# Patient Record
Sex: FEMALE | Race: BLACK OR AFRICAN AMERICAN | NOT HISPANIC OR LATINO | Employment: FULL TIME | RURAL
[De-identification: names, ages, dates, MRNs, and addresses within clinical notes are randomized per-mention and may not be internally consistent; named-entity substitution may affect disease eponyms.]

---

## 2022-11-01 ENCOUNTER — LAB VISIT (OUTPATIENT)
Dept: PRIMARY CARE CLINIC | Facility: CLINIC | Age: 52
End: 2022-11-01

## 2022-11-01 DIAGNOSIS — Z11.1 SCREENING-PULMONARY TB: Primary | ICD-10-CM

## 2022-11-01 PROCEDURE — 86580 TB INTRADERMAL TEST: CPT | Mod: ,,, | Performed by: NURSE PRACTITIONER

## 2022-11-01 PROCEDURE — 86580 POCT TB SKIN TEST: ICD-10-PCS | Mod: ,,, | Performed by: NURSE PRACTITIONER

## 2022-11-01 NOTE — PROGRESS NOTES
Subjective:       Patient ID: Stuart Cloud is a 52 y.o. female.    Chief Complaint: No chief complaint on file.    HPI  Review of Systems      Objective:      Physical Exam    Assessment:       Problem List Items Addressed This Visit    None  Visit Diagnoses       Screening-pulmonary TB    -  Primary              Plan:       TB skin test only

## 2022-11-03 ENCOUNTER — HOSPITAL ENCOUNTER (EMERGENCY)
Facility: HOSPITAL | Age: 52
Discharge: HOME | End: 2022-11-03
Payer: MEDICAID

## 2022-11-03 ENCOUNTER — LAB VISIT (OUTPATIENT)
Dept: PRIMARY CARE CLINIC | Facility: CLINIC | Age: 52
End: 2022-11-03
Payer: MEDICAID

## 2022-11-03 VITALS
DIASTOLIC BLOOD PRESSURE: 77 MMHG | OXYGEN SATURATION: 95 % | SYSTOLIC BLOOD PRESSURE: 135 MMHG | WEIGHT: 248 LBS | RESPIRATION RATE: 18 BRPM | TEMPERATURE: 99 F | HEART RATE: 67 BPM | BODY MASS INDEX: 39.86 KG/M2 | HEIGHT: 66 IN

## 2022-11-03 DIAGNOSIS — Z23 FLU VACCINE NEED: Primary | ICD-10-CM

## 2022-11-03 DIAGNOSIS — M17.11 PRIMARY OSTEOARTHRITIS OF RIGHT KNEE: Primary | ICD-10-CM

## 2022-11-03 DIAGNOSIS — R52 PAIN: ICD-10-CM

## 2022-11-03 LAB
TB INDURATION - 48 HR READ: NORMAL
TB INDURATION - 72 HR READ: NORMAL
TB SKIN TEST - 48 HR READ: NEGATIVE
TB SKIN TEST - 72 HR READ: NORMAL

## 2022-11-03 PROCEDURE — 90686 FLU VACCINE (QUAD) GREATER THAN OR EQUAL TO 3YO PRESERVATIVE FREE IM: ICD-10-PCS | Mod: ,,, | Performed by: NURSE PRACTITIONER

## 2022-11-03 PROCEDURE — 99283 PR EMERGENCY DEPT VISIT,LEVEL III: ICD-10-PCS | Mod: ,,, | Performed by: NURSE PRACTITIONER

## 2022-11-03 PROCEDURE — 99283 EMERGENCY DEPT VISIT LOW MDM: CPT | Mod: ,,, | Performed by: NURSE PRACTITIONER

## 2022-11-03 PROCEDURE — 96372 THER/PROPH/DIAG INJ SC/IM: CPT

## 2022-11-03 PROCEDURE — 90471 FLU VACCINE (QUAD) GREATER THAN OR EQUAL TO 3YO PRESERVATIVE FREE IM: ICD-10-PCS | Mod: ,,, | Performed by: NURSE PRACTITIONER

## 2022-11-03 PROCEDURE — 99284 EMERGENCY DEPT VISIT MOD MDM: CPT

## 2022-11-03 PROCEDURE — 90471 IMMUNIZATION ADMIN: CPT | Mod: ,,, | Performed by: NURSE PRACTITIONER

## 2022-11-03 PROCEDURE — 63600175 PHARM REV CODE 636 W HCPCS: Performed by: NURSE PRACTITIONER

## 2022-11-03 PROCEDURE — 90686 IIV4 VACC NO PRSV 0.5 ML IM: CPT | Mod: ,,, | Performed by: NURSE PRACTITIONER

## 2022-11-03 RX ORDER — IBUPROFEN 800 MG/1
800 TABLET ORAL EVERY 6 HOURS PRN
Qty: 20 TABLET | Refills: 0 | Status: SHIPPED | OUTPATIENT
Start: 2022-11-03 | End: 2023-03-29 | Stop reason: ALTCHOICE

## 2022-11-03 RX ORDER — METHOCARBAMOL 500 MG/1
1000 TABLET, FILM COATED ORAL 3 TIMES DAILY PRN
Qty: 30 TABLET | Refills: 0 | Status: SHIPPED | OUTPATIENT
Start: 2022-11-03 | End: 2022-11-08

## 2022-11-03 RX ORDER — KETOROLAC TROMETHAMINE 30 MG/ML
30 INJECTION, SOLUTION INTRAMUSCULAR; INTRAVENOUS
Status: COMPLETED | OUTPATIENT
Start: 2022-11-03 | End: 2022-11-03

## 2022-11-03 RX ADMIN — KETOROLAC TROMETHAMINE 30 MG: 30 INJECTION, SOLUTION INTRAMUSCULAR at 02:11

## 2022-11-03 NOTE — PROGRESS NOTES
Subjective:       Patient ID: Stuart Colud is a 52 y.o. female.    Chief Complaint: No chief complaint on file.    HPI  Review of Systems      Objective:      Physical Exam    Assessment:       Problem List Items Addressed This Visit    None  Visit Diagnoses       Flu vaccine need    -  Primary    Relevant Orders    Influenza - Quadrivalent *Preferred* (6 months+) (PF)            Plan:       Flu vaccine only

## 2022-11-03 NOTE — ED TRIAGE NOTES
PATIENT PRESENTS TO ER WITH COMPLAINT OF RIGHT KNEE PAIN. REPORTS THAT SHE HAS PAIN AND HAD IT EVALUATED BEFORE BUT JUST GOT MEDICAL INSURANCE AND NEEDS A MRI

## 2022-11-03 NOTE — ED PROVIDER NOTES
Encounter Date: 11/3/2022       History     Chief Complaint   Patient presents with    Knee Pain     52 year old female presents to ED with complaint of right knee pain. She states pain has been chronic to right knee since having repair of right hip approximately 6 months ago. 6 weeks ago she received an injection to her knee that lasted approximately 4 days. She endorses knee instability and locking/popping to knee. She states she is unable to sit with her knee bent due to pain and decreased ROM. Patient is from out of town and is requesting MRI of knee due to previous workup from other ortho and changes in insurance    The history is provided by the patient. No  was used.   Review of patient's allergies indicates:  No Known Allergies  No past medical history on file.  No past surgical history on file.  No family history on file.     Review of Systems   Musculoskeletal:  Positive for arthralgias and gait problem. Negative for joint swelling.   All other systems reviewed and are negative.    Physical Exam     Initial Vitals [11/03/22 1311]   BP Pulse Resp Temp SpO2   122/65 95 19 98.5 °F (36.9 °C) 97 %      MAP       --         Physical Exam    Nursing note and vitals reviewed.  Constitutional: She appears well-developed and well-nourished.   HENT:   Head: Normocephalic and atraumatic.   Eyes: EOM are normal. Pupils are equal, round, and reactive to light.   Neck: Neck supple.   Normal range of motion.  Cardiovascular:  Normal rate and regular rhythm.           Pulmonary/Chest: She has no wheezes. She has no rhonchi.   Abdominal: Abdomen is soft. She exhibits no distension. There is no abdominal tenderness.   Musculoskeletal:         General: Tenderness present. No edema.      Cervical back: Normal range of motion and neck supple.      Right lower leg: Swelling and tenderness present.     Neurological: She is alert and oriented to person, place, and time.   Skin: Skin is warm and dry. Capillary  refill takes less than 2 seconds.   Psychiatric: She has a normal mood and affect. Thought content normal.       Medical Screening Exam   See Full Note    ED Course   Procedures  Labs Reviewed - No data to display       Imaging Results              X-Ray Knee AP LAT with [place] Right (Final result)  Result time 11/03/22 13:38:18   Procedure changed from X-Ray Knee 3 View Right     Final result by Александр Cheng DO (11/03/22 13:38:18)                   Impression:      No acute findings    Mild degenerative change      Electronically signed by: Александр Cheng  Date:    11/03/2022  Time:    13:38               Narrative:    EXAMINATION:  XR KNEE AP LAT WITH [place] RIGHT    CLINICAL HISTORY:  knee pain;Pain, unspecified    TECHNIQUE:  XR KNEE AP LAT WITH SUNRISE RIGHT    COMPARISON:  None    FINDINGS:  No acute fracture or dislocation.    Mild degenerative change    No radiopaque foreign bodies.                                       Medications   ketorolac injection 30 mg (30 mg Intramuscular Given 11/3/22 1407)                       Clinical Impression:   Final diagnoses:  [R52] Pain  [M17.11] Primary osteoarthritis of right knee (Primary)        ED Disposition Condition    Discharge Stable          ED Prescriptions       Medication Sig Dispense Start Date End Date Auth. Provider    methocarbamoL (ROBAXIN) 500 MG Tab Take 2 tablets (1,000 mg total) by mouth 3 (three) times daily as needed (pain). 30 tablet 11/3/2022 11/8/2022 SHANICE Herring    ibuprofen (ADVIL,MOTRIN) 800 MG tablet Take 1 tablet (800 mg total) by mouth every 6 (six) hours as needed for Pain. 20 tablet 11/3/2022 -- SHANICE Herring          Follow-up Information    None          SHANICE Herring  11/03/22 1413       SHANICE Herring  11/03/22 1416

## 2022-11-03 NOTE — Clinical Note
"Bethany"BETHANY" Pedro Luis was seen and treated in our emergency department on 11/3/2022.  She may return to work on 11/07/2022.       If you have any questions or concerns, please don't hesitate to call.      SHANICE Herring"

## 2023-01-22 ENCOUNTER — HOSPITAL ENCOUNTER (EMERGENCY)
Facility: HOSPITAL | Age: 53
Discharge: HOME | End: 2023-01-22
Payer: MEDICAID

## 2023-01-22 VITALS
BODY MASS INDEX: 39.37 KG/M2 | DIASTOLIC BLOOD PRESSURE: 79 MMHG | RESPIRATION RATE: 18 BRPM | SYSTOLIC BLOOD PRESSURE: 147 MMHG | TEMPERATURE: 98 F | HEART RATE: 79 BPM | OXYGEN SATURATION: 98 % | WEIGHT: 245 LBS | HEIGHT: 66 IN

## 2023-01-22 DIAGNOSIS — R05.9 COUGH, UNSPECIFIED TYPE: ICD-10-CM

## 2023-01-22 DIAGNOSIS — U07.1 COVID-19 VIRUS INFECTION: Primary | ICD-10-CM

## 2023-01-22 PROCEDURE — 99283 PR EMERGENCY DEPT VISIT,LEVEL III: ICD-10-PCS | Mod: ,,, | Performed by: NURSE PRACTITIONER

## 2023-01-22 PROCEDURE — 99283 EMERGENCY DEPT VISIT LOW MDM: CPT | Mod: 25

## 2023-01-22 PROCEDURE — 99283 EMERGENCY DEPT VISIT LOW MDM: CPT | Mod: ,,, | Performed by: NURSE PRACTITIONER

## 2023-01-22 RX ORDER — HYDROCHLOROTHIAZIDE 12.5 MG/1
12.5 TABLET ORAL DAILY
COMMUNITY
End: 2023-03-16 | Stop reason: SDUPTHER

## 2023-01-22 RX ORDER — LOSARTAN POTASSIUM 25 MG/1
25 TABLET ORAL DAILY
COMMUNITY
End: 2023-03-16 | Stop reason: SDUPTHER

## 2023-01-22 NOTE — DISCHARGE INSTRUCTIONS
Take Mucinex 600-1200 mg twice a day. Use Flonase nasal spray daily. Take Vitamin C 1000mg daily, Vitamin D3 1000units daily, Zinc 25-50 mg daily, aspirin 325 mg daily. Drink plenty of liquids. Take Tylenol or ibuprofen as needed for pain. Return to the ED for trouble breathing or chest pain.

## 2023-01-22 NOTE — Clinical Note
"Stuart ARDONVENUS" Pedro Luis was seen and treated in our emergency department on 1/22/2023.  She may return to work on 01/25/2023.       If you have any questions or concerns, please don't hesitate to call.      Padmini Faye NP"

## 2023-01-22 NOTE — ED PROVIDER NOTES
Encounter Date: 1/22/2023       History     Chief Complaint   Patient presents with    Cough    COVID-19 Concerns     Presented with c/o persistent cough, burning nose, chills, bodyaches and general malaise. Was diagnosed with COVID 19 on 1/16. Denies dyspnea, abd pain or n/v/d.  was only given 5 days of the paxlovid for COVID 19. (Explained that is normal dosing).  has not been taking Mucinex or vitamin regimen or using flonase. States COVID 19 vaccine previously. Reports that she is concerned about having pneumonia.    Review of patient's allergies indicates:   Allergen Reactions    Tylox [oxycodone-acetaminophen] Itching    Sulfa (sulfonamide antibiotics) Rash    Zithromax z-casimiro [azithromycin] Rash     No past medical history on file.  No past surgical history on file.  No family history on file.     Review of Systems   Constitutional:  Positive for activity change, appetite change, chills and fatigue. Negative for fever.   HENT:  Positive for congestion and ear pain.    Respiratory:  Positive for cough. Negative for shortness of breath and wheezing.    Cardiovascular:  Negative for chest pain.   Gastrointestinal:  Negative for abdominal pain, diarrhea, nausea and vomiting.   Genitourinary: Negative.    Musculoskeletal: Negative.    Skin: Negative.    Neurological: Negative.    Psychiatric/Behavioral: Negative.       Physical Exam     Initial Vitals [01/22/23 1642]   BP Pulse Resp Temp SpO2   (!) 148/77 83 16 98.2 °F (36.8 °C) 100 %      MAP       --         Physical Exam    Nursing note and vitals reviewed.  Constitutional: She appears well-developed and well-nourished.   HENT:   Head: Normocephalic.   Right Ear: External ear normal.   Left Ear: External ear normal.   Nose: Nose normal.   Mouth/Throat: Oropharynx is clear and moist.   Eyes: Conjunctivae and EOM are normal. Pupils are equal, round, and reactive to light.   Neck: Neck supple.   Normal range of motion.  Cardiovascular:  Normal rate,  regular rhythm, normal heart sounds and intact distal pulses.           No murmur heard.  Pulmonary/Chest: Breath sounds normal. No respiratory distress. She has no wheezes. She has no rhonchi. She has no rales. She exhibits no tenderness.   Abdominal: Abdomen is soft. Bowel sounds are normal. There is no abdominal tenderness.   Musculoskeletal:         General: Normal range of motion.      Cervical back: Normal range of motion and neck supple.     Neurological: She is alert and oriented to person, place, and time. She has normal strength. GCS score is 15. GCS eye subscore is 4. GCS verbal subscore is 5. GCS motor subscore is 6.   Skin: Skin is warm and dry. Capillary refill takes less than 2 seconds.   Psychiatric: She has a normal mood and affect.       Medical Screening Exam   See Full Note    ED Course   Procedures  Labs Reviewed - No data to display       Imaging Results              X-Ray Chest PA And Lateral (Final result)  Result time 01/22/23 17:27:03      Final result by Александр Cheng DO (01/22/23 17:27:03)                   Impression:      No acute pulmonary disease      Electronically signed by: Александр Cheng  Date:    01/22/2023  Time:    17:27               Narrative:    EXAMINATION:  XR CHEST PA AND LATERAL    CLINICAL HISTORY:  COVID-19    TECHNIQUE:  XR CHEST PA AND LATERAL    COMPARISON:  None    FINDINGS:  No lines or tubes.    Lungs are clear.    Normal pleura.    Cardiac silhouette is normal    No obvious acute bone findings.                                    X-Rays:   Independently Interpreted Readings:   Chest X-Ray: No acute findings.   Medications - No data to display  Medical Decision Making:   ED Management:  Pt is afebrile. O2 sat is 100% on RA. HR 80s. Lungs are clear bilat. With no wheeze, rhonchi or rales. CXR clear of pneumonia. Instructed to take Mucinex and use Flonase and take the Vit C, Vit D and zinc. Instructed to return to the ED if she develops dyspnea.                  Clinical Impression:   Final diagnoses:  [U07.1] COVID-19 virus infection (Primary)  [R05.9] Cough, unspecified type        ED Disposition Condition    Discharge Stable          ED Prescriptions    None       Follow-up Information       Follow up With Specialties Details Why Contact Info    Gonzalo Aburto MD Family Medicine In 1 day  [address] [place] [address]  [facility] [place] [address]  [phone]               Padmini Faye NP  01/22/23 4488

## 2023-01-25 ENCOUNTER — OFFICE VISIT (OUTPATIENT)
Dept: ORTHOPEDICS | Facility: CLINIC | Age: 53
End: 2023-01-25
Payer: MEDICAID

## 2023-01-25 DIAGNOSIS — M23.91 INTERNAL DERANGEMENT OF KNEE JOINT, RIGHT: ICD-10-CM

## 2023-01-25 DIAGNOSIS — M25.561 ACUTE PAIN OF RIGHT KNEE: Primary | ICD-10-CM

## 2023-01-25 PROCEDURE — 1159F PR MEDICATION LIST DOCUMENTED IN MEDICAL RECORD: ICD-10-PCS | Mod: CPTII,,, | Performed by: ORTHOPAEDIC SURGERY

## 2023-01-25 PROCEDURE — 99203 OFFICE O/P NEW LOW 30 MIN: CPT | Mod: S$PBB,,, | Performed by: ORTHOPAEDIC SURGERY

## 2023-01-25 PROCEDURE — 99203 PR OFFICE/OUTPT VISIT, NEW, LEVL III, 30-44 MIN: ICD-10-PCS | Mod: S$PBB,,, | Performed by: ORTHOPAEDIC SURGERY

## 2023-01-25 PROCEDURE — 1159F MED LIST DOCD IN RCRD: CPT | Mod: CPTII,,, | Performed by: ORTHOPAEDIC SURGERY

## 2023-01-25 PROCEDURE — 99212 OFFICE O/P EST SF 10 MIN: CPT | Mod: PBBFAC | Performed by: ORTHOPAEDIC SURGERY

## 2023-01-25 NOTE — PROGRESS NOTES
Clinic Note        CC:   Chief Complaint   Patient presents with    Right Knee - Pain        Principal problem: Acute pain of right knee [M25.561]     REASON FOR VISIT:       HISTORY:  53-year-old female who has had right knee pain for over 6 months her pain is worse when she stands hurts when she squats she has mechanical symptoms she is done physical therapy prior to moving here from [place] she is having pain over the posterior medial joint line of the knee as at previous injections without relief the symptoms.      PAST MEDICAL HISTORY: History reviewed. No pertinent past medical history.       PAST SURGICAL HISTORY: History reviewed. No pertinent surgical history.       ALLERGIES:   Review of patient's allergies indicates:   Allergen Reactions    Tylox [oxycodone-acetaminophen] Itching    Sulfa (sulfonamide antibiotics) Rash    Zithromax z-casimiro [azithromycin] Rash        MEDICATIONS :    Current Outpatient Medications:     hydroCHLOROthiazide (HYDRODIURIL) 12.5 MG Tab, Take 12.5 mg by mouth once daily., Disp: , Rfl:     ibuprofen (ADVIL,MOTRIN) 800 MG tablet, Take 1 tablet (800 mg total) by mouth every 6 (six) hours as needed for Pain., Disp: 20 tablet, Rfl: 0    losartan (COZAAR) 25 MG tablet, Take 25 mg by mouth once daily., Disp: , Rfl:      SOCIAL HISTORY:   Social History     Socioeconomic History    Marital status: Single          FAMILY HISTORY: History reviewed. No pertinent family history.       PHYSICAL EXAM:  In general, this is a well-developed, well-nourished female . The patient is alert, oriented and cooperative.      HEENT:  Normocephalic, atraumatic.  Extraocular movements are intact bilaterally.  The oropharynx is benign.       NECK:  Nontender with good range of motion.      LUNGS:  Clear to auscultation bilaterally.      HEART:  Demonstrates a regular rate and rhythm.  No murmurs appreciated.      ABDOMEN:  Soft, non-tender, non-distended.        EXTREMITIES:  Right lower extremity  she has motor function 5/5 sensation to touch has palpable pulses tender to palpation of the posterior medial joint line pain on Jacques's testing no instability noted      RADIOGRAPHIC FINDINGS:  X-rays show some degenerative changes more involved medial compartment      IMPRESSION: Acute pain of right knee    Internal derangement of knee joint, right         PLAN:  This time we discussed treatment options and will get an MRI of her right knee she is having mechanical symptoms I suspect a medial meniscus tear I will check her back after the MRI  There are no Patient Instructions on file for this visit.      Follow up in about 6 weeks (around 3/8/2023).         Tacos Aburto      (Subject to voice recognition error, transcription service not allowed)

## 2023-01-26 ENCOUNTER — TELEPHONE (OUTPATIENT)
Dept: ORTHOPEDICS | Facility: CLINIC | Age: 53
End: 2023-01-26
Payer: MEDICAID

## 2023-02-23 ENCOUNTER — TELEPHONE (OUTPATIENT)
Dept: ORTHOPEDICS | Facility: CLINIC | Age: 53
End: 2023-02-23
Payer: MEDICAID

## 2023-02-23 NOTE — TELEPHONE ENCOUNTER
Attempted to call patient with MRI results of right knee.  No answer, voice mail was full and unable to leave a message.

## 2023-02-23 NOTE — TELEPHONE ENCOUNTER
Patient called back and I gave her MRI results of her right knee. MRI shows lateral meniscus tear and Dr. Aburto recommends right knee scope.  Patient agrees and states she needs to check her calendar for when spring break is and she will call back to set up surgery.

## 2023-03-13 ENCOUNTER — HOSPITAL ENCOUNTER (EMERGENCY)
Facility: HOSPITAL | Age: 53
Discharge: HOME | End: 2023-03-13
Payer: MEDICAID

## 2023-03-13 VITALS
RESPIRATION RATE: 17 BRPM | HEIGHT: 66 IN | TEMPERATURE: 98 F | OXYGEN SATURATION: 97 % | DIASTOLIC BLOOD PRESSURE: 84 MMHG | HEART RATE: 62 BPM | BODY MASS INDEX: 41.62 KG/M2 | SYSTOLIC BLOOD PRESSURE: 133 MMHG | WEIGHT: 259 LBS

## 2023-03-13 DIAGNOSIS — K59.00 CONSTIPATION, UNSPECIFIED CONSTIPATION TYPE: Primary | ICD-10-CM

## 2023-03-13 LAB
ALBUMIN SERPL BCP-MCNC: 3.6 G/DL (ref 3.5–5)
ALBUMIN/GLOB SERPL: 1.1 {RATIO}
ALP SERPL-CCNC: 82 U/L (ref 41–108)
ALT SERPL W P-5'-P-CCNC: 29 U/L (ref 13–56)
ANION GAP SERPL CALCULATED.3IONS-SCNC: 10 MMOL/L (ref 7–16)
AST SERPL W P-5'-P-CCNC: 14 U/L (ref 15–37)
BASOPHILS # BLD AUTO: 0.03 K/UL (ref 0–0.2)
BASOPHILS NFR BLD AUTO: 0.6 % (ref 0–1)
BILIRUB SERPL-MCNC: 0.6 MG/DL (ref ?–1.2)
BUN SERPL-MCNC: 14 MG/DL (ref 7–18)
BUN/CREAT SERPL: 20 (ref 6–20)
CALCIUM SERPL-MCNC: 9.1 MG/DL (ref 8.5–10.1)
CHLORIDE SERPL-SCNC: 107 MMOL/L (ref 98–107)
CO2 SERPL-SCNC: 29 MMOL/L (ref 21–32)
CREAT SERPL-MCNC: 0.71 MG/DL (ref 0.55–1.02)
DIFFERENTIAL METHOD BLD: ABNORMAL
EGFR (NO RACE VARIABLE) (RUSH/TITUS): 103 ML/MIN/1.73M²
EOSINOPHIL # BLD AUTO: 0.12 K/UL (ref 0–0.5)
EOSINOPHIL NFR BLD AUTO: 2.5 % (ref 1–4)
ERYTHROCYTE [DISTWIDTH] IN BLOOD BY AUTOMATED COUNT: 12.6 % (ref 11.5–14.5)
GLOBULIN SER-MCNC: 3.2 G/DL (ref 2–4)
GLUCOSE SERPL-MCNC: 113 MG/DL (ref 74–106)
HCT VFR BLD AUTO: 39.2 % (ref 38–47)
HGB BLD-MCNC: 12.9 G/DL (ref 12–16)
IMM GRANULOCYTES # BLD AUTO: 0.01 K/UL (ref 0–0.04)
IMM GRANULOCYTES NFR BLD: 0.2 % (ref 0–0.4)
LYMPHOCYTES # BLD AUTO: 1.64 K/UL (ref 1–4.8)
LYMPHOCYTES NFR BLD AUTO: 34.8 % (ref 27–41)
MCH RBC QN AUTO: 29.3 PG (ref 27–31)
MCHC RBC AUTO-ENTMCNC: 32.9 G/DL (ref 32–36)
MCV RBC AUTO: 88.9 FL (ref 80–96)
MONOCYTES # BLD AUTO: 0.31 K/UL (ref 0–0.8)
MONOCYTES NFR BLD AUTO: 6.6 % (ref 2–6)
MPC BLD CALC-MCNC: 11.3 FL (ref 9.4–12.4)
NEUTROPHILS # BLD AUTO: 2.6 K/UL (ref 1.8–7.7)
NEUTROPHILS NFR BLD AUTO: 55.3 % (ref 53–65)
NRBC # BLD AUTO: 0 X10E3/UL
NRBC, AUTO (.00): 0 %
PLATELET # BLD AUTO: 226 K/UL (ref 150–400)
POTASSIUM SERPL-SCNC: 3.4 MMOL/L (ref 3.5–5.1)
PROT SERPL-MCNC: 6.8 G/DL (ref 6.4–8.2)
RBC # BLD AUTO: 4.41 M/UL (ref 4.2–5.4)
SODIUM SERPL-SCNC: 143 MMOL/L (ref 136–145)
WBC # BLD AUTO: 4.71 K/UL (ref 4.5–11)

## 2023-03-13 PROCEDURE — 99284 PR EMERGENCY DEPT VISIT,LEVEL IV: ICD-10-PCS | Mod: ,,, | Performed by: NURSE PRACTITIONER

## 2023-03-13 PROCEDURE — 99284 EMERGENCY DEPT VISIT MOD MDM: CPT | Mod: ,,, | Performed by: NURSE PRACTITIONER

## 2023-03-13 PROCEDURE — 85025 COMPLETE CBC W/AUTO DIFF WBC: CPT | Performed by: NURSE PRACTITIONER

## 2023-03-13 PROCEDURE — 99285 EMERGENCY DEPT VISIT HI MDM: CPT | Mod: 25

## 2023-03-13 PROCEDURE — 80053 COMPREHEN METABOLIC PANEL: CPT | Performed by: NURSE PRACTITIONER

## 2023-03-13 RX ORDER — LORATADINE 10 MG/1
10 TABLET ORAL DAILY
Qty: 60 TABLET | Refills: 0 | Status: SHIPPED | OUTPATIENT
Start: 2023-03-13 | End: 2023-03-29 | Stop reason: ALTCHOICE

## 2023-03-13 RX ORDER — POLYETHYLENE GLYCOL 3350 17 G/17G
17 POWDER, FOR SOLUTION ORAL DAILY
Qty: 10 PACKET | Refills: 0 | Status: SHIPPED | OUTPATIENT
Start: 2023-03-13 | End: 2023-03-13 | Stop reason: SDUPTHER

## 2023-03-13 RX ORDER — FLUTICASONE PROPIONATE 50 MCG
1 SPRAY, SUSPENSION (ML) NASAL 2 TIMES DAILY PRN
Qty: 15 G | Refills: 0 | Status: SHIPPED | OUTPATIENT
Start: 2023-03-13 | End: 2023-05-01

## 2023-03-13 RX ORDER — POLYETHYLENE GLYCOL 3350 17 G/17G
17 POWDER, FOR SOLUTION ORAL DAILY
Qty: 10 PACKET | Refills: 0 | Status: SHIPPED | OUTPATIENT
Start: 2023-03-13 | End: 2023-06-22 | Stop reason: CLARIF

## 2023-03-13 RX ORDER — FLUTICASONE PROPIONATE 50 MCG
1 SPRAY, SUSPENSION (ML) NASAL 2 TIMES DAILY PRN
Qty: 15 G | Refills: 0 | Status: SHIPPED | OUTPATIENT
Start: 2023-03-13 | End: 2023-03-13 | Stop reason: SDUPTHER

## 2023-03-13 RX ORDER — DOCUSATE SODIUM 100 MG/1
100 CAPSULE, LIQUID FILLED ORAL 2 TIMES DAILY PRN
Qty: 60 CAPSULE | Refills: 0 | Status: SHIPPED | OUTPATIENT
Start: 2023-03-13 | End: 2023-03-13 | Stop reason: SDUPTHER

## 2023-03-13 RX ORDER — DOCUSATE SODIUM 100 MG/1
100 CAPSULE, LIQUID FILLED ORAL 2 TIMES DAILY PRN
Qty: 60 CAPSULE | Refills: 0 | Status: SHIPPED | OUTPATIENT
Start: 2023-03-13 | End: 2023-03-29 | Stop reason: ALTCHOICE

## 2023-03-13 RX ORDER — LORATADINE 10 MG/1
10 TABLET ORAL DAILY
Qty: 60 TABLET | Refills: 0 | Status: SHIPPED | OUTPATIENT
Start: 2023-03-13 | End: 2023-03-13 | Stop reason: SDUPTHER

## 2023-03-13 NOTE — Clinical Note
"Stuart CALDERÓN" Pedro Luis was seen and treated in our emergency department on 3/13/2023.  She may return to work on 03/14/2023.       If you have any questions or concerns, please don't hesitate to call.      Missy WILKINSON    "

## 2023-03-13 NOTE — DISCHARGE INSTRUCTIONS
Drink plenty of fluids.  Eat fresh fruits and vegetables.  Follow up with your primary care provider in 2 days. Return to the emergency department for any increase in symptoms or for any other new or worrisome symptoms.

## 2023-03-16 ENCOUNTER — OFFICE VISIT (OUTPATIENT)
Dept: FAMILY MEDICINE | Facility: CLINIC | Age: 53
End: 2023-03-16
Payer: MEDICAID

## 2023-03-16 VITALS
HEART RATE: 76 BPM | WEIGHT: 293 LBS | BODY MASS INDEX: 47.09 KG/M2 | HEIGHT: 66 IN | DIASTOLIC BLOOD PRESSURE: 82 MMHG | SYSTOLIC BLOOD PRESSURE: 145 MMHG

## 2023-03-16 DIAGNOSIS — E66.01 CLASS 3 SEVERE OBESITY DUE TO EXCESS CALORIES WITH BODY MASS INDEX (BMI) OF 45.0 TO 49.9 IN ADULT, UNSPECIFIED WHETHER SERIOUS COMORBIDITY PRESENT: ICD-10-CM

## 2023-03-16 DIAGNOSIS — I10 ESSENTIAL HYPERTENSION: Primary | ICD-10-CM

## 2023-03-16 DIAGNOSIS — E03.9 ACQUIRED HYPOTHYROIDISM: ICD-10-CM

## 2023-03-16 DIAGNOSIS — R73.9 HYPERGLYCEMIA: ICD-10-CM

## 2023-03-16 PROCEDURE — 3008F PR BODY MASS INDEX (BMI) DOCUMENTED: ICD-10-PCS | Mod: CPTII,,, | Performed by: FAMILY MEDICINE

## 2023-03-16 PROCEDURE — 4010F PR ACE/ARB THEARPY RXD/TAKEN: ICD-10-PCS | Mod: CPTII,,, | Performed by: FAMILY MEDICINE

## 2023-03-16 PROCEDURE — 3008F BODY MASS INDEX DOCD: CPT | Mod: CPTII,,, | Performed by: FAMILY MEDICINE

## 2023-03-16 PROCEDURE — 99204 PR OFFICE/OUTPT VISIT, NEW, LEVL IV, 45-59 MIN: ICD-10-PCS | Mod: ,,, | Performed by: FAMILY MEDICINE

## 2023-03-16 PROCEDURE — 3079F DIAST BP 80-89 MM HG: CPT | Mod: CPTII,,, | Performed by: FAMILY MEDICINE

## 2023-03-16 PROCEDURE — 3077F PR MOST RECENT SYSTOLIC BLOOD PRESSURE >= 140 MM HG: ICD-10-PCS | Mod: CPTII,,, | Performed by: FAMILY MEDICINE

## 2023-03-16 PROCEDURE — 99204 OFFICE O/P NEW MOD 45 MIN: CPT | Mod: ,,, | Performed by: FAMILY MEDICINE

## 2023-03-16 PROCEDURE — 3079F PR MOST RECENT DIASTOLIC BLOOD PRESSURE 80-89 MM HG: ICD-10-PCS | Mod: CPTII,,, | Performed by: FAMILY MEDICINE

## 2023-03-16 PROCEDURE — 1159F PR MEDICATION LIST DOCUMENTED IN MEDICAL RECORD: ICD-10-PCS | Mod: CPTII,,, | Performed by: FAMILY MEDICINE

## 2023-03-16 PROCEDURE — 4010F ACE/ARB THERAPY RXD/TAKEN: CPT | Mod: CPTII,,, | Performed by: FAMILY MEDICINE

## 2023-03-16 PROCEDURE — 3077F SYST BP >= 140 MM HG: CPT | Mod: CPTII,,, | Performed by: FAMILY MEDICINE

## 2023-03-16 PROCEDURE — 1159F MED LIST DOCD IN RCRD: CPT | Mod: CPTII,,, | Performed by: FAMILY MEDICINE

## 2023-03-16 RX ORDER — PHENTERMINE HYDROCHLORIDE 37.5 MG/1
37.5 CAPSULE ORAL EVERY MORNING
Qty: 30 CAPSULE | Refills: 2 | Status: SHIPPED | OUTPATIENT
Start: 2023-03-16 | End: 2023-03-29 | Stop reason: ALTCHOICE

## 2023-03-16 RX ORDER — HYDROCHLOROTHIAZIDE 12.5 MG/1
12.5 TABLET ORAL DAILY
Qty: 30 TABLET | Refills: 2 | Status: SHIPPED | OUTPATIENT
Start: 2023-03-16 | End: 2023-03-29 | Stop reason: ALTCHOICE

## 2023-03-16 RX ORDER — LEVOTHYROXINE SODIUM 25 UG/1
25 TABLET ORAL
Qty: 30 TABLET | Refills: 2 | Status: SHIPPED | OUTPATIENT
Start: 2023-03-16 | End: 2023-05-01 | Stop reason: SDUPTHER

## 2023-03-16 RX ORDER — LEVOTHYROXINE SODIUM 25 UG/1
25 TABLET ORAL
COMMUNITY
Start: 2022-09-22 | End: 2023-03-16 | Stop reason: SDUPTHER

## 2023-03-16 RX ORDER — LOSARTAN POTASSIUM 25 MG/1
25 TABLET ORAL DAILY
Qty: 30 TABLET | Refills: 2 | Status: SHIPPED | OUTPATIENT
Start: 2023-03-16 | End: 2023-05-01 | Stop reason: SDUPTHER

## 2023-03-20 PROBLEM — I10 ESSENTIAL HYPERTENSION: Status: ACTIVE | Noted: 2023-03-20

## 2023-03-20 PROBLEM — E03.9 ACQUIRED HYPOTHYROIDISM: Status: ACTIVE | Noted: 2023-03-20

## 2023-03-20 PROBLEM — R73.9 HYPERGLYCEMIA: Status: ACTIVE | Noted: 2023-03-20

## 2023-03-20 PROBLEM — E66.01 CLASS 3 SEVERE OBESITY DUE TO EXCESS CALORIES WITH BODY MASS INDEX (BMI) OF 45.0 TO 49.9 IN ADULT: Status: ACTIVE | Noted: 2023-03-20

## 2023-03-20 PROBLEM — E66.813 CLASS 3 SEVERE OBESITY DUE TO EXCESS CALORIES WITH BODY MASS INDEX (BMI) OF 45.0 TO 49.9 IN ADULT: Status: ACTIVE | Noted: 2023-03-20

## 2023-03-20 NOTE — ASSESSMENT & PLAN NOTE
Patient requests help with weight loss.  Repeat currently does not qualify for Ozempic.  Will try Adipex 37.5 mg p.o. q.d. for no longer than 6 months.  Can consider adding Topamax to this.

## 2023-03-20 NOTE — PROGRESS NOTES
"              Sarbjit Akins IV, DO  [facility]  [address] [place] [address]  Phone: [phone]      Subjective     Name: Stuart Cloud   Sex: female  YOB: 1970 (53 y.o.)  MRN: [id number]  Visit Date: 03/20/2023   Chief Complaint: Establish Care and Medication Refill (Wants to lose weight/Wants referral to ob/gyn/Gallbladder trouble)        HISTORY OF PRESENT ILLNESS:    Chief Complaint   Patient presents with    Establish Care    Medication Refill     Wants to lose weight  Wants referral to ob/gyn  Gallbladder trouble       HPI  See tests that keep you healthy and the problem oriented documentation below.    Tests to Keep You Healthy    Mammogram: DUE  Colon Cancer Screening: DUE  Cervical Cancer Screening: DUE  Last Blood Pressure <= 139/89 (3/16/2023): NO        Portions of this note may have been created with voice recognition software. Occasional "wrong-word" or "sound-a-like" substitutions may have occurred due to the inherent limitations of voice recognition software. Please, read the note carefully and recognize, using context, where substitutions have occurred.     PAST MEDICAL HISTORY:  Significant Diagnoses - Patient  has a past medical history of Hypertension and Hypothyroidism.  Medications - Patient has a current medication list which includes the following long-term medication(s): hydrochlorothiazide, levothyroxine, loratadine, and losartan.   Allergies - Patient is allergic to tylox [oxycodone-acetaminophen], latex, sulfa (sulfonamide antibiotics), and zithromax z-casimiro [azithromycin].  Surgeries - Patient  has no past surgical history on file.  Family History - Patient family history is not on file.      SOCIAL HISTORY:  Tobacco - Patient  reports that she has never smoked. She has never used smokeless tobacco.   Alcohol - Patient  reports no history of alcohol use.   Recreational Drugs - Patient  reports no history of drug use.       Review of Systems " "  All other systems reviewed and are negative.       Past Medical History:   Diagnosis Date    Hypertension     Hypothyroidism         Review of patient's allergies indicates:   Allergen Reactions    Tylox [oxycodone-acetaminophen] Itching    Latex     Sulfa (sulfonamide antibiotics) Rash    Zithromax z-casimiro [azithromycin] Rash        History reviewed. No pertinent surgical history.     History reviewed. No pertinent family history.    Current Outpatient Medications   Medication Instructions    docusate sodium (COLACE) 100 mg, Oral, 2 times daily PRN    fluticasone propionate (FLONASE) 50 mcg, Each Nostril, 2 times daily PRN    hydroCHLOROthiazide (HYDRODIURIL) 12.5 mg, Oral, Daily    ibuprofen (ADVIL,MOTRIN) 800 mg, Oral, Every 6 hours PRN    levothyroxine (SYNTHROID) 25 mcg, Oral, Before breakfast    loratadine (CLARITIN) 10 mg, Oral, Daily    losartan (COZAAR) 25 mg, Oral, Daily    phentermine 37.5 mg, Oral, Every morning    polyethylene glycol (GLYCOLAX) 17 g, Oral, Daily        Objective     BP (!) 145/82   Pulse 76   Ht 5' 6" (1.676 m)   Wt 134.3 kg (296 lb)   BMI 47.78 kg/m²     Physical Exam  Constitutional:       General: She is not in acute distress.     Appearance: Normal appearance. She is not ill-appearing.   HENT:      Head: Normocephalic and atraumatic.      Right Ear: External ear normal.      Left Ear: External ear normal.   Eyes:      Extraocular Movements: Extraocular movements intact.      Conjunctiva/sclera: Conjunctivae normal.   Cardiovascular:      Rate and Rhythm: Normal rate.      Heart sounds: No murmur heard.  Pulmonary:      Effort: Pulmonary effort is normal.   Musculoskeletal:      Cervical back: Normal range of motion.   Skin:     General: Skin is warm and dry.      Coloration: Skin is not jaundiced.      Findings: No rash.   Neurological:      Mental Status: She is alert.   Psychiatric:         Mood and Affect: Mood normal.        All recently obtained labs have " been reviewed and discussed in detail with the patient.   Assessment     1. Essential hypertension    2. Acquired hypothyroidism    3. Hyperglycemia    4. Class 3 severe obesity due to excess calories with body mass index (BMI) of 45.0 to 49.9 in adult, unspecified whether serious comorbidity present         Plan        Problem List Items Addressed This Visit          Cardiac/Vascular    Essential hypertension - Primary    Relevant Medications    hydroCHLOROthiazide (HYDRODIURIL) 12.5 MG Tab    losartan (COZAAR) 25 MG tablet    Other Relevant Orders    Lipid Panel       Endocrine    Acquired hypothyroidism     Synthroid 25 mcg p.o. q.d.          Relevant Medications    levothyroxine (SYNTHROID) 25 MCG tablet    Hyperglycemia     Hemoglobin A1c for stratification         Relevant Orders    Hemoglobin A1C    Class 3 severe obesity due to excess calories with body mass index (BMI) of 45.0 to 49.9 in adult     Patient requests help with weight loss.  Repeat currently does not qualify for Ozempic.  Will try Adipex 37.5 mg p.o. q.d. for no longer than 6 months.  Can consider adding Topamax to this.         Relevant Medications    phentermine 37.5 MG capsule       No follow-ups on file.    Patient advised that is symptoms worsen, they should call or report directly to local emergency department.    Sarbjit Akins,   [facility]

## 2023-03-29 ENCOUNTER — OFFICE VISIT (OUTPATIENT)
Dept: OBSTETRICS AND GYNECOLOGY | Facility: CLINIC | Age: 53
End: 2023-03-29
Payer: MEDICAID

## 2023-03-29 VITALS
BODY MASS INDEX: 41.27 KG/M2 | HEIGHT: 66 IN | WEIGHT: 256.81 LBS | RESPIRATION RATE: 17 BRPM | HEART RATE: 86 BPM | OXYGEN SATURATION: 99 % | SYSTOLIC BLOOD PRESSURE: 142 MMHG | TEMPERATURE: 99 F | DIASTOLIC BLOOD PRESSURE: 79 MMHG

## 2023-03-29 DIAGNOSIS — E03.9 HYPOTHYROIDISM, UNSPECIFIED TYPE: ICD-10-CM

## 2023-03-29 DIAGNOSIS — R73.9 HYPERGLYCEMIA: ICD-10-CM

## 2023-03-29 DIAGNOSIS — N89.8 VAGINAL DISCHARGE: ICD-10-CM

## 2023-03-29 DIAGNOSIS — Z12.4 SCREENING FOR CERVICAL CANCER: ICD-10-CM

## 2023-03-29 DIAGNOSIS — Z01.411 ENCOUNTER FOR GYNECOLOGICAL EXAMINATION WITH ABNORMAL FINDING: Primary | ICD-10-CM

## 2023-03-29 LAB
CANDIDA SPECIES: NEGATIVE
CHOLEST SERPL-MCNC: 168 MG/DL (ref 0–200)
CHOLEST/HDLC SERPL: 3.2 {RATIO}
GARDNERELLA: NEGATIVE
HDLC SERPL-MCNC: 53 MG/DL (ref 40–60)
LDLC SERPL CALC-MCNC: 100 MG/DL
LDLC/HDLC SERPL: 1.9 {RATIO}
NONHDLC SERPL-MCNC: 115 MG/DL
T3FREE SERPL-MCNC: 2.58 PG/ML (ref 2.18–3.98)
T4 FREE SERPL-MCNC: 0.93 NG/DL (ref 0.76–1.46)
TRICHOMONAS: NEGATIVE
TRIGL SERPL-MCNC: 75 MG/DL (ref 35–150)
TSH SERPL DL<=0.005 MIU/L-ACNC: 1.69 UIU/ML (ref 0.36–3.74)
VLDLC SERPL-MCNC: 15 MG/DL

## 2023-03-29 PROCEDURE — 87624 HPV HI-RISK TYP POOLED RSLT: CPT | Mod: ,,, | Performed by: CLINICAL MEDICAL LABORATORY

## 2023-03-29 PROCEDURE — 1159F MED LIST DOCD IN RCRD: CPT | Mod: CPTII,,, | Performed by: ADVANCED PRACTICE MIDWIFE

## 2023-03-29 PROCEDURE — 84481 FREE ASSAY (FT-3): CPT | Mod: ,,, | Performed by: CLINICAL MEDICAL LABORATORY

## 2023-03-29 PROCEDURE — 87624 HUMAN PAPILLOMAVIRUS (HPV): ICD-10-PCS | Mod: ,,, | Performed by: CLINICAL MEDICAL LABORATORY

## 2023-03-29 PROCEDURE — 84481 T3, FREE: ICD-10-PCS | Mod: ,,, | Performed by: CLINICAL MEDICAL LABORATORY

## 2023-03-29 PROCEDURE — 87510 BACTERIAL VAGINOSIS: ICD-10-PCS | Mod: ,,, | Performed by: CLINICAL MEDICAL LABORATORY

## 2023-03-29 PROCEDURE — 99386 PR PREVENTIVE VISIT,NEW,40-64: ICD-10-PCS | Mod: ,,, | Performed by: ADVANCED PRACTICE MIDWIFE

## 2023-03-29 PROCEDURE — 3044F HG A1C LEVEL LT 7.0%: CPT | Mod: CPTII,,, | Performed by: ADVANCED PRACTICE MIDWIFE

## 2023-03-29 PROCEDURE — 84439 ASSAY OF FREE THYROXINE: CPT | Mod: ,,, | Performed by: CLINICAL MEDICAL LABORATORY

## 2023-03-29 PROCEDURE — 3008F BODY MASS INDEX DOCD: CPT | Mod: CPTII,,, | Performed by: ADVANCED PRACTICE MIDWIFE

## 2023-03-29 PROCEDURE — 87660 BACTERIAL VAGINOSIS: ICD-10-PCS | Mod: ,,, | Performed by: CLINICAL MEDICAL LABORATORY

## 2023-03-29 PROCEDURE — 1159F PR MEDICATION LIST DOCUMENTED IN MEDICAL RECORD: ICD-10-PCS | Mod: CPTII,,, | Performed by: ADVANCED PRACTICE MIDWIFE

## 2023-03-29 PROCEDURE — 83036 HEMOGLOBIN A1C: ICD-10-PCS | Mod: ,,, | Performed by: CLINICAL MEDICAL LABORATORY

## 2023-03-29 PROCEDURE — 87591 CHLAMYDIA/GC, PCR (THINPREP): ICD-10-PCS | Mod: ,,, | Performed by: CLINICAL MEDICAL LABORATORY

## 2023-03-29 PROCEDURE — 4010F ACE/ARB THERAPY RXD/TAKEN: CPT | Mod: CPTII,,, | Performed by: ADVANCED PRACTICE MIDWIFE

## 2023-03-29 PROCEDURE — 3077F PR MOST RECENT SYSTOLIC BLOOD PRESSURE >= 140 MM HG: ICD-10-PCS | Mod: CPTII,,, | Performed by: ADVANCED PRACTICE MIDWIFE

## 2023-03-29 PROCEDURE — 99386 PREV VISIT NEW AGE 40-64: CPT | Mod: ,,, | Performed by: ADVANCED PRACTICE MIDWIFE

## 2023-03-29 PROCEDURE — 3078F PR MOST RECENT DIASTOLIC BLOOD PRESSURE < 80 MM HG: ICD-10-PCS | Mod: CPTII,,, | Performed by: ADVANCED PRACTICE MIDWIFE

## 2023-03-29 PROCEDURE — 87591 N.GONORRHOEAE DNA AMP PROB: CPT | Mod: ,,, | Performed by: CLINICAL MEDICAL LABORATORY

## 2023-03-29 PROCEDURE — 87660 TRICHOMONAS VAGIN DIR PROBE: CPT | Mod: ,,, | Performed by: CLINICAL MEDICAL LABORATORY

## 2023-03-29 PROCEDURE — 84443 TSH: ICD-10-PCS | Mod: ,,, | Performed by: CLINICAL MEDICAL LABORATORY

## 2023-03-29 PROCEDURE — 4010F PR ACE/ARB THEARPY RXD/TAKEN: ICD-10-PCS | Mod: CPTII,,, | Performed by: ADVANCED PRACTICE MIDWIFE

## 2023-03-29 PROCEDURE — 87480 CANDIDA DNA DIR PROBE: CPT | Mod: ,,, | Performed by: CLINICAL MEDICAL LABORATORY

## 2023-03-29 PROCEDURE — 84439 T4, FREE: ICD-10-PCS | Mod: ,,, | Performed by: CLINICAL MEDICAL LABORATORY

## 2023-03-29 PROCEDURE — 84443 ASSAY THYROID STIM HORMONE: CPT | Mod: ,,, | Performed by: CLINICAL MEDICAL LABORATORY

## 2023-03-29 PROCEDURE — 80061 LIPID PANEL: CPT | Mod: ,,, | Performed by: CLINICAL MEDICAL LABORATORY

## 2023-03-29 PROCEDURE — 87491 CHLMYD TRACH DNA AMP PROBE: CPT | Mod: ,,, | Performed by: CLINICAL MEDICAL LABORATORY

## 2023-03-29 PROCEDURE — 83036 HEMOGLOBIN GLYCOSYLATED A1C: CPT | Mod: ,,, | Performed by: CLINICAL MEDICAL LABORATORY

## 2023-03-29 PROCEDURE — 88142 CYTOPATH C/V THIN LAYER: CPT | Mod: TC,GCY | Performed by: ADVANCED PRACTICE MIDWIFE

## 2023-03-29 PROCEDURE — 80061 LIPID PANEL: ICD-10-PCS | Mod: ,,, | Performed by: CLINICAL MEDICAL LABORATORY

## 2023-03-29 PROCEDURE — 3078F DIAST BP <80 MM HG: CPT | Mod: CPTII,,, | Performed by: ADVANCED PRACTICE MIDWIFE

## 2023-03-29 PROCEDURE — 3077F SYST BP >= 140 MM HG: CPT | Mod: CPTII,,, | Performed by: ADVANCED PRACTICE MIDWIFE

## 2023-03-29 PROCEDURE — 87480 BACTERIAL VAGINOSIS: ICD-10-PCS | Mod: ,,, | Performed by: CLINICAL MEDICAL LABORATORY

## 2023-03-29 PROCEDURE — 3008F PR BODY MASS INDEX (BMI) DOCUMENTED: ICD-10-PCS | Mod: CPTII,,, | Performed by: ADVANCED PRACTICE MIDWIFE

## 2023-03-29 PROCEDURE — 87510 GARDNER VAG DNA DIR PROBE: CPT | Mod: ,,, | Performed by: CLINICAL MEDICAL LABORATORY

## 2023-03-29 PROCEDURE — 87491 CHLAMYDIA/GC, PCR (THINPREP): ICD-10-PCS | Mod: ,,, | Performed by: CLINICAL MEDICAL LABORATORY

## 2023-03-29 PROCEDURE — 3044F PR MOST RECENT HEMOGLOBIN A1C LEVEL <7.0%: ICD-10-PCS | Mod: CPTII,,, | Performed by: ADVANCED PRACTICE MIDWIFE

## 2023-03-29 NOTE — LETTER
March 29, 2023      [facility] - Obstetrics and Gynecology  [address] [place] [id number]  Phone: [phone]  Fax: [phone]       Patient: Stuart Cloud   YOB: 1970  Date of Visit: 03/29/2023    To Whom It May Concern:    YOLANDA Cloud  was at [facility] on 03/29/2023. The patient may return to work/school on 3/30/2023 with no restrictions. If you have any questions or concerns, or if I can be of further assistance, please do not hesitate to contact me.    Sincerely,    Binta De La Cruz LPN

## 2023-03-29 NOTE — PROGRESS NOTES
Subjective:      Patient ID: Stuart Cloud is a 53 y.o. female.    Chief Complaint:  Vaginal Bleeding (Spotting every now and then) and Annual Exam      History of Present Illness  Ms Cloud is here for an gyn exam.  She has had a hysterectomy but has her ovaries.  She had a mammogram last August and it was normal.  She notices blood on the tissue when she wipes on and ff for about 8 months.  She had the hysterectomy because of fibroids and endometriosis.  She recently had an endoscopy and colonoscopy and they were normal.  She has started having hot flashes.  She has pain down her legs and difficulty walking.   She has had a knee replacement.  BMI is 41.  2  Annual Exam-Postmenopausal  Patient presents for annual exam. The patient has  c/o spotting every not and then today. The patient is not currently sexually active. GYN screening history: last pap: patient does not recall results of last pap. The patient is not taking hormone replacement therapy. Patient denies post-menopausal vaginal bleeding. Vaginal bleeding has been going on for 8 months. The patient wears seatbelts: yes. The patient participates in regular exercise: no. Has the patient ever been transfused or tattooed?: yes. The patient reports that there is not domestic violence in her life.    GYN & OB History  No LMP recorded. Patient has had a hysterectomy.   Date of Last Pap: No result found    OB History    Para Term  AB Living   6 4 4   2 4   SAB IAB Ectopic Multiple Live Births   1       4      # Outcome Date GA Lbr Andrea/2nd Weight Sex Delivery Anes PTL Lv   6 AB            5 SAB            4 Term            3 Term            2 Term            1 Term                Review of Systems  Review of Systems   Constitutional:  Positive for appetite change and fatigue.   HENT: Negative.     Respiratory: Negative.     Cardiovascular: Negative.    Gastrointestinal: Negative.  Negative for anal bleeding, constipation and diarrhea.    Genitourinary:  Positive for vaginal bleeding.   Musculoskeletal:  Positive for arthralgias, gait problem and joint swelling.   Skin:  Positive for color change.      Objective:    Physical Exam   Constitutional: She is oriented to person, place, and time. She appears well-developed. No distress.   HENT:   Head: Normocephalic.   Nose: Nose normal.   Neck: No thyromegaly present.   Cardiovascular: Normal rate, regular rhythm and normal heart sounds.   Pulmonary/Chest: Effort normal and breath sounds normal. She exhibits no mass, no tenderness, no laceration and no retraction. Right breast exhibits no inverted nipple, no mass, no nipple discharge, no skin change and no tenderness. Left breast exhibits no inverted nipple, no mass, no nipple discharge, no skin change and no tenderness.   Abdominal: Soft. She exhibits no distension and no mass. There is no abdominal tenderness. There is no guarding.   Genitourinary: Pelvic exam was performed with patient supine. Rectum normal and skenes normal. Right labia normal and left labia normal. Right bartholin is not tender. Left bartholin is not tender. No bleeding or atrophy in the vagina. Right adnexum displays no mass. Left adnexum displays no mass. Cervix exhibits no friability. Also,  pap smear completed  Uterus is absent.    Genitourinary Comments: She has a cervix and the uterus is absent.  Pap collected.     Musculoskeletal:      Cervical back: Neck supple.   Neurological: She is alert and oriented to person, place, and time.   Skin: Skin is warm.   Psychiatric: She has a normal mood and affect. Her behavior is normal.      Assessment:     1. Encounter for gynecological examination with abnormal finding    2. Body mass index (BMI) 45.0-49.9, adult    3. Hypothyroidism, unspecified type    4. Hyperglycemia    5. Screening for cervical cancer    6. Vaginal discharge            Plan:   Call results of testing  Healthy diet discussed  U/S of the pelvis  Discussed that she  must have had a supracervical hysterectomy  Consider HRT if hot flashes are severe.

## 2023-03-30 ENCOUNTER — TELEPHONE (OUTPATIENT)
Dept: OBSTETRICS AND GYNECOLOGY | Facility: CLINIC | Age: 53
End: 2023-03-30
Payer: MEDICAID

## 2023-03-30 LAB
EST. AVERAGE GLUCOSE BLD GHB EST-MCNC: 100 MG/DL
HBA1C MFR BLD HPLC: 5.6 % (ref 4.5–6.6)

## 2023-03-30 NOTE — TELEPHONE ENCOUNTER
----- Message from Michelle Hollins CNM sent at 3/30/2023 11:09 AM CDT -----  Review with pt.as neg. Affirm, thyroid panel and lipids are within normal limits.  AIC and pap not resulted.

## 2023-03-31 ENCOUNTER — TELEPHONE (OUTPATIENT)
Dept: OBSTETRICS AND GYNECOLOGY | Facility: CLINIC | Age: 53
End: 2023-03-31
Payer: MEDICAID

## 2023-03-31 DIAGNOSIS — R23.2 HOT FLASHES: Primary | ICD-10-CM

## 2023-04-01 LAB
GH SERPL-MCNC: NORMAL NG/ML
INSULIN SERPL-ACNC: NORMAL U[IU]/ML
LAB AP CLINICAL INFORMATION: NORMAL
LAB AP GYN INTERPRETATION: NEGATIVE
LAB AP PAP DISCLAIMER COMMENTS: NORMAL
RENIN PLAS-CCNC: NORMAL NG/ML/H

## 2023-04-04 ENCOUNTER — TELEPHONE (OUTPATIENT)
Dept: OBSTETRICS AND GYNECOLOGY | Facility: CLINIC | Age: 53
End: 2023-04-04
Payer: MEDICAID

## 2023-04-04 DIAGNOSIS — Z91.89 AT RISK FOR ACUTE ISCHEMIC CARDIAC EVENT: Primary | ICD-10-CM

## 2023-04-04 LAB
CHLAMYDIA BY PCR: NEGATIVE
HPV 16: NEGATIVE
HPV 18: NEGATIVE
HPV OTHER: NEGATIVE
N. GONORRHOEAE (GC) BY PCR: NEGATIVE

## 2023-04-04 RX ORDER — ATORVASTATIN CALCIUM 20 MG/1
20 TABLET, FILM COATED ORAL DAILY
Qty: 90 TABLET | Refills: 3 | Status: SHIPPED | OUTPATIENT
Start: 2023-04-04 | End: 2023-05-01

## 2023-04-04 NOTE — TELEPHONE ENCOUNTER
----- Message from Michelle Hollins CNM sent at 4/4/2023  8:35 AM CDT -----  Review with pt.as normal pap, neg HPV.  Needs annual exam and repeat pap in 5 years.  Negative GC/CT.  She is suppose to come back in for an FSH.

## 2023-04-04 NOTE — ASSESSMENT & PLAN NOTE
Nondiabetic with a ASCVD risk score indicating benefit from moderate intensity statin like atorvastatin 20 mg p.o. q.h.s.    The 10-year ASCVD risk score (Homero ROBISON, et al., 2019) is: 5%    Values used to calculate the score:      Age: 53 years      Sex: Female      Is Non- : Yes      Diabetic: No      Tobacco smoker: No      Systolic Blood Pressure: 142 mmHg      Is BP treated: Yes      HDL Cholesterol: 53 mg/dL      Total Cholesterol: 168 mg/dL

## 2023-04-05 ENCOUNTER — OFFICE VISIT (OUTPATIENT)
Dept: FAMILY MEDICINE | Facility: CLINIC | Age: 53
End: 2023-04-05
Payer: MEDICAID

## 2023-04-05 ENCOUNTER — HOSPITAL ENCOUNTER (OUTPATIENT)
Dept: RADIOLOGY | Facility: HOSPITAL | Age: 53
Discharge: HOME | End: 2023-04-05
Attending: ADVANCED PRACTICE MIDWIFE
Payer: MEDICAID

## 2023-04-05 VITALS
HEIGHT: 66 IN | HEART RATE: 83 BPM | SYSTOLIC BLOOD PRESSURE: 108 MMHG | DIASTOLIC BLOOD PRESSURE: 66 MMHG | BODY MASS INDEX: 41.46 KG/M2 | WEIGHT: 258 LBS

## 2023-04-05 DIAGNOSIS — I10 ESSENTIAL HYPERTENSION: ICD-10-CM

## 2023-04-05 DIAGNOSIS — E66.01 CLASS 3 SEVERE OBESITY DUE TO EXCESS CALORIES WITH SERIOUS COMORBIDITY AND BODY MASS INDEX (BMI) OF 45.0 TO 49.9 IN ADULT: ICD-10-CM

## 2023-04-05 DIAGNOSIS — E03.9 HYPOTHYROIDISM, UNSPECIFIED TYPE: ICD-10-CM

## 2023-04-05 DIAGNOSIS — M54.50 ACUTE BILATERAL LOW BACK PAIN WITHOUT SCIATICA: Primary | ICD-10-CM

## 2023-04-05 PROCEDURE — 3078F PR MOST RECENT DIASTOLIC BLOOD PRESSURE < 80 MM HG: ICD-10-PCS | Mod: CPTII,,, | Performed by: FAMILY MEDICINE

## 2023-04-05 PROCEDURE — 3008F BODY MASS INDEX DOCD: CPT | Mod: CPTII,,, | Performed by: FAMILY MEDICINE

## 2023-04-05 PROCEDURE — 76830 TRANSVAGINAL US NON-OB: CPT | Mod: 26,,, | Performed by: RADIOLOGY

## 2023-04-05 PROCEDURE — 1159F PR MEDICATION LIST DOCUMENTED IN MEDICAL RECORD: ICD-10-PCS | Mod: CPTII,,, | Performed by: FAMILY MEDICINE

## 2023-04-05 PROCEDURE — 99214 PR OFFICE/OUTPT VISIT, EST, LEVL IV, 30-39 MIN: ICD-10-PCS | Mod: 25,,, | Performed by: FAMILY MEDICINE

## 2023-04-05 PROCEDURE — 96372 PR INJECTION,THERAP/PROPH/DIAG2ST, IM OR SUBCUT: ICD-10-PCS | Mod: ,,, | Performed by: FAMILY MEDICINE

## 2023-04-05 PROCEDURE — 96372 THER/PROPH/DIAG INJ SC/IM: CPT | Mod: ,,, | Performed by: FAMILY MEDICINE

## 2023-04-05 PROCEDURE — 76830 US PELVIS COMP WITH TRANSVAG NON-OB (XPD): ICD-10-PCS | Mod: 26,,, | Performed by: RADIOLOGY

## 2023-04-05 PROCEDURE — 76856 US PELVIS COMP WITH TRANSVAG NON-OB (XPD): ICD-10-PCS | Mod: 26,,, | Performed by: RADIOLOGY

## 2023-04-05 PROCEDURE — 3044F PR MOST RECENT HEMOGLOBIN A1C LEVEL <7.0%: ICD-10-PCS | Mod: CPTII,,, | Performed by: FAMILY MEDICINE

## 2023-04-05 PROCEDURE — 3074F SYST BP LT 130 MM HG: CPT | Mod: CPTII,,, | Performed by: FAMILY MEDICINE

## 2023-04-05 PROCEDURE — 4010F PR ACE/ARB THEARPY RXD/TAKEN: ICD-10-PCS | Mod: CPTII,,, | Performed by: FAMILY MEDICINE

## 2023-04-05 PROCEDURE — 3074F PR MOST RECENT SYSTOLIC BLOOD PRESSURE < 130 MM HG: ICD-10-PCS | Mod: CPTII,,, | Performed by: FAMILY MEDICINE

## 2023-04-05 PROCEDURE — 3044F HG A1C LEVEL LT 7.0%: CPT | Mod: CPTII,,, | Performed by: FAMILY MEDICINE

## 2023-04-05 PROCEDURE — 76856 US EXAM PELVIC COMPLETE: CPT | Mod: TC

## 2023-04-05 PROCEDURE — 4010F ACE/ARB THERAPY RXD/TAKEN: CPT | Mod: CPTII,,, | Performed by: FAMILY MEDICINE

## 2023-04-05 PROCEDURE — 3078F DIAST BP <80 MM HG: CPT | Mod: CPTII,,, | Performed by: FAMILY MEDICINE

## 2023-04-05 PROCEDURE — 1159F MED LIST DOCD IN RCRD: CPT | Mod: CPTII,,, | Performed by: FAMILY MEDICINE

## 2023-04-05 PROCEDURE — 3008F PR BODY MASS INDEX (BMI) DOCUMENTED: ICD-10-PCS | Mod: CPTII,,, | Performed by: FAMILY MEDICINE

## 2023-04-05 PROCEDURE — 99214 OFFICE O/P EST MOD 30 MIN: CPT | Mod: 25,,, | Performed by: FAMILY MEDICINE

## 2023-04-05 PROCEDURE — 76856 US EXAM PELVIC COMPLETE: CPT | Mod: 26,,, | Performed by: RADIOLOGY

## 2023-04-05 RX ORDER — KETOROLAC TROMETHAMINE 10 MG/1
10 TABLET, FILM COATED ORAL EVERY 6 HOURS
Qty: 20 TABLET | Refills: 0 | Status: SHIPPED | OUTPATIENT
Start: 2023-04-05 | End: 2023-04-10

## 2023-04-05 RX ORDER — KETOROLAC TROMETHAMINE 30 MG/ML
60 INJECTION, SOLUTION INTRAMUSCULAR; INTRAVENOUS
Status: COMPLETED | OUTPATIENT
Start: 2023-04-05 | End: 2023-04-05

## 2023-04-05 RX ORDER — IBUPROFEN 800 MG/1
800 TABLET ORAL 3 TIMES DAILY
Qty: 30 TABLET | Refills: 0 | Status: SHIPPED | OUTPATIENT
Start: 2023-04-05 | End: 2023-04-15

## 2023-04-05 RX ORDER — CYCLOBENZAPRINE HCL 5 MG
5 TABLET ORAL 3 TIMES DAILY PRN
Qty: 30 TABLET | Refills: 0 | Status: SHIPPED | OUTPATIENT
Start: 2023-04-05 | End: 2023-04-15

## 2023-04-05 RX ADMIN — KETOROLAC TROMETHAMINE 60 MG: 30 INJECTION, SOLUTION INTRAMUSCULAR; INTRAVENOUS at 01:04

## 2023-04-05 NOTE — ASSESSMENT & PLAN NOTE
Patient complains of acute bilateral low back pain.  She states that it does go down her leg but it is not sciatica.  We will be supporting her with a intramuscular injection of Toradol 60 mg in the office today I will also send her out with a 5 day supply of Toradol the be taking q.6 hours p.r.n. for pain.  Patient also requests a small supply of ibuprofen 800 mg as well as Flexeril which has helped in the past.  Patient was advised not to take the ibuprofen with the Toradol.  She was also advised not to take for Toradol for more than 5 days.  Kidney function reviewed and is more than adequate.

## 2023-04-05 NOTE — PROGRESS NOTES
"              Sarbjit Akins IV, DO  [facility]  [address] [place] [address]  Phone: [phone]      Subjective     Name: Stuart Cloud   Sex: female  YOB: 1970 (53 y.o.)  MRN: [id number]  Visit Date: 04/05/2023   Chief Complaint: Back Pain and Hip Pain (Right hip/X 2 days)        HISTORY OF PRESENT ILLNESS:    Chief Complaint   Patient presents with    Back Pain    Hip Pain     Right hip  X 2 days       HPI  See tests that keep you healthy and the problem oriented documentation below.    Tests to Keep You Healthy    Mammogram: DUE  Colon Cancer Screening: DUE  Last Blood Pressure <= 139/89 (4/5/2023): NO        Portions of this note may have been created with voice recognition software. Occasional "wrong-word" or "sound-a-like" substitutions may have occurred due to the inherent limitations of voice recognition software. Please, read the note carefully and recognize, using context, where substitutions have occurred.     PAST MEDICAL HISTORY:  Significant Diagnoses - Patient  has a past medical history of Hypertension and Hypothyroidism.  Medications - Patient has a current medication list which includes the following long-term medication(s): atorvastatin, levothyroxine, and losartan.   Allergies - Patient is allergic to percocet [oxycodone-acetaminophen], latex, sulfa (sulfonamide antibiotics), and zithromax z-casimiro [azithromycin].  Surgeries - Patient  has a past surgical history that includes Hysterectomy; lumpectomy, breast; right hip replacement (Right); and left knee replacement (Left).  Family History - Patient family history includes Diabetes in her mother; Hypertension in her maternal grandmother and mother; Stroke in her maternal grandfather, maternal grandmother, and mother.      SOCIAL HISTORY:  Tobacco - Patient  reports that she has never smoked. She has never used smokeless tobacco.   Alcohol - Patient  reports no history of alcohol use.   Recreational " "Drugs - Patient  reports no history of drug use.       Review of Systems   All other systems reviewed and are negative.       Past Medical History:   Diagnosis Date    Hypertension     Hypothyroidism         Review of patient's allergies indicates:   Allergen Reactions    Percocet [oxycodone-acetaminophen] Itching    Latex     Sulfa (sulfonamide antibiotics) Rash    Zithromax z-casimiro [azithromycin] Rash        Past Surgical History:   Procedure Laterality Date    HYSTERECTOMY      left knee replacement Left     LUMPECTOMY, BREAST      right hip replacement Right         Family History   Problem Relation Age of Onset    Hypertension Maternal Grandmother     Stroke Maternal Grandmother     Stroke Maternal Grandfather     Hypertension Mother     Stroke Mother     Diabetes Mother        Current Outpatient Medications   Medication Instructions    atorvastatin (LIPITOR) 20 mg, Oral, Daily    cyclobenzaprine (FLEXERIL) 5 mg, Oral, 3 times daily PRN    fluticasone propionate (FLONASE) 50 mcg, Each Nostril, 2 times daily PRN    ibuprofen (ADVIL,MOTRIN) 800 mg, Oral, 3 times daily    ketorolac (TORADOL) 10 mg, Oral, Every 6 hours    levothyroxine (SYNTHROID) 25 mcg, Oral, Before breakfast    losartan (COZAAR) 25 mg, Oral, Daily    polyethylene glycol (GLYCOLAX) 17 g, Oral, Daily        Objective     /66 (BP Location: Left arm, Patient Position: Sitting)   Pulse 83   Ht 5' 6" (1.676 m)   Wt 117 kg (258 lb)   BMI 41.64 kg/m²     Physical Exam  Constitutional:       General: She is not in acute distress.     Appearance: Normal appearance. She is not ill-appearing.   HENT:      Head: Normocephalic and atraumatic.      Right Ear: External ear normal.      Left Ear: External ear normal.   Eyes:      Extraocular Movements: Extraocular movements intact.      Conjunctiva/sclera: Conjunctivae normal.   Cardiovascular:      Rate and Rhythm: Normal rate.      Heart sounds: No murmur heard.  Pulmonary:      " Effort: Pulmonary effort is normal.   Musculoskeletal:      Cervical back: Normal range of motion.   Skin:     General: Skin is warm and dry.      Coloration: Skin is not jaundiced.      Findings: No rash.   Neurological:      Mental Status: She is alert.   Psychiatric:         Mood and Affect: Mood normal.        All recently obtained labs have been reviewed and discussed in detail with the patient.   Assessment     1. Acute bilateral low back pain without sciatica         Plan        Problem List Items Addressed This Visit          Orthopedic    Acute bilateral low back pain without sciatica - Primary     Patient complains of acute bilateral low back pain.  She states that it does go down her leg but it is not sciatica.  We will be supporting her with a intramuscular injection of Toradol 60 mg in the office today I will also send her out with a 5 day supply of Toradol the be taking q.6 hours p.r.n. for pain.  Patient also requests a small supply of ibuprofen 800 mg as well as Flexeril which has helped in the past.  Patient was advised not to take the ibuprofen with the Toradol.  She was also advised not to take for Toradol for more than 5 days.  Kidney function reviewed and is more than adequate.           Relevant Medications    ketorolac injection 60 mg (Start on 4/5/2023  2:00 PM)    ketorolac (TORADOL) 10 mg tablet    cyclobenzaprine (FLEXERIL) 5 MG tablet    ibuprofen (ADVIL,MOTRIN) 800 MG tablet       No follow-ups on file.    Patient advised that is symptoms worsen, they should call or report directly to local emergency department.    Sarbjit Akins DO  [facility]

## 2023-04-10 ENCOUNTER — TELEPHONE (OUTPATIENT)
Dept: OBSTETRICS AND GYNECOLOGY | Facility: CLINIC | Age: 53
End: 2023-04-10
Payer: MEDICAID

## 2023-04-10 NOTE — TELEPHONE ENCOUNTER
----- Message from Michelle Hollins CNM sent at 4/10/2023  3:38 PM CDT -----  Review with pt.as normal.  They think she has a left ovary.They did see her cervix but uterus is absent.

## 2023-04-17 ENCOUNTER — TELEPHONE (OUTPATIENT)
Dept: OBSTETRICS AND GYNECOLOGY | Facility: CLINIC | Age: 53
End: 2023-04-17
Payer: MEDICAID

## 2023-04-17 NOTE — TELEPHONE ENCOUNTER
----- Message from Michelle Hollins CNM sent at 4/16/2023  3:02 PM CDT -----  Review with pt.as FSH elevated.  Doesn't she want estrogen to treat the hot flashes

## 2023-04-28 ENCOUNTER — TELEPHONE (OUTPATIENT)
Dept: ORTHOPEDICS | Facility: CLINIC | Age: 53
End: 2023-04-28
Payer: MEDICAID

## 2023-04-28 NOTE — TELEPHONE ENCOUNTER
----- Message from Tammy Gonzalez sent at 4/21/2023 11:18 AM CDT -----  Regarding: PROCEDURE  PATIENT SAYS SHE IS READY TO SCHEDULE PROCEDURE. CALL BACK NUMBER IS [phone].

## 2023-05-01 ENCOUNTER — OFFICE VISIT (OUTPATIENT)
Dept: FAMILY MEDICINE | Facility: CLINIC | Age: 53
End: 2023-05-01
Payer: MEDICAID

## 2023-05-01 VITALS
SYSTOLIC BLOOD PRESSURE: 115 MMHG | DIASTOLIC BLOOD PRESSURE: 70 MMHG | BODY MASS INDEX: 41.14 KG/M2 | HEIGHT: 66 IN | HEART RATE: 85 BPM | WEIGHT: 256 LBS

## 2023-05-01 DIAGNOSIS — Z12.11 SCREENING FOR MALIGNANT NEOPLASM OF COLON: ICD-10-CM

## 2023-05-01 DIAGNOSIS — I10 ESSENTIAL HYPERTENSION: ICD-10-CM

## 2023-05-01 DIAGNOSIS — Z87.11 HISTORY OF GASTRIC ULCER: Primary | ICD-10-CM

## 2023-05-01 DIAGNOSIS — E03.9 ACQUIRED HYPOTHYROIDISM: ICD-10-CM

## 2023-05-01 DIAGNOSIS — Z12.31 ENCOUNTER FOR SCREENING MAMMOGRAM FOR MALIGNANT NEOPLASM OF BREAST: ICD-10-CM

## 2023-05-01 PROCEDURE — 99213 OFFICE O/P EST LOW 20 MIN: CPT | Mod: ,,, | Performed by: FAMILY MEDICINE

## 2023-05-01 PROCEDURE — 1159F MED LIST DOCD IN RCRD: CPT | Mod: CPTII,,, | Performed by: FAMILY MEDICINE

## 2023-05-01 PROCEDURE — 1159F PR MEDICATION LIST DOCUMENTED IN MEDICAL RECORD: ICD-10-PCS | Mod: CPTII,,, | Performed by: FAMILY MEDICINE

## 2023-05-01 PROCEDURE — 4010F PR ACE/ARB THEARPY RXD/TAKEN: ICD-10-PCS | Mod: CPTII,,, | Performed by: FAMILY MEDICINE

## 2023-05-01 PROCEDURE — 3074F PR MOST RECENT SYSTOLIC BLOOD PRESSURE < 130 MM HG: ICD-10-PCS | Mod: CPTII,,, | Performed by: FAMILY MEDICINE

## 2023-05-01 PROCEDURE — 3008F PR BODY MASS INDEX (BMI) DOCUMENTED: ICD-10-PCS | Mod: CPTII,,, | Performed by: FAMILY MEDICINE

## 2023-05-01 PROCEDURE — 3074F SYST BP LT 130 MM HG: CPT | Mod: CPTII,,, | Performed by: FAMILY MEDICINE

## 2023-05-01 PROCEDURE — 3044F PR MOST RECENT HEMOGLOBIN A1C LEVEL <7.0%: ICD-10-PCS | Mod: CPTII,,, | Performed by: FAMILY MEDICINE

## 2023-05-01 PROCEDURE — 3044F HG A1C LEVEL LT 7.0%: CPT | Mod: CPTII,,, | Performed by: FAMILY MEDICINE

## 2023-05-01 PROCEDURE — 4010F ACE/ARB THERAPY RXD/TAKEN: CPT | Mod: CPTII,,, | Performed by: FAMILY MEDICINE

## 2023-05-01 PROCEDURE — 3008F BODY MASS INDEX DOCD: CPT | Mod: CPTII,,, | Performed by: FAMILY MEDICINE

## 2023-05-01 PROCEDURE — 3078F PR MOST RECENT DIASTOLIC BLOOD PRESSURE < 80 MM HG: ICD-10-PCS | Mod: CPTII,,, | Performed by: FAMILY MEDICINE

## 2023-05-01 PROCEDURE — 3078F DIAST BP <80 MM HG: CPT | Mod: CPTII,,, | Performed by: FAMILY MEDICINE

## 2023-05-01 PROCEDURE — 99213 PR OFFICE/OUTPT VISIT, EST, LEVL III, 20-29 MIN: ICD-10-PCS | Mod: ,,, | Performed by: FAMILY MEDICINE

## 2023-05-01 RX ORDER — HYDROCHLOROTHIAZIDE 12.5 MG/1
12.5 TABLET ORAL DAILY
Qty: 30 TABLET | Refills: 2 | Status: SHIPPED | OUTPATIENT
Start: 2023-05-01 | End: 2023-08-17

## 2023-05-01 RX ORDER — LEVOTHYROXINE SODIUM 25 UG/1
25 TABLET ORAL
Qty: 30 TABLET | Refills: 2 | Status: SHIPPED | OUTPATIENT
Start: 2023-05-01 | End: 2023-08-17 | Stop reason: SDUPTHER

## 2023-05-01 RX ORDER — HYDROCHLOROTHIAZIDE 12.5 MG/1
12.5 TABLET ORAL DAILY
COMMUNITY
Start: 2023-04-23 | End: 2023-05-01 | Stop reason: SDUPTHER

## 2023-05-01 RX ORDER — LOSARTAN POTASSIUM 25 MG/1
25 TABLET ORAL DAILY
Qty: 30 TABLET | Refills: 2 | Status: SHIPPED | OUTPATIENT
Start: 2023-05-01 | End: 2023-08-17

## 2023-05-01 NOTE — ASSESSMENT & PLAN NOTE
[facility]  Patient request consult to Gastroenterology for screening colonoscopy as well as EGD for history of gastric/duodenal ulcer.  Patient is not sure which.  States that she was never treated in told that the the ulcer would be just fine.

## 2023-05-01 NOTE — LETTER
May 1, 2023      [facility]  [address] [place] [address]  Phone: [phone]  Fax: [phone]       Patient: Stuart Cloud   YOB: 1970  Date of Visit: 05/01/2023    To Whom It May Concern:    YOLANDA Cloud  was at [facility] on 05/01/2023. The patient may return to work/school on 05/03/2023 with no restrictions. If you have any questions or concerns, or if I can be of further assistance, please do not hesitate to contact me.    Sincerely,    Gosia Gonzales RN

## 2023-05-01 NOTE — PROGRESS NOTES
"              Sarbjit Akins IV, DO  [facility]  [address] [place] [address]  Phone: [phone]      Subjective     Name: Stuart Cloud   Sex: female  YOB: 1970 (53 y.o.)  MRN: [id number]  Visit Date: 05/01/2023   Chief Complaint: left eye red (Itches, burns/Started this am)        HISTORY OF PRESENT ILLNESS:    Chief Complaint   Patient presents with    left eye red     Itches, burns  Started this am       HPI  See tests that keep you healthy and the problem oriented documentation below.    Tests to Keep You Healthy    Mammogram: ORDERED BUT NOT SCHEDULED  Colon Cancer Screening: DUE  Last Blood Pressure <= 139/89 (5/1/2023): Yes        Portions of this note may have been created with voice recognition software. Occasional "wrong-word" or "sound-a-like" substitutions may have occurred due to the inherent limitations of voice recognition software. Please, read the note carefully and recognize, using context, where substitutions have occurred.     PAST MEDICAL HISTORY:  Significant Diagnoses - Patient  has a past medical history of Hypertension and Hypothyroidism.  Medications - Patient has a current medication list which includes the following long-term medication(s): hydrochlorothiazide, levothyroxine, and losartan.   Allergies - Patient is allergic to percocet [oxycodone-acetaminophen], latex, sulfa (sulfonamide antibiotics), and zithromax z-casimiro [azithromycin].  Surgeries - Patient  has a past surgical history that includes Hysterectomy; lumpectomy, breast; right hip replacement (Right); and left knee replacement (Left).  Family History - Patient family history includes Diabetes in her mother; Hypertension in her maternal grandmother and mother; Stroke in her maternal grandfather, maternal grandmother, and mother.      SOCIAL HISTORY:  Tobacco - Patient  reports that she has never smoked. She has never used smokeless tobacco.   Alcohol - Patient  reports no history " "of alcohol use.   Recreational Drugs - Patient  reports no history of drug use.       Review of Systems   All other systems reviewed and are negative.       Past Medical History:   Diagnosis Date    Hypertension     Hypothyroidism         Review of patient's allergies indicates:   Allergen Reactions    Percocet [oxycodone-acetaminophen] Itching    Latex     Sulfa (sulfonamide antibiotics) Rash    Zithromax z-casimiro [azithromycin] Rash        Past Surgical History:   Procedure Laterality Date    HYSTERECTOMY      left knee replacement Left     LUMPECTOMY, BREAST      right hip replacement Right         Family History   Problem Relation Age of Onset    Hypertension Maternal Grandmother     Stroke Maternal Grandmother     Stroke Maternal Grandfather     Hypertension Mother     Stroke Mother     Diabetes Mother        Current Outpatient Medications   Medication Instructions    hydroCHLOROthiazide (HYDRODIURIL) 12.5 mg, Oral, Daily    levothyroxine (SYNTHROID) 25 mcg, Oral, Before breakfast    losartan (COZAAR) 25 mg, Oral, Daily    polyethylene glycol (GLYCOLAX) 17 g, Oral, Daily        Objective     /70   Pulse 85   Ht 5' 6" (1.676 m)   Wt 116.1 kg (256 lb)   BMI 41.32 kg/m²     Physical Exam  Constitutional:       General: She is not in acute distress.     Appearance: Normal appearance. She is not ill-appearing.   HENT:      Head: Normocephalic and atraumatic.      Right Ear: External ear normal.      Left Ear: External ear normal.   Eyes:      Extraocular Movements: Extraocular movements intact.      Conjunctiva/sclera: Conjunctivae normal.   Cardiovascular:      Rate and Rhythm: Normal rate.      Heart sounds: No murmur heard.  Pulmonary:      Effort: Pulmonary effort is normal.   Musculoskeletal:      Cervical back: Normal range of motion.   Skin:     General: Skin is warm and dry.      Coloration: Skin is not jaundiced.      Findings: No rash.   Neurological:      Mental Status: She is " alert.   Psychiatric:         Mood and Affect: Mood normal.        All recently obtained labs have been reviewed and discussed in detail with the patient.   Assessment     1. History of gastric ulcer    2. Acquired hypothyroidism    3. Essential hypertension    4. Encounter for screening mammogram for malignant neoplasm of breast    5. Screening for malignant neoplasm of colon         Plan        Problem List Items Addressed This Visit          Cardiac/Vascular    Essential hypertension     Controlled in the office today with a blood pressure 115/70  Continue current care.           Relevant Medications    hydroCHLOROthiazide (HYDRODIURIL) 12.5 MG Tab    losartan (COZAAR) 25 MG tablet       Endocrine    Hypothyroidism     Currently controlled on Synthroid.           Relevant Medications    levothyroxine (SYNTHROID) 25 MCG tablet       GI    History of gastric ulcer - Primary     Patient request consult to Gastroenterology for screening colonoscopy as well as EGD for history of gastric/duodenal ulcer.  Patient is not sure which.  States that she was never treated in told that the the ulcer would be just fine.            Other Visit Diagnoses       Encounter for screening mammogram for malignant neoplasm of breast        Relevant Orders    Mammo Digital Screening Bilat    Screening for malignant neoplasm of colon        Relevant Orders    Ambulatory referral/consult to Gastroenterology            No follow-ups on file.    Patient advised that is symptoms worsen, they should call or report directly to local emergency department.    Sarbjit Akins,   [facility]

## 2023-05-02 DIAGNOSIS — Z12.11 COLON CANCER SCREENING: Primary | ICD-10-CM

## 2023-05-02 DIAGNOSIS — Z12.11 SCREENING FOR COLON CANCER: Primary | ICD-10-CM

## 2023-05-02 RX ORDER — POLYETHYLENE GLYCOL 3350, SODIUM SULFATE ANHYDROUS, SODIUM BICARBONATE, SODIUM CHLORIDE, POTASSIUM CHLORIDE 236; 22.74; 6.74; 5.86; 2.97 G/4L; G/4L; G/4L; G/4L; G/4L
4 POWDER, FOR SOLUTION ORAL ONCE
Qty: 4000 ML | Refills: 0 | Status: SHIPPED | OUTPATIENT
Start: 2023-05-02 | End: 2023-05-02

## 2023-05-18 ENCOUNTER — TELEPHONE (OUTPATIENT)
Dept: ORTHOPEDICS | Facility: CLINIC | Age: 53
End: 2023-05-18
Payer: MEDICAID

## 2023-05-18 DIAGNOSIS — Z01.812 PRE-OPERATIVE LABORATORY EXAMINATION: ICD-10-CM

## 2023-05-18 DIAGNOSIS — S83.281A ACUTE LATERAL MENISCUS TEAR OF RIGHT KNEE, INITIAL ENCOUNTER: Primary | ICD-10-CM

## 2023-05-18 DIAGNOSIS — Z01.810 PRE-OPERATIVE CARDIOVASCULAR EXAMINATION: ICD-10-CM

## 2023-05-18 NOTE — TELEPHONE ENCOUNTER
Called patient and she was ready to set up her right knee scope.  Scheduled her for 05/25/23 and explained to her to come in on 05/24/23 for labs and EKG. Voiced understanding.

## 2023-05-18 NOTE — TELEPHONE ENCOUNTER
----- Message from Tammy Gonzalez sent at 5/17/2023  8:32 AM CDT -----  Regarding: SURGERY  PATIENT SAYS SHE IS READY TO SCHEDULE SURGERY. CALL BACK NUMBER IS [phone].

## 2023-05-23 ENCOUNTER — TELEPHONE (OUTPATIENT)
Dept: ORTHOPEDICS | Facility: CLINIC | Age: 53
End: 2023-05-23
Payer: MEDICAID

## 2023-05-23 NOTE — TELEPHONE ENCOUNTER
----- Message from Dorothy Carpenter sent at 5/22/2023 11:24 AM CDT -----  Regarding: Knee Scope  Patient want to know if she will be able to go back to work Friday after surgery Thursday knee scope. Please call her @ [phone]

## 2023-05-23 NOTE — TELEPHONE ENCOUNTER
Called patient and explained to her that she may want to take off Friday following her surgery due to having to walk a lot.  Patient agrees and stated she would get a work excuse on Thursday when she is here for surgery.

## 2023-05-25 ENCOUNTER — ANESTHESIA EVENT (OUTPATIENT)
Dept: SURGERY | Facility: HOSPITAL | Age: 53
End: 2023-05-25
Payer: MEDICAID

## 2023-05-25 ENCOUNTER — HOSPITAL ENCOUNTER (OUTPATIENT)
Facility: HOSPITAL | Age: 53
Discharge: HOME | End: 2023-05-25
Attending: ORTHOPAEDIC SURGERY | Admitting: ORTHOPAEDIC SURGERY
Payer: MEDICAID

## 2023-05-25 ENCOUNTER — ANESTHESIA (OUTPATIENT)
Dept: SURGERY | Facility: HOSPITAL | Age: 53
End: 2023-05-25
Payer: MEDICAID

## 2023-05-25 VITALS
RESPIRATION RATE: 18 BRPM | WEIGHT: 255 LBS | HEART RATE: 63 BPM | SYSTOLIC BLOOD PRESSURE: 145 MMHG | HEIGHT: 66 IN | OXYGEN SATURATION: 94 % | TEMPERATURE: 98 F | BODY MASS INDEX: 40.98 KG/M2 | DIASTOLIC BLOOD PRESSURE: 80 MMHG

## 2023-05-25 DIAGNOSIS — S83.281A ACUTE LATERAL MENISCUS TEAR OF RIGHT KNEE: ICD-10-CM

## 2023-05-25 PROCEDURE — 27000716 HC OXISENSOR PROBE, ANY SIZE: Performed by: ANESTHESIOLOGY

## 2023-05-25 PROCEDURE — 36000710: Performed by: ORTHOPAEDIC SURGERY

## 2023-05-25 PROCEDURE — D9220A PRA ANESTHESIA: Mod: QK,ANES,, | Performed by: ANESTHESIOLOGY

## 2023-05-25 PROCEDURE — 25000003 PHARM REV CODE 250: Performed by: NURSE ANESTHETIST, CERTIFIED REGISTERED

## 2023-05-25 PROCEDURE — 01400 ANES OPN/ARTHRS KNEE JT NOS: CPT | Performed by: ORTHOPAEDIC SURGERY

## 2023-05-25 PROCEDURE — 29881 ARTHRS KNE SRG MNISECTMY M/L: CPT | Mod: RT,,, | Performed by: ORTHOPAEDIC SURGERY

## 2023-05-25 PROCEDURE — 27000655: Performed by: ANESTHESIOLOGY

## 2023-05-25 PROCEDURE — 71000015 HC POSTOP RECOV 1ST HR: Performed by: ORTHOPAEDIC SURGERY

## 2023-05-25 PROCEDURE — D9220A PRA ANESTHESIA: ICD-10-PCS | Mod: CRNA,,, | Performed by: NURSE ANESTHETIST, CERTIFIED REGISTERED

## 2023-05-25 PROCEDURE — D9220A PRA ANESTHESIA: ICD-10-PCS | Mod: QK,ANES,, | Performed by: ANESTHESIOLOGY

## 2023-05-25 PROCEDURE — 37000008 HC ANESTHESIA 1ST 15 MINUTES: Performed by: ORTHOPAEDIC SURGERY

## 2023-05-25 PROCEDURE — 27000177 HC AIRWAY, LARYNGEAL MASK: Performed by: ANESTHESIOLOGY

## 2023-05-25 PROCEDURE — 71000033 HC RECOVERY, INTIAL HOUR: Performed by: ORTHOPAEDIC SURGERY

## 2023-05-25 PROCEDURE — D9220A PRA ANESTHESIA: Mod: CRNA,,, | Performed by: NURSE ANESTHETIST, CERTIFIED REGISTERED

## 2023-05-25 PROCEDURE — 63600175 PHARM REV CODE 636 W HCPCS: Performed by: ANESTHESIOLOGY

## 2023-05-25 PROCEDURE — 27201423 OPTIME MED/SURG SUP & DEVICES STERILE SUPPLY: Performed by: ORTHOPAEDIC SURGERY

## 2023-05-25 PROCEDURE — 63600175 PHARM REV CODE 636 W HCPCS: Performed by: NURSE ANESTHETIST, CERTIFIED REGISTERED

## 2023-05-25 PROCEDURE — 36000711: Performed by: ORTHOPAEDIC SURGERY

## 2023-05-25 PROCEDURE — 25000003 PHARM REV CODE 250: Performed by: ORTHOPAEDIC SURGERY

## 2023-05-25 PROCEDURE — 37000009 HC ANESTHESIA EA ADD 15 MINS: Performed by: ORTHOPAEDIC SURGERY

## 2023-05-25 PROCEDURE — 29881 PR KNEE SCOPE SINGLE MENISECECTOMY: ICD-10-PCS | Mod: RT,,, | Performed by: ORTHOPAEDIC SURGERY

## 2023-05-25 PROCEDURE — 71000039 HC RECOVERY, EACH ADD'L HOUR: Performed by: ORTHOPAEDIC SURGERY

## 2023-05-25 PROCEDURE — 97161 PT EVAL LOW COMPLEX 20 MIN: CPT

## 2023-05-25 RX ORDER — ACETAMINOPHEN 500 MG
1000 TABLET ORAL EVERY 6 HOURS PRN
Status: DISCONTINUED | OUTPATIENT
Start: 2023-05-25 | End: 2023-05-25 | Stop reason: HOSPADM

## 2023-05-25 RX ORDER — ONDANSETRON 2 MG/ML
4 INJECTION INTRAMUSCULAR; INTRAVENOUS DAILY PRN
Status: DISCONTINUED | OUTPATIENT
Start: 2023-05-25 | End: 2023-05-25 | Stop reason: HOSPADM

## 2023-05-25 RX ORDER — MIDAZOLAM HYDROCHLORIDE 1 MG/ML
INJECTION INTRAMUSCULAR; INTRAVENOUS
Status: DISCONTINUED | OUTPATIENT
Start: 2023-05-25 | End: 2023-05-25

## 2023-05-25 RX ORDER — ONDANSETRON 4 MG/1
8 TABLET, ORALLY DISINTEGRATING ORAL EVERY 8 HOURS PRN
Status: DISCONTINUED | OUTPATIENT
Start: 2023-05-25 | End: 2023-05-25 | Stop reason: HOSPADM

## 2023-05-25 RX ORDER — LIDOCAINE HYDROCHLORIDE 10 MG/ML
1 INJECTION, SOLUTION EPIDURAL; INFILTRATION; INTRACAUDAL; PERINEURAL ONCE
Status: CANCELLED | OUTPATIENT
Start: 2023-05-25 | End: 2023-05-25

## 2023-05-25 RX ORDER — MEPERIDINE HYDROCHLORIDE 25 MG/ML
25 INJECTION INTRAMUSCULAR; INTRAVENOUS; SUBCUTANEOUS EVERY 10 MIN PRN
Status: DISCONTINUED | OUTPATIENT
Start: 2023-05-25 | End: 2023-05-25 | Stop reason: HOSPADM

## 2023-05-25 RX ORDER — CLINDAMYCIN PHOSPHATE 900 MG/50ML
INJECTION, SOLUTION INTRAVENOUS
Status: DISCONTINUED | OUTPATIENT
Start: 2023-05-25 | End: 2023-05-25

## 2023-05-25 RX ORDER — HYDROMORPHONE HYDROCHLORIDE 4 MG/1
4 TABLET ORAL EVERY 6 HOURS PRN
Qty: 28 TABLET | Refills: 0 | Status: SHIPPED | OUTPATIENT
Start: 2023-05-25 | End: 2023-06-01

## 2023-05-25 RX ORDER — HYDROMORPHONE HYDROCHLORIDE 2 MG/ML
1 INJECTION, SOLUTION INTRAMUSCULAR; INTRAVENOUS; SUBCUTANEOUS EVERY 4 HOURS PRN
Status: DISCONTINUED | OUTPATIENT
Start: 2023-05-25 | End: 2023-05-25 | Stop reason: HOSPADM

## 2023-05-25 RX ORDER — FENTANYL CITRATE 50 UG/ML
INJECTION, SOLUTION INTRAMUSCULAR; INTRAVENOUS
Status: DISCONTINUED | OUTPATIENT
Start: 2023-05-25 | End: 2023-05-25

## 2023-05-25 RX ORDER — LIDOCAINE HYDROCHLORIDE 20 MG/ML
INJECTION, SOLUTION EPIDURAL; INFILTRATION; INTRACAUDAL; PERINEURAL
Status: DISCONTINUED | OUTPATIENT
Start: 2023-05-25 | End: 2023-05-25

## 2023-05-25 RX ORDER — SODIUM CHLORIDE, SODIUM LACTATE, POTASSIUM CHLORIDE, CALCIUM CHLORIDE 600; 310; 30; 20 MG/100ML; MG/100ML; MG/100ML; MG/100ML
125 INJECTION, SOLUTION INTRAVENOUS CONTINUOUS
Status: DISCONTINUED | OUTPATIENT
Start: 2023-05-25 | End: 2023-05-25 | Stop reason: HOSPADM

## 2023-05-25 RX ORDER — MORPHINE SULFATE 10 MG/ML
4 INJECTION INTRAMUSCULAR; INTRAVENOUS; SUBCUTANEOUS EVERY 5 MIN PRN
Status: DISCONTINUED | OUTPATIENT
Start: 2023-05-25 | End: 2023-05-25

## 2023-05-25 RX ORDER — HYDROMORPHONE HYDROCHLORIDE 2 MG/ML
0.5 INJECTION, SOLUTION INTRAMUSCULAR; INTRAVENOUS; SUBCUTANEOUS EVERY 5 MIN PRN
Status: COMPLETED | OUTPATIENT
Start: 2023-05-25 | End: 2023-05-25

## 2023-05-25 RX ORDER — ONDANSETRON 2 MG/ML
INJECTION INTRAMUSCULAR; INTRAVENOUS
Status: DISCONTINUED | OUTPATIENT
Start: 2023-05-25 | End: 2023-05-25

## 2023-05-25 RX ORDER — DEXAMETHASONE SODIUM PHOSPHATE 4 MG/ML
INJECTION, SOLUTION INTRA-ARTICULAR; INTRALESIONAL; INTRAMUSCULAR; INTRAVENOUS; SOFT TISSUE
Status: DISCONTINUED | OUTPATIENT
Start: 2023-05-25 | End: 2023-05-25

## 2023-05-25 RX ORDER — DIPHENHYDRAMINE HYDROCHLORIDE 50 MG/ML
25 INJECTION INTRAMUSCULAR; INTRAVENOUS EVERY 6 HOURS PRN
Status: DISCONTINUED | OUTPATIENT
Start: 2023-05-25 | End: 2023-05-25 | Stop reason: HOSPADM

## 2023-05-25 RX ORDER — SODIUM CHLORIDE 9 MG/ML
INJECTION, SOLUTION INTRAVENOUS CONTINUOUS
Status: DISPENSED | OUTPATIENT
Start: 2023-05-25

## 2023-05-25 RX ORDER — PROMETHAZINE HYDROCHLORIDE 25 MG/1
25 TABLET ORAL EVERY 6 HOURS PRN
Status: DISCONTINUED | OUTPATIENT
Start: 2023-05-25 | End: 2023-05-25 | Stop reason: HOSPADM

## 2023-05-25 RX ORDER — PROPOFOL 10 MG/ML
VIAL (ML) INTRAVENOUS
Status: DISCONTINUED | OUTPATIENT
Start: 2023-05-25 | End: 2023-05-25

## 2023-05-25 RX ORDER — SODIUM CHLORIDE, SODIUM LACTATE, POTASSIUM CHLORIDE, CALCIUM CHLORIDE 600; 310; 30; 20 MG/100ML; MG/100ML; MG/100ML; MG/100ML
INJECTION, SOLUTION INTRAVENOUS CONTINUOUS
Status: CANCELLED | OUTPATIENT
Start: 2023-05-25

## 2023-05-25 RX ADMIN — HYDROMORPHONE HYDROCHLORIDE 0.5 MG: 2 INJECTION, SOLUTION INTRAMUSCULAR; INTRAVENOUS; SUBCUTANEOUS at 04:05

## 2023-05-25 RX ADMIN — ONDANSETRON 4 MG: 2 INJECTION INTRAMUSCULAR; INTRAVENOUS at 03:05

## 2023-05-25 RX ADMIN — SODIUM CHLORIDE: 9 INJECTION, SOLUTION INTRAVENOUS at 03:05

## 2023-05-25 RX ADMIN — SODIUM CHLORIDE: 9 INJECTION, SOLUTION INTRAVENOUS at 05:05

## 2023-05-25 RX ADMIN — DEXAMETHASONE SODIUM PHOSPHATE 6 MG: 4 INJECTION, SOLUTION INTRA-ARTICULAR; INTRALESIONAL; INTRAMUSCULAR; INTRAVENOUS; SOFT TISSUE at 03:05

## 2023-05-25 RX ADMIN — LIDOCAINE HYDROCHLORIDE 40 MG: 20 INJECTION, SOLUTION EPIDURAL; INFILTRATION; INTRACAUDAL; PERINEURAL at 03:05

## 2023-05-25 RX ADMIN — FENTANYL CITRATE 100 MCG: 50 INJECTION INTRAMUSCULAR; INTRAVENOUS at 03:05

## 2023-05-25 RX ADMIN — MIDAZOLAM HYDROCHLORIDE 2 MG: 1 INJECTION, SOLUTION INTRAMUSCULAR; INTRAVENOUS at 03:05

## 2023-05-25 RX ADMIN — DIPHENHYDRAMINE HYDROCHLORIDE 25 MG: 50 INJECTION INTRAMUSCULAR; INTRAVENOUS at 04:05

## 2023-05-25 RX ADMIN — CLINDAMYCIN IN 5 PERCENT DEXTROSE 900 MG: 18 INJECTION, SOLUTION INTRAVENOUS at 03:05

## 2023-05-25 RX ADMIN — PROPOFOL 175 MG: 10 INJECTION, EMULSION INTRAVENOUS at 03:05

## 2023-05-25 NOTE — H&P
[facility]  Orthopedics  H&P    Patient Name: Stuart Cloud  MRN: [id number]  Admission Date: (Not on file)  Primary Care Provider: Sarbjit Akins IV, DO    Patient information was obtained from patient and past medical records.     Subjective:     Principal Problem:<principal problem not specified>    Chief Complaint: No chief complaint on file.       HPI: 53-year-old female with lateral meniscus tear of her right knee needing arthroscopic evaluation debridement possible tear of the right knee  Right lower extremity she moves her toes has sensation to touch has palpable pulses tender palpation over her lateral joint line pain on flexion past 90° has 1+ effusion no instability in the knee noted  X-rays show some mild degenerative changes MRI shows lateral meniscus tear right knee  Impression lateral meniscus tear right knee  Plan arthroscopic evaluation debridement possible repair right knee      Past Medical History:   Diagnosis Date    Hypertension     Hypothyroidism        Past Surgical History:   Procedure Laterality Date    HYSTERECTOMY      left knee replacement Left     LUMPECTOMY, BREAST      right hip replacement Right        Review of patient's allergies indicates:   Allergen Reactions    Percocet [oxycodone-acetaminophen] Itching    Latex     Sulfa (sulfonamide antibiotics) Rash    Zithromax z-casimiro [azithromycin] Rash       No current facility-administered medications for this encounter.     Current Outpatient Medications   Medication Sig    hydroCHLOROthiazide (HYDRODIURIL) 12.5 MG Tab Take 1 tablet (12.5 mg total) by mouth once daily.    levothyroxine (SYNTHROID) 25 MCG tablet Take 1 tablet (25 mcg total) by mouth before breakfast.    losartan (COZAAR) 25 MG tablet Take 1 tablet (25 mg total) by mouth once daily.    polyethylene glycol (GLYCOLAX) 17 gram PwPk Take 17 g by mouth once daily.     Family History       Problem Relation (Age of Onset)     Diabetes Mother    Hypertension Maternal Grandmother, Mother    Stroke Maternal Grandmother, Maternal Grandfather, Mother          Tobacco Use    Smoking status: Never    Smokeless tobacco: Never   Substance and Sexual Activity    Alcohol use: Never    Drug use: Never    Sexual activity: Not Currently     Review of Systems   Musculoskeletal:  Positive for joint pain.   Objective:     Vital Signs (Most Recent):    Vital Signs (24h Range):  BP: ()/()   Arterial Line BP: ()/()            There is no height or weight on file to calculate BMI.    No intake or output data in the 24 hours ending 05/25/23 0813               Right Knee Exam     Inspection   Effusion: present    Tenderness   The patient is tender to palpation of the lateral joint line.    Other   Sensation: normal    Vascular Exam     Right Pulses  Dorsalis Pedis:      2+           Significant Labs: All pertinent labs within the past 24 hours have been reviewed.    Significant Imaging: I have reviewed all pertinent imaging results/findings.    Assessment/Plan:     No notes have been filed under this hospital service.  Service: Orthopedic Surgery      Tacos Aburto MD  Orthopedics  [facility]

## 2023-05-25 NOTE — PT/OT/SLP EVAL
Physical Therapy Evaluation    Patient Name:  Stuart Cloud   MRN:  [id number]    Recommendations:     Discharge Recommendations: outpatient PT, home   Discharge Equipment Recommendations: crutches   Barriers to discharge: None    Assessment:     Stuart Cloud is a 53 y.o. female admitted with a medical diagnosis of Acute lateral meniscus tear of right knee.  She presents with the following impairments/functional limitations: decreased ROM, gait instability Patient with good understanding of post op education.    Rehab Prognosis: Good; patient would benefit from acute skilled PT services to address these deficits and reach maximum level of function.    Recent Surgery: Procedure(s) (LRB):  ARTHROSCOPY, KNEE (Right)  ARTHROSCOPY, KNEE, WITH MENISCECTOMY (Right) Day of Surgery    Plan:     During this hospitalization, patient to be seen 1 x/week to address the identified rehab impairments via gait training, therapeutic activities and progress toward the following goals:    Plan of Care Expires:  05/25/23    Subjective     Chief Complaint: post op pain  Patient/Family Comments/goals: plans on dc home today  Pain/Comfort:  Pain Rating 1: 4/10  Location - Side 1: Right  Location 1: knee  Pain Addressed 1: Cessation of Activity    Patients cultural, spiritual, [employer] conflicts given the current situation: no    Living Environment:  Lives with family  Prior to admission, patients level of function was independent.  Equipment used at home: none.  DME owned (not currently used): none.  Upon discharge, patient will have assistance from family.    Objective:     Communicated with nurse prior to session.  Patient found supine with    upon PT entry to room.    General Precautions: Standard, fall  Orthopedic Precautions:RLE weight bearing as tolerated   Braces:    Respiratory Status: Room air    Exams:  na    Functional Mobility:  Gait: ambulated 10 feet with crutches wbat      AM-PAC 6 CLICK MOBILITY  Total  Score:24       Treatment & Education:  HEP: ascope protocol  Crutches fit and train wbat    Patient left supine with all lines intact.    GOALS:   Multidisciplinary Problems       Physical Therapy Goals       Not on file                    History:     Past Medical History:   Diagnosis Date    Hypertension     Hypothyroidism        Past Surgical History:   Procedure Laterality Date    HYSTERECTOMY      left knee replacement Left     LUMPECTOMY, BREAST      right hip replacement Right        Time Tracking:     PT Received On: 05/25/23  PT Start Time: 1740     PT Stop Time: 1800  PT Total Time (min): 20 min     Billable Minutes: Evaluation 20      05/25/2023

## 2023-05-25 NOTE — ANESTHESIA PROCEDURE NOTES
Intubation    Date/Time: 5/25/2023 3:08 PM  Performed by: Rhiannon Fuentes CRNA  Authorized by: Carson Haile MD     Intubation:     Induction:  Intravenous    Intubated:  Postinduction    Mask Ventilation:  N/a    Attempts:  1    Attempted By:  CRNA    Difficult Airway Encountered?: No      Complications:  None    Airway Device:  Supraglottic airway/LMA    Airway Device Size:  4.0    Style/Cuff Inflation:  Cuffed    Inflation Amount (mL):  10    Secured at:  The lips    Placement Verified By:  Capnometry    Complicating Factors:  None    Findings Post-Intubation:  BS equal bilateral and atraumatic/condition of teeth unchanged

## 2023-05-25 NOTE — HPI
53-year-old female with lateral meniscus tear of her right knee needing arthroscopic evaluation debridement possible tear of the right knee  Right lower extremity she moves her toes has sensation to touch has palpable pulses tender palpation over her lateral joint line pain on flexion past 90° has 1+ effusion no instability in the knee noted  X-rays show some mild degenerative changes MRI shows lateral meniscus tear right knee  Impression lateral meniscus tear right knee  Plan arthroscopic evaluation debridement possible repair right knee

## 2023-05-25 NOTE — SUBJECTIVE & OBJECTIVE
Past Medical History:   Diagnosis Date    Hypertension     Hypothyroidism        Past Surgical History:   Procedure Laterality Date    HYSTERECTOMY      left knee replacement Left     LUMPECTOMY, BREAST      right hip replacement Right        Review of patient's allergies indicates:   Allergen Reactions    Percocet [oxycodone-acetaminophen] Itching    Latex     Sulfa (sulfonamide antibiotics) Rash    Zithromax z-casimiro [azithromycin] Rash       No current facility-administered medications for this encounter.     Current Outpatient Medications   Medication Sig    hydroCHLOROthiazide (HYDRODIURIL) 12.5 MG Tab Take 1 tablet (12.5 mg total) by mouth once daily.    levothyroxine (SYNTHROID) 25 MCG tablet Take 1 tablet (25 mcg total) by mouth before breakfast.    losartan (COZAAR) 25 MG tablet Take 1 tablet (25 mg total) by mouth once daily.    polyethylene glycol (GLYCOLAX) 17 gram PwPk Take 17 g by mouth once daily.     Family History       Problem Relation (Age of Onset)    Diabetes Mother    Hypertension Maternal Grandmother, Mother    Stroke Maternal Grandmother, Maternal Grandfather, Mother          Tobacco Use    Smoking status: Never    Smokeless tobacco: Never   Substance and Sexual Activity    Alcohol use: Never    Drug use: Never    Sexual activity: Not Currently     Review of Systems   Musculoskeletal:  Positive for joint pain.   Objective:     Vital Signs (Most Recent):    Vital Signs (24h Range):  BP: ()/()   Arterial Line BP: ()/()            There is no height or weight on file to calculate BMI.    No intake or output data in the 24 hours ending 05/25/23 0813               Right Knee Exam     Inspection   Effusion: present    Tenderness   The patient is tender to palpation of the lateral joint line.    Other   Sensation: normal    Vascular Exam     Right Pulses  Dorsalis Pedis:      2+           Significant Labs: All pertinent labs within the past 24 hours have been reviewed.    Significant Imaging: I have  reviewed all pertinent imaging results/findings.

## 2023-05-25 NOTE — BRIEF OP NOTE
"[facility] - Orthopedic Periop Services  Brief Operative Note    Surgery Date: 5/25/2023     Surgeon(s) and Role:     * Tacos Aburto MD - Primary    Assisting Surgeon: None    Pre-op Diagnosis:  Acute lateral meniscus tear of right knee, initial encounter [S83.281A]    Post-op Diagnosis:  Post-Op Diagnosis Codes:     * Acute lateral meniscus tear of right knee, initial encounter [S83.281A]    Procedure(s) (LRB):  ARTHROSCOPY, KNEE (Right)  ARTHROSCOPY, KNEE, WITH MENISCECTOMY (Right)    Anesthesia: General    Operative Findings:  Patient underwent right knee arthroscopy without complication.    Estimated Blood Loss: 15 mL         Specimens:   Specimen (24h ago, onward)      None              Discharge Note    OUTCOME: Patient tolerated treatment/procedure well without complication and is now ready for discharge.    DISPOSITION: Home or Self Care    FINAL DIAGNOSIS:  Acute lateral meniscus tear of right knee    FOLLOWUP: In clinic    DISCHARGE INSTRUCTIONS:    Discharge Procedure Orders   CRUTCHES FOR HOME USE     Order Specific Question Answer Comments   Type: Axillary    Height: 5' 6" (1.676 m)    Weight: 115.7 kg (255 lb)    Length of need (1-99 months): 2      Diet general     Keep surgical extremity elevated     Ice to affected area   Order Comments: using barrier between ice and skin (specify duration&frequency)     Remove dressing in 72 hours   Order Comments: Keep dressing in place for 72 hours     Change dressing (specify)   Order Comments: Dressing change: one time per day beginning 72 hours post op.     Call MD for:  temperature >100.4     Call MD for:  persistent nausea and vomiting     Call MD for:  severe uncontrolled pain     Call MD for:  difficulty breathing, headache or visual disturbances     Call MD for:  redness, tenderness, or signs of infection (pain, swelling, redness, odor or green/yellow discharge around incision site)     Call MD for:  hives     Call MD for:  persistent dizziness " or light-headedness     Call MD for:  extreme fatigue     Activity as tolerated     Shower on day dressing removed (No bath)     Weight bearing as tolerated

## 2023-05-25 NOTE — ANESTHESIA PREPROCEDURE EVALUATION
05/25/2023  Stuart Cloud is a 53 y.o., female.      Pre-op Assessment    I have reviewed the Patient Summary Reports.     I have reviewed the Nursing Notes. I have reviewed the NPO Status.   I have reviewed the Medications.     Review of Systems  Anesthesia Hx:  No problems with previous Anesthesia    Social:  Non-Smoker, No Alcohol Use    Hematology/Oncology:  Hematology Normal   Oncology Normal     EENT/Dental:EENT/Dental Normal   Cardiovascular:   Hypertension    Pulmonary:  Pulmonary Normal    Renal/:  Renal/ Normal     Hepatic/GI:  Hepatic/GI Normal    Musculoskeletal:  Musculoskeletal Normal    Neurological:  Neurology Normal    Endocrine:   Hypothyroidism  Obesity / BMI > 30  Dermatological:  Skin Normal    Psych:  Psychiatric Normal           Physical Exam  General: Well nourished    Airway:  Mallampati: III / III  Mouth Opening: Normal  TM Distance: > 6 cm  Tongue: Normal  Neck ROM: Normal ROM    Chest/Lungs:  Clear to auscultation, Normal Respiratory Rate    Heart:  Rate: Normal  Rhythm: Regular Rhythm        Anesthesia Plan  Type of Anesthesia, risks & benefits discussed:    Anesthesia Type: Gen Supraglottic Airway  Intra-op Monitoring Plan: Standard ASA Monitors  Post Op Pain Control Plan: multimodal analgesia  Induction:  IV  Informed Consent: Informed consent signed with the Patient and all parties understand the risks and agree with anesthesia plan.  All questions answered.   ASA Score: 2  Day of Surgery Review of History & Physical: H&P Update referred to the surgeon/provider.I have interviewed and examined the patient. I have reviewed the patient's H&P dated: There are no significant changes. H&P completed by Anesthesiologist.    Ready For Surgery From Anesthesia Perspective.     .

## 2023-05-25 NOTE — INTERVAL H&P NOTE
The patient has been examined and the H&P has been reviewed:    I concur with the findings and no changes have occurred since H&P was written.    Surgery risks, benefits and alternative options discussed and understood by patient/family.          Active Hospital Problems    Diagnosis  POA    *Acute lateral meniscus tear of right knee [S83.324A]  Yes      Resolved Hospital Problems   No resolved problems to display.

## 2023-05-25 NOTE — ANESTHESIA POSTPROCEDURE EVALUATION
Anesthesia Post Evaluation    Patient: Stuart Cloud    Procedure(s) Performed: Procedure(s) (LRB):  ARTHROSCOPY, KNEE (Right)  ARTHROSCOPY, KNEE, WITH MENISCECTOMY (Right)    Final Anesthesia Type: general      Patient location during evaluation: PACU  Post-procedure vital signs: reviewed and stable  Pain management: adequate  Airway patency: patent    PONV status at discharge: No PONV  Anesthetic complications: no      Cardiovascular status: hemodynamically stable  Respiratory status: unassisted  Hydration status: euvolemic  Follow-up not needed.          Vitals Value Taken Time   /84 05/25/23 1717   Temp 36.6 °C (97.8 °F) 05/25/23 1608   Pulse 63 05/25/23 1727   Resp 18 05/25/23 1717   SpO2 94 % 05/25/23 1727   Vitals shown include unvalidated device data.      Event Time   Out of Recovery 17:12:31         Pain/Fernando Score: Pain Rating Prior to Med Admin: 7 (5/25/2023  4:40 PM)  Fernando Score: 10 (5/25/2023  5:25 PM)

## 2023-05-25 NOTE — OP NOTE
[facility] - Orthopedic Periop Services  General Surgery  Operative Note    SUMMARY     Date of Procedure: 5/25/2023     Procedure: Procedure(s) (LRB):  ARTHROSCOPY, KNEE (Right)  ARTHROSCOPY, KNEE, WITH MENISCECTOMY (Right)       Surgeon(s) and Role:     * Tacos Aburto MD - Primary    Assisting Surgeon: None    Pre-Operative Diagnosis: Acute lateral meniscus tear of right knee, initial encounter [S83.281A]    Post-Operative Diagnosis: Post-Op Diagnosis Codes:     * Acute lateral meniscus tear of right knee, initial encounter [S83.281A]    Anesthesia: General    Operative Findings (including complications, if any):  After risks and benefits of procedure explained at length the patient stating she understands risks benefits wished to proceed with the procedure written informed consent was obtained.  Patient was taken operating room placed in supine position on operative table anesthesia induced per Anesthesia.  This time tourniquet was placed over cast padding on proximal right thigh this was then placed in arthroscopic leg alves.  Pillow placed on left knee in the foot of the table let down.  This time right lower extremities prepped and draped sterile fashion.  Inferior medial and inferior lateral portals were marked at the inferior pole of patella mediolateral borders of patellar tendon.  At this time stab incision made at the inferior lateral portal blunt trocar used to introduce cannula in the knee.  Camera introduced in knee inflated with saline.  Knee was inspected there was noted be some chondromalacia grade 2 of the patellofemoral joint.  Medial gutter no pathology medial joint line there was tear of the posterior horn the medial meniscus on the articular surface needing debridement.  ACL PCL popliteus tendons intact.  Lateral meniscus was noted to have a macerated tear of the posterior horn extending to the anterior horn of the lateral meniscus.  There was some grade 1-2 chondromalacia lateral  joint line.  Medial portal made probe placed in medial and lateral meniscus tears confirmed ACL PCL popliteus tendons intact.  At this time biter placed in medial compartment medial meniscus tear debrided shaver used to remove any debrided material smooth edge.  Probe placed in the further tears medially.  Biter placed in lateral compartment lateral meniscus tear debrided shaver used to remove any debrided material smooth edge.  Probe placed in the further tears laterally.  This time knee inspected no further pathology.  Instruments fluid removed.  Portals closed with 4-0 nylon horizontal mattress suture.  Sterile occlusive dressing placed.  Patient was awakened taken recovery room in good condition.  All counts correct.  No complications.    Description of Technical Procedures:     Significant Surgical Tasks Conducted by the Assistant(s), if Applicable:     Estimated Blood Loss (EBL): 15 mL           Implants: * No implants in log *    Specimens:   Specimen (24h ago, onward)      None                    Condition: Good    Disposition: PACU - hemodynamically stable.    Attestation: I was present and scrubbed for the entire procedure.

## 2023-05-25 NOTE — TRANSFER OF CARE
"Anesthesia Transfer of Care Note    Patient: Stuart Cloud    Procedure(s) Performed: Procedure(s) (LRB):  ARTHROSCOPY, KNEE (Right)  ARTHROSCOPY, KNEE, WITH MENISCECTOMY (Right)    Patient location: PACU    Anesthesia Type: general    Transport from OR: Transported from OR on 6-10 L/min O2 by face mask with adequate spontaneous ventilation    Post pain: adequate analgesia    Post assessment: no apparent anesthetic complications    Post vital signs: stable    Level of consciousness: awake and alert    Nausea/Vomiting: no nausea/vomiting    Complications: none    Transfer of care protocol was followed      Last vitals:   Visit Vitals  BP (!) 159/88   Pulse 68   Temp 36.8 °C (98.3 °F) (Oral)   Resp 18   Ht 5' 6" (1.676 m)   Wt 115.7 kg (255 lb)   SpO2 96%   Breastfeeding No   BMI 41.16 kg/m²     "

## 2023-05-31 ENCOUNTER — TELEPHONE (OUTPATIENT)
Dept: HEPATOLOGY | Facility: HOSPITAL | Age: 53
End: 2023-05-31
Payer: MEDICAID

## 2023-05-31 DIAGNOSIS — M25.569 KNEE PAIN, UNSPECIFIED CHRONICITY, UNSPECIFIED LATERALITY: Primary | ICD-10-CM

## 2023-05-31 NOTE — TELEPHONE ENCOUNTER
Spoke with patient regarding pain in the knee she has had replaced in the past.  She would like ortho to evaluate.  Will order referral today.

## 2023-06-01 ENCOUNTER — OFFICE VISIT (OUTPATIENT)
Dept: FAMILY MEDICINE | Facility: CLINIC | Age: 53
End: 2023-06-01
Payer: MEDICAID

## 2023-06-01 VITALS
HEART RATE: 85 BPM | HEIGHT: 66 IN | BODY MASS INDEX: 41.78 KG/M2 | SYSTOLIC BLOOD PRESSURE: 120 MMHG | DIASTOLIC BLOOD PRESSURE: 75 MMHG | WEIGHT: 260 LBS

## 2023-06-01 DIAGNOSIS — I10 ESSENTIAL HYPERTENSION: ICD-10-CM

## 2023-06-01 DIAGNOSIS — F32.9 REACTIVE DEPRESSION: ICD-10-CM

## 2023-06-01 DIAGNOSIS — M25.561 ACUTE PAIN OF RIGHT KNEE: Primary | ICD-10-CM

## 2023-06-01 DIAGNOSIS — F41.9 ANXIETY: ICD-10-CM

## 2023-06-01 PROCEDURE — 3078F PR MOST RECENT DIASTOLIC BLOOD PRESSURE < 80 MM HG: ICD-10-PCS | Mod: CPTII,,, | Performed by: FAMILY MEDICINE

## 2023-06-01 PROCEDURE — 3008F BODY MASS INDEX DOCD: CPT | Mod: CPTII,,, | Performed by: FAMILY MEDICINE

## 2023-06-01 PROCEDURE — 3044F PR MOST RECENT HEMOGLOBIN A1C LEVEL <7.0%: ICD-10-PCS | Mod: CPTII,,, | Performed by: FAMILY MEDICINE

## 2023-06-01 PROCEDURE — 1159F MED LIST DOCD IN RCRD: CPT | Mod: CPTII,,, | Performed by: FAMILY MEDICINE

## 2023-06-01 PROCEDURE — 96372 PR INJECTION,THERAP/PROPH/DIAG2ST, IM OR SUBCUT: ICD-10-PCS | Mod: ,,, | Performed by: FAMILY MEDICINE

## 2023-06-01 PROCEDURE — 4010F ACE/ARB THERAPY RXD/TAKEN: CPT | Mod: CPTII,,, | Performed by: FAMILY MEDICINE

## 2023-06-01 PROCEDURE — 3078F DIAST BP <80 MM HG: CPT | Mod: CPTII,,, | Performed by: FAMILY MEDICINE

## 2023-06-01 PROCEDURE — 99214 OFFICE O/P EST MOD 30 MIN: CPT | Mod: 25,,, | Performed by: FAMILY MEDICINE

## 2023-06-01 PROCEDURE — 3074F SYST BP LT 130 MM HG: CPT | Mod: CPTII,,, | Performed by: FAMILY MEDICINE

## 2023-06-01 PROCEDURE — 4010F PR ACE/ARB THEARPY RXD/TAKEN: ICD-10-PCS | Mod: CPTII,,, | Performed by: FAMILY MEDICINE

## 2023-06-01 PROCEDURE — 1159F PR MEDICATION LIST DOCUMENTED IN MEDICAL RECORD: ICD-10-PCS | Mod: CPTII,,, | Performed by: FAMILY MEDICINE

## 2023-06-01 PROCEDURE — 3008F PR BODY MASS INDEX (BMI) DOCUMENTED: ICD-10-PCS | Mod: CPTII,,, | Performed by: FAMILY MEDICINE

## 2023-06-01 PROCEDURE — 99214 PR OFFICE/OUTPT VISIT, EST, LEVL IV, 30-39 MIN: ICD-10-PCS | Mod: 25,,, | Performed by: FAMILY MEDICINE

## 2023-06-01 PROCEDURE — 96372 THER/PROPH/DIAG INJ SC/IM: CPT | Mod: ,,, | Performed by: FAMILY MEDICINE

## 2023-06-01 PROCEDURE — 3044F HG A1C LEVEL LT 7.0%: CPT | Mod: CPTII,,, | Performed by: FAMILY MEDICINE

## 2023-06-01 PROCEDURE — 3074F PR MOST RECENT SYSTOLIC BLOOD PRESSURE < 130 MM HG: ICD-10-PCS | Mod: CPTII,,, | Performed by: FAMILY MEDICINE

## 2023-06-01 RX ORDER — CLONAZEPAM 0.5 MG/1
0.5 TABLET ORAL 2 TIMES DAILY PRN
Qty: 28 TABLET | Refills: 0 | Status: SHIPPED | OUTPATIENT
Start: 2023-06-01 | End: 2023-08-17 | Stop reason: SDUPTHER

## 2023-06-01 RX ORDER — KETOROLAC TROMETHAMINE 30 MG/ML
60 INJECTION, SOLUTION INTRAMUSCULAR; INTRAVENOUS
Status: COMPLETED | OUTPATIENT
Start: 2023-06-01 | End: 2023-06-01

## 2023-06-01 RX ORDER — FLUOXETINE HYDROCHLORIDE 20 MG/1
20 CAPSULE ORAL DAILY
Qty: 30 CAPSULE | Refills: 11 | Status: SHIPPED | OUTPATIENT
Start: 2023-06-01 | End: 2023-06-22 | Stop reason: CLARIF

## 2023-06-01 RX ORDER — KETOROLAC TROMETHAMINE 10 MG/1
10 TABLET, FILM COATED ORAL EVERY 6 HOURS
Qty: 20 TABLET | Refills: 0 | Status: SHIPPED | OUTPATIENT
Start: 2023-06-01 | End: 2023-06-06

## 2023-06-01 RX ADMIN — KETOROLAC TROMETHAMINE 60 MG: 30 INJECTION, SOLUTION INTRAMUSCULAR; INTRAVENOUS at 11:06

## 2023-06-01 NOTE — LETTER
June 1, 2023      [facility]  [address]  [place] [address]  Phone: [phone]  Fax: [phone]       Patient: Stuart Cloud   YOB: 1970  Date of Visit: 06/01/2023    To Whom It May Concern:    YOLANDA Cloud  was at [facility] on 06/01/2023. The patient may return to work/school on 06/08/2023 with no restrictions. If you have any questions or concerns, or if I can be of further assistance, please do not hesitate to contact me.    Sincerely,    Gosia Gonzales RN

## 2023-06-01 NOTE — LETTER
June 1, 2023      [facility]  [address] [place] [address]  Phone: [phone]  Fax: [phone]       Patient: Stuart Cloud   YOB: 1970  Date of Visit: 06/01/2023    To Whom It May Concern:    YOLANDA Cloud  was at [facility] on 06/01/2023. The patient may return to work/school on 06/07/2023 with restrictions. Light duty.  If you have any questions or concerns, or if I can be of further assistance, please do not hesitate to contact me.    Sincerely,        Sarbjit Akins IV, DO

## 2023-06-01 NOTE — PROGRESS NOTES
"              Sarbjit Akins IV, DO  [facility] [place]  [address], [place] [address]  Phone: [phone]      Subjective     Name: Stuart Cloud   Sex: female  YOB: 1970 (53 y.o.)  MRN: [id number]  Visit Date: 06/01/2023   Chief Complaint: Knee Pain (Left knee pain)        HISTORY OF PRESENT ILLNESS:    Chief Complaint   Patient presents with    Knee Pain     Left knee pain       HPI  See tests that keep you healthy and the problem oriented documentation below.    Tests to Keep You Healthy    Mammogram: SCHEDULED  Colon Cancer Screening: SCHEDULED  Last Blood Pressure <= 139/89 (6/1/2023): Yes      Portions of this note may have been created with voice recognition software. Occasional "wrong-word" or "sound-a-like" substitutions may have occurred due to the inherent limitations of voice recognition software. Please, read the note carefully and recognize, using context, where substitutions have occurred.     PAST MEDICAL HISTORY:  Significant Diagnoses - Patient  has a past medical history of Hypertension and Hypothyroidism.  Medications - Patient has a current medication list which includes the following long-term medication(s): hydrochlorothiazide, levothyroxine, losartan, clonazepam, and fluoxetine.   Allergies - Patient is allergic to percocet [oxycodone-acetaminophen], latex, opioids - morphine analogues, sulfa (sulfonamide antibiotics), and zithromax z-casimiro [azithromycin].  Surgeries - Patient  has a past surgical history that includes Hysterectomy; lumpectomy, breast; right hip replacement (Right); left knee replacement (Left); Arthroscopy of knee (Right, 5/25/2023); and Knee arthroscopy w/ meniscectomy (Right, 5/25/2023).  Family History - Patient family history includes Diabetes in her mother; Hypertension in her maternal grandmother and mother; Stroke in her maternal grandfather, maternal grandmother, and mother.      SOCIAL HISTORY:  Tobacco - Patient  reports that " "she has never smoked. She has never used smokeless tobacco.   Alcohol - Patient  reports no history of alcohol use.   Recreational Drugs - Patient  reports no history of drug use.       Review of Systems   All other systems reviewed and are negative.       Past Medical History:   Diagnosis Date    Hypertension     Hypothyroidism         Review of patient's allergies indicates:   Allergen Reactions    Percocet [oxycodone-acetaminophen] Itching    Latex     Opioids - morphine analogues Hives    Sulfa (sulfonamide antibiotics) Rash    Zithromax z-casimiro [azithromycin] Rash        Past Surgical History:   Procedure Laterality Date    ARTHROSCOPY OF KNEE Right 5/25/2023    Procedure: ARTHROSCOPY, KNEE;  Surgeon: Tacos Aburto MD;  Location: [facility];  Service: Orthopedics;  Laterality: Right;    HYSTERECTOMY      KNEE ARTHROSCOPY W/ MENISCECTOMY Right 5/25/2023    Procedure: ARTHROSCOPY, KNEE, WITH MENISCECTOMY;  Surgeon: Tacos Aburto MD;  Location: [facility];  Service: Orthopedics;  Laterality: Right;    left knee replacement Left     LUMPECTOMY, BREAST      right hip replacement Right         Family History   Problem Relation Age of Onset    Hypertension Maternal Grandmother     Stroke Maternal Grandmother     Stroke Maternal Grandfather     Hypertension Mother     Stroke Mother     Diabetes Mother        Current Outpatient Medications   Medication Instructions    clonazePAM (KLONOPIN) 0.5 mg, Oral, 2 times daily PRN    FLUoxetine 20 mg, Oral, Daily    hydroCHLOROthiazide (HYDRODIURIL) 12.5 mg, Oral, Daily    ketorolac (TORADOL) 10 mg, Oral, Every 6 hours    levothyroxine (SYNTHROID) 25 mcg, Oral, Before breakfast    losartan (COZAAR) 25 mg, Oral, Daily    polyethylene glycol (GLYCOLAX) 17 g, Oral, Daily        Objective     /75   Pulse 85   Ht 5' 6" (1.676 m)   Wt 117.9 kg (260 lb)   BMI 41.97 kg/m²     Physical Exam  Constitutional:       General: She is not in " acute distress.     Appearance: Normal appearance. She is not ill-appearing.   HENT:      Head: Normocephalic and atraumatic.      Right Ear: External ear normal.      Left Ear: External ear normal.   Eyes:      Extraocular Movements: Extraocular movements intact.      Conjunctiva/sclera: Conjunctivae normal.   Cardiovascular:      Rate and Rhythm: Normal rate.      Heart sounds: No murmur heard.  Pulmonary:      Effort: Pulmonary effort is normal.   Musculoskeletal:      Cervical back: Normal range of motion.   Skin:     General: Skin is warm and dry.      Coloration: Skin is not jaundiced.      Findings: No rash.   Neurological:      Mental Status: She is alert.   Psychiatric:         Mood and Affect: Mood normal.        All recently obtained labs have been reviewed and discussed in detail with the patient.   Assessment     1. Acute pain of right knee    2. Reactive depression    3. Essential hypertension    4. Anxiety         Plan        Problem List Items Addressed This Visit          Psychiatric    Reactive depression    Relevant Medications    FLUoxetine 20 MG capsule    Anxiety    Relevant Medications    clonazePAM (KLONOPIN) 0.5 MG tablet       Cardiac/Vascular    Essential hypertension       Orthopedic    Acute pain of right knee - Primary    Relevant Medications    ketorolac injection 60 mg    ketorolac (TORADOL) 10 mg tablet       No follow-ups on file.    Patient advised that is symptoms worsen, they should call or report directly to local emergency department.    Sarbjit Akins,   [facility]

## 2023-06-06 ENCOUNTER — TELEPHONE (OUTPATIENT)
Dept: ORTHOPEDICS | Facility: CLINIC | Age: 53
End: 2023-06-06
Payer: MEDICAID

## 2023-06-06 NOTE — TELEPHONE ENCOUNTER
Called patient and explained to her that Dr. Aburto would check her at her appt in the morning. Voiced understanding.

## 2023-06-06 NOTE — TELEPHONE ENCOUNTER
----- Message from Dorothy Carpenter sent at 6/6/2023 11:53 AM CDT -----  Regarding: infection  Patient had surgery on the 25 or 26 and she have infection have yellowish puss coming out of  incision and its red her knee also is stiff. Please call her @ [phone]

## 2023-06-07 ENCOUNTER — OFFICE VISIT (OUTPATIENT)
Dept: ORTHOPEDICS | Facility: CLINIC | Age: 53
End: 2023-06-07
Payer: MEDICAID

## 2023-06-07 VITALS
HEART RATE: 68 BPM | WEIGHT: 260 LBS | SYSTOLIC BLOOD PRESSURE: 150 MMHG | TEMPERATURE: 98 F | BODY MASS INDEX: 41.78 KG/M2 | OXYGEN SATURATION: 96 % | HEIGHT: 66 IN | DIASTOLIC BLOOD PRESSURE: 90 MMHG

## 2023-06-07 DIAGNOSIS — Z47.89 ENCOUNTER FOR ORTHOPEDIC FOLLOW-UP CARE: Primary | ICD-10-CM

## 2023-06-07 DIAGNOSIS — Z98.890 STATUS POST ARTHROSCOPY OF RIGHT KNEE: ICD-10-CM

## 2023-06-07 PROCEDURE — 1159F MED LIST DOCD IN RCRD: CPT | Mod: CPTII,,, | Performed by: ORTHOPAEDIC SURGERY

## 2023-06-07 PROCEDURE — 3044F PR MOST RECENT HEMOGLOBIN A1C LEVEL <7.0%: ICD-10-PCS | Mod: CPTII,,, | Performed by: ORTHOPAEDIC SURGERY

## 2023-06-07 PROCEDURE — 3077F SYST BP >= 140 MM HG: CPT | Mod: CPTII,,, | Performed by: ORTHOPAEDIC SURGERY

## 2023-06-07 PROCEDURE — 3077F PR MOST RECENT SYSTOLIC BLOOD PRESSURE >= 140 MM HG: ICD-10-PCS | Mod: CPTII,,, | Performed by: ORTHOPAEDIC SURGERY

## 2023-06-07 PROCEDURE — 3008F BODY MASS INDEX DOCD: CPT | Mod: CPTII,,, | Performed by: ORTHOPAEDIC SURGERY

## 2023-06-07 PROCEDURE — 3080F DIAST BP >= 90 MM HG: CPT | Mod: CPTII,,, | Performed by: ORTHOPAEDIC SURGERY

## 2023-06-07 PROCEDURE — 4010F PR ACE/ARB THEARPY RXD/TAKEN: ICD-10-PCS | Mod: CPTII,,, | Performed by: ORTHOPAEDIC SURGERY

## 2023-06-07 PROCEDURE — 3080F PR MOST RECENT DIASTOLIC BLOOD PRESSURE >= 90 MM HG: ICD-10-PCS | Mod: CPTII,,, | Performed by: ORTHOPAEDIC SURGERY

## 2023-06-07 PROCEDURE — 3044F HG A1C LEVEL LT 7.0%: CPT | Mod: CPTII,,, | Performed by: ORTHOPAEDIC SURGERY

## 2023-06-07 PROCEDURE — 3008F PR BODY MASS INDEX (BMI) DOCUMENTED: ICD-10-PCS | Mod: CPTII,,, | Performed by: ORTHOPAEDIC SURGERY

## 2023-06-07 PROCEDURE — 99024 PR POST-OP FOLLOW-UP VISIT: ICD-10-PCS | Mod: ,,, | Performed by: ORTHOPAEDIC SURGERY

## 2023-06-07 PROCEDURE — 99024 POSTOP FOLLOW-UP VISIT: CPT | Mod: ,,, | Performed by: ORTHOPAEDIC SURGERY

## 2023-06-07 PROCEDURE — 4010F ACE/ARB THERAPY RXD/TAKEN: CPT | Mod: CPTII,,, | Performed by: ORTHOPAEDIC SURGERY

## 2023-06-07 PROCEDURE — 99215 OFFICE O/P EST HI 40 MIN: CPT | Mod: PBBFAC | Performed by: ORTHOPAEDIC SURGERY

## 2023-06-07 PROCEDURE — 1159F PR MEDICATION LIST DOCUMENTED IN MEDICAL RECORD: ICD-10-PCS | Mod: CPTII,,, | Performed by: ORTHOPAEDIC SURGERY

## 2023-06-07 NOTE — PROGRESS NOTES
Patient is here for follow-up of her right knee arthroscopy.  She has little irritation around the stitches.  Sutures removed.  No signs of any infection inside of the knee.  I do not see any purulence today.  She says she has some slight drainage yesterday.  This time sutures removed.  She is good motion of the knee.  I will send her to therapy for strengthening.  I will follow up 6 weeks.

## 2023-06-13 ENCOUNTER — CLINICAL SUPPORT (OUTPATIENT)
Dept: REHABILITATION | Facility: HOSPITAL | Age: 53
End: 2023-06-13
Payer: MEDICAID

## 2023-06-13 DIAGNOSIS — Z98.890 STATUS POST ARTHROSCOPY OF RIGHT KNEE: Primary | ICD-10-CM

## 2023-06-13 DIAGNOSIS — M25.661 DECREASED RANGE OF MOTION (ROM) OF RIGHT KNEE: ICD-10-CM

## 2023-06-13 DIAGNOSIS — R26.9 ABNORMAL GAIT: ICD-10-CM

## 2023-06-13 DIAGNOSIS — R29.898 WEAKNESS OF RIGHT LOWER EXTREMITY: ICD-10-CM

## 2023-06-13 PROCEDURE — 97161 PT EVAL LOW COMPLEX 20 MIN: CPT

## 2023-06-13 NOTE — PLAN OF CARE
[facility]  Physical Therapy Initial Evaluation    Name: Stuart Cloud  Clinic Number: [id number]    Therapy Diagnosis:   Encounter Diagnoses   Name Primary?    Status post arthroscopy of right knee Yes    Decreased range of motion (ROM) of right knee     Weakness of right lower extremity     Abnormal gait      Physician: Tacos Aburto MD    Physician Orders: PT Eval and Treat  Medical Diagnosis from Referral: Z47.89 (ICD-10-CM) - Encounter for orthopedic follow-up care and Z98.890 (ICD-10-CM) - Status post arthroscopy of right knee  Evaluation Date: 6/13/2023  Authorization Period Expiration: 6/6/2024  Plan of Care Expiration: 7/28/2023  Visit # / Visits authorized: 1/13 (including initial evaluation)    Time In: 1105  Time Out: 1150  Total Billable Time: 45 minutes    Precautions: Standard    Subjective     Date of onset: 5/25/2023    History of current condition - Stuart reports she is status post a right knee arthroscopy on 5/25/2023. 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 Patient reports Dr. Aburto wanted her to be off work for 3-4 weeks; however, patient reports she returned to work on 5/29/2023 due to personal reasons. Patient has a past surgical history of a left total knee replacement and a right total knee replacement.     Medical History:   Past Medical History:   Diagnosis Date    Hypertension     Hypothyroidism      Surgical History:   Stuart Cloud  has a past surgical history that includes Hysterectomy; lumpectomy, breast; right hip replacement (Right); left knee replacement (Left); Arthroscopy of knee (Right, 5/25/2023); and Knee arthroscopy w/ meniscectomy (Right, 5/25/2023).    Medications:   Stuart has a current medication list which includes the following prescription(s): clonazepam, fluoxetine, hydrochlorothiazide, levothyroxine, losartan, and polyethylene glycol, and the following Facility-Administered  Medications: sodium chloride 0.9%.    Allergies:   Review of patient's allergies indicates:   Allergen Reactions    Percocet [oxycodone-acetaminophen] Itching    Latex     Opioids - morphine analogues Hives    Sulfa (sulfonamide antibiotics) Rash    Zithromax z-casimiro [azithromycin] Rash      Imaging: no post-op imaging of the right knee available to view in Epic    Prior Therapy: none since surgery aside from receiving a home exercise program in outpatient surgery   Prior Level of Function: independent with all functional mobility without assistive device  Current Level of Function: independent with all functional mobility without assistive device but with a notable limp  History of Falls: none  Social History: lives with their son (12-year-old)  Steps/Stairs: 2 steps to enter home without handrails; 15 steps inside home with a handrail on the right while ascending  Occupation: manager/supervisor of [employer] at [employer]  Driving: Yes  Durable Medical Equipment: rollator  Dominant Extremity: right  Affected Extremity: right    Pain:  Current 7/10, worst 10/10, best 6/10   Location: right knee   Description: Aching, pressure, and stiffness  Aggravating Factors: Prolonged standing/walking  Easing Factors: pain medication, ice, rest, and elevation    Pts goals: Patient wishes to decrease the pain in her right knee and improve her right lower extremity function to improve her quality of life both at work and at home.     Objective     Range of Motion/Strength:     Hip Right Left Pain/Dysfunction with Movement   AROM/PROM      flexion  WFL  WFL    extension  WFL  WFL    abduction  WFL  WFL    adduction  WFL  WFL    Internal rotation  WFL  WFL    External rotation  WFL  WFL      Knee Right Left Pain/Dysfunction with Movement   AROM/PROM      flexion  102*  110*    extension  0*  0*      Ankle Right Left Pain/Dysfunction with Movement   AROM/PROM      dorsiflexion  WFL  WFL    plantarflexion  WFL   WFL      L/E MMT Right Left Pain/Dysfunction with Movement   Hip Flexion 3-/5 4/5    Hip Abduction 4/5 4/5    Hip Adduction 4/5 4/5    Knee Flexion 4/5 4/5    Knee Extension 4/5 4+/5    Ankle DF 4/5 4+/5    Ankle PF 4+/5 4+/5      Quad la degree on right; 0 degrees on left    Posture: rounded shoulders    Palpation: grossly tenderness on palpation of right knee    Sensation: WFL for light touch, pain, and temperature    Flexibility: generalized stiffness in bilateral lower extremities    Gait Analysis: independent without assistive device; right antalgic gait with decreased right stance time; foot flat contact bilaterally    Transfers: modified independent with use of upper extremities    CMS Impairment/Limitation/Restriction for FOTO Intake Survey    Therapist reviewed FOTO scores for Stuart Cloud on 2023.   FOTO documents entered into EPIC - see Media section.    Functional Score: 34%  Category: Mobility     TREATMENT     Home Exercises and Patient Education Provided    Education provided: Patient educated on the importance of completing skilled physical therapy treatment and home exercise program as prescribed to maximize functional gains.     Written Home Exercises Provided: yes. Exercises were reviewed and Stuart was able to demonstrate them prior to the end of the session. Stuart demonstrated fair  understanding of the education provided.     See EMR under Patient Instructions for exercises provided 2023.    Assessment     Stuart is a 53 y.o. female referred to outpatient physical therapy with a medical diagnosis of Z47.89 (ICD-10-CM) - Encounter for orthopedic follow-up care and Z98.890 (ICD-10-CM) - Status post arthroscopy of right knee. Pt presents to PT decreased right knee flexion range of motion, generalized stiffness in bilateral lower extremities, inefficient use of right quad, right greater than left lower extremity weakness, increased assist required with transfers compared to  baseline, and abnormal gait mechanics.    Pt prognosis is Good.   Pt will benefit from skilled outpatient Physical Therapy to address the deficits stated above and in the chart below, provide pt/family education, and to maximize pt's level of independence.     Plan of care discussed with patient: Yes  Pt's spiritual, cultural and educational needs considered and pt agreeable to plan of care and goals as stated below:     Anticipated Barriers for therapy: compliance with home exercise program    Decision Making/ Complexity Score: low    Goals:    Short Term Goals:  Patient will demonstrate independence with home exercise program to ensure carryover of treatment.  Patient will perform sit to/from stand with contact guard assist without upper extremity support to improve independence and safety with transfers.  Patient will demonstrate 0-115 degrees of bilateral knee active range of motion to improve functional use of bilateral lower extremities.   Patient will perform a right straight leg raise with a 0 degree quad lag to improve functional stability of the right knee.   Patient will improve right lower extremity strength to 4+/5 to improve independence and safety with bed mobility, transfers, and gait.  Patient will ambulate 150 feet without assistive device with independence without evident antalgic gait to improve independence and safety with gait.   Patient will ascend/descend 5 steps reciprocally with bilateral upper extremity support to improve independence and safety with stair negotiation.     Long Term Goals:  Patient will perform sit to/from stand with standby assist without upper extremity support to improve independence and safety with transfers.  Patient will improve bilateral lower extremity strength to 5/5 to improve independence and safety with bed mobility, transfers, and gait.  Patient will ambulate 300 feet without assistive device with independence including up/down curbs and ramps to improve  independence and safety with community ambulation.  Patient will ascend/descend 5 steps reciprocally with unilateral upper extremity support to improve independence and safety with stair negotiation.     Plan     Plan of care Certification: 6/13/2023 to 7/28/2023.    Outpatient Physical Therapy 2 times weekly for 6 weeks to include the following interventions: Electrical Stimulation (Russian; neuromuscular electrical stimulation; premodulated IFC), Gait Training, Manual Therapy, Moist Heat/ Ice, Neuromuscular Re-ed, Patient Education, Therapeutic Activities, and Therapeutic Exercise.     Tyler Pate, PT, DPT  6/13/2023    I CERTIFY THE NEED FOR THESE SERVICES FURNISHED UNDER THIS PLAN OF TREATMENT AND WHILE UNDER MY CARE.    Physician's comments:

## 2023-06-22 ENCOUNTER — HOSPITAL ENCOUNTER (EMERGENCY)
Facility: HOSPITAL | Age: 53
Discharge: HOME | End: 2023-06-22
Payer: MEDICAID

## 2023-06-22 VITALS
SYSTOLIC BLOOD PRESSURE: 154 MMHG | WEIGHT: 250 LBS | DIASTOLIC BLOOD PRESSURE: 89 MMHG | TEMPERATURE: 98 F | RESPIRATION RATE: 18 BRPM | HEART RATE: 81 BPM | HEIGHT: 66 IN | OXYGEN SATURATION: 99 % | BODY MASS INDEX: 40.18 KG/M2

## 2023-06-22 DIAGNOSIS — T14.90XA INJURY: ICD-10-CM

## 2023-06-22 DIAGNOSIS — S63.501A SPRAIN OF RIGHT WRIST, INITIAL ENCOUNTER: Primary | ICD-10-CM

## 2023-06-22 PROCEDURE — 99283 EMERGENCY DEPT VISIT LOW MDM: CPT

## 2023-06-22 PROCEDURE — 99283 PR EMERGENCY DEPT VISIT,LEVEL III: ICD-10-PCS | Mod: ,,, | Performed by: NURSE PRACTITIONER

## 2023-06-22 PROCEDURE — 99283 EMERGENCY DEPT VISIT LOW MDM: CPT | Mod: ,,, | Performed by: NURSE PRACTITIONER

## 2023-06-22 NOTE — ED PROVIDER NOTES
Encounter Date: 6/22/2023       History     Chief Complaint   Patient presents with    Wrist Injury     Right wrist pain after a fall 06/21/23     Patient presents to the ED with complaints of right wrist pain after she fell at work yesterday. Reports the pain is primarily in her wrist but it radiates to her fingers and elbow.     The history is provided by the patient.   Review of patient's allergies indicates:   Allergen Reactions    Percocet [oxycodone-acetaminophen] Itching    Latex     Opioids - morphine analogues Hives    Sulfa (sulfonamide antibiotics) Rash    Zithromax z-casimiro [azithromycin] Rash     Past Medical History:   Diagnosis Date    Hypertension     Hypothyroidism      Past Surgical History:   Procedure Laterality Date    ARTHROSCOPY OF KNEE Right 5/25/2023    Procedure: ARTHROSCOPY, KNEE;  Surgeon: Tacos Aburto MD;  Location: [facility];  Service: Orthopedics;  Laterality: Right;    HYSTERECTOMY      KNEE ARTHROSCOPY W/ MENISCECTOMY Right 5/25/2023    Procedure: ARTHROSCOPY, KNEE, WITH MENISCECTOMY;  Surgeon: Tacos Aburto MD;  Location: [facility];  Service: Orthopedics;  Laterality: Right;    left knee replacement Left     LUMPECTOMY, BREAST      right hip replacement Right      Family History   Problem Relation Age of Onset    Hypertension Maternal Grandmother     Stroke Maternal Grandmother     Stroke Maternal Grandfather     Hypertension Mother     Stroke Mother     Diabetes Mother      Social History     Tobacco Use    Smoking status: Never    Smokeless tobacco: Never   Substance Use Topics    Alcohol use: Never    Drug use: Never     Review of Systems   Constitutional: Negative.    Respiratory: Negative.     Cardiovascular: Negative.    Musculoskeletal: Negative.         Right wrist pain   Skin: Negative.    Neurological: Negative.    Psychiatric/Behavioral: Negative.     All other systems reviewed and are negative.    Physical Exam     Initial Vitals [06/22/23 0855]   BP  Pulse Resp Temp SpO2   (!) 154/89 81 18 98.1 °F (36.7 °C) 99 %      MAP       --         Physical Exam    Vitals reviewed.  Constitutional: She appears well-developed and well-nourished.   Cardiovascular:  Normal rate, regular rhythm, normal heart sounds and intact distal pulses.           Pulmonary/Chest: Breath sounds normal.   Musculoskeletal:         General: Tenderness (right wrist) present. No edema. Normal range of motion.     Neurological: She is alert and oriented to person, place, and time. She has normal strength. GCS score is 15. GCS eye subscore is 4. GCS verbal subscore is 5. GCS motor subscore is 6.   Skin: Skin is warm and dry. Capillary refill takes less than 2 seconds.   Psychiatric: She has a normal mood and affect. Her behavior is normal. Judgment and thought content normal.       Medical Screening Exam   See Full Note    ED Course   Procedures  Labs Reviewed - No data to display       Imaging Results              X-Ray Forearm Right (Final result)  Result time 06/22/23 09:24:54      Final result by Tutu Carcamo II, MD (06/22/23 09:24:54)                   Impression:      No evidence of abnormality demonstrated      Electronically signed by: Tutu Carcamo  Date:    06/22/2023  Time:    09:24               Narrative:    EXAMINATION:  XR FOREARM RIGHT    CLINICAL HISTORY:  Injury, unspecified, initial encounter    COMPARISON:  None available    TECHNIQUE:  XR FOREARM RIGHT    FINDINGS:  No evidence of fracture seen.  The alignment of the joints appears normal.  No degenerative change is present.  No soft tissue abnormality is seen.                                       X-Ray Wrist Complete Right (Final result)  Result time 06/22/23 09:23:58      Final result by Tutu Carcamo II, MD (06/22/23 09:23:58)                   Impression:      No evidence of acute injury demonstrated      Electronically signed by: Tutu Carcamo  Date:    06/22/2023  Time:    09:23               Narrative:     EXAMINATION:  XR WRIST COMPLETE 3 VIEWS RIGHT    CLINICAL HISTORY:  Injury, unspecified, initial encounter    COMPARISON:  None available    TECHNIQUE:  XR WRIST 3 VIEWS RIGHT    FINDINGS:  No evidence of fracture seen.  The alignment of the joints appears normal.  Mild wrist degenerative change is present.  No soft tissue abnormality is seen.                                       Medications - No data to display  Medical Decision Making:   Clinical Tests:   Radiological Study: Ordered and Reviewed  ED Management:  [facility]    Patient presents for emergent evaluation of acute right wrist pain after a fall that poses a threat to life and/or bodily function.    In the ED patient found to have acute right wrist pain.    I ordered X-rays and personally reviewed them and reviewed the radiologist interpretation.  Right wrist Xray insignificant for acute findings.      Discharge MDM  I discussed the treatment and discharge plan with the patient including wrist splint as needed, Ibuprofen and ice as needed also.    Patient was discharged in stable condition.  Detailed return precautions discussed.                        Clinical Impression:   Final diagnoses:  [T14.90XA] Injury  [S63.501A] Sprain of right wrist, initial encounter (Primary)        ED Disposition Condition    Discharge Stable          ED Prescriptions    None       Follow-up Information       Follow up With Specialties Details Why Contact Info    Sarbjit Akins IV, DO Family Medicine In 1 week  [address] [facility] [place] [address]  [phone]               Kaela Carmen, MATI  06/22/23 1411

## 2023-06-22 NOTE — ED TRIAGE NOTES
Patient reports she fell while at work 06/21/23, reports her foot stuck in place and she fell to the floor and sustained pain to her right wrist

## 2023-06-22 NOTE — Clinical Note
"Stuart Moonadonay" Pedro Luis was seen and treated in our emergency department on 6/22/2023.  She may return to work on 06/23/2023.       If you have any questions or concerns, please don't hesitate to call.      SHANICE Murillo"

## 2023-06-26 ENCOUNTER — TELEPHONE (OUTPATIENT)
Dept: FAMILY MEDICINE | Facility: CLINIC | Age: 53
End: 2023-06-26
Payer: MEDICAID

## 2023-06-26 DIAGNOSIS — G62.9 NEUROPATHY: Primary | ICD-10-CM

## 2023-06-26 NOTE — TELEPHONE ENCOUNTER
Patient has numbness and tingling in her hand now.  She does request referral for Neurology and I will be placing was ordered today.

## 2023-07-11 ENCOUNTER — CLINICAL SUPPORT (OUTPATIENT)
Dept: REHABILITATION | Facility: HOSPITAL | Age: 53
End: 2023-07-11
Payer: MEDICAID

## 2023-07-11 DIAGNOSIS — Z98.890 STATUS POST ARTHROSCOPY OF RIGHT KNEE: Primary | ICD-10-CM

## 2023-07-11 DIAGNOSIS — R29.898 WEAKNESS OF RIGHT LOWER EXTREMITY: ICD-10-CM

## 2023-07-11 DIAGNOSIS — R26.9 ABNORMAL GAIT: ICD-10-CM

## 2023-07-11 DIAGNOSIS — M25.661 DECREASED RANGE OF MOTION (ROM) OF RIGHT KNEE: ICD-10-CM

## 2023-07-11 PROCEDURE — 97112 NEUROMUSCULAR REEDUCATION: CPT | Mod: CQ

## 2023-07-11 PROCEDURE — 97530 THERAPEUTIC ACTIVITIES: CPT | Mod: CQ

## 2023-07-11 PROCEDURE — 97110 THERAPEUTIC EXERCISES: CPT | Mod: CQ

## 2023-07-11 NOTE — PROGRESS NOTES
"[facility]  Physical Therapy Treatment Note    Name: Stuart Cloud  Clinic Number: [id number]    Therapy Diagnosis: No diagnosis found.  Physician: Tacos Aburto MD    Visit Date: 7/11/2023    Physician Orders: PT Eval and Treat  Medical Diagnosis from Referral: Z47.89 (ICD-10-CM) - Encounter for orthopedic follow-up care and Z98.890 (ICD-10-CM) - Status post arthroscopy of right knee  Evaluation Date: 6/13/2023  Authorization Period Expiration: 6/6/2024  Plan of Care Expiration: 7/28/2023  Visit # / Visits authorized: 2/13   PTA Visit #: 1    Time In: 1535  Time Out: 1616  Total Billable Time: 41 minutes    Precautions: Standard    Subjective     Pt reports: "I am sore from this job."    She was compliant with home exercise program.    Pain: 5/10  Location: right knee      Objective     Stuart received therapeutic exercises to develop strength, endurance, ROM, and flexibility for 16 minutes including:  Nu step - 6 minutes   Calf stretch on slant board - 2 minutes  Long arc quads (RLE) - 20 reps  Seated hamstring stretch - 3 x 20 sec hold    Stuart participated in neuromuscular re-education activities to improve: Balance for 10 minutes. The following activities were included:  Quad sets (BLE) - 20 reps x 3 sec hold  Short arc quads (RLE) - 20 reps  Straight leg raises (RLE) - 20 reps    Stuart participated in dynamic functional therapeutic activities to improve functional performance for 10 minutes, including:  Chair squats - 20 reps   4 in step up - 20 reps     Stuart received cold pack for 5 minutes to right knee. (Patient reported only needing 5 minutes)     Right Knee AROM Right Knee PROM   Extension 0 degrees 0 degrees   Flexion (Supine) 102 degrees 102 degrees     Home Exercises Provided and Patient Education Provided     Education provided:               Written Home Exercises Provided: yes. Exercises were reviewed and Stuart was able to demonstrate them prior to " the end of the session.  Stuart demonstrated good  understanding of the education provided.     See EMR under Patient Instructions for exercises provided  07/11/2023 by MIKAEL Worley .    Assessment     Patient with no new complaints following therapy today.     Stuart isprogressing well towards her goals.   Pt prognosis is Good.     Pt will continue to benefit from skilled outpatient physical therapy to address the deficits listed in the problem list box on initial evaluation, provide pt/family education, and to maximize pt's level of independence in the home and community environment.     Pt's spiritual, cultural, and educational needs considered and pt agreeable to plan of care and goals.     Anticipated barriers to physical therapy: compliance with home exercise program    Goals:    Short Term Goals:  Patient will demonstrate independence with home exercise program to ensure carryover of treatment.  Patient will perform sit to/from stand with contact guard assist without upper extremity support to improve independence and safety with transfers.  Patient will demonstrate 0-115 degrees of bilateral knee active range of motion to improve functional use of bilateral lower extremities.   Patient will perform a right straight leg raise with a 0 degree quad lag to improve functional stability of the right knee.   Patient will improve right lower extremity strength to 4+/5 to improve independence and safety with bed mobility, transfers, and gait.  Patient will ambulate 150 feet without assistive device with independence without evident antalgic gait to improve independence and safety with gait.   Patient will ascend/descend 5 steps reciprocally with bilateral upper extremity support to improve independence and safety with stair negotiation.      Long Term Goals:  Patient will perform sit to/from stand with standby assist without upper extremity support to improve independence and safety with transfers.  Patient  will improve bilateral lower extremity strength to 5/5 to improve independence and safety with bed mobility, transfers, and gait.  Patient will ambulate 300 feet without assistive device with independence including up/down curbs and ramps to improve independence and safety with community ambulation.  Patient will ascend/descend 5 steps reciprocally with unilateral upper extremity support to improve independence and safety with stair negotiation.     Plan     Continue with appropriate POC      Halle Hernandez, PTA  7/11/2023

## 2023-08-17 DIAGNOSIS — E66.01 CLASS 3 SEVERE OBESITY DUE TO EXCESS CALORIES WITH SERIOUS COMORBIDITY AND BODY MASS INDEX (BMI) OF 45.0 TO 49.9 IN ADULT: ICD-10-CM

## 2023-08-17 DIAGNOSIS — E03.9 ACQUIRED HYPOTHYROIDISM: ICD-10-CM

## 2023-08-17 DIAGNOSIS — M54.50 ACUTE BILATERAL LOW BACK PAIN WITHOUT SCIATICA: Primary | ICD-10-CM

## 2023-08-17 DIAGNOSIS — F41.9 ANXIETY: ICD-10-CM

## 2023-08-17 DIAGNOSIS — I10 ESSENTIAL HYPERTENSION: ICD-10-CM

## 2023-08-17 RX ORDER — PHENTERMINE HYDROCHLORIDE 37.5 MG/1
37.5 CAPSULE ORAL EVERY MORNING
COMMUNITY
Start: 2023-07-08 | End: 2023-08-17 | Stop reason: SDUPTHER

## 2023-08-17 RX ORDER — CLONAZEPAM 0.5 MG/1
0.5 TABLET ORAL 2 TIMES DAILY PRN
Qty: 28 TABLET | Refills: 0 | Status: SHIPPED | OUTPATIENT
Start: 2023-08-17 | End: 2023-11-14 | Stop reason: SDUPTHER

## 2023-08-17 RX ORDER — LOSARTAN POTASSIUM AND HYDROCHLOROTHIAZIDE 12.5; 5 MG/1; MG/1
1 TABLET ORAL DAILY
Qty: 90 TABLET | Refills: 3 | Status: SHIPPED | OUTPATIENT
Start: 2023-08-17 | End: 2024-04-01 | Stop reason: SDUPTHER

## 2023-08-17 RX ORDER — PHENTERMINE HYDROCHLORIDE 37.5 MG/1
37.5 CAPSULE ORAL EVERY MORNING
Qty: 84 CAPSULE | Refills: 0 | Status: SHIPPED | OUTPATIENT
Start: 2023-08-17 | End: 2023-11-09

## 2023-08-17 RX ORDER — HYDROCHLOROTHIAZIDE 12.5 MG/1
12.5 TABLET ORAL DAILY
Qty: 30 TABLET | Refills: 2 | Status: CANCELLED | OUTPATIENT
Start: 2023-08-17

## 2023-08-17 RX ORDER — LOSARTAN POTASSIUM 25 MG/1
25 TABLET ORAL DAILY
Qty: 30 TABLET | Refills: 2 | Status: CANCELLED | OUTPATIENT
Start: 2023-08-17

## 2023-08-17 RX ORDER — NAPROXEN 500 MG/1
500 TABLET ORAL 2 TIMES DAILY
Qty: 60 TABLET | Refills: 0 | Status: SHIPPED | OUTPATIENT
Start: 2023-08-17 | End: 2023-09-16

## 2023-08-17 RX ORDER — CYCLOBENZAPRINE HCL 5 MG
5 TABLET ORAL 3 TIMES DAILY PRN
Qty: 30 TABLET | Refills: 0 | Status: SHIPPED | OUTPATIENT
Start: 2023-08-17 | End: 2023-08-27

## 2023-08-17 RX ORDER — NAPROXEN 500 MG/1
500 TABLET ORAL 2 TIMES DAILY
COMMUNITY
Start: 2023-03-18 | End: 2023-08-17 | Stop reason: SDUPTHER

## 2023-08-17 RX ORDER — LEVOTHYROXINE SODIUM 25 UG/1
25 TABLET ORAL
Qty: 30 TABLET | Refills: 2 | Status: SHIPPED | OUTPATIENT
Start: 2023-08-17 | End: 2023-11-14 | Stop reason: SDUPTHER

## 2023-08-17 NOTE — TELEPHONE ENCOUNTER
for patient convenience and with blood pressure continuing to be elevated, both systolic and diastolic, recommend increasing dosage of losartan and combining with hydrochlorothiazide into 1 pill.  Prescription monitoring program was reviewed refills are appropriate at this time.  Patient will need another visit for further prescriptions of clonazepam.    Records show that patient did lose weight between last 2 visits.  Will continue phentermine at this time.  Twelve week prescription has been sent in and patient will need follow-up afterwards to continue this medication.

## 2023-08-21 ENCOUNTER — OFFICE VISIT (OUTPATIENT)
Dept: FAMILY MEDICINE | Facility: CLINIC | Age: 53
End: 2023-08-21
Payer: MEDICAID

## 2023-08-21 VITALS
TEMPERATURE: 98 F | DIASTOLIC BLOOD PRESSURE: 72 MMHG | HEART RATE: 66 BPM | SYSTOLIC BLOOD PRESSURE: 110 MMHG | BODY MASS INDEX: 42.27 KG/M2 | WEIGHT: 261.88 LBS

## 2023-08-21 DIAGNOSIS — R11.0 NAUSEA: Primary | ICD-10-CM

## 2023-08-21 DIAGNOSIS — Z20.828 VIRAL DISEASE EXPOSURE: ICD-10-CM

## 2023-08-21 LAB
CTP QC/QA: YES
FLUAV AG NPH QL: NEGATIVE
FLUBV AG NPH QL: NEGATIVE
SARS-COV-2 AG RESP QL IA.RAPID: NEGATIVE

## 2023-08-21 PROCEDURE — 87428 SARSCOV & INF VIR A&B AG IA: CPT | Mod: RHCUB | Performed by: NURSE PRACTITIONER

## 2023-08-21 PROCEDURE — 99213 OFFICE O/P EST LOW 20 MIN: CPT | Mod: ,,, | Performed by: NURSE PRACTITIONER

## 2023-08-21 PROCEDURE — 99213 PR OFFICE/OUTPT VISIT, EST, LEVL III, 20-29 MIN: ICD-10-PCS | Mod: ,,, | Performed by: NURSE PRACTITIONER

## 2023-08-21 PROCEDURE — 3044F PR MOST RECENT HEMOGLOBIN A1C LEVEL <7.0%: ICD-10-PCS | Mod: CPTII,,, | Performed by: NURSE PRACTITIONER

## 2023-08-21 PROCEDURE — 3074F PR MOST RECENT SYSTOLIC BLOOD PRESSURE < 130 MM HG: ICD-10-PCS | Mod: CPTII,,, | Performed by: NURSE PRACTITIONER

## 2023-08-21 PROCEDURE — 1160F RVW MEDS BY RX/DR IN RCRD: CPT | Mod: CPTII,,, | Performed by: NURSE PRACTITIONER

## 2023-08-21 PROCEDURE — 1160F PR REVIEW ALL MEDS BY PRESCRIBER/CLIN PHARMACIST DOCUMENTED: ICD-10-PCS | Mod: CPTII,,, | Performed by: NURSE PRACTITIONER

## 2023-08-21 PROCEDURE — 3008F PR BODY MASS INDEX (BMI) DOCUMENTED: ICD-10-PCS | Mod: CPTII,,, | Performed by: NURSE PRACTITIONER

## 2023-08-21 PROCEDURE — 3078F DIAST BP <80 MM HG: CPT | Mod: CPTII,,, | Performed by: NURSE PRACTITIONER

## 2023-08-21 PROCEDURE — 1159F PR MEDICATION LIST DOCUMENTED IN MEDICAL RECORD: ICD-10-PCS | Mod: CPTII,,, | Performed by: NURSE PRACTITIONER

## 2023-08-21 PROCEDURE — 3044F HG A1C LEVEL LT 7.0%: CPT | Mod: CPTII,,, | Performed by: NURSE PRACTITIONER

## 2023-08-21 PROCEDURE — 3074F SYST BP LT 130 MM HG: CPT | Mod: CPTII,,, | Performed by: NURSE PRACTITIONER

## 2023-08-21 PROCEDURE — 1159F MED LIST DOCD IN RCRD: CPT | Mod: CPTII,,, | Performed by: NURSE PRACTITIONER

## 2023-08-21 PROCEDURE — 4010F ACE/ARB THERAPY RXD/TAKEN: CPT | Mod: CPTII,,, | Performed by: NURSE PRACTITIONER

## 2023-08-21 PROCEDURE — 3078F PR MOST RECENT DIASTOLIC BLOOD PRESSURE < 80 MM HG: ICD-10-PCS | Mod: CPTII,,, | Performed by: NURSE PRACTITIONER

## 2023-08-21 PROCEDURE — 4010F PR ACE/ARB THEARPY RXD/TAKEN: ICD-10-PCS | Mod: CPTII,,, | Performed by: NURSE PRACTITIONER

## 2023-08-21 PROCEDURE — 3008F BODY MASS INDEX DOCD: CPT | Mod: CPTII,,, | Performed by: NURSE PRACTITIONER

## 2023-08-21 RX ORDER — DOCUSATE SODIUM 100 MG/1
CAPSULE, LIQUID FILLED ORAL
COMMUNITY
Start: 2023-03-13 | End: 2023-11-14

## 2023-08-21 RX ORDER — ONDANSETRON 8 MG/1
8 TABLET, ORALLY DISINTEGRATING ORAL EVERY 6 HOURS PRN
Qty: 20 TABLET | Refills: 0 | Status: SHIPPED | OUTPATIENT
Start: 2023-08-21

## 2023-08-21 NOTE — PATIENT INSTRUCTIONS
COVID-19 negative  Zofran as prescribed PRN along with supportive care measures    Will ask Dr. Akins's nurse to check status of referral for endoscopy

## 2023-08-21 NOTE — PROGRESS NOTES
Subjective:       Patient ID: Stuart Cloud is a 53 y.o. female.    Chief Complaint: Abdominal Pain, Nausea, and Headache    HPI    Patient presents with son c/o abdominal pain, nausea, and headache    Says that she is expecting call with appointment details for colonoscopy appointment    Denies vomiting or diarrhea  Describes abdominal cramping and nausea for the past 1-2 days    Given that son has multiple URI complaints, patient says that she is hurting in her back which is similar to pain she experienced when she had COVID-19; Wants screening today    /72   Pulse 66   Temp 97.9 °F (36.6 °C)   Wt 118.8 kg (261 lb 14.4 oz)   BMI 42.27 kg/m²     Medication List with Changes/Refills   New Medications    ONDANSETRON (ZOFRAN-ODT) 8 MG TBDL    Take 1 tablet (8 mg total) by mouth every 6 (six) hours as needed (nausea).   Current Medications    CLONAZEPAM (KLONOPIN) 0.5 MG TABLET    Take 1 tablet (0.5 mg total) by mouth 2 (two) times daily as needed for Anxiety.    CYCLOBENZAPRINE (FLEXERIL) 5 MG TABLET    Take 1 tablet (5 mg total) by mouth 3 (three) times daily as needed for Muscle spasms.    DOCUSATE SODIUM (COLACE) 100 MG CAPSULE    TAKE ONE CAPSULE BY MOUTH TWICE DAILY AS NEEDED FOR CONSTIPATION    LEVOTHYROXINE (SYNTHROID) 25 MCG TABLET    Take 1 tablet (25 mcg total) by mouth before breakfast.    LOSARTAN-HYDROCHLOROTHIAZIDE 50-12.5 MG (HYZAAR) 50-12.5 MG PER TABLET    Take 1 tablet by mouth once daily.    NAPROXEN (NAPROSYN) 500 MG TABLET    Take 1 tablet (500 mg total) by mouth 2 (two) times daily.    PHENTERMINE 37.5 MG CAPSULE    Take 1 capsule (37.5 mg total) by mouth every morning.       Review of Systems   Constitutional:  Negative for chills and fever.   Respiratory:  Negative for cough.    Cardiovascular:  Negative for chest pain.   Gastrointestinal:  Positive for abdominal pain and nausea. Negative for blood in stool, diarrhea and vomiting.   Neurological:  Positive for headaches.          Objective:      Physical Exam  Vitals and nursing note reviewed.   Constitutional:       General: She is not in acute distress.     Appearance: Normal appearance.   HENT:      Head: Normocephalic.   Eyes:      Conjunctiva/sclera: Conjunctivae normal.   Neck:      Thyroid: No thyromegaly.      Trachea: Trachea normal.   Cardiovascular:      Rate and Rhythm: Normal rate and regular rhythm.      Pulses: Normal pulses.      Heart sounds: Normal heart sounds.   Pulmonary:      Effort: Pulmonary effort is normal. No respiratory distress.      Breath sounds: Normal breath sounds.   Abdominal:      General: Bowel sounds are normal.      Palpations: Abdomen is soft.   Musculoskeletal:      Cervical back: Neck supple.   Skin:     General: Skin is warm and dry.   Neurological:      General: No focal deficit present.      Mental Status: She is alert and oriented to person, place, and time.   Psychiatric:         Mood and Affect: Mood normal.         Behavior: Behavior normal.         Assessment:       1. Nausea    2. Viral disease exposure        Plan:       Patient Instructions   COVID-19 negative  Zofran as prescribed PRN along with supportive care measures    Will ask Dr. Akins's nurse to check status of referral for endoscopy   Nausea  -     ondansetron (ZOFRAN-ODT) 8 MG TbDL; Take 1 tablet (8 mg total) by mouth every 6 (six) hours as needed (nausea).  Dispense: 20 tablet; Refill: 0    Viral disease exposure  -     POCT SARS-COV2 (COVID) with Flu Antigen

## 2023-08-21 NOTE — LETTER
August 21, 2023    Stuart Cloud  [address]  [place] [address]             [facility]  Family Medicine  [address] [place] [address]  Phone: [phone]  Fax: [phone]   August 21, 2023     Patient: Stuart Cloud   YOB: 1970   Date of Visit: 8/21/2023       To Whom it May Concern:    Stuart Cloud was seen in my clinic on 8/21/2023. She may return to work on 08/22/23 .    Please excuse her from any classes or work missed.    If you have any questions or concerns, please don't hesitate to call.    Sincerely,         Mahsa Carson, ISIDROP

## 2023-08-22 ENCOUNTER — OFFICE VISIT (OUTPATIENT)
Dept: NEUROLOGY | Facility: CLINIC | Age: 53
End: 2023-08-22
Payer: MEDICAID

## 2023-08-22 VITALS
RESPIRATION RATE: 18 BRPM | BODY MASS INDEX: 42.01 KG/M2 | OXYGEN SATURATION: 98 % | DIASTOLIC BLOOD PRESSURE: 70 MMHG | WEIGHT: 261.38 LBS | HEART RATE: 78 BPM | SYSTOLIC BLOOD PRESSURE: 124 MMHG | HEIGHT: 66 IN

## 2023-08-22 DIAGNOSIS — G62.9 NEUROPATHY: ICD-10-CM

## 2023-08-22 DIAGNOSIS — G43.109 MIGRAINE WITH AURA AND WITHOUT STATUS MIGRAINOSUS, NOT INTRACTABLE: ICD-10-CM

## 2023-08-22 DIAGNOSIS — G44.85 PRIMARY STABBING HEADACHE: ICD-10-CM

## 2023-08-22 DIAGNOSIS — G56.20 CUBITAL TUNNEL SYNDROME, UNSPECIFIED LATERALITY: Primary | ICD-10-CM

## 2023-08-22 PROCEDURE — 3008F BODY MASS INDEX DOCD: CPT | Mod: CPTII,,, | Performed by: PSYCHIATRY & NEUROLOGY

## 2023-08-22 PROCEDURE — 99215 OFFICE O/P EST HI 40 MIN: CPT | Mod: PBBFAC | Performed by: PSYCHIATRY & NEUROLOGY

## 2023-08-22 PROCEDURE — 4010F PR ACE/ARB THEARPY RXD/TAKEN: ICD-10-PCS | Mod: CPTII,,, | Performed by: PSYCHIATRY & NEUROLOGY

## 2023-08-22 PROCEDURE — 1160F RVW MEDS BY RX/DR IN RCRD: CPT | Mod: CPTII,,, | Performed by: PSYCHIATRY & NEUROLOGY

## 2023-08-22 PROCEDURE — 1159F MED LIST DOCD IN RCRD: CPT | Mod: CPTII,,, | Performed by: PSYCHIATRY & NEUROLOGY

## 2023-08-22 PROCEDURE — 4010F ACE/ARB THERAPY RXD/TAKEN: CPT | Mod: CPTII,,, | Performed by: PSYCHIATRY & NEUROLOGY

## 2023-08-22 PROCEDURE — 3074F PR MOST RECENT SYSTOLIC BLOOD PRESSURE < 130 MM HG: ICD-10-PCS | Mod: CPTII,,, | Performed by: PSYCHIATRY & NEUROLOGY

## 2023-08-22 PROCEDURE — 1159F PR MEDICATION LIST DOCUMENTED IN MEDICAL RECORD: ICD-10-PCS | Mod: CPTII,,, | Performed by: PSYCHIATRY & NEUROLOGY

## 2023-08-22 PROCEDURE — 99204 PR OFFICE/OUTPT VISIT, NEW, LEVL IV, 45-59 MIN: ICD-10-PCS | Mod: S$PBB,,, | Performed by: PSYCHIATRY & NEUROLOGY

## 2023-08-22 PROCEDURE — 3008F PR BODY MASS INDEX (BMI) DOCUMENTED: ICD-10-PCS | Mod: CPTII,,, | Performed by: PSYCHIATRY & NEUROLOGY

## 2023-08-22 PROCEDURE — 99204 OFFICE O/P NEW MOD 45 MIN: CPT | Mod: S$PBB,,, | Performed by: PSYCHIATRY & NEUROLOGY

## 2023-08-22 PROCEDURE — 3078F PR MOST RECENT DIASTOLIC BLOOD PRESSURE < 80 MM HG: ICD-10-PCS | Mod: CPTII,,, | Performed by: PSYCHIATRY & NEUROLOGY

## 2023-08-22 PROCEDURE — 3074F SYST BP LT 130 MM HG: CPT | Mod: CPTII,,, | Performed by: PSYCHIATRY & NEUROLOGY

## 2023-08-22 PROCEDURE — 3044F HG A1C LEVEL LT 7.0%: CPT | Mod: CPTII,,, | Performed by: PSYCHIATRY & NEUROLOGY

## 2023-08-22 PROCEDURE — 3044F PR MOST RECENT HEMOGLOBIN A1C LEVEL <7.0%: ICD-10-PCS | Mod: CPTII,,, | Performed by: PSYCHIATRY & NEUROLOGY

## 2023-08-22 PROCEDURE — 3078F DIAST BP <80 MM HG: CPT | Mod: CPTII,,, | Performed by: PSYCHIATRY & NEUROLOGY

## 2023-08-22 PROCEDURE — 1160F PR REVIEW ALL MEDS BY PRESCRIBER/CLIN PHARMACIST DOCUMENTED: ICD-10-PCS | Mod: CPTII,,, | Performed by: PSYCHIATRY & NEUROLOGY

## 2023-08-22 RX ORDER — PREGABALIN 75 MG/1
75 CAPSULE ORAL 2 TIMES DAILY
Qty: 180 CAPSULE | Refills: 3 | Status: SHIPPED | OUTPATIENT
Start: 2023-08-22 | End: 2023-11-14

## 2023-08-22 NOTE — PATIENT INSTRUCTIONS
Work on healthy lifestyle habits   NCV both Ue's r/o cubital tunnel syndrome  Trial Lyrica 75 mg twice daily  MRI brain w contrast    F/u 3 months
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
## 2023-08-30 ENCOUNTER — TELEPHONE (OUTPATIENT)
Dept: FAMILY MEDICINE | Facility: CLINIC | Age: 53
End: 2023-08-30
Payer: MEDICAID

## 2023-09-15 DIAGNOSIS — Z12.11 SCREENING FOR COLON CANCER: Primary | ICD-10-CM

## 2023-09-18 RX ORDER — POLYETHYLENE GLYCOL 3350, SODIUM SULFATE ANHYDROUS, SODIUM BICARBONATE, SODIUM CHLORIDE, POTASSIUM CHLORIDE 236; 22.74; 6.74; 5.86; 2.97 G/4L; G/4L; G/4L; G/4L; G/4L
4 POWDER, FOR SOLUTION ORAL ONCE
Qty: 4000 ML | Refills: 0 | Status: SHIPPED | OUTPATIENT
Start: 2023-09-18 | End: 2023-09-18

## 2023-10-23 ENCOUNTER — OFFICE VISIT (OUTPATIENT)
Dept: FAMILY MEDICINE | Facility: CLINIC | Age: 53
End: 2023-10-23

## 2023-10-23 VITALS
WEIGHT: 250.38 LBS | HEIGHT: 66 IN | OXYGEN SATURATION: 100 % | HEART RATE: 76 BPM | TEMPERATURE: 98 F | BODY MASS INDEX: 40.24 KG/M2 | DIASTOLIC BLOOD PRESSURE: 82 MMHG | SYSTOLIC BLOOD PRESSURE: 131 MMHG

## 2023-10-23 DIAGNOSIS — H81.90 VESTIBULAR DIZZINESS: Primary | ICD-10-CM

## 2023-10-23 PROCEDURE — 99212 OFFICE O/P EST SF 10 MIN: CPT | Mod: ,,,

## 2023-10-23 PROCEDURE — 99212 PR OFFICE/OUTPT VISIT, EST, LEVL II, 10-19 MIN: ICD-10-PCS | Mod: ,,,

## 2023-10-23 RX ORDER — MECLIZINE HYDROCHLORIDE 25 MG/1
25 TABLET ORAL 3 TIMES DAILY PRN
Qty: 30 TABLET | Refills: 1 | Status: SHIPPED | OUTPATIENT
Start: 2023-10-23 | End: 2024-04-01

## 2023-10-23 RX ORDER — FLUTICASONE PROPIONATE 50 MCG
1 SPRAY, SUSPENSION (ML) NASAL DAILY
Qty: 15.8 ML | Refills: 2 | Status: SHIPPED | OUTPATIENT
Start: 2023-10-23 | End: 2023-11-14

## 2023-10-23 NOTE — ASSESSMENT & PLAN NOTE
Continue taking amoxicillin and decongestant mediation prescribed yesterday.   Start using flonase    Use saline nose sprays  Cool mist humidifier with distilled water  Warm salt water gargles  Warm tea with honey and lemon  Chloraseptic spray OTC.   Tylenol/ibuprofen for pain/fever greater than 100.4  Increase water intake.   Return to clinic if there is no improvement.

## 2023-10-23 NOTE — LETTER
October 23, 2023      [facility]  [address]  [place] [place] [address]  Phone: [phone]  Fax: [phone]       Patient: Stuart Cloud   YOB: 1970  Date of Visit: 10/23/2023    To Whom It May Concern:    Bismark Cloud  was at [facility] on 10/23/2023. The patient may return to work/school on 10/24/2023 with no restrictions. If you have any questions or concerns, or if I can be of further assistance, please do not hesitate to contact me.    Sincerely,    DAKOTA Lee

## 2023-10-23 NOTE — PATIENT INSTRUCTIONS
Start taking flonase daily  Continue medications previously prescribed    Use saline nose sprays  Cool mist humidifier with distilled water  Warm salt water gargles  Warm tea with honey and lemon  Chloraseptic spray OTC.   Tylenol/ibuprofen for pain/fever greater than 100.4  Increase water intake.   Return to clinic if there is no improvement.

## 2023-10-23 NOTE — PROGRESS NOTES
PATIENT NAME: Stuart Cloud  : 1970  DATE: 10/23/23  MRN: [id number]          Reason for Visit / Chief Complaint: URI (Exam Room 8)       Update PCP  Update Chief Complaint         History of Present Illness / Problem Focused Workflow     Stuart Cloud presents to the clinic with URI (Exam Room 8)     Started on amoxicillin yesterday by another provider for otitis.     URI   There has been no fever. Associated symptoms include coughing, ear pain, nausea, rhinorrhea, sneezing and a sore throat. Pertinent negatives include no diarrhea or vomiting.       Review of Systems     @Review of Systems   Constitutional:  Positive for appetite change. Negative for chills and fever.   HENT:  Positive for ear pain, postnasal drip, rhinorrhea, sinus pressure/congestion, sneezing and sore throat. Negative for tinnitus.    Respiratory:  Positive for cough.    Gastrointestinal:  Positive for nausea. Negative for diarrhea and vomiting.   Neurological:  Positive for dizziness.       Medical / Social / Family History     Past Medical History:   Diagnosis Date    Hypertension     Hypothyroidism        Past Surgical History:   Procedure Laterality Date    ARTHROSCOPY OF KNEE Right 2023    Procedure: ARTHROSCOPY, KNEE;  Surgeon: Tacos Aburto MD;  Location: [facility];  Service: Orthopedics;  Laterality: Right;    HYSTERECTOMY      KNEE ARTHROSCOPY W/ MENISCECTOMY Right 2023    Procedure: ARTHROSCOPY, KNEE, WITH MENISCECTOMY;  Surgeon: Tacos Aburto MD;  Location: [facility];  Service: Orthopedics;  Laterality: Right;    left knee replacement Left     LUMPECTOMY, BREAST      right hip replacement Right        Social History  Ms.  reports that she has never smoked. She has never used smokeless tobacco. She reports that she does not drink alcohol and does not use drugs.    Family History  Ms.'s family history includes Diabetes in her mother; Hypertension in her maternal grandmother and mother; Stroke  in her maternal grandfather, maternal grandmother, and mother.    Medications and Allergies     Medications  Outpatient Medications Marked as Taking for the 10/23/23 encounter (Office Visit) with Maria L Phillips FNP-C   Medication Sig Dispense Refill    levothyroxine (SYNTHROID) 25 MCG tablet Take 1 tablet (25 mcg total) by mouth before breakfast. 30 tablet 2    losartan-hydrochlorothiazide 50-12.5 mg (HYZAAR) 50-12.5 mg per tablet Take 1 tablet by mouth once daily. 90 tablet 3    ondansetron (ZOFRAN-ODT) 8 MG TbDL Take 1 tablet (8 mg total) by mouth every 6 (six) hours as needed (nausea). 20 tablet 0       Allergies  Review of patient's allergies indicates:   Allergen Reactions    Percocet [oxycodone-acetaminophen] Itching    Latex     Opioids - morphine analogues Hives    Sulfa (sulfonamide antibiotics) Rash    Zithromax z-casimiro [azithromycin] Rash       Physical Examination     Vitals:    10/23/23 1332   BP: 131/82   Pulse: 76   Temp: 98.4 °F (36.9 °C)     Physical Exam  Vitals and nursing note reviewed.   Constitutional:       Appearance: Normal appearance. She is normal weight.   HENT:      Head: Normocephalic.      Right Ear: Ear canal and external ear normal. A middle ear effusion is present. Tympanic membrane is bulging.      Left Ear: Ear canal and external ear normal. A middle ear effusion is present. Tympanic membrane is bulging.      Nose: Congestion present.      Mouth/Throat:      Mouth: Mucous membranes are moist.      Pharynx: Oropharynx is clear.   Eyes:      Extraocular Movements: Extraocular movements intact.      Conjunctiva/sclera: Conjunctivae normal.      Pupils: Pupils are equal, round, and reactive to light.   Cardiovascular:      Rate and Rhythm: Normal rate and regular rhythm.      Pulses: Normal pulses.      Heart sounds: Normal heart sounds.   Pulmonary:      Effort: Pulmonary effort is normal.      Breath sounds: Normal breath sounds.   Abdominal:      General: Abdomen is flat. Bowel  sounds are normal.      Palpations: Abdomen is soft.   Musculoskeletal:         General: Normal range of motion.      Cervical back: Normal range of motion and neck supple.   Skin:     General: Skin is warm and dry.      Capillary Refill: Capillary refill takes less than 2 seconds.   Neurological:      General: No focal deficit present.      Mental Status: She is alert and oriented to person, place, and time.   Psychiatric:         Mood and Affect: Mood normal.         Behavior: Behavior normal.         Thought Content: Thought content normal.         Judgment: Judgment normal.                           Procedures   Assessment and Plan (including Health Maintenance)      Problem List  Smart Sets  Document Outside HM   :    Plan:   Problem List Items Addressed This Visit          ENT    Vestibular dizziness - Primary    Current Assessment & Plan     Continue taking amoxicillin and decongestant mediation prescribed yesterday.   Start using flonase    Use saline nose sprays  Cool mist humidifier with distilled water  Warm salt water gargles  Warm tea with honey and lemon  Chloraseptic spray OTC.   Tylenol/ibuprofen for pain/fever greater than 100.4  Increase water intake.   Return to clinic if there is no improvement.               Health Maintenance Topics with due status: Not Due       Topic Last Completion Date    Hemoglobin A1c (Diabetic Prevention Screening) 03/29/2023    Lipid Panel 03/29/2023       Future Appointments   Date Time Provider Department Center   11/15/2023  3:00 PM Sage Ordonez MD [facility] NEURO [place] [facility]   [facility]31[facility]  [facility] 01 [facility]             Follow up if symptoms worsen or fail to improve.     Signature:  DAKOTA MARTINEZ        Date of encounter: 10/23/23

## 2023-10-24 ENCOUNTER — HOSPITAL ENCOUNTER (EMERGENCY)
Facility: HOSPITAL | Age: 53
Discharge: HOME | End: 2023-10-24

## 2023-10-24 VITALS
HEIGHT: 66 IN | HEART RATE: 62 BPM | OXYGEN SATURATION: 100 % | BODY MASS INDEX: 36 KG/M2 | TEMPERATURE: 98 F | RESPIRATION RATE: 17 BRPM | DIASTOLIC BLOOD PRESSURE: 82 MMHG | SYSTOLIC BLOOD PRESSURE: 123 MMHG | WEIGHT: 224 LBS

## 2023-10-24 DIAGNOSIS — J06.9 VIRAL UPPER RESPIRATORY ILLNESS: Primary | ICD-10-CM

## 2023-10-24 LAB — SARS-COV-2 RDRP RESP QL NAA+PROBE: NEGATIVE

## 2023-10-24 PROCEDURE — 99283 EMERGENCY DEPT VISIT LOW MDM: CPT | Mod: ,,, | Performed by: NURSE PRACTITIONER

## 2023-10-24 PROCEDURE — 87635 SARS-COV-2 COVID-19 AMP PRB: CPT | Performed by: NURSE PRACTITIONER

## 2023-10-24 PROCEDURE — 99282 EMERGENCY DEPT VISIT SF MDM: CPT

## 2023-10-24 PROCEDURE — 99283 PR EMERGENCY DEPT VISIT,LEVEL III: ICD-10-PCS | Mod: ,,, | Performed by: NURSE PRACTITIONER

## 2023-10-24 NOTE — ED PROVIDER NOTES
Encounter Date: 10/24/2023       History     Chief Complaint   Patient presents with    Diarrhea    Abdominal Pain    Shoulder Pain     Patient comes in with complaints of 3 soft stools today, stomach pain and bilateral shoulder pain, States she was started on atb therapy last week, was seen in clinic yesterday . She is requesting covid test     Patient presents to the ED with multiple complaints, states she was seen by a PCP yesterday and states she was told if she did not feel any better today to get a COVID swab. Patient states she went to her PCP but due to financial reasons she came to the ED for testing.     The history is provided by the patient.     Review of patient's allergies indicates:   Allergen Reactions    Percocet [oxycodone-acetaminophen] Itching    Latex     Opioids - morphine analogues Hives    Sulfa (sulfonamide antibiotics) Rash    Zithromax z-casimiro [azithromycin] Rash     Past Medical History:   Diagnosis Date    Hypertension     Hypothyroidism      Past Surgical History:   Procedure Laterality Date    ARTHROSCOPY OF KNEE Right 5/25/2023    Procedure: ARTHROSCOPY, KNEE;  Surgeon: Tacos Aburto MD;  Location: [facility];  Service: Orthopedics;  Laterality: Right;    HYSTERECTOMY      KNEE ARTHROSCOPY W/ MENISCECTOMY Right 5/25/2023    Procedure: ARTHROSCOPY, KNEE, WITH MENISCECTOMY;  Surgeon: Tacos Aburto MD;  Location: [facility];  Service: Orthopedics;  Laterality: Right;    left knee replacement Left     LUMPECTOMY, BREAST      right hip replacement Right      Family History   Problem Relation Age of Onset    Hypertension Maternal Grandmother     Stroke Maternal Grandmother     Stroke Maternal Grandfather     Hypertension Mother     Stroke Mother     Diabetes Mother      Social History     Tobacco Use    Smoking status: Never    Smokeless tobacco: Never   Substance Use Topics    Alcohol use: Never    Drug use: Never     Review of Systems   Constitutional: Negative.    HENT:   Positive for congestion, sinus pain, sneezing and sore throat.    Respiratory: Negative.     Cardiovascular: Negative.    Musculoskeletal: Negative.    Skin: Negative.    Neurological: Negative.    Psychiatric/Behavioral: Negative.     All other systems reviewed and are negative.      Physical Exam     Initial Vitals [10/24/23 1033]   BP Pulse Resp Temp SpO2   (!) 125/57 79 20 98.1 °F (36.7 °C) 99 %      MAP       --         Physical Exam    Vitals reviewed.  Constitutional: She appears well-developed and well-nourished.   HENT:   Right Ear: External ear normal.   Left Ear: External ear normal.   Mouth/Throat: Oropharynx is clear and moist.   Cardiovascular:  Normal rate, regular rhythm, normal heart sounds and intact distal pulses.           Pulmonary/Chest: Breath sounds normal.   Abdominal: Abdomen is soft. Bowel sounds are normal.   Musculoskeletal:         General: Normal range of motion.     Neurological: She is alert and oriented to person, place, and time. She has normal strength. GCS score is 15. GCS eye subscore is 4. GCS verbal subscore is 5. GCS motor subscore is 6.   Skin: Skin is warm and dry. Capillary refill takes less than 2 seconds.   Psychiatric: She has a normal mood and affect. Her behavior is normal. Judgment and thought content normal.         Medical Screening Exam   See Full Note    ED Course   Procedures  Labs Reviewed   SARS-COV-2 RNA AMPLIFICATION, QUAL - Normal    Narrative:     Negative SARS-CoV results should not be used as the sole basis for treatment or patient management decisions; negative results should be considered in the context of a patient's recent exposures, history and the presene of clinical signs and symptoms consistent with COVID-19.  Negative results should be treated as presumptive and confirmed by molecular assay, if necessary for patient management.          Imaging Results    None          Medications - No data to display  Medical Decision Making  MDM    Patient  presents for emergent evaluation of acute multiple URI complaints that poses a threat to life and/or bodily function.    In the ED patient found to have acute viral URI.    I ordered labs and personally reviewed them.  Labs negative for COVID.     Discharge MDM  I discussed the treatment and discharge plan with the patient.   Patient was discharged in stable condition.  Detailed return precautions discussed.                                Clinical Impression:   Final diagnoses:  [J06.9] Viral upper respiratory illness (Primary)        ED Disposition Condition    Discharge Stable          ED Prescriptions    None       Follow-up Information    None          Kaela Carmen, [facility]  10/24/23 1210

## 2023-10-24 NOTE — Clinical Note
"Stuart Cortesloni" Pedro Luis was seen and treated in our emergency department on 10/24/2023.  She may return to work on 10/25/2023.       If you have any questions or concerns, please don't hesitate to call.      Kaela Carmen, SHANICE"

## 2023-11-08 ENCOUNTER — HOSPITAL ENCOUNTER (EMERGENCY)
Facility: HOSPITAL | Age: 53
Discharge: HOME | End: 2023-11-08
Attending: EMERGENCY MEDICINE
Payer: MEDICAID

## 2023-11-08 VITALS
TEMPERATURE: 98 F | HEIGHT: 66 IN | RESPIRATION RATE: 18 BRPM | OXYGEN SATURATION: 100 % | BODY MASS INDEX: 36 KG/M2 | DIASTOLIC BLOOD PRESSURE: 78 MMHG | WEIGHT: 224 LBS | SYSTOLIC BLOOD PRESSURE: 135 MMHG | HEART RATE: 80 BPM

## 2023-11-08 DIAGNOSIS — A08.4 VIRAL GASTROENTERITIS: Primary | ICD-10-CM

## 2023-11-08 DIAGNOSIS — R10.9 ABDOMINAL PAIN: ICD-10-CM

## 2023-11-08 LAB
ALBUMIN SERPL BCP-MCNC: 3.1 G/DL (ref 3.5–5)
ALBUMIN/GLOB SERPL: 1.1 {RATIO}
ALP SERPL-CCNC: 79 U/L (ref 41–108)
ALT SERPL W P-5'-P-CCNC: 32 U/L (ref 13–56)
ANION GAP SERPL CALCULATED.3IONS-SCNC: 8 MMOL/L (ref 7–16)
AST SERPL W P-5'-P-CCNC: 26 U/L (ref 15–37)
BACTERIA #/AREA URNS HPF: ABNORMAL /HPF
BASOPHILS # BLD AUTO: 0.02 K/UL (ref 0–0.2)
BASOPHILS NFR BLD AUTO: 0.4 % (ref 0–1)
BILIRUB SERPL-MCNC: 0.5 MG/DL (ref ?–1.2)
BILIRUB UR QL STRIP: NEGATIVE
BUN SERPL-MCNC: 14 MG/DL (ref 7–18)
BUN/CREAT SERPL: 17 (ref 6–20)
CALCIUM SERPL-MCNC: 8.8 MG/DL (ref 8.5–10.1)
CAOX CRY URNS QL MICRO: ABNORMAL /HPF
CHLORIDE SERPL-SCNC: 111 MMOL/L (ref 98–107)
CLARITY UR: ABNORMAL
CO2 SERPL-SCNC: 27 MMOL/L (ref 21–32)
COLOR UR: YELLOW
CREAT SERPL-MCNC: 0.81 MG/DL (ref 0.55–1.02)
DIFFERENTIAL METHOD BLD: ABNORMAL
EGFR (NO RACE VARIABLE) (RUSH/TITUS): 88 ML/MIN/1.73M2
EOSINOPHIL # BLD AUTO: 0.27 K/UL (ref 0–0.5)
EOSINOPHIL NFR BLD AUTO: 4.8 % (ref 1–4)
ERYTHROCYTE [DISTWIDTH] IN BLOOD BY AUTOMATED COUNT: 12.4 % (ref 11.5–14.5)
FLUAV AG UPPER RESP QL IA.RAPID: NEGATIVE
FLUBV AG UPPER RESP QL IA.RAPID: NEGATIVE
GLOBULIN SER-MCNC: 2.9 G/DL (ref 2–4)
GLUCOSE SERPL-MCNC: 126 MG/DL (ref 74–106)
GLUCOSE UR STRIP-MCNC: NORMAL MG/DL
HCT VFR BLD AUTO: 37.6 % (ref 38–47)
HGB BLD-MCNC: 12.9 G/DL (ref 12–16)
IMM GRANULOCYTES # BLD AUTO: 0.02 K/UL (ref 0–0.04)
IMM GRANULOCYTES NFR BLD: 0.4 % (ref 0–0.4)
KETONES UR STRIP-SCNC: NEGATIVE MG/DL
LEUKOCYTE ESTERASE UR QL STRIP: ABNORMAL
LIPASE SERPL-CCNC: <19 U/L (ref 16–77)
LYMPHOCYTES # BLD AUTO: 1.08 K/UL (ref 1–4.8)
LYMPHOCYTES NFR BLD AUTO: 19.3 % (ref 27–41)
MCH RBC QN AUTO: 29.5 PG (ref 27–31)
MCHC RBC AUTO-ENTMCNC: 34.3 G/DL (ref 32–36)
MCV RBC AUTO: 85.8 FL (ref 80–96)
MONOCYTES # BLD AUTO: 0.41 K/UL (ref 0–0.8)
MONOCYTES NFR BLD AUTO: 7.3 % (ref 2–6)
MPC BLD CALC-MCNC: 10.3 FL (ref 9.4–12.4)
MUCOUS, UA: ABNORMAL /LPF
NEUTROPHILS # BLD AUTO: 3.79 K/UL (ref 1.8–7.7)
NEUTROPHILS NFR BLD AUTO: 67.8 % (ref 53–65)
NITRITE UR QL STRIP: NEGATIVE
NRBC # BLD AUTO: 0 X10E3/UL
NRBC, AUTO (.00): 0 %
PH UR STRIP: 5.5 PH UNITS
PLATELET # BLD AUTO: 247 K/UL (ref 150–400)
POTASSIUM SERPL-SCNC: 3.2 MMOL/L (ref 3.5–5.1)
PROT SERPL-MCNC: 6 G/DL (ref 6.4–8.2)
PROT UR QL STRIP: 20
RAPID GROUP A STREP: NEGATIVE
RBC # BLD AUTO: 4.38 M/UL (ref 4.2–5.4)
RBC # UR STRIP: ABNORMAL /UL
RBC #/AREA URNS HPF: 22 /HPF
SARS-COV-2 RDRP RESP QL NAA+PROBE: NEGATIVE
SODIUM SERPL-SCNC: 143 MMOL/L (ref 136–145)
SP GR UR STRIP: 1.03
SQUAMOUS #/AREA URNS LPF: ABNORMAL /HPF
UROBILINOGEN UR STRIP-ACNC: 3 MG/DL
WBC # BLD AUTO: 5.59 K/UL (ref 4.5–11)
WBC #/AREA URNS HPF: 5 /HPF

## 2023-11-08 PROCEDURE — 87635 SARS-COV-2 COVID-19 AMP PRB: CPT | Performed by: EMERGENCY MEDICINE

## 2023-11-08 PROCEDURE — 93005 ELECTROCARDIOGRAM TRACING: CPT

## 2023-11-08 PROCEDURE — 83690 ASSAY OF LIPASE: CPT | Performed by: EMERGENCY MEDICINE

## 2023-11-08 PROCEDURE — 80053 COMPREHEN METABOLIC PANEL: CPT | Performed by: EMERGENCY MEDICINE

## 2023-11-08 PROCEDURE — 93010 EKG 12-LEAD: ICD-10-PCS | Mod: ,,, | Performed by: INTERNAL MEDICINE

## 2023-11-08 PROCEDURE — 99284 PR EMERGENCY DEPT VISIT,LEVEL IV: ICD-10-PCS | Mod: ,,, | Performed by: EMERGENCY MEDICINE

## 2023-11-08 PROCEDURE — 99284 EMERGENCY DEPT VISIT MOD MDM: CPT | Mod: ,,, | Performed by: EMERGENCY MEDICINE

## 2023-11-08 PROCEDURE — 99284 EMERGENCY DEPT VISIT MOD MDM: CPT | Mod: 25

## 2023-11-08 PROCEDURE — 93010 ELECTROCARDIOGRAM REPORT: CPT | Mod: ,,, | Performed by: INTERNAL MEDICINE

## 2023-11-08 PROCEDURE — 87804 INFLUENZA ASSAY W/OPTIC: CPT | Performed by: EMERGENCY MEDICINE

## 2023-11-08 PROCEDURE — 96365 THER/PROPH/DIAG IV INF INIT: CPT

## 2023-11-08 PROCEDURE — 25000003 PHARM REV CODE 250: Performed by: EMERGENCY MEDICINE

## 2023-11-08 PROCEDURE — 81001 URINALYSIS AUTO W/SCOPE: CPT | Performed by: EMERGENCY MEDICINE

## 2023-11-08 PROCEDURE — 85025 COMPLETE CBC W/AUTO DIFF WBC: CPT | Performed by: EMERGENCY MEDICINE

## 2023-11-08 PROCEDURE — 87880 STREP A ASSAY W/OPTIC: CPT | Performed by: EMERGENCY MEDICINE

## 2023-11-08 PROCEDURE — 63600175 PHARM REV CODE 636 W HCPCS: Performed by: EMERGENCY MEDICINE

## 2023-11-08 PROCEDURE — 96375 TX/PRO/DX INJ NEW DRUG ADDON: CPT

## 2023-11-08 PROCEDURE — 96361 HYDRATE IV INFUSION ADD-ON: CPT

## 2023-11-08 RX ORDER — KETOROLAC TROMETHAMINE 15 MG/ML
15 INJECTION, SOLUTION INTRAMUSCULAR; INTRAVENOUS
Status: COMPLETED | OUTPATIENT
Start: 2023-11-08 | End: 2023-11-08

## 2023-11-08 RX ORDER — PROMETHAZINE HYDROCHLORIDE 25 MG/1
25 TABLET ORAL EVERY 6 HOURS PRN
Qty: 15 TABLET | Refills: 0 | Status: SHIPPED | OUTPATIENT
Start: 2023-11-08

## 2023-11-08 RX ORDER — PROMETHAZINE HYDROCHLORIDE 25 MG/1
25 SUPPOSITORY RECTAL EVERY 6 HOURS PRN
Qty: 12 SUPPOSITORY | Refills: 0 | Status: SHIPPED | OUTPATIENT
Start: 2023-11-08 | End: 2023-11-14

## 2023-11-08 RX ORDER — ACETAMINOPHEN 325 MG/1
650 TABLET ORAL
Status: COMPLETED | OUTPATIENT
Start: 2023-11-08 | End: 2023-11-08

## 2023-11-08 RX ORDER — PROMETHAZINE HYDROCHLORIDE 25 MG/1
25 TABLET ORAL EVERY 6 HOURS PRN
Qty: 16 TABLET | Refills: 0 | Status: SHIPPED | OUTPATIENT
Start: 2023-11-08 | End: 2023-11-14

## 2023-11-08 RX ADMIN — KETOROLAC TROMETHAMINE 15 MG: 15 INJECTION, SOLUTION INTRAMUSCULAR; INTRAVENOUS at 06:11

## 2023-11-08 RX ADMIN — ACETAMINOPHEN 650 MG: 325 TABLET ORAL at 05:11

## 2023-11-08 RX ADMIN — SODIUM CHLORIDE 1000 ML: 9 INJECTION, SOLUTION INTRAVENOUS at 05:11

## 2023-11-08 RX ADMIN — PROMETHAZINE HYDROCHLORIDE 12.5 MG: 25 INJECTION INTRAMUSCULAR; INTRAVENOUS at 05:11

## 2023-11-08 NOTE — Clinical Note
"Stuart"Stuart" Pedro Luis was seen and treated in our emergency department on 11/8/2023.  She may return to work on 11/13/2023.       If you have any questions or concerns, please don't hesitate to call.      Contreras Rosario MD"

## 2023-11-08 NOTE — ED TRIAGE NOTES
Patient reports lower abdominal pain since Sunday and upper abdominal pain that started last night.  She reports that she has had diarrhea since Sunday every time she coughs or sneezes, and vomited at least 3 times since then.

## 2023-11-08 NOTE — DISCHARGE INSTRUCTIONS
Use prescription Phenergan as needed for nausea    Keep hydrated with plenty of fluids.  Gradually increase your diet as tolerated    Use ibuprofen and Tylenol as needed

## 2023-11-08 NOTE — ED PROVIDER NOTES
.mdmEncounter Date: 11/8/2023       History     Chief Complaint   Patient presents with    Vomiting    Abdominal Pain    Diarrhea     Patient complains of vomiting and diarrhea and abdominal cramps body aches and fever.  Her symptoms 1st began 3 days ago.  She works at a hospital.  She did test negative for COVID a few days ago.  Also runny nose occasional cough.  No localized abdominal pain.      Review of patient's allergies indicates:   Allergen Reactions    Percocet [oxycodone-acetaminophen] Itching    Latex     Opioids - morphine analogues Hives    Sulfa (sulfonamide antibiotics) Rash    Zithromax z-casimiro [azithromycin] Rash     Past Medical History:   Diagnosis Date    Hypertension     Hypothyroidism      Past Surgical History:   Procedure Laterality Date    ARTHROSCOPY OF KNEE Right 5/25/2023    Procedure: ARTHROSCOPY, KNEE;  Surgeon: Tacos Aburto MD;  Location: [facility];  Service: Orthopedics;  Laterality: Right;    HYSTERECTOMY      KNEE ARTHROSCOPY W/ MENISCECTOMY Right 5/25/2023    Procedure: ARTHROSCOPY, KNEE, WITH MENISCECTOMY;  Surgeon: Tacos Aburto MD;  Location: [facility];  Service: Orthopedics;  Laterality: Right;    left knee replacement Left     LUMPECTOMY, BREAST      right hip replacement Right      Family History   Problem Relation Age of Onset    Hypertension Maternal Grandmother     Stroke Maternal Grandmother     Stroke Maternal Grandfather     Hypertension Mother     Stroke Mother     Diabetes Mother      Social History     Tobacco Use    Smoking status: Never    Smokeless tobacco: Never   Substance Use Topics    Alcohol use: Never    Drug use: Never     Review of Systems   Constitutional:  Negative for fever.   HENT:  Negative for sore throat.    Respiratory:  Negative for shortness of breath.    Cardiovascular:  Negative for chest pain.   Gastrointestinal:  Positive for abdominal pain (Generalized cramping), diarrhea, nausea and vomiting.   Genitourinary:  Negative  for dysuria.   Musculoskeletal:  Negative for back pain.   Skin:  Negative for rash.   Neurological:  Negative for weakness.   Hematological:  Does not bruise/bleed easily.       Physical Exam     Initial Vitals [11/08/23 0426]   BP Pulse Resp Temp SpO2   (!) 145/95 83 16 98.3 °F (36.8 °C) 100 %      MAP       --         Physical Exam    Nursing note and vitals reviewed.  Constitutional: She appears well-developed and well-nourished.   HENT:   Head: Normocephalic and atraumatic.   Eyes: EOM are normal. Pupils are equal, round, and reactive to light.   Neck: Neck supple. No thyromegaly present.   Normal range of motion.  Cardiovascular:  Normal rate, regular rhythm, normal heart sounds and intact distal pulses.           No murmur heard.  Pulmonary/Chest: Breath sounds normal. No respiratory distress. She has no wheezes.   Abdominal: Abdomen is soft. Bowel sounds are normal. She exhibits no distension. There is no abdominal tenderness.   Musculoskeletal:         General: No tenderness or edema. Normal range of motion.      Cervical back: Normal range of motion and neck supple.     Lymphadenopathy:     She has no cervical adenopathy.   Neurological: She is alert and oriented to person, place, and time. She has normal strength. No cranial nerve deficit or sensory deficit.   Skin: Skin is warm and dry. No rash noted.   Psychiatric: She has a normal mood and affect.         Medical Screening Exam   See Full Note    ED Course   Procedures  Labs Reviewed   COMPREHENSIVE METABOLIC PANEL - Abnormal; Notable for the following components:       Result Value    Potassium 3.2 (*)     Chloride 111 (*)     Glucose 126 (*)     Total Protein 6.0 (*)     Albumin 3.1 (*)     All other components within normal limits   URINALYSIS, REFLEX TO URINE CULTURE - Abnormal; Notable for the following components:    Leukocytes, UA Trace (*)     Protein, UA 20 (*)     Urobilinogen, UA 3 (*)     Blood, UA Moderate (*)     Specific Gravity, UA  1.034 (*)     All other components within normal limits   CBC WITH DIFFERENTIAL - Abnormal; Notable for the following components:    Hematocrit 37.6 (*)     Neutrophils % 67.8 (*)     Lymphocytes % 19.3 (*)     Monocytes % 7.3 (*)     Eosinophils % 4.8 (*)     All other components within normal limits   URINALYSIS, MICROSCOPIC - Abnormal; Notable for the following components:    RBC, UA 22 (*)     Bacteria, UA Few (*)     Squamous Epithelial Cells, UA Occasional (*)     Calcium Oxalate Crystals, UA Many (*)     Mucous Few (*)     All other components within normal limits   RAPID INFLUENZA A/B - Normal   THROAT SCREEN, RAPID STREP - Normal   LIPASE - Normal   SARS-COV-2 RNA AMPLIFICATION, QUAL - Normal    Narrative:     Negative SARS-CoV results should not be used as the sole basis for treatment or patient management decisions; negative results should be considered in the context of a patient's recent exposures, history and the presene of clinical signs and symptoms consistent with COVID-19.  Negative results should be treated as presumptive and confirmed by molecular assay, if necessary for patient management.   CBC W/ AUTO DIFFERENTIAL    Narrative:     The following orders were created for panel order CBC auto differential.  Procedure                               Abnormality         Status                     ---------                               -----------         ------                     CBC with Differential[[id number]]       Abnormal            Final result                 Please view results for these tests on the individual orders.          Imaging Results    None          Medications   sodium chloride 0.9% bolus 1,000 mL 1,000 mL (0 mLs Intravenous Stopped 11/8/23 0612)   promethazine (PHENERGAN) 12.5 mg in dextrose 5 % (D5W) 50 mL IVPB (0 mg Intravenous Stopped 11/8/23 0531)   acetaminophen tablet 650 mg (650 mg Oral Given 11/8/23 0511)   ketorolac injection 15 mg (15 mg Intravenous Given 11/8/23  0610)     Medical Decision Making  MDM    Patient presents for emergent evaluation of acute diarrhea vomiting around [place] runny nose cough that poses a threat to life and/or bodily function.    In the ED patient found to have acute viral illness.    I ordered labs and personally reviewed them.  Labs significant for negative flu strep and COVID.  White count normal    Discharge MDM  Patient was managed in the ED with IV normal saline Phenergan Toradol  The response to treatment was improved.    Patient was discharged in stable condition.  Detailed return precautions discussed.     Amount and/or Complexity of Data Reviewed  Labs: ordered.    Risk  OTC drugs.  Prescription drug management.               ED Course as of 11/10/23 0602   Wed Nov 08, 2023   0551 Care transferred to Dr. Nelson [PK]   0602 No dysuria or urinary frequency.  Discussed with the hematuria patient says she is been worked up previously for that with cystoscopy.  Advised to follow-up with primary care [PK]   0777 Strep test is negative. [BB]      ED Course User Index  [BB] Chepe Nelson MD  [PK] Contreras Rosario MD                    Clinical Impression:   Final diagnoses:  [R10.9] Abdominal pain  [A08.4] Viral gastroenteritis (Primary)        ED Disposition Condition    Discharge Stable          ED Prescriptions       Medication Sig Dispense Start Date End Date Auth. Provider    promethazine (PHENERGAN) 25 MG tablet Take 1 tablet (25 mg total) by mouth every 6 (six) hours as needed for Nausea. 15 tablet 11/8/2023 -- Contreras Rosario MD    promethazine (PHENERGAN) 25 MG tablet Take 1 tablet (25 mg total) by mouth every 6 (six) hours as needed. 16 tablet 11/8/2023 -- Chepe Nelson MD    promethazine (PHENERGAN) 25 MG suppository Place 1 suppository (25 mg total) rectally every 6 (six) hours as needed. 12 suppository 11/8/2023 -- Chepe Nelson MD          Follow-up Information       Follow up With Specialties Details Why Contact Info     Sarbjit Akins IV, DO Family Medicine Schedule an appointment as soon as possible for a visit   [address]  [place] [address]  [phone]      [facility] Emergency Medicine Go to  As needed, If symptoms worsen [address] [place] [address] [phone]             Contreras Rosario MD  11/10/23 0602

## 2023-11-09 ENCOUNTER — TELEPHONE (OUTPATIENT)
Dept: EMERGENCY MEDICINE | Facility: HOSPITAL | Age: 53
End: 2023-11-09
Payer: MEDICAID

## 2023-11-14 ENCOUNTER — OFFICE VISIT (OUTPATIENT)
Dept: FAMILY MEDICINE | Facility: CLINIC | Age: 53
End: 2023-11-14
Payer: MEDICAID

## 2023-11-14 VITALS
BODY MASS INDEX: 40.74 KG/M2 | DIASTOLIC BLOOD PRESSURE: 85 MMHG | SYSTOLIC BLOOD PRESSURE: 131 MMHG | HEIGHT: 66 IN | WEIGHT: 253.5 LBS | HEART RATE: 84 BPM

## 2023-11-14 DIAGNOSIS — H92.03 OTALGIA OF BOTH EARS: ICD-10-CM

## 2023-11-14 DIAGNOSIS — F41.9 ANXIETY: ICD-10-CM

## 2023-11-14 DIAGNOSIS — H61.21 IMPACTED CERUMEN OF RIGHT EAR: ICD-10-CM

## 2023-11-14 DIAGNOSIS — Z12.39 ENCOUNTER FOR SCREENING FOR MALIGNANT NEOPLASM OF BREAST, UNSPECIFIED SCREENING MODALITY: Primary | ICD-10-CM

## 2023-11-14 DIAGNOSIS — I10 ESSENTIAL HYPERTENSION: ICD-10-CM

## 2023-11-14 DIAGNOSIS — E66.01 CLASS 3 SEVERE OBESITY DUE TO EXCESS CALORIES WITHOUT SERIOUS COMORBIDITY WITH BODY MASS INDEX (BMI) OF 40.0 TO 44.9 IN ADULT: ICD-10-CM

## 2023-11-14 DIAGNOSIS — E03.9 ACQUIRED HYPOTHYROIDISM: ICD-10-CM

## 2023-11-14 PROCEDURE — 69209 REMOVE IMPACTED EAR WAX UNI: CPT | Mod: RT,,, | Performed by: FAMILY MEDICINE

## 2023-11-14 PROCEDURE — 69209 EAR CERUMEN REMOVAL: ICD-10-PCS | Mod: RT,,, | Performed by: FAMILY MEDICINE

## 2023-11-14 PROCEDURE — 99214 PR OFFICE/OUTPT VISIT, EST, LEVL IV, 30-39 MIN: ICD-10-PCS | Mod: 25,,, | Performed by: FAMILY MEDICINE

## 2023-11-14 PROCEDURE — 99214 OFFICE O/P EST MOD 30 MIN: CPT | Mod: 25,,, | Performed by: FAMILY MEDICINE

## 2023-11-14 RX ORDER — LEVOTHYROXINE SODIUM 25 UG/1
25 TABLET ORAL
Qty: 30 TABLET | Refills: 2 | Status: SHIPPED | OUTPATIENT
Start: 2023-11-14 | End: 2024-04-01 | Stop reason: SDUPTHER

## 2023-11-14 RX ORDER — PHENTERMINE HYDROCHLORIDE 37.5 MG/1
37.5 TABLET ORAL
Qty: 30 TABLET | Refills: 2 | Status: SHIPPED | OUTPATIENT
Start: 2023-11-14 | End: 2024-02-12

## 2023-11-14 RX ORDER — CLONAZEPAM 0.5 MG/1
0.5 TABLET ORAL 2 TIMES DAILY PRN
Qty: 28 TABLET | Refills: 0 | Status: SHIPPED | OUTPATIENT
Start: 2023-11-14 | End: 2024-04-01 | Stop reason: SDUPTHER

## 2023-11-14 NOTE — ASSESSMENT & PLAN NOTE
Well-controlled with clonazepam 0.5 mg p.o. b.i.d. p.r.n. anxiety.  Will prescribe number 28 today.

## 2023-11-14 NOTE — ASSESSMENT & PLAN NOTE
Patient lost approximately 1.5 lb since August.  Re-evaluation of BMI April 5th 20 till today shows a decrease of about 5 points patient's current BMI is 40.92.    Recommend continuing phentermine for 90 days.

## 2023-11-14 NOTE — PROGRESS NOTES
"              Sarbjit Akins IV, DO  [facility]  [address] [place] [address]  Phone: [phone]      Subjective     Name: Stuart Cloud   Sex: female  YOB: 1970 (53 y.o.)  MRN: [id number]  Visit Date: 11/14/2023   Chief Complaint: Otalgia and Conjunctivitis        HISTORY OF PRESENT ILLNESS:    Chief Complaint   Patient presents with    Otalgia    Conjunctivitis       HPI  See tests that keep you healthy and the problem oriented documentation below.    Tests to Keep You Healthy    Mammogram: SCHEDULED  Colon Cancer Screening: SCHEDULED  Last Blood Pressure <= 139/89 (11/14/2023): Yes      Portions of this note may have been created with voice recognition software. Occasional "wrong-word" or "sound-a-like" substitutions may have occurred due to the inherent limitations of voice recognition software. Please, read the note carefully and recognize, using context, where substitutions have occurred.     PAST MEDICAL HISTORY:  Significant Diagnoses - Patient  has a past medical history of Hypertension and Hypothyroidism.  Medications - Patient has a current medication list which includes the following long-term medication(s): clonazepam, levothyroxine, losartan-hydrochlorothiazide 50-12.5 mg, and meclizine.   Allergies - Patient is allergic to percocet [oxycodone-acetaminophen], latex, opioids - morphine analogues, sulfa (sulfonamide antibiotics), and zithromax z-casimiro [azithromycin].  Surgeries - Patient  has a past surgical history that includes Hysterectomy; lumpectomy, breast; right hip replacement (Right); left knee replacement (Left); Arthroscopy of knee (Right, 5/25/2023); and Knee arthroscopy w/ meniscectomy (Right, 5/25/2023).  Family History - Patient family history includes Diabetes in her mother; Hypertension in her maternal grandmother and mother; Stroke in her maternal grandfather, maternal grandmother, and mother.      SOCIAL HISTORY:  Tobacco - Patient  reports " that she has never smoked. She has never been exposed to tobacco smoke. She has never used smokeless tobacco.   Alcohol - Patient  reports no history of alcohol use.   Recreational Drugs - Patient  reports no history of drug use.       Review of Systems   All other systems reviewed and are negative.         Past Medical History:   Diagnosis Date    Hypertension     Hypothyroidism         Review of patient's allergies indicates:   Allergen Reactions    Percocet [oxycodone-acetaminophen] Itching    Latex     Opioids - morphine analogues Hives    Sulfa (sulfonamide antibiotics) Rash    Zithromax z-casimiro [azithromycin] Rash        Past Surgical History:   Procedure Laterality Date    ARTHROSCOPY OF KNEE Right 5/25/2023    Procedure: ARTHROSCOPY, KNEE;  Surgeon: Tacos Aburto MD;  Location: [facility];  Service: Orthopedics;  Laterality: Right;    HYSTERECTOMY      KNEE ARTHROSCOPY W/ MENISCECTOMY Right 5/25/2023    Procedure: ARTHROSCOPY, KNEE, WITH MENISCECTOMY;  Surgeon: Tacos Aburto MD;  Location: [facility];  Service: Orthopedics;  Laterality: Right;    left knee replacement Left     LUMPECTOMY, BREAST      right hip replacement Right         Family History   Problem Relation Age of Onset    Hypertension Maternal Grandmother     Stroke Maternal Grandmother     Stroke Maternal Grandfather     Hypertension Mother     Stroke Mother     Diabetes Mother        Current Outpatient Medications   Medication Instructions    clonazePAM (KLONOPIN) 0.5 mg, Oral, 2 times daily PRN    cyclobenzaprine (FLEXERIL) 5 MG tablet Take 1 tablet 3 times a day by oral route as needed for 9 days.    levothyroxine (SYNTHROID) 25 mcg, Oral, Before breakfast    losartan-hydrochlorothiazide 50-12.5 mg (HYZAAR) 50-12.5 mg per tablet 1 tablet, Oral, Daily    meclizine (ANTIVERT) 25 mg, Oral, 3 times daily PRN    NAPROXEN ORAL 500 mg, Oral, Daily PRN    ondansetron (ZOFRAN-ODT) 8 mg, Oral, Every 6 hours PRN    phentermine  "(ADIPEX-P) 37.5 mg, Oral, Before breakfast    promethazine (PHENERGAN) 25 mg, Oral, Every 6 hours PRN        Objective     /85   Pulse 84   Ht 5' 6" (1.676 m)   Wt 115 kg (253 lb 8 oz)   BMI 40.92 kg/m²     Physical Exam  Constitutional:       General: She is not in acute distress.     Appearance: Normal appearance. She is not ill-appearing.   HENT:      Head: Normocephalic and atraumatic.      Right Ear: External ear normal.      Left Ear: External ear normal.   Eyes:      Extraocular Movements: Extraocular movements intact.      Conjunctiva/sclera: Conjunctivae normal.   Cardiovascular:      Rate and Rhythm: Normal rate.      Heart sounds: No murmur heard.  Pulmonary:      Effort: Pulmonary effort is normal.   Musculoskeletal:      Cervical back: Normal range of motion.   Skin:     General: Skin is warm and dry.      Coloration: Skin is not jaundiced.      Findings: No rash.   Neurological:      Mental Status: She is alert.   Psychiatric:         Mood and Affect: Mood normal.          All recently obtained labs have been reviewed and discussed in detail with the patient.   Assessment     1. Encounter for screening for malignant neoplasm of breast, unspecified screening modality    2. Class 3 severe obesity due to excess calories without serious comorbidity with body mass index (BMI) of 40.0 to 44.9 in adult    3. Anxiety    4. Acquired hypothyroidism    5. Otalgia of both ears    6. Impacted cerumen of right ear    7. Essential hypertension         Plan        Problem List Items Addressed This Visit          Psychiatric    Anxiety     Well-controlled with clonazepam 0.5 mg p.o. b.i.d. p.r.n. anxiety.  Will prescribe number 28 today.         Relevant Medications    clonazePAM (KLONOPIN) 0.5 MG tablet       ENT    Otalgia of both ears     Patient describes pain in both ears.  She states that she was given some Augmentin and penicillin by a nurse practitioner last week.  Left ear is visualized with clean " canal and tympanic membrane is visualized with a good cone of light.  Right ear is unable to be visualized secondary to impacted cerumen.  See quick procedure note for further information.            Cardiac/Vascular    Essential hypertension     Reasonably controlled in office today 131/85.  Recommend keeping under 120/80.  Patient has not taken her medications today but states that she will start him back and that at home it runs approximately 117/76.            Endocrine    Hypothyroidism     Most recent TSH and free T4 within lab reference ranges.  Recommend continuing Synthroid 25 mcg p.o. q.d..         Relevant Medications    levothyroxine (SYNTHROID) 25 MCG tablet    Class 3 severe obesity due to excess calories without serious comorbidity with body mass index (BMI) of 40.0 to 44.9 in adult     Patient lost approximately 1.5 lb since August.  Re-evaluation of BMI April 5th 20 till today shows a decrease of about 5 points patient's current BMI is 40.92.    Recommend continuing phentermine for 90 days.         Relevant Medications    phentermine (ADIPEX-P) 37.5 mg tablet     Other Visit Diagnoses       Encounter for screening for malignant neoplasm of breast, unspecified screening modality    -  Primary    Relevant Orders    Mammo Digital Screening Bilat w/ Demetrio    Impacted cerumen of right ear        Relevant Orders    Ear Cerumen Removal (Completed)            No follow-ups on file.    Patient advised that is symptoms worsen, they should call or report directly to local emergency department.    Sarbjit Akins,   [facility]

## 2023-11-14 NOTE — ASSESSMENT & PLAN NOTE
Reasonably controlled in office today 131/85.  Recommend keeping under 120/80.  Patient has not taken her medications today but states that she will start him back and that at home it runs approximately 117/76.

## 2023-11-14 NOTE — ASSESSMENT & PLAN NOTE
Most recent TSH and free T4 within lab reference ranges.  Recommend continuing Synthroid 25 mcg p.o. q.d..

## 2023-11-14 NOTE — PROCEDURES
"Ear Cerumen Removal    Date/Time: 11/14/2023 10:30 AM    Performed by: Sarbjit Akins IV, DO  Authorized by: Sarbjit Akins IV, DO    Time out: Immediately prior to procedure a "time out" was called to verify the correct patient, procedure, equipment, support staff and site/side marked as required.      Local anesthetic:  None  Location details:  Right ear  Procedure type: irrigation    Cerumen  Removal Results:  Cerumen completely removed  Patient tolerance:  Patient tolerated the procedure well with no immediate complications     After cerumen removal, ear canal was visualized and is mildly erythematous from irritation.  Tympanic membrane is visualized with good cone of light.    "

## 2023-11-14 NOTE — ASSESSMENT & PLAN NOTE
Patient describes pain in both ears.  She states that she was given some Augmentin and penicillin by a nurse practitioner last week.  Left ear is visualized with clean canal and tympanic membrane is visualized with a good cone of light.  Right ear is unable to be visualized secondary to impacted cerumen.  See quick procedure note for further information.

## 2023-11-14 NOTE — LETTER
November 14, 2023      [facility]  [address] [place] [address]  Phone: [phone]  Fax: [phone]       Patient: Stuart Cloud   YOB: 1970  Date of Visit: 11/14/2023    To Whom It May Concern:    Bismark Cloud  was at [facility] on 11/14/2023. The patient may return to work/school on 11/15/2023 with no restrictions. If you have any questions or concerns, or if I can be of further assistance, please do not hesitate to contact me.    Sincerely,    Sarbjit Akins IV, D.O.

## 2023-12-29 ENCOUNTER — OFFICE VISIT (OUTPATIENT)
Dept: FAMILY MEDICINE | Facility: CLINIC | Age: 53
End: 2023-12-29
Payer: MEDICAID

## 2023-12-29 VITALS
HEART RATE: 83 BPM | WEIGHT: 252 LBS | OXYGEN SATURATION: 98 % | BODY MASS INDEX: 40.5 KG/M2 | SYSTOLIC BLOOD PRESSURE: 125 MMHG | DIASTOLIC BLOOD PRESSURE: 82 MMHG | TEMPERATURE: 98 F | HEIGHT: 66 IN

## 2023-12-29 DIAGNOSIS — R49.0 HOARSENESS: ICD-10-CM

## 2023-12-29 DIAGNOSIS — E03.9 HYPOTHYROIDISM, UNSPECIFIED TYPE: Primary | ICD-10-CM

## 2023-12-29 DIAGNOSIS — H92.03 OTALGIA OF BOTH EARS: ICD-10-CM

## 2023-12-29 PROCEDURE — 99213 OFFICE O/P EST LOW 20 MIN: CPT | Mod: ,,,

## 2023-12-29 PROCEDURE — 3079F DIAST BP 80-89 MM HG: CPT | Mod: CPTII,,,

## 2023-12-29 PROCEDURE — 3008F BODY MASS INDEX DOCD: CPT | Mod: CPTII,,,

## 2023-12-29 PROCEDURE — 3074F SYST BP LT 130 MM HG: CPT | Mod: CPTII,,,

## 2023-12-29 RX ORDER — CIPROFLOXACIN AND DEXAMETHASONE 3; 1 MG/ML; MG/ML
4 SUSPENSION/ DROPS AURICULAR (OTIC) 2 TIMES DAILY
Qty: 7.5 ML | Refills: 0 | Status: SHIPPED | OUTPATIENT
Start: 2023-12-29

## 2023-12-29 NOTE — LETTER
December 29, 2023      [facility]  [address] [facility] [place] [address]  Phone: [phone]  Fax: [phone]       Patient: Stuart Cloud   YOB: 1970  Date of Visit: 12/29/2023    To Whom It May Concern:    Bismark Cloud  was at [facility] on 12/29/2023. The patient may return to work on 12/31/2023 with no restrictions. If you have any questions or concerns, or if I can be of further assistance, please do not hesitate to contact me.    Sincerely,    Maria L DUNCAN

## 2024-01-02 ENCOUNTER — OFFICE VISIT (OUTPATIENT)
Dept: FAMILY MEDICINE | Facility: CLINIC | Age: 54
End: 2024-01-02
Payer: MEDICAID

## 2024-01-02 VITALS
DIASTOLIC BLOOD PRESSURE: 85 MMHG | BODY MASS INDEX: 39.87 KG/M2 | HEART RATE: 54 BPM | OXYGEN SATURATION: 98 % | SYSTOLIC BLOOD PRESSURE: 122 MMHG | TEMPERATURE: 98 F | WEIGHT: 247 LBS

## 2024-01-02 DIAGNOSIS — J01.00 ACUTE NON-RECURRENT MAXILLARY SINUSITIS: Primary | ICD-10-CM

## 2024-01-02 PROCEDURE — 3079F DIAST BP 80-89 MM HG: CPT | Mod: CPTII,,,

## 2024-01-02 PROCEDURE — 3074F SYST BP LT 130 MM HG: CPT | Mod: CPTII,,,

## 2024-01-02 PROCEDURE — 3008F BODY MASS INDEX DOCD: CPT | Mod: CPTII,,,

## 2024-01-02 PROCEDURE — 1160F RVW MEDS BY RX/DR IN RCRD: CPT | Mod: CPTII,,,

## 2024-01-02 PROCEDURE — 99213 OFFICE O/P EST LOW 20 MIN: CPT | Mod: ,,,

## 2024-01-02 PROCEDURE — 1159F MED LIST DOCD IN RCRD: CPT | Mod: CPTII,,,

## 2024-01-02 RX ORDER — METHYLPREDNISOLONE 4 MG/1
TABLET ORAL
Qty: 21 EACH | Refills: 0 | Status: SHIPPED | OUTPATIENT
Start: 2024-01-02 | End: 2024-01-23

## 2024-01-02 RX ORDER — AMOXICILLIN AND CLAVULANATE POTASSIUM 875; 125 MG/1; MG/1
1 TABLET, FILM COATED ORAL EVERY 12 HOURS
Qty: 20 TABLET | Refills: 0 | Status: SHIPPED | OUTPATIENT
Start: 2024-01-02 | End: 2024-01-17 | Stop reason: CLARIF

## 2024-01-02 NOTE — ASSESSMENT & PLAN NOTE
Augmentin BID x 10 days  Medrol Dosepack     Use saline nose sprays  Cool mist humidifier with distilled water  Warm salt water gargles  Warm tea with honey and lemon  Chloraseptic spray OTC.   Tylenol/ibuprofen for pain/fever greater than 100.4  Increase water intake.   Return to clinic if there is no improvement.

## 2024-01-02 NOTE — PROGRESS NOTES
PATIENT NAME: Stuart Cloud  : 1970  DATE: 23  MRN: [id number]      Reason for Visit / Chief Complaint: fluid in ear (Exam A )       Update PCP  Update Chief Complaint         History of Present Illness / Problem Focused Workflow     Stuart Cloud presents to the clinic with fluid in ear (Exam A )     Presents to clinic with dizziness, ear pain, and hoarseness for a few days. She also reports trouble swallowing and dry cough . She has been off thyroid medication x 1 month.         Review of Systems     @Review of Systems   Constitutional:  Negative for chills and fever.   HENT:  Positive for nasal congestion, ear pain, sore throat, trouble swallowing and voice change. Negative for ear discharge.    Respiratory:  Positive for cough. Negative for shortness of breath and wheezing.    Cardiovascular:  Negative for palpitations and leg swelling.   Genitourinary:  Negative for decreased urine volume.   Neurological:  Negative for headaches.   Hematological:  Negative for adenopathy.       Medical / Social / Family History     Past Medical History:   Diagnosis Date    Hypertension     Hypothyroidism        Past Surgical History:   Procedure Laterality Date    ARTHROSCOPY OF KNEE Right 2023    Procedure: ARTHROSCOPY, KNEE;  Surgeon: Tacos Aburto MD;  Location: [facility];  Service: Orthopedics;  Laterality: Right;    HYSTERECTOMY      KNEE ARTHROSCOPY W/ MENISCECTOMY Right 2023    Procedure: ARTHROSCOPY, KNEE, WITH MENISCECTOMY;  Surgeon: Tacos Aburto MD;  Location: [facility];  Service: Orthopedics;  Laterality: Right;    left knee replacement Left     LUMPECTOMY, BREAST      right hip replacement Right        Social History  Ms.  reports that she has never smoked. She has never been exposed to tobacco smoke. She has never used smokeless tobacco. She reports that she does not drink alcohol and does not use drugs.    Family History  Ms.'s family history includes Diabetes in  her mother; Hypertension in her maternal grandmother and mother; Stroke in her maternal grandfather, maternal grandmother, and mother.    Medications and Allergies     Medications  No outpatient medications have been marked as taking for the 12/29/23 encounter (Office Visit) with Maria L Phillips FNP-C.       Allergies  Review of patient's allergies indicates:   Allergen Reactions    Percocet [oxycodone-acetaminophen] Itching    Latex     Opioids - morphine analogues Hives    Sulfa (sulfonamide antibiotics) Rash    Zithromax z-casimiro [azithromycin] Rash       Physical Examination     Vitals:    12/29/23 1337   BP: 125/82   Pulse: 83   Temp: 97.7 °F (36.5 °C)     Physical Exam  Vitals and nursing note reviewed.   Constitutional:       Appearance: Normal appearance. She is normal weight.   HENT:      Head: Normocephalic.      Right Ear: Ear canal and external ear normal. Tenderness present. A middle ear effusion is present. Tympanic membrane is injected.      Left Ear: Ear canal and external ear normal. Tenderness present. A middle ear effusion is present. Tympanic membrane is injected.      Nose: Nose normal.      Right Turbinates: Enlarged.      Left Turbinates: Enlarged.      Mouth/Throat:      Mouth: Mucous membranes are moist.      Pharynx: Oropharynx is clear. Uvula midline. Posterior oropharyngeal erythema present.   Eyes:      Extraocular Movements: Extraocular movements intact.      Conjunctiva/sclera: Conjunctivae normal.      Pupils: Pupils are equal, round, and reactive to light.   Cardiovascular:      Rate and Rhythm: Normal rate and regular rhythm.      Pulses: Normal pulses.      Heart sounds: Normal heart sounds.   Pulmonary:      Effort: Pulmonary effort is normal.      Breath sounds: Normal breath sounds.   Abdominal:      General: Abdomen is flat. Bowel sounds are normal.      Palpations: Abdomen is soft.   Musculoskeletal:         General: Normal range of motion.      Cervical back: Normal range of  motion and neck supple.   Skin:     General: Skin is warm and dry.      Capillary Refill: Capillary refill takes less than 2 seconds.   Neurological:      General: No focal deficit present.      Mental Status: She is alert and oriented to person, place, and time.   Psychiatric:         Mood and Affect: Mood normal.         Behavior: Behavior normal.         Thought Content: Thought content normal.         Judgment: Judgment normal.               Lab Results   Component Value Date    WBC 5.59 11/08/2023    HGB 12.9 11/08/2023    HCT 37.6 (L) 11/08/2023    MCV 85.8 11/08/2023     11/08/2023          Sodium   Date Value Ref Range Status   11/08/2023 143 136 - 145 mmol/L Final     Potassium   Date Value Ref Range Status   11/08/2023 3.2 (L) 3.5 - 5.1 mmol/L Final     Chloride   Date Value Ref Range Status   11/08/2023 111 (H) 98 - 107 mmol/L Final     CO2   Date Value Ref Range Status   11/08/2023 27 21 - 32 mmol/L Final     Glucose   Date Value Ref Range Status   11/08/2023 126 (H) 74 - 106 mg/dL Final     BUN   Date Value Ref Range Status   11/08/2023 14 7 - 18 mg/dL Final     Creatinine   Date Value Ref Range Status   11/08/2023 0.81 0.55 - 1.02 mg/dL Final     Calcium   Date Value Ref Range Status   11/08/2023 8.8 8.5 - 10.1 mg/dL Final     Total Protein   Date Value Ref Range Status   11/08/2023 6.0 (L) 6.4 - 8.2 g/dL Final     Albumin   Date Value Ref Range Status   11/08/2023 3.1 (L) 3.5 - 5.0 g/dL Final     Bilirubin, Total   Date Value Ref Range Status   11/08/2023 0.5 >0.0 - 1.2 mg/dL Final     Alk Phos   Date Value Ref Range Status   11/08/2023 79 41 - 108 U/L Final     AST   Date Value Ref Range Status   11/08/2023 26 15 - 37 U/L Final     ALT   Date Value Ref Range Status   11/08/2023 32 13 - 56 U/L Final     Anion Gap   Date Value Ref Range Status   11/08/2023 8 7 - 16 mmol/L Final      Procedures   Assessment and Plan (including Health Maintenance)      Problem List  Smart Sets  Document Outside     :    Plan:  Problem List Items Addressed This Visit          ENT    Otalgia of both ears    Current Assessment & Plan     Ciprodex drops            Endocrine    Hypothyroidism - Primary    Current Assessment & Plan     Has been off thyroid medication x 1 month   -restart medication   -recheck labs after taking medication x 1 month  Referred to endocrinology per patient request         Relevant Orders    Ambulatory referral/consult to Endocrinology     Other Visit Diagnoses       Hoarseness        Relevant Orders    Ambulatory referral/consult to Endocrinology            Health Maintenance Topics with due status: Not Due       Topic Last Completion Date    Hemoglobin A1c (Diabetic Prevention Screening) 03/29/2023    Lipid Panel 03/29/2023       Future Appointments   Date Time Provider Department Center   1/10/2024  4:00 PM [facility]   1/31/2024  7:00 AM [facility] 01 [facility]         No follow-ups on file.     Signature:  DAKOTA MARTINEZ         Date of encounter: 12/29/23

## 2024-01-02 NOTE — PROGRESS NOTES
PATIENT NAME: Stuart Cloud  : 1970  DATE: 24  MRN: [id number]      Reason for Visit / Chief Complaint: Ear Drainage (Exam C )       Update PCP  Update Chief Complaint         History of Present Illness / Problem Focused Workflow     Stuart Cloud presents to the clinic with Ear Drainage (Exam C )     Presents to clinic with persistent ear symptoms with new onset runny nose and sinus pressure. She reports feeling a tickle in sinuses. Sputum is yellow.     Review of Systems     @Review of Systems   Constitutional:  Negative for chills and fever.   HENT:  Positive for nasal congestion, ear pain, rhinorrhea, sinus pressure/congestion and sore throat. Negative for ear discharge.    Eyes:  Negative for visual disturbance.   Respiratory:  Positive for cough. Negative for shortness of breath.    Gastrointestinal:  Negative for abdominal pain, diarrhea, nausea and vomiting.   Genitourinary:  Negative for decreased urine volume and dysuria.   Neurological:  Negative for headaches.     Medical / Social / Family History     Past Medical History:   Diagnosis Date    Hypertension     Hypothyroidism        Past Surgical History:   Procedure Laterality Date    ARTHROSCOPY OF KNEE Right 2023    Procedure: ARTHROSCOPY, KNEE;  Surgeon: Tacos Aburto MD;  Location: [facility];  Service: Orthopedics;  Laterality: Right;    HYSTERECTOMY      KNEE ARTHROSCOPY W/ MENISCECTOMY Right 2023    Procedure: ARTHROSCOPY, KNEE, WITH MENISCECTOMY;  Surgeon: Tacos Aburto MD;  Location: [facility];  Service: Orthopedics;  Laterality: Right;    left knee replacement Left     LUMPECTOMY, BREAST      right hip replacement Right        Social History  Ms.  reports that she has never smoked. She has never been exposed to tobacco smoke. She has never used smokeless tobacco. She reports that she does not drink alcohol and does not use drugs.    Family History  Ms.'s family history includes Diabetes in her  mother; Hypertension in her maternal grandmother and mother; Stroke in her maternal grandfather, maternal grandmother, and mother.    Medications and Allergies     Medications  No outpatient medications have been marked as taking for the 1/2/24 encounter (Office Visit) with Maria L Phillips FNP-C.       Allergies  Review of patient's allergies indicates:   Allergen Reactions    Percocet [oxycodone-acetaminophen] Itching    Latex     Opioids - morphine analogues Hives    Sulfa (sulfonamide antibiotics) Rash    Zithromax z-casimiro [azithromycin] Rash       Physical Examination     Vitals:    01/02/24 1451   BP: 122/85   Pulse: (!) 54   Temp: 98 °F (36.7 °C)     Physical Exam  Vitals and nursing note reviewed.   Constitutional:       Appearance: Normal appearance. She is normal weight.   HENT:      Head: Normocephalic.      Right Ear: Tympanic membrane, ear canal and external ear normal.      Left Ear: Tympanic membrane, ear canal and external ear normal.      Nose: Congestion and rhinorrhea present. Rhinorrhea is purulent.      Right Turbinates: Enlarged.      Left Turbinates: Enlarged.      Right Sinus: Maxillary sinus tenderness present.      Left Sinus: Maxillary sinus tenderness present.      Mouth/Throat:      Mouth: Mucous membranes are moist.      Pharynx: Oropharynx is clear. Uvula midline. Posterior oropharyngeal erythema present. No oropharyngeal exudate.   Eyes:      Extraocular Movements: Extraocular movements intact.      Conjunctiva/sclera: Conjunctivae normal.      Pupils: Pupils are equal, round, and reactive to light.   Cardiovascular:      Rate and Rhythm: Normal rate and regular rhythm.      Pulses: Normal pulses.      Heart sounds: Normal heart sounds.   Pulmonary:      Effort: Pulmonary effort is normal.      Breath sounds: Normal breath sounds.   Abdominal:      General: Abdomen is flat. Bowel sounds are normal.      Palpations: Abdomen is soft.   Musculoskeletal:         General: Normal range  of motion.      Cervical back: Normal range of motion and neck supple.   Skin:     General: Skin is warm and dry.      Capillary Refill: Capillary refill takes less than 2 seconds.   Neurological:      General: No focal deficit present.      Mental Status: She is alert and oriented to person, place, and time.   Psychiatric:         Mood and Affect: Mood normal.         Behavior: Behavior normal.         Thought Content: Thought content normal.         Judgment: Judgment normal.                    Procedures   Assessment and Plan (including Health Maintenance)      Problem List  Smart Sets  Document Outside HM   :    Plan:           Problem List Items Addressed This Visit          ENT    Acute non-recurrent maxillary sinusitis - Primary    Current Assessment & Plan     Augmentin BID x 10 days  Medrol Dosepack     Use saline nose sprays  Cool mist humidifier with distilled water  Warm salt water gargles  Warm tea with honey and lemon  Chloraseptic spray OTC.   Tylenol/ibuprofen for pain/fever greater than 100.4  Increase water intake.   Return to clinic if there is no improvement.               Health Maintenance Topics with due status: Not Due       Topic Last Completion Date    Hemoglobin A1c (Diabetic Prevention Screening) 03/29/2023    Lipid Panel 03/29/2023       Future Appointments   Date Time Provider Department Center   1/10/2024  4:00 PM [facility]   1/31/2024  7:00 AM [facility] GI ROOM 01 [facility]         Follow up if symptoms worsen or fail to improve.     Signature:  DAKOTA MARTINEZ        Date of encounter: 1/2/24

## 2024-01-02 NOTE — LETTER
January 2, 2024      [facility]  [address] [place] [address]  Phone: [phone]  Fax: [phone]       Patient: Stuart Cloud   YOB: 1970  Date of Visit: 01/02/2024    To Whom It May Concern:    Bismark Cloud  was at [facility] on 01/02/2024. The patient may return to work on 01/03/2024 with no restrictions. If you have any questions or concerns, or if I can be of further assistance, please do not hesitate to contact me.    Sincerely,    Maria L DUNCAN

## 2024-01-16 ENCOUNTER — HOSPITAL ENCOUNTER (EMERGENCY)
Facility: HOSPITAL | Age: 54
Discharge: HOME | End: 2024-01-16
Attending: EMERGENCY MEDICINE
Payer: MEDICAID

## 2024-01-16 DIAGNOSIS — M79.672 PAIN IN BOTH FEET: Primary | ICD-10-CM

## 2024-01-16 DIAGNOSIS — M79.671 PAIN IN BOTH FEET: Primary | ICD-10-CM

## 2024-01-16 PROCEDURE — 99283 EMERGENCY DEPT VISIT LOW MDM: CPT | Mod: ,,, | Performed by: EMERGENCY MEDICINE

## 2024-01-16 PROCEDURE — 99281 EMR DPT VST MAYX REQ PHY/QHP: CPT

## 2024-01-16 NOTE — ED PROVIDER NOTES
Encounter Date: 1/16/2024       History     Chief Complaint   Patient presents with    Foot Pain     A 54-year-old female patient came to the emergency department complaining of pain in both feet.  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  The pain is mainly on the top of both feet.  There is no fever or chills.  There is no swelling of the feet.  The patient has previous history of arthritis and she had procedures on her knee.    The history is provided by the patient. No  was used.     Review of patient's allergies indicates:   Allergen Reactions    Percocet [oxycodone-acetaminophen] Itching    Latex     Opioids - morphine analogues Hives    Sulfa (sulfonamide antibiotics) Rash    Zithromax z-casimiro [azithromycin] Rash     Past Medical History:   Diagnosis Date    Anxiety with depression     Essential hypertension 03/20/2023    History of gastric ulcer 05/01/2023    Hypothyroidism     Vestibular dizziness 10/23/2023     Past Surgical History:   Procedure Laterality Date    ARTHROSCOPY OF KNEE Right 5/25/2023    Procedure: ARTHROSCOPY, KNEE;  Surgeon: Tacos Aburto MD;  Location: [facility];  Service: Orthopedics;  Laterality: Right;    HYSTERECTOMY      KNEE ARTHROSCOPY W/ MENISCECTOMY Right 5/25/2023    Procedure: ARTHROSCOPY, KNEE, WITH MENISCECTOMY;  Surgeon: Tacos Aburto MD;  Location: [facility];  Service: Orthopedics;  Laterality: Right;    left knee replacement Left     LUMPECTOMY, BREAST      right hip replacement Right      Family History   Problem Relation Age of Onset    Hypertension Maternal Grandmother     Stroke Maternal Grandmother     Stroke Maternal Grandfather     Hypertension Mother     Stroke Mother     Diabetes Mother      Social History     Tobacco Use    Smoking status: Never     Passive exposure: Never    Smokeless tobacco: Never   Substance Use Topics    Alcohol use: Never    Drug use: Never     Review of  Systems   Constitutional:  Negative for fever.   HENT:  Negative for sore throat.    Respiratory:  Negative for shortness of breath.    Cardiovascular:  Negative for chest pain.   Gastrointestinal:  Negative for nausea.   Genitourinary:  Negative for dysuria.   Musculoskeletal:  Negative for back pain.   Skin:  Negative for rash.   Neurological:  Negative for weakness.   Hematological:  Does not bruise/bleed easily.       Physical Exam     Initial Vitals   BP Pulse Resp Temp SpO2   -- -- -- -- --      MAP       --         Physical Exam    Constitutional: She appears well-developed and well-nourished.   HENT:   Head: Normocephalic and atraumatic.   Eyes: Pupils are equal, round, and reactive to light.   Neck: Neck supple.   Normal range of motion.  Pulmonary/Chest: No respiratory distress.   Abdominal: Abdomen is soft.   Musculoskeletal:         General: Normal range of motion.      Cervical back: Normal range of motion and neck supple.      Comments: Tenderness on the dorsum of the feet     Neurological: She is alert. GCS score is 15. GCS eye subscore is 4. GCS verbal subscore is 5. GCS motor subscore is 6.   Skin: Skin is warm. Capillary refill takes less than 2 seconds.   Psychiatric: She has a normal mood and affect.         Medical Screening Exam   See Full Note    ED Course   Procedures  Labs Reviewed - No data to display       Imaging Results    None          Medications - No data to display  Medical Decision Making  54-year-old female patient works a chicken plant has pain in the feet because she stands for a long time                                      Clinical Impression:   Final diagnoses:  [M79.671, M79.672] Pain in both feet (Primary)        ED Disposition Condition    Discharge Stable          ED Prescriptions    None       Follow-up Information    None          Edy Ariza MD  01/16/24 0459

## 2024-01-16 NOTE — Clinical Note
"Stuart Recio" Pedro Luis was seen and treated in our emergency department on 1/16/2024.  She may return to work on 01/16/2024.       If you have any questions or concerns, please don't hesitate to call.       RN    "

## 2024-01-16 NOTE — ED TRIAGE NOTES
"Pt in with cc of bilateral feet pain on the tops. Says it started after wearing heels to [employer]. Pt has taken no OTC meds because " she aint got none"  "

## 2024-01-16 NOTE — Clinical Note
"Stuart Recio" Pedro Luis was seen and treated in our emergency department on 1/16/2024.  She may return to work on 01/17/2024.       If you have any questions or concerns, please don't hesitate to call.       RN    "

## 2024-01-17 ENCOUNTER — HOSPITAL ENCOUNTER (EMERGENCY)
Facility: HOSPITAL | Age: 54
Discharge: HOME | End: 2024-01-17
Payer: MEDICAID

## 2024-01-17 VITALS
SYSTOLIC BLOOD PRESSURE: 119 MMHG | BODY MASS INDEX: 39.21 KG/M2 | HEART RATE: 85 BPM | TEMPERATURE: 98 F | WEIGHT: 244 LBS | DIASTOLIC BLOOD PRESSURE: 73 MMHG | HEIGHT: 66 IN | RESPIRATION RATE: 18 BRPM | OXYGEN SATURATION: 100 %

## 2024-01-17 DIAGNOSIS — B34.9 VIRAL SYNDROME: Primary | ICD-10-CM

## 2024-01-17 LAB
FLUAV AG UPPER RESP QL IA.RAPID: NEGATIVE
FLUBV AG UPPER RESP QL IA.RAPID: NEGATIVE
SARS-COV-2 RDRP RESP QL NAA+PROBE: NEGATIVE

## 2024-01-17 PROCEDURE — 99284 EMERGENCY DEPT VISIT MOD MDM: CPT

## 2024-01-17 PROCEDURE — 63600175 PHARM REV CODE 636 W HCPCS: Performed by: NURSE PRACTITIONER

## 2024-01-17 PROCEDURE — 96372 THER/PROPH/DIAG INJ SC/IM: CPT | Performed by: NURSE PRACTITIONER

## 2024-01-17 PROCEDURE — 87804 INFLUENZA ASSAY W/OPTIC: CPT | Performed by: NURSE PRACTITIONER

## 2024-01-17 PROCEDURE — 87635 SARS-COV-2 COVID-19 AMP PRB: CPT | Performed by: NURSE PRACTITIONER

## 2024-01-17 PROCEDURE — 99284 EMERGENCY DEPT VISIT MOD MDM: CPT | Mod: ,,, | Performed by: NURSE PRACTITIONER

## 2024-01-17 RX ORDER — ONDANSETRON 4 MG/1
4 TABLET, ORALLY DISINTEGRATING ORAL EVERY 6 HOURS PRN
Qty: 10 TABLET | Refills: 0 | Status: SHIPPED | OUTPATIENT
Start: 2024-01-17 | End: 2024-04-01

## 2024-01-17 RX ORDER — ONDANSETRON HYDROCHLORIDE 2 MG/ML
4 INJECTION, SOLUTION INTRAVENOUS
Status: COMPLETED | OUTPATIENT
Start: 2024-01-17 | End: 2024-01-17

## 2024-01-17 RX ADMIN — ONDANSETRON 4 MG: 2 INJECTION INTRAMUSCULAR; INTRAVENOUS at 09:01

## 2024-01-17 NOTE — ED TRIAGE NOTES
PATIENT PRESENTS TO ER WITH COMPLAINT OF NAUSEA, CHILLS. REPORTS NAUSEA STARTED THIS AM AND CHILLS X 2 DAYS. BILATERAL EAR PAIN AS WELL. RECENTLY GIVEN ABX FOR FLUID BEHIND EARS

## 2024-01-17 NOTE — Clinical Note
"Stuart Recio" Pedro Luis was seen and treated in our emergency department on 1/17/2024.  She may return to work on 01/18/2024.       If you have any questions or concerns, please don't hesitate to call.      FEI Carolina RN    "

## 2024-01-17 NOTE — ED PROVIDER NOTES
Encounter Date: 1/17/2024       History     Chief Complaint   Patient presents with    Nausea     54-year-old female presents to the emergency department to be evaluated for nausea, diarrhea, bilateral ear pain and chills that began this morning.  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  Denies any abdominal pain, dysuria, vomiting.    The history is provided by the patient.   Diarrhea   This is a new problem. The current episode started today. The problem occurs 2 to 4 times per day. Associated symptoms include chills. Pertinent negatives include no abdominal pain, arthralgias, bloating, coughing, fever, headaches, increased  flatus, myalgias, sweats, URI, vomiting or weight loss.     Review of patient's allergies indicates:   Allergen Reactions    Percocet [oxycodone-acetaminophen] Itching    Latex     Opioids - morphine analogues Hives    Sulfa (sulfonamide antibiotics) Rash    Zithromax z-casimiro [azithromycin] Rash     Past Medical History:   Diagnosis Date    Anxiety with depression     Essential hypertension 03/20/2023    History of gastric ulcer 05/01/2023    Hypothyroidism     Vestibular dizziness 10/23/2023     Past Surgical History:   Procedure Laterality Date    ARTHROSCOPY OF KNEE Right 5/25/2023    Procedure: ARTHROSCOPY, KNEE;  Surgeon: Tacos Aburto MD;  Location: [facility];  Service: Orthopedics;  Laterality: Right;    HYSTERECTOMY      KNEE ARTHROSCOPY W/ MENISCECTOMY Right 5/25/2023    Procedure: ARTHROSCOPY, KNEE, WITH MENISCECTOMY;  Surgeon: Tacos Aburto MD;  Location: [facility];  Service: Orthopedics;  Laterality: Right;    left knee replacement Left     LUMPECTOMY, BREAST      right hip replacement Right      Family History   Problem Relation Age of Onset    Hypertension Maternal Grandmother     Stroke Maternal Grandmother     Stroke Maternal Grandfather     Hypertension Mother      Stroke Mother     Diabetes Mother      Social History     Tobacco Use    Smoking status: Never     Passive exposure: Never    Smokeless tobacco: Never   Substance Use Topics    Alcohol use: Never    Drug use: Never     Review of Systems   Constitutional:  Positive for chills. Negative for fever and weight loss.   Respiratory:  Negative for cough.    Gastrointestinal:  Positive for diarrhea and nausea. Negative for abdominal distention, abdominal pain, bloating, constipation, flatus and vomiting.   Musculoskeletal:  Negative for arthralgias and myalgias.   Neurological:  Negative for headaches.   All other systems reviewed and are negative.      Physical Exam     Initial Vitals [01/17/24 0921]   BP Pulse Resp Temp SpO2   119/73 85 18 97.9 °F (36.6 °C) 100 %      MAP       --         Physical Exam    Vitals reviewed.  Constitutional: She appears well-developed and well-nourished.   Neck: Neck supple.   Cardiovascular:  Normal rate and regular rhythm.           Pulmonary/Chest: Breath sounds normal.   Abdominal: Abdomen is soft. Bowel sounds are normal. She exhibits no distension and no mass. There is no abdominal tenderness. There is no rebound and no guarding.   Musculoskeletal:         General: Normal range of motion.      Cervical back: Neck supple.     Neurological: She is alert and oriented to person, place, and time. She has normal strength. GCS score is 15. GCS eye subscore is 4. GCS verbal subscore is 5. GCS motor subscore is 6.   Skin: Skin is warm and dry. Capillary refill takes less than 2 seconds.   Psychiatric: She has a normal mood and affect.         Medical Screening Exam   See Full Note    ED Course   Procedures  Labs Reviewed   RAPID INFLUENZA A/B - Normal   SARS-COV-2 RNA AMPLIFICATION, QUAL - Normal    Narrative:     Negative SARS-CoV results should not be used as the sole basis for treatment or patient management decisions; negative results should be considered in the context of a patient's recent  exposures, history and the presene of clinical signs and symptoms consistent with COVID-19.  Negative results should be treated as presumptive and confirmed by molecular assay, if necessary for patient management.          Imaging Results    None          Medications   ondansetron injection 4 mg (4 mg Intramuscular Given 1/17/24 0940)     Medical Decision Making  54-year-old female presents to the emergency department to be evaluated for nausea, diarrhea, bilateral ear pain and chills that began this morning.  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  Denies any abdominal pain, dysuria, vomiting.  Flu and COVID swabs are both negative  Treated in the emergency department was Zofran   Diagnosis: Viral syndrome  Prescribed Zofran    Risk  Prescription drug management.                                      Clinical Impression:   Final diagnoses:  [B34.9] Viral syndrome (Primary)        ED Disposition Condition    Discharge Stable          ED Prescriptions       Medication Sig Dispense Start Date End Date Auth. Provider    ondansetron (ZOFRAN-ODT) 4 MG TbDL Take 1 tablet (4 mg total) by mouth every 6 (six) hours as needed. 10 tablet 1/17/2024 -- Maria L Palafox FNP          Follow-up Information    None          Maria L Palafox FNP  01/17/24 1031

## 2024-01-17 NOTE — DISCHARGE INSTRUCTIONS
Clear liquid diet.  Take Zofran as needed as directed. Follow up with your primary care provider in 2 days. Return to the emergency department for any increase in symptoms or for any other new or worrisome symptoms.

## 2024-01-22 ENCOUNTER — HOSPITAL ENCOUNTER (EMERGENCY)
Facility: HOSPITAL | Age: 54
Discharge: HOME | End: 2024-01-22
Payer: MEDICAID

## 2024-01-22 PROCEDURE — 99283 EMERGENCY DEPT VISIT LOW MDM: CPT | Mod: ,,, | Performed by: EMERGENCY MEDICINE

## 2024-01-22 PROCEDURE — 99283 EMERGENCY DEPT VISIT LOW MDM: CPT

## 2024-01-23 ENCOUNTER — TELEPHONE (OUTPATIENT)
Dept: EMERGENCY MEDICINE | Facility: HOSPITAL | Age: 54
End: 2024-01-23
Payer: MEDICAID

## 2024-01-23 DIAGNOSIS — E66.01 CLASS 3 SEVERE OBESITY DUE TO EXCESS CALORIES WITHOUT SERIOUS COMORBIDITY WITH BODY MASS INDEX (BMI) OF 40.0 TO 44.9 IN ADULT: ICD-10-CM

## 2024-01-23 RX ORDER — PHENTERMINE HYDROCHLORIDE 37.5 MG/1
37.5 TABLET ORAL
Qty: 30 TABLET | Refills: 2 | OUTPATIENT
Start: 2024-01-23 | End: 2024-04-22

## 2024-01-23 NOTE — TELEPHONE ENCOUNTER
Patient was previously made aware this is controlled substance and will not be called in remotely.  If patient continues to have reasonable weight loss can considering continuing medication after evaluation and discussion.

## 2024-01-23 NOTE — TELEPHONE ENCOUNTER
----- Message from Harvey Echavarria sent at 1/23/2024 10:43 AM CST -----  Please return call at [phone] concerning meds being called into pharmacy

## 2024-02-05 ENCOUNTER — HOSPITAL ENCOUNTER (EMERGENCY)
Facility: HOSPITAL | Age: 54
Discharge: HOME | End: 2024-02-05
Attending: FAMILY MEDICINE
Payer: MEDICAID

## 2024-02-05 VITALS
DIASTOLIC BLOOD PRESSURE: 84 MMHG | RESPIRATION RATE: 18 BRPM | HEIGHT: 66 IN | HEART RATE: 82 BPM | BODY MASS INDEX: 40.5 KG/M2 | OXYGEN SATURATION: 100 % | SYSTOLIC BLOOD PRESSURE: 137 MMHG | TEMPERATURE: 98 F | WEIGHT: 252 LBS

## 2024-02-05 DIAGNOSIS — J06.9 UPPER RESPIRATORY TRACT INFECTION, UNSPECIFIED TYPE: Primary | ICD-10-CM

## 2024-02-05 PROCEDURE — 99282 EMERGENCY DEPT VISIT SF MDM: CPT

## 2024-02-05 PROCEDURE — 87635 SARS-COV-2 COVID-19 AMP PRB: CPT | Performed by: FAMILY MEDICINE

## 2024-02-05 PROCEDURE — 87804 INFLUENZA ASSAY W/OPTIC: CPT | Performed by: FAMILY MEDICINE

## 2024-02-05 PROCEDURE — 99283 EMERGENCY DEPT VISIT LOW MDM: CPT | Mod: ,,, | Performed by: FAMILY MEDICINE

## 2024-02-05 NOTE — Clinical Note
"Stuart "Anandaadonay" Pedro Luis was seen and treated in our emergency department on 2/5/2024.  She may return to work on 02/06/2024.       If you have any questions or concerns, please don't hesitate to call.      Sarbjit Townsend, DO"

## 2024-02-05 NOTE — ED PROVIDER NOTES
Encounter Date: 2/5/2024       History     Chief Complaint   Patient presents with    COVID-19 Concerns     Patient comes in with sore throat coughing runny nose        Review of patient's allergies indicates:   Allergen Reactions    Percocet [oxycodone-acetaminophen] Itching    Latex     Opioids - morphine analogues Hives    Sulfa (sulfonamide antibiotics) Rash    Zithromax z-casimiro [azithromycin] Rash     Past Medical History:   Diagnosis Date    Anxiety with depression     Essential hypertension 03/20/2023    History of gastric ulcer 05/01/2023    Hypothyroidism     Vestibular dizziness 10/23/2023     Past Surgical History:   Procedure Laterality Date    ARTHROSCOPY OF KNEE Right 5/25/2023    Procedure: ARTHROSCOPY, KNEE;  Surgeon: Tacos Aburto MD;  Location: [facility] OR;  Service: Orthopedics;  Laterality: Right;    HYSTERECTOMY      KNEE ARTHROSCOPY W/ MENISCECTOMY Right 5/25/2023    Procedure: ARTHROSCOPY, KNEE, WITH MENISCECTOMY;  Surgeon: Tacos Aburto MD;  Location: [facility] OR;  Service: Orthopedics;  Laterality: Right;    left knee replacement Left     LUMPECTOMY, BREAST      right hip replacement Right      Family History   Problem Relation Age of Onset    Hypertension Maternal Grandmother     Stroke Maternal Grandmother     Stroke Maternal Grandfather     Hypertension Mother     Stroke Mother     Diabetes Mother      Social History     Tobacco Use    Smoking status: Never     Passive exposure: Never    Smokeless tobacco: Never   Substance Use Topics    Alcohol use: Never    Drug use: Never     Review of Systems   Constitutional: Negative.  Negative for fever.   HENT: Negative.  Negative for sore throat.    Eyes: Negative.    Respiratory: Negative.  Negative for shortness of breath.    Cardiovascular: Negative.  Negative for chest pain.   Gastrointestinal: Negative.  Negative for nausea.   Endocrine: Negative.    Genitourinary: Negative.  Negative for dysuria.   Musculoskeletal: Negative.   Negative for back pain.   Skin: Negative.  Negative for rash.   Allergic/Immunologic: Negative.    Neurological: Negative.  Negative for weakness.   Hematological: Negative.  Does not bruise/bleed easily.   Psychiatric/Behavioral: Negative.     All other systems reviewed and are negative.      Physical Exam     Initial Vitals [02/05/24 0808]   BP Pulse Resp Temp SpO2   137/84 82 18 98.2 °F (36.8 °C) 100 %      MAP       --         Physical Exam    Constitutional: She appears well-developed and well-nourished.   HENT:   Head: Normocephalic and atraumatic.   Right Ear: External ear normal.   Left Ear: External ear normal.   Nose: Nose normal.   Mouth/Throat: Oropharynx is clear and moist.   Eyes: Conjunctivae and EOM are normal. Pupils are equal, round, and reactive to light.   Neck: Neck supple.   Normal range of motion.  Cardiovascular:  Normal rate, regular rhythm, normal heart sounds and intact distal pulses.           Pulmonary/Chest: Breath sounds normal.   Abdominal: Abdomen is soft. Bowel sounds are normal.   Genitourinary:    Vagina and uterus normal.     Musculoskeletal:         General: Normal range of motion.      Cervical back: Normal range of motion and neck supple.     Neurological: She is alert and oriented to person, place, and time. She has normal strength and normal reflexes.   Skin: Skin is warm. Capillary refill takes less than 2 seconds.   Psychiatric: She has a normal mood and affect. Her behavior is normal. Judgment and thought content normal.         Medical Screening Exam   See Full Note    ED Course   Procedures  Labs Reviewed   RAPID INFLUENZA A/B - Normal   SARS-COV-2 RNA AMPLIFICATION, QUAL - Normal    Narrative:     Negative SARS-CoV results should not be used as the sole basis for treatment or patient management decisions; negative results should be considered in the context of a patient's recent exposures, history and the presene of clinical signs and symptoms consistent with  COVID-19.  Negative results should be treated as presumptive and confirmed by molecular assay, if necessary for patient management.          Imaging Results    None          Medications - No data to display  Medical Decision Making                        Medical Decision Making:   Initial Assessment:   Patient with nasal congestion cough  Differential Diagnosis:   Viral upper respiratory tract infection  ED Management:  Continue over-the-counter decongestants and ibuprofen  Follow-up primary care provider             Clinical Impression:   Final diagnoses:  [J06.9] Upper respiratory tract infection, unspecified type (Primary)        ED Disposition Condition    Discharge Stable          ED Prescriptions    None       Follow-up Information    None          Sarbjit Townsend, DO  02/08/24 2027

## 2024-02-05 NOTE — ED TRIAGE NOTES
Pt presents to ed via pov, c/o uri, runny nose, and exposure to flu and covid yesterday @ [facility].

## 2024-02-20 DIAGNOSIS — E03.9 HYPOTHYROIDISM, UNSPECIFIED TYPE: ICD-10-CM

## 2024-02-20 DIAGNOSIS — R49.0 HOARSENESS: Primary | ICD-10-CM

## 2024-02-20 NOTE — PROGRESS NOTES
Patient came in for labs for thyroid. Discussed getting thyroid ultrasound and doing e consult with endocrinology. Patient is agreeable with plan. Mentions pain in right anterior thigh that is different from sciatic pain. She has been wearing back brace at work and had put right foot up while standing. Advised her to avoid propping feet up while standing and  putting pressure on the  LFCN, loosen back brace slightly and use ice over anterior hip area. Advised to return to clinic if it did not improve or it worsens. Patient verbalized understanding.

## 2024-03-01 ENCOUNTER — TELEPHONE (OUTPATIENT)
Dept: FAMILY MEDICINE | Facility: CLINIC | Age: 54
End: 2024-03-01
Payer: COMMERCIAL

## 2024-03-01 NOTE — TELEPHONE ENCOUNTER
----- Message from DAKOTA Lee sent at 3/1/2024  9:43 AM CST -----  Thyroid labs were okay. When her ultrasound comes back we will contact her again.

## 2024-03-07 ENCOUNTER — HOSPITAL ENCOUNTER (EMERGENCY)
Facility: HOSPITAL | Age: 54
Discharge: HOME | End: 2024-03-07
Attending: FAMILY MEDICINE
Payer: COMMERCIAL

## 2024-03-07 VITALS
OXYGEN SATURATION: 97 % | DIASTOLIC BLOOD PRESSURE: 82 MMHG | HEIGHT: 66 IN | BODY MASS INDEX: 40.18 KG/M2 | RESPIRATION RATE: 18 BRPM | WEIGHT: 250 LBS | TEMPERATURE: 98 F | SYSTOLIC BLOOD PRESSURE: 139 MMHG | HEART RATE: 73 BPM

## 2024-03-07 DIAGNOSIS — R19.7 DIARRHEA, UNSPECIFIED TYPE: Primary | ICD-10-CM

## 2024-03-07 PROCEDURE — 99284 EMERGENCY DEPT VISIT MOD MDM: CPT

## 2024-03-07 PROCEDURE — 99284 EMERGENCY DEPT VISIT MOD MDM: CPT | Mod: ,,, | Performed by: FAMILY MEDICINE

## 2024-03-07 RX ORDER — FUROSEMIDE 20 MG/1
20 TABLET ORAL EVERY OTHER DAY
Qty: 20 TABLET | Refills: 0 | Status: SHIPPED | OUTPATIENT
Start: 2024-03-07 | End: 2024-04-01

## 2024-03-07 RX ORDER — DIPHENOXYLATE HYDROCHLORIDE AND ATROPINE SULFATE 2.5; .025 MG/1; MG/1
1 TABLET ORAL 4 TIMES DAILY PRN
Qty: 30 TABLET | Refills: 1 | Status: SHIPPED | OUTPATIENT
Start: 2024-03-07 | End: 2024-04-01

## 2024-03-07 NOTE — ED PROVIDER NOTES
Encounter Date: 3/7/2024    SCRIBE #1 NOTE: I, am scribing for, and in the presence of,  . I have scribed the following portions of the note -       History     Chief Complaint   Patient presents with    Diarrhea     Pt c/o nausea, diarrhea and stomach pain x1 wk. Pt also c/o left leg burning and bilateral swelling to hands.      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[employer]        Review of patient's allergies indicates:   Allergen Reactions    Percocet [oxycodone-acetaminophen] Itching    Latex     Opioids - morphine analogues Hives    Sulfa (sulfonamide antibiotics) Rash    Zithromax z-casimiro [azithromycin] Rash     Past Medical History:   Diagnosis Date    Anxiety with depression     Arthritis     Class 3 severe obesity due to excess calories without serious comorbidity with body mass index (BMI) of 40.0 to 44.9 in adult 03/20/2023    Essential hypertension 03/20/2023    History of gastric ulcer 05/01/2023    Hypothyroidism     Vestibular dizziness 10/23/2023     Past Surgical History:   Procedure Laterality Date    ARTHROSCOPY OF KNEE Right 05/25/2023    Procedure: ARTHROSCOPY, KNEE;  Surgeon: Tacos Aburto MD;  Location: [facility] OR;  Service: Orthopedics;  Laterality: Right;    HYSTERECTOMY      JOINT REPLACEMENT Right     hip replacement    KNEE ARTHROPLASTY Left     KNEE ARTHROSCOPY W/ MENISCECTOMY Right 05/25/2023    Procedure: ARTHROSCOPY, KNEE, WITH MENISCECTOMY;  Surgeon: Tacos Aburto MD;  Location: [facility] OR;  Service: Orthopedics;  Laterality: Right;    LUMPECTOMY, BREAST       Family History   Problem Relation Age of Onset    Hypertension Maternal Grandmother     Stroke Maternal Grandmother     Stroke Maternal Grandfather     Hypertension Mother     Stroke Mother     Diabetes Mother      Social History     Tobacco Use    Smoking status:  Never     Passive exposure: Never    Smokeless tobacco: Never   Substance Use Topics    Alcohol use: Never    Drug use: Never     Review of Systems   Constitutional: Negative.  Negative for fever.   HENT: Negative.  Negative for sore throat.    Eyes: Negative.    Respiratory: Negative.  Negative for shortness of breath.    Cardiovascular: Negative.  Negative for chest pain.   Gastrointestinal: Negative.  Negative for nausea.   Endocrine: Negative.    Genitourinary: Negative.  Negative for dysuria.   Musculoskeletal: Negative.  Negative for back pain.   Skin: Negative.  Negative for rash.   Allergic/Immunologic: Negative.    Neurological: Negative.  Negative for weakness.   Hematological: Negative.  Does not bruise/bleed easily.   Psychiatric/Behavioral: Negative.     All other systems reviewed and are negative.      Physical Exam     Initial Vitals [03/07/24 0528]   BP Pulse Resp Temp SpO2   139/82 73 18 97.9 °F (36.6 °C) 97 %      MAP       --         Physical Exam    Constitutional: She appears well-developed and well-nourished.   HENT:   Head: Normocephalic and atraumatic.   Right Ear: External ear normal.   Left Ear: External ear normal.   Nose: Nose normal.   Mouth/Throat: Oropharynx is clear and moist.   Eyes: Conjunctivae and EOM are normal. Pupils are equal, round, and reactive to light.   Neck: Neck supple.   Normal range of motion.  Cardiovascular:  Normal rate, regular rhythm, normal heart sounds and intact distal pulses.           Pulmonary/Chest: Breath sounds normal.   Abdominal: Abdomen is soft. Bowel sounds are normal.   Genitourinary:    Vagina and uterus normal.     Musculoskeletal:         General: Normal range of motion.      Cervical back: Normal range of motion and neck supple.     Neurological: She is alert and oriented to person, place, and time. She has normal strength and normal reflexes.   Skin: Skin is warm. Capillary refill takes less than 2 seconds.   Psychiatric: She has a normal mood  and affect. Her behavior is normal. Judgment and thought content normal.         Medical Screening Exam   See Full Note    ED Course   Procedures  Labs Reviewed - No data to display       Imaging Results    None          Medications - No data to display  Medical Decision Making  Risk  Prescription drug management.                          Medical Decision Making:   Initial Assessment:   Patient with wrist arm and leg pain secondary to work she is doing  Differential Diagnosis:   Pains in wrist arms and legs secondary to her job with diarrhea  ED Management:  Lomotil             Clinical Impression:   Final diagnoses:  [R19.7] Diarrhea, unspecified type (Primary)        ED Disposition Condition    Discharge Stable          ED Prescriptions       Medication Sig Dispense Start Date End Date Auth. Provider    diphenoxylate-atropine 2.5-0.025 mg (LOMOTIL) 2.5-0.025 mg per tablet Take 1 tablet by mouth 4 (four) times daily as needed for Diarrhea. 30 tablet 3/7/2024 -- Sarbjit Townsend,     furosemide (LASIX) 20 MG tablet Take 1 tablet (20 mg total) by mouth every other day. for 20 doses 20 tablet 3/7/2024 4/15/2024 Sarbjit Townsend DO          Follow-up Information    None          Sarbjit Townsend DO  03/20/24 6714

## 2024-03-07 NOTE — Clinical Note
"Stuart Recio" Pedro Luis was seen and treated in our emergency department on 3/7/2024.  She may return to work on 03/08/2024.       If you have any questions or concerns, please don't hesitate to call.      Satinder WILKINSON    "

## 2024-03-11 ENCOUNTER — HOSPITAL ENCOUNTER (EMERGENCY)
Facility: HOSPITAL | Age: 54
Discharge: HOME | End: 2024-03-12
Payer: COMMERCIAL

## 2024-03-11 ENCOUNTER — HOSPITAL ENCOUNTER (OUTPATIENT)
Dept: RADIOLOGY | Facility: HOSPITAL | Age: 54
Discharge: HOME | End: 2024-03-11
Payer: COMMERCIAL

## 2024-03-11 DIAGNOSIS — M54.17 LUMBOSACRAL RADICULOPATHY: Primary | ICD-10-CM

## 2024-03-11 DIAGNOSIS — R49.0 HOARSENESS: ICD-10-CM

## 2024-03-11 PROCEDURE — 99284 EMERGENCY DEPT VISIT MOD MDM: CPT | Mod: ,,, | Performed by: NURSE PRACTITIONER

## 2024-03-11 PROCEDURE — 76536 US EXAM OF HEAD AND NECK: CPT | Mod: TC

## 2024-03-11 PROCEDURE — 99284 EMERGENCY DEPT VISIT MOD MDM: CPT | Mod: 25

## 2024-03-11 PROCEDURE — 76536 US EXAM OF HEAD AND NECK: CPT | Mod: 26,,, | Performed by: STUDENT IN AN ORGANIZED HEALTH CARE EDUCATION/TRAINING PROGRAM

## 2024-03-11 RX ORDER — TIZANIDINE 4 MG/1
4 TABLET ORAL EVERY 6 HOURS PRN
Qty: 20 TABLET | Refills: 0 | Status: SHIPPED | OUTPATIENT
Start: 2024-03-11 | End: 2024-03-19

## 2024-03-11 RX ORDER — IBUPROFEN 800 MG/1
800 TABLET ORAL 3 TIMES DAILY
Qty: 20 TABLET | Refills: 0 | Status: SHIPPED | OUTPATIENT
Start: 2024-03-11 | End: 2024-04-05

## 2024-03-11 RX ORDER — KETOROLAC TROMETHAMINE 30 MG/ML
60 INJECTION, SOLUTION INTRAMUSCULAR; INTRAVENOUS
Status: COMPLETED | OUTPATIENT
Start: 2024-03-11 | End: 2024-03-12

## 2024-03-12 ENCOUNTER — OFFICE VISIT (OUTPATIENT)
Dept: FAMILY MEDICINE | Facility: CLINIC | Age: 54
End: 2024-03-12
Payer: COMMERCIAL

## 2024-03-12 VITALS
HEIGHT: 66 IN | HEART RATE: 81 BPM | OXYGEN SATURATION: 98 % | SYSTOLIC BLOOD PRESSURE: 137 MMHG | TEMPERATURE: 98 F | BODY MASS INDEX: 41.3 KG/M2 | DIASTOLIC BLOOD PRESSURE: 83 MMHG | RESPIRATION RATE: 16 BRPM | WEIGHT: 257 LBS

## 2024-03-12 VITALS
BODY MASS INDEX: 41.17 KG/M2 | HEART RATE: 92 BPM | DIASTOLIC BLOOD PRESSURE: 68 MMHG | TEMPERATURE: 98 F | OXYGEN SATURATION: 98 % | HEIGHT: 66 IN | WEIGHT: 256.19 LBS | SYSTOLIC BLOOD PRESSURE: 132 MMHG

## 2024-03-12 DIAGNOSIS — G89.29 CHRONIC MIDLINE LOW BACK PAIN, UNSPECIFIED WHETHER SCIATICA PRESENT: Primary | ICD-10-CM

## 2024-03-12 DIAGNOSIS — M54.50 CHRONIC MIDLINE LOW BACK PAIN, UNSPECIFIED WHETHER SCIATICA PRESENT: Primary | ICD-10-CM

## 2024-03-12 DIAGNOSIS — R13.10 SWALLOWING PROBLEM: ICD-10-CM

## 2024-03-12 DIAGNOSIS — Z12.11 SCREENING FOR COLON CANCER: ICD-10-CM

## 2024-03-12 DIAGNOSIS — M79.89 INFLAMMATION OF SOFT TISSUE: ICD-10-CM

## 2024-03-12 DIAGNOSIS — E03.9 HYPOTHYROIDISM, UNSPECIFIED TYPE: ICD-10-CM

## 2024-03-12 DIAGNOSIS — G62.9 NEUROPATHY: ICD-10-CM

## 2024-03-12 PROBLEM — R30.0 DYSURIA: Status: ACTIVE | Noted: 2024-03-12

## 2024-03-12 LAB
BILIRUB SERPL-MCNC: NEGATIVE MG/DL
BLOOD URINE, POC: ABNORMAL
COLOR, POC UA: YELLOW
GLUCOSE UR QL STRIP: NEGATIVE
KETONES UR QL STRIP: NEGATIVE
LEUKOCYTE ESTERASE URINE, POC: NEGATIVE
NITRITE, POC UA: NEGATIVE
PH, POC UA: 5.5
PROTEIN, POC: NEGATIVE
SPECIFIC GRAVITY, POC UA: 1.02
UROBILINOGEN, POC UA: 0.2

## 2024-03-12 PROCEDURE — 81003 URINALYSIS AUTO W/O SCOPE: CPT | Mod: RHCUB

## 2024-03-12 PROCEDURE — 3008F BODY MASS INDEX DOCD: CPT | Mod: ,,,

## 2024-03-12 PROCEDURE — 63600175 PHARM REV CODE 636 W HCPCS: Performed by: NURSE PRACTITIONER

## 2024-03-12 PROCEDURE — 3078F DIAST BP <80 MM HG: CPT | Mod: ,,,

## 2024-03-12 PROCEDURE — 1159F MED LIST DOCD IN RCRD: CPT | Mod: ,,,

## 2024-03-12 PROCEDURE — 81003 URINALYSIS AUTO W/O SCOPE: CPT | Mod: QW,,,

## 2024-03-12 PROCEDURE — 96372 THER/PROPH/DIAG INJ SC/IM: CPT | Performed by: NURSE PRACTITIONER

## 2024-03-12 PROCEDURE — 99213 OFFICE O/P EST LOW 20 MIN: CPT | Mod: ,,,

## 2024-03-12 PROCEDURE — 3075F SYST BP GE 130 - 139MM HG: CPT | Mod: ,,,

## 2024-03-12 PROCEDURE — 1160F RVW MEDS BY RX/DR IN RCRD: CPT | Mod: ,,,

## 2024-03-12 PROCEDURE — 87086 URINE CULTURE/COLONY COUNT: CPT | Mod: ,,, | Performed by: CLINICAL MEDICAL LABORATORY

## 2024-03-12 RX ORDER — GABAPENTIN 300 MG/1
300 CAPSULE ORAL 3 TIMES DAILY PRN
COMMUNITY
End: 2024-03-19 | Stop reason: SDUPTHER

## 2024-03-12 RX ADMIN — KETOROLAC TROMETHAMINE 60 MG: 30 INJECTION, SOLUTION INTRAMUSCULAR at 12:03

## 2024-03-12 NOTE — ASSESSMENT & PLAN NOTE
Continue current treatment with 25 mcg of synthroid daily  Thyroid US was normal  TSH and T4 within range at last check.

## 2024-03-12 NOTE — ED PROVIDER NOTES
Encounter Date: 3/11/2024       History     Chief Complaint   Patient presents with    Leg Pain     Patient presents to the ER with complaint of left lower back and left thigh pain.  Patient denies injury or accident.  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  She was reports numbness and tingling in his in the lateral left thigh.  She denies dysuria.  No loss of control of bowel or bladder.  No weakness in left lower extremity.  Patient has history of left knee replacement, right hip replacement hysterectomy.  She also has history of anxiety hypothyroidism and hypertension.  Patient reports her symptoms started proximally 1 month ago but has been worse over the last 24-48 hours.    The history is provided by the patient. No  was used.     Review of patient's allergies indicates:   Allergen Reactions    Percocet [oxycodone-acetaminophen] Itching    Latex     Opioids - morphine analogues Hives    Sulfa (sulfonamide antibiotics) Rash    Zithromax z-casimiro [azithromycin] Rash     Past Medical History:   Diagnosis Date    Anxiety with depression     Essential hypertension 03/20/2023    History of gastric ulcer 05/01/2023    Hypothyroidism     Vestibular dizziness 10/23/2023     Past Surgical History:   Procedure Laterality Date    ARTHROSCOPY OF KNEE Right 5/25/2023    Procedure: ARTHROSCOPY, KNEE;  Surgeon: Tacos Aburto MD;  Location: [facility] OR;  Service: Orthopedics;  Laterality: Right;    HYSTERECTOMY      KNEE ARTHROSCOPY W/ MENISCECTOMY Right 5/25/2023    Procedure: ARTHROSCOPY, KNEE, WITH MENISCECTOMY;  Surgeon: Tacos Aburto MD;  Location: [facility] OR;  Service: Orthopedics;  Laterality: Right;    left knee replacement Left     LUMPECTOMY, BREAST      right hip replacement Right      Family History   Problem Relation Age of Onset    Hypertension Maternal Grandmother     Stroke Maternal Grandmother     Stroke  Maternal Grandfather     Hypertension Mother     Stroke Mother     Diabetes Mother      Social History     Tobacco Use    Smoking status: Never     Passive exposure: Never    Smokeless tobacco: Never   Substance Use Topics    Alcohol use: Never    Drug use: Never     Review of Systems   Constitutional:  Positive for activity change.   Musculoskeletal:  Positive for arthralgias and back pain.        Left lower back pain that radiates into left thigh   Neurological:  Positive for numbness (tingling and numbness of left thigh).   All other systems reviewed and are negative.      Physical Exam     Initial Vitals [03/11/24 2224]   BP Pulse Resp Temp SpO2   122/80 94 16 97.8 °F (36.6 °C) 95 %      MAP       --         Physical Exam    Nursing note and vitals reviewed.  Constitutional: Vital signs are normal. She appears well-developed and well-nourished. She is Obese . She is active and cooperative.   HENT:   Head: Normocephalic.   Right Ear: External ear normal.   Left Ear: External ear normal.   Nose: Nose normal.   Mouth/Throat: Oropharynx is clear and moist.   Eyes: Conjunctivae and EOM are normal.   Neck: Neck supple.   Normal range of motion.  Cardiovascular:  Normal rate, normal heart sounds and intact distal pulses.           Pulmonary/Chest: Breath sounds normal.   Abdominal: Abdomen is soft. Bowel sounds are normal.   Musculoskeletal:         General: Tenderness (left lower back and thigh tender to palpation) present. Normal range of motion.      Cervical back: Normal range of motion and neck supple.     Neurological: She is alert and oriented to person, place, and time. She has normal strength. GCS score is 15. GCS eye subscore is 4. GCS verbal subscore is 5. GCS motor subscore is 6.   Skin: Skin is warm and dry. Capillary refill takes less than 2 seconds.   Psychiatric: She has a normal mood and affect. Her behavior is normal. Judgment and thought content normal.         Medical Screening Exam   See Full  Note    ED Course   Procedures  Labs Reviewed - No data to display       Imaging Results              X-Ray Lumbar Spine Ap And Lateral (In process)                      Medications   ketorolac injection 60 mg (has no administration in time range)     Medical Decision Making  Patient presents to the ER with complaint of left lower back and left thigh pain.  Patient denies injury or accident.  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  She was reports numbness and tingling in his in the lateral left thigh.  She denies dysuria.  No loss of control of bowel or bladder.  No weakness in left lower extremity.  Patient has history of left knee replacement, right hip replacement hysterectomy.  She also has history of anxiety hypothyroidism and hypertension.  Patient reports her symptoms started proximally 1 month ago but has been worse over the last 24-48 hours.      Amount and/or Complexity of Data Reviewed  Radiology: ordered. Decision-making details documented in ED Course.  Discussion of management or test interpretation with external provider(s): Toradol 60 mg IM to treat back and thigh pain    Patient verbalized improvement of pain prior to discharge.    Patient was discharged home with diagnosis of lumbosacral radiculopathy.  She was given a prescription for tizanidine and ibuprofen to take as prescribed.  She was given referral to follow up with Dr. Michael Adams in the ortho spine clinic.  She was instructed to call his office to schedule appointment.  She was instructed to alternate heat and ice compresses for comfort and return to the ER with new or worsening symptoms.    Risk  Prescription drug management.                                      Clinical Impression:   Final diagnoses:  [M54.17] Lumbosacral radiculopathy (Primary)        ED Disposition Condition    Discharge Stable          ED Prescriptions       Medication Sig Dispense Start Date End Date  Auth. Provider    tiZANidine (ZANAFLEX) 4 MG tablet Take 1 tablet (4 mg total) by mouth every 6 (six) hours as needed (muscle spasm). 20 tablet 3/11/2024 3/21/2024 Tran Ortega FNP    ibuprofen (ADVIL,MOTRIN) 800 MG tablet Take 1 tablet (800 mg total) by mouth 3 (three) times daily. 20 tablet 3/11/2024 -- Tran Ortega FNP          Follow-up Information       Follow up With Specialties Details Why Contact Info    Matthew Adams MD Orthopedic Surgery, Spine Surgery Schedule an appointment as soon as possible for a visit   [address] [place] [address] [facility] [address] [phone]               Tran Ortega FNP  03/11/24 4595       Tran Ortega FNP  03/11/24 9777

## 2024-03-12 NOTE — LETTER
March 12, 2024      [facility]  [address] [facility] [place] [address]  Phone: [phone]  Fax: [phone]       Patient: Stuart Cloud   YOB: 1970  Date of Visit: 03/12/2024    To Whom It May Concern:    Bismark Cloud  was at [facility] on 03/12/2024 as a follow up from her ER visit. The patient may return to work/school on 3/13/2024 with no restrictions. If you have any questions or concerns, or if I can be of further assistance, please do not hesitate to contact me.    Sincerely,    DAKOTA Lee

## 2024-03-12 NOTE — ED TRIAGE NOTES
Pt reports having left thigh pain that has been going on for about a month. She states there is a knot and that the pain is intermittent.

## 2024-03-12 NOTE — ASSESSMENT & PLAN NOTE
Referral placed to see dr ellis again d/t inability to see him last time.   Wishing to have emg done now

## 2024-03-12 NOTE — DISCHARGE INSTRUCTIONS
Take medications as prescribed.  Alternate heat and ice compresses for comfort.  Follow up with Dr. Matthew Adams's office in ortho spine clinic.  You were to call his office to schedule appointment.  Return to the ER with new or worsening symptoms.

## 2024-03-12 NOTE — PROGRESS NOTES
PATIENT NAME: Stuart Cloud  : 1970  DATE: 3/12/24  MRN: [id number]          Reason for Visit / Chief Complaint: Thyroid Problem and Leg Pain (EXAM A)       Update PCP  Update Chief Complaint         History of Present Illness / Problem Focused Workflow     Stuart Cloud presents to the clinic with Thyroid Problem and Leg Pain (EXAM A)     Presents to clinic for ER follow up. Went to ER yesterday for L thigh pain. Lumbar xray was performed and showed diffuse degenerative changes. Referral to Dr. Matthew Adams was placed by ED provider. She received toradol IM which helped last night and prescribed ibuprofen and tizanidine. She is concerned about L thigh heat and redness. She is also concerned that back pain may be from UTI. Denies fever/chills.     Thyroid Problem    Leg Pain         Review of Systems     @Review of Systems   Constitutional:  Negative for chills and fever.   HENT:  Positive for trouble swallowing.    Respiratory:  Negative for chest tightness and shortness of breath.    Cardiovascular:  Positive for leg swelling. Negative for chest pain.   Gastrointestinal:  Negative for nausea and vomiting.   Genitourinary:  Negative for decreased urine volume, dysuria, flank pain and frequency.   Musculoskeletal:  Positive for back pain and leg pain.   Neurological:  Negative for headaches.       Medical / Social / Family History     Past Medical History:   Diagnosis Date    Anxiety with depression     Arthritis     Class 3 severe obesity due to excess calories without serious comorbidity with body mass index (BMI) of 40.0 to 44.9 in adult 2023    Essential hypertension 2023    History of gastric ulcer 2023    Hypothyroidism     Vestibular dizziness 10/23/2023       Past Surgical History:   Procedure Laterality Date    ARTHROSCOPY OF KNEE Right 2023    Procedure: ARTHROSCOPY, KNEE;  Surgeon: Tacos Aburto MD;  Location: [facility];  Service: Orthopedics;  Laterality:  Right;    HYSTERECTOMY      JOINT REPLACEMENT Right     hip replacement    KNEE ARTHROPLASTY Left     KNEE ARTHROSCOPY W/ MENISCECTOMY Right 05/25/2023    Procedure: ARTHROSCOPY, KNEE, WITH MENISCECTOMY;  Surgeon: Tacos Aburto MD;  Location: [facility];  Service: Orthopedics;  Laterality: Right;    LUMPECTOMY, BREAST         Social History  Ms.  reports that she has never smoked. She has never been exposed to tobacco smoke. She has never used smokeless tobacco. She reports that she does not drink alcohol and does not use drugs.    Family History  Ms.'s family history includes Diabetes in her mother; Hypertension in her maternal grandmother and mother; Stroke in her maternal grandfather, maternal grandmother, and mother.    Medications and Allergies     Medications  No outpatient medications have been marked as taking for the 3/12/24 encounter (Office Visit) with Maria L Phillips FNP-C.       Allergies  Review of patient's allergies indicates:   Allergen Reactions    Percocet [oxycodone-acetaminophen] Itching    Latex     Opioids - morphine analogues Hives    Sulfa (sulfonamide antibiotics) Rash    Zithromax z-casimiro [azithromycin] Rash       Physical Examination     Vitals:    03/12/24 1343   BP: 132/68   Pulse: 92   Temp: 97.8 °F (36.6 °C)     Physical Exam  Vitals and nursing note reviewed.   Constitutional:       Appearance: Normal appearance. She is obese.   HENT:      Head: Normocephalic.      Right Ear: External ear normal.      Left Ear: External ear normal.      Mouth/Throat:      Mouth: Mucous membranes are moist.      Pharynx: Uvula midline.   Eyes:      Extraocular Movements: Extraocular movements intact.      Conjunctiva/sclera: Conjunctivae normal.      Pupils: Pupils are equal, round, and reactive to light.   Cardiovascular:      Rate and Rhythm: Normal rate and regular rhythm.      Pulses: Normal pulses.      Heart sounds: Normal heart sounds.   Pulmonary:      Effort: Pulmonary effort is  normal.      Breath sounds: Normal breath sounds.   Abdominal:      General: Abdomen is flat. Bowel sounds are normal.      Palpations: Abdomen is soft.      Tenderness: There is no right CVA tenderness or left CVA tenderness.   Musculoskeletal:         General: Tenderness present.      Cervical back: Normal range of motion and neck supple.      Lumbar back: Tenderness present. Decreased range of motion.      Right lower le+ Edema present.      Left lower le+ Edema present.   Skin:     General: Skin is warm and dry.      Capillary Refill: Capillary refill takes less than 2 seconds.      Findings: Erythema present.      Comments: Minimal erythema to left anterior thigh with some warmth.    Neurological:      General: No focal deficit present.      Mental Status: She is alert and oriented to person, place, and time.   Psychiatric:         Mood and Affect: Mood normal.         Behavior: Behavior normal.         Thought Content: Thought content normal.         Judgment: Judgment normal.               Lab Results   Component Value Date    WBC 5.59 2023    HGB 12.9 2023    HCT 37.6 (L) 2023    MCV 85.8 2023     2023          Sodium   Date Value Ref Range Status   2023 143 136 - 145 mmol/L Final     Potassium   Date Value Ref Range Status   2023 3.2 (L) 3.5 - 5.1 mmol/L Final     Chloride   Date Value Ref Range Status   2023 111 (H) 98 - 107 mmol/L Final     CO2   Date Value Ref Range Status   2023 27 21 - 32 mmol/L Final     Glucose   Date Value Ref Range Status   2023 126 (H) 74 - 106 mg/dL Final     BUN   Date Value Ref Range Status   2023 14 7 - 18 mg/dL Final     Creatinine   Date Value Ref Range Status   2023 0.81 0.55 - 1.02 mg/dL Final     Calcium   Date Value Ref Range Status   2023 8.8 8.5 - 10.1 mg/dL Final     Total Protein   Date Value Ref Range Status   2023 6.0 (L) 6.4 - 8.2 g/dL Final     Albumin   Date Value  Ref Range Status   11/08/2023 3.1 (L) 3.5 - 5.0 g/dL Final     Bilirubin, Total   Date Value Ref Range Status   11/08/2023 0.5 >0.0 - 1.2 mg/dL Final     Alk Phos   Date Value Ref Range Status   11/08/2023 79 41 - 108 U/L Final     AST   Date Value Ref Range Status   11/08/2023 26 15 - 37 U/L Final     ALT   Date Value Ref Range Status   11/08/2023 32 13 - 56 U/L Final     Anion Gap   Date Value Ref Range Status   11/08/2023 8 7 - 16 mmol/L Final      Procedures   Assessment and Plan (including Health Maintenance)      Problem List  Smart Sets  Document Outside HM   :    Plan:           Problem List Items Addressed This Visit          Neuro    Neuropathy    Current Assessment & Plan     Referral placed to see dr ellis again d/t inability to see him last time.   Wishing to have emg done now         Relevant Orders    Ambulatory referral/consult to Neurology       Renal/    Dysuria - Primary    Current Assessment & Plan     UA showed trace blood  KUB ordered  Urine sent for culture  Recommend increasing water intake             Endocrine    Hypothyroidism    Current Assessment & Plan     Continue current treatment with 25 mcg of synthroid daily  Thyroid US was normal  TSH and T4 within range at last check.             GI    Swallowing problem    Current Assessment & Plan     Referred to GI for swallowing issues (spasms and pain) intermittent         Relevant Orders    Ambulatory referral/consult to Gastroenterology       Orthopedic    Inflammation of soft tissue    Relevant Orders    X-Ray Femur 2 View Left    X-Ray Hip 2 or 3 views Left (with Pelvis when performed)     Other Visit Diagnoses       Screening for colon cancer        Relevant Orders    Colonoscopy            Health Maintenance Topics with due status: Not Due       Topic Last Completion Date    Hemoglobin A1c (Diabetic Prevention Screening) 03/29/2023    Lipid Panel 03/29/2023       No future appointments.     Health Maintenance Due   Topic Date Due     Hepatitis C Screening  Never done    COVID-19 Vaccine (1) Never done    HIV Screening  Never done    TETANUS VACCINE  Never done    Mammogram  Never done    Colorectal Cancer Screening  Never done    Shingles Vaccine (1 of 2) Never done    Influenza Vaccine (1) 09/01/2023        Follow up in about 6 months (around 9/12/2024).     Signature:  DAKOTA MARTINEZ        Date of encounter: 3/12/24

## 2024-03-14 ENCOUNTER — TELEPHONE (OUTPATIENT)
Dept: FAMILY MEDICINE | Facility: CLINIC | Age: 54
End: 2024-03-14
Payer: COMMERCIAL

## 2024-03-14 LAB — UA COMPLETE W REFLEX CULTURE PNL UR: NORMAL

## 2024-03-14 NOTE — TELEPHONE ENCOUNTER
----- Message from DAKOTA Lee sent at 3/14/2024  2:04 PM CDT -----  Her urine culture was good. No growth of bacteria. Whenever she can get her xrays and we get the results we will contact her again.

## 2024-03-18 ENCOUNTER — HOSPITAL ENCOUNTER (OUTPATIENT)
Dept: RADIOLOGY | Facility: HOSPITAL | Age: 54
Discharge: HOME | End: 2024-03-18
Payer: COMMERCIAL

## 2024-03-18 DIAGNOSIS — M54.50 CHRONIC MIDLINE LOW BACK PAIN, UNSPECIFIED WHETHER SCIATICA PRESENT: ICD-10-CM

## 2024-03-18 DIAGNOSIS — M79.89 INFLAMMATION OF SOFT TISSUE: ICD-10-CM

## 2024-03-18 DIAGNOSIS — G89.29 CHRONIC MIDLINE LOW BACK PAIN, UNSPECIFIED WHETHER SCIATICA PRESENT: ICD-10-CM

## 2024-03-18 PROCEDURE — 74018 RADEX ABDOMEN 1 VIEW: CPT | Mod: TC

## 2024-03-18 PROCEDURE — 73502 X-RAY EXAM HIP UNI 2-3 VIEWS: CPT | Mod: 26,LT,, | Performed by: RADIOLOGY

## 2024-03-18 PROCEDURE — 73502 X-RAY EXAM HIP UNI 2-3 VIEWS: CPT | Mod: TC,LT

## 2024-03-18 PROCEDURE — 73552 X-RAY EXAM OF FEMUR 2/>: CPT | Mod: 26,LT,, | Performed by: RADIOLOGY

## 2024-03-18 PROCEDURE — 74018 RADEX ABDOMEN 1 VIEW: CPT | Mod: 26,,, | Performed by: RADIOLOGY

## 2024-03-18 PROCEDURE — 73552 X-RAY EXAM OF FEMUR 2/>: CPT | Mod: TC,LT

## 2024-03-19 ENCOUNTER — OFFICE VISIT (OUTPATIENT)
Dept: FAMILY MEDICINE | Facility: CLINIC | Age: 54
End: 2024-03-19
Payer: COMMERCIAL

## 2024-03-19 ENCOUNTER — TELEPHONE (OUTPATIENT)
Dept: FAMILY MEDICINE | Facility: CLINIC | Age: 54
End: 2024-03-19
Payer: COMMERCIAL

## 2024-03-19 VITALS
HEIGHT: 66 IN | WEIGHT: 261 LBS | TEMPERATURE: 98 F | BODY MASS INDEX: 41.95 KG/M2 | OXYGEN SATURATION: 98 % | HEART RATE: 75 BPM | DIASTOLIC BLOOD PRESSURE: 65 MMHG | SYSTOLIC BLOOD PRESSURE: 105 MMHG

## 2024-03-19 DIAGNOSIS — M54.16 LUMBAR RADICULOPATHY, CHRONIC: Primary | ICD-10-CM

## 2024-03-19 DIAGNOSIS — M54.16 LUMBAR RADICULOPATHY: Primary | ICD-10-CM

## 2024-03-19 DIAGNOSIS — Z12.11 SCREENING FOR COLON CANCER: Primary | ICD-10-CM

## 2024-03-19 PROCEDURE — 3008F BODY MASS INDEX DOCD: CPT | Mod: ,,,

## 2024-03-19 PROCEDURE — 3074F SYST BP LT 130 MM HG: CPT | Mod: ,,,

## 2024-03-19 PROCEDURE — 1160F RVW MEDS BY RX/DR IN RCRD: CPT | Mod: ,,,

## 2024-03-19 PROCEDURE — 99213 OFFICE O/P EST LOW 20 MIN: CPT | Mod: ,,,

## 2024-03-19 PROCEDURE — 3078F DIAST BP <80 MM HG: CPT | Mod: ,,,

## 2024-03-19 PROCEDURE — 1159F MED LIST DOCD IN RCRD: CPT | Mod: ,,,

## 2024-03-19 RX ORDER — GABAPENTIN 300 MG/1
300 CAPSULE ORAL 3 TIMES DAILY PRN
Qty: 90 CAPSULE | Refills: 5 | Status: SHIPPED | OUTPATIENT
Start: 2024-03-19 | End: 2024-04-01 | Stop reason: SDUPTHER

## 2024-03-19 RX ORDER — CYCLOBENZAPRINE HCL 10 MG
10 TABLET ORAL 3 TIMES DAILY PRN
Qty: 30 TABLET | Refills: 11 | Status: SHIPPED | OUTPATIENT
Start: 2024-03-19 | End: 2024-04-01 | Stop reason: SDUPTHER

## 2024-03-19 RX ORDER — POLYETHYLENE GLYCOL 3350, SODIUM SULFATE ANHYDROUS, SODIUM BICARBONATE, SODIUM CHLORIDE, POTASSIUM CHLORIDE 236; 22.74; 6.74; 5.86; 2.97 G/4L; G/4L; G/4L; G/4L; G/4L
4 POWDER, FOR SOLUTION ORAL ONCE
Qty: 4000 ML | Refills: 0 | Status: SHIPPED | OUTPATIENT
Start: 2024-03-19 | End: 2024-03-19

## 2024-03-19 NOTE — PROGRESS NOTES
PATIENT NAME: Stuart Cloud  : 1970  DATE: 3/19/24  MRN: [id number]          Reason for Visit / Chief Complaint: Follow-up (EXAM B)       Update PCP  Update Chief Complaint         History of Present Illness / Problem Focused Workflow     Stuart Cloud presents to the clinic with Follow-up (EXAM B)     Presents to clinic today for FMLA paperwork discussion. She was advised after last visit to return to work 3/13 or to contact clinic to be seen again if she needed additional time off. No known contact to clinic. No appointment scheduled last week for follow up on problem. She did not have imaging performed from last week until yesterday when she called trying to get paperwork done. Initial decision to take patient off work until appointment with ortho spine that was orignially scheduled for  but she wishes to return to work and feels she can work with duty restrictions. Paperwork filled out for continuous leave from 3/7/24 to 3/13/24 and intermittent leave on dates of future appointment. Bending and prolonged standing should be avoided. Take medications as directed.     Previous visit:  Presents to clinic for ER follow up. Went to ER yesterday for L thigh pain. Lumbar xray was performed and showed diffuse degenerative changes. Referral to Dr. Matthew Adams was placed by ED provider. She received toradol IM which helped last night and prescribed ibuprofen and tizanidine. She is concerned about L thigh heat and redness. She is also concerned that back pain may be from UTI. Denies fever/chills.     Thyroid Problem    Leg Pain     Follow-up  Pertinent negatives include no chest pain, chills, fever, headaches, nausea or vomiting.       Review of Systems     @Review of Systems   Constitutional:  Negative for chills and fever.   HENT:  Positive for trouble swallowing.    Respiratory:  Negative for chest tightness and shortness of breath.    Cardiovascular:  Positive for leg swelling. Negative for chest pain.    Gastrointestinal:  Negative for nausea and vomiting.   Genitourinary:  Negative for decreased urine volume, dysuria, flank pain and frequency.   Musculoskeletal:  Positive for back pain and leg pain.   Neurological:  Negative for headaches.       Medical / Social / Family History     Past Medical History:   Diagnosis Date    Anxiety with depression     Arthritis     Class 3 severe obesity due to excess calories without serious comorbidity with body mass index (BMI) of 40.0 to 44.9 in adult 03/20/2023    Essential hypertension 03/20/2023    History of gastric ulcer 05/01/2023    Hypothyroidism     Vestibular dizziness 10/23/2023       Past Surgical History:   Procedure Laterality Date    ARTHROSCOPY OF KNEE Right 05/25/2023    Procedure: ARTHROSCOPY, KNEE;  Surgeon: Tacos Aburto MD;  Location: [facility];  Service: Orthopedics;  Laterality: Right;    HYSTERECTOMY      JOINT REPLACEMENT Right     hip replacement    KNEE ARTHROPLASTY Left     KNEE ARTHROSCOPY W/ MENISCECTOMY Right 05/25/2023    Procedure: ARTHROSCOPY, KNEE, WITH MENISCECTOMY;  Surgeon: Tacos Aburto MD;  Location: [facility];  Service: Orthopedics;  Laterality: Right;    LUMPECTOMY, BREAST         Social History  Ms.  reports that she has never smoked. She has never been exposed to tobacco smoke. She has never used smokeless tobacco. She reports that she does not drink alcohol and does not use drugs.    Family History  Ms.'s family history includes Diabetes in her mother; Hypertension in her maternal grandmother and mother; Stroke in her maternal grandfather, maternal grandmother, and mother.    Medications and Allergies     Medications  No outpatient medications have been marked as taking for the 3/19/24 encounter (Office Visit) with Maria L Phillips FNP-C.       Allergies  Review of patient's allergies indicates:   Allergen Reactions    Percocet [oxycodone-acetaminophen] Itching    Latex     Opioids - morphine  analogues Hives    Sulfa (sulfonamide antibiotics) Rash    Zithromax z-casimiro [azithromycin] Rash       Physical Examination     Vitals:    24 0951   BP: 105/65   Pulse: 75   Temp: 97.6 °F (36.4 °C)     Physical Exam  Vitals and nursing note reviewed.   Constitutional:       Appearance: Normal appearance. She is obese.   HENT:      Head: Normocephalic.      Right Ear: External ear normal.      Left Ear: External ear normal.      Mouth/Throat:      Mouth: Mucous membranes are moist.      Pharynx: Uvula midline.   Eyes:      Extraocular Movements: Extraocular movements intact.      Conjunctiva/sclera: Conjunctivae normal.      Pupils: Pupils are equal, round, and reactive to light.   Cardiovascular:      Rate and Rhythm: Normal rate and regular rhythm.      Pulses: Normal pulses.      Heart sounds: Normal heart sounds.   Pulmonary:      Effort: Pulmonary effort is normal.      Breath sounds: Normal breath sounds.   Abdominal:      General: Abdomen is flat. Bowel sounds are normal.      Palpations: Abdomen is soft.      Tenderness: There is no right CVA tenderness or left CVA tenderness.   Musculoskeletal:         General: Tenderness present.      Cervical back: Normal range of motion and neck supple.      Lumbar back: Tenderness present. Decreased range of motion.      Right lower le+ Edema present.      Left lower le+ Edema present.   Skin:     General: Skin is warm and dry.      Capillary Refill: Capillary refill takes less than 2 seconds.      Findings: Erythema present.      Comments: Minimal erythema to left anterior thigh with some warmth.    Neurological:      General: No focal deficit present.      Mental Status: She is alert and oriented to person, place, and time.   Psychiatric:         Mood and Affect: Mood normal.         Behavior: Behavior normal.         Thought Content: Thought content normal.         Judgment: Judgment normal.             Lab Results   Component Value Date    WBC 5.59  11/08/2023    HGB 12.9 11/08/2023    HCT 37.6 (L) 11/08/2023    MCV 85.8 11/08/2023     11/08/2023          Sodium   Date Value Ref Range Status   11/08/2023 143 136 - 145 mmol/L Final     Potassium   Date Value Ref Range Status   11/08/2023 3.2 (L) 3.5 - 5.1 mmol/L Final     Chloride   Date Value Ref Range Status   11/08/2023 111 (H) 98 - 107 mmol/L Final     CO2   Date Value Ref Range Status   11/08/2023 27 21 - 32 mmol/L Final     Glucose   Date Value Ref Range Status   11/08/2023 126 (H) 74 - 106 mg/dL Final     BUN   Date Value Ref Range Status   11/08/2023 14 7 - 18 mg/dL Final     Creatinine   Date Value Ref Range Status   11/08/2023 0.81 0.55 - 1.02 mg/dL Final     Calcium   Date Value Ref Range Status   11/08/2023 8.8 8.5 - 10.1 mg/dL Final     Total Protein   Date Value Ref Range Status   11/08/2023 6.0 (L) 6.4 - 8.2 g/dL Final     Albumin   Date Value Ref Range Status   11/08/2023 3.1 (L) 3.5 - 5.0 g/dL Final     Bilirubin, Total   Date Value Ref Range Status   11/08/2023 0.5 >0.0 - 1.2 mg/dL Final     Alk Phos   Date Value Ref Range Status   11/08/2023 79 41 - 108 U/L Final     AST   Date Value Ref Range Status   11/08/2023 26 15 - 37 U/L Final     ALT   Date Value Ref Range Status   11/08/2023 32 13 - 56 U/L Final     Anion Gap   Date Value Ref Range Status   11/08/2023 8 7 - 16 mmol/L Final      Procedures   Assessment and Plan (including Health Maintenance)      Problem List  Smart Sets  Document Outside HM   :    Plan:           Problem List Items Addressed This Visit    None  Visit Diagnoses     Lumbar radiculopathy, chronic    -  Primary    Relevant Orders    Ambulatory referral/consult to Physical/Occupational Therapy          Health Maintenance Topics with due status: Not Due       Topic Last Completion Date    Hemoglobin A1c (Diabetic Prevention Screening) 03/29/2023    Lipid Panel 03/29/2023       Future Appointments   Date Time Provider Department Center   3/20/2024  2:30 PM Bryan  Matthew OBRIEN MD [facility] SPINE [facility]   4/11/2024  8:15 AM Sage Ordonez MD [facility] NEURO [facility]   6/3/2024 12:30 PM [facility] GI ROOM 01 [facility]   6/24/2024  1:40 PM [facility] MAMMO[facility] Sara        Health Maintenance Due   Topic Date Due    Hepatitis C Screening  Never done    COVID-19 Vaccine (1) Never done    HIV Screening  Never done    TETANUS VACCINE  Never done    Mammogram  Never done    Colorectal Cancer Screening  Never done    Shingles Vaccine (1 of 2) Never done    Influenza Vaccine (1) 09/01/2023        No follow-ups on file.     Signature:  DAKOTA MARTINEZ        Date of encounter: 3/19/24

## 2024-03-19 NOTE — LETTER
March 19, 2024      [facility]  [address] [place] [address]  Phone: [phone]  Fax: [phone]       Patient: Stuart Cloud   YOB: 1970  Date of Visit: 03/19/2024    To Whom It May Concern:    Bismark Cloud  was at [facility] on 03/19/2024. The patient may return to work on 03/20/2024 with no restrictions. If you have any questions or concerns, or if I can be of further assistance, please do not hesitate to contact me.    Sincerely,    Maria L DUNCAN

## 2024-03-19 NOTE — TELEPHONE ENCOUNTER
----- Message from DAKOTA Lee sent at 3/19/2024 11:38 AM CDT -----  HIP and Femur:  FINDINGS:  A pelvic views there is partially imaged right hip arthroplasty.  There is no acute fracture of the left hip or left femur.  There is a left knee arthroplasty as well that appears in the expected location.  Mild osteoarthritic changes are seen of the left hip.     Impression:     No acute findings.  Mild degenerative changes of the left hip.      KUB:  FINDINGS:  No free fluid or free air seen.  The bowel gas pattern appears within normal limits.  No abnormal calcifications are present.  No other abnormality is identified.     Impression:     No evidence of abnormality demonstrated

## 2024-03-19 NOTE — LETTER
March 19, 2024      [facility]  [address] [facility] [place] [address]  Phone: [phone]  Fax: [phone]       Patient: Stuart Cloud   YOB: 1970  Date of Visit: 03/19/2024    To Whom It May Concern:    Bismark Cloud  was at [facility] on 03/19/2024. The patient may return to work/school on 3/20/2024 with restrictions. Allow patient to sit intermittently if needed.  If you have any questions or concerns, or if I can be of further assistance, please do not hesitate to contact me.    Sincerely,    DAKOTA Lee

## 2024-03-20 ENCOUNTER — HOSPITAL ENCOUNTER (OUTPATIENT)
Dept: RADIOLOGY | Facility: HOSPITAL | Age: 54
Discharge: HOME | End: 2024-03-20
Attending: ORTHOPAEDIC SURGERY
Payer: COMMERCIAL

## 2024-03-20 ENCOUNTER — OFFICE VISIT (OUTPATIENT)
Dept: SPINE | Facility: CLINIC | Age: 54
End: 2024-03-20
Payer: COMMERCIAL

## 2024-03-20 DIAGNOSIS — M54.17 LUMBOSACRAL RADICULOPATHY: ICD-10-CM

## 2024-03-20 DIAGNOSIS — M54.16 LUMBAR RADICULOPATHY: ICD-10-CM

## 2024-03-20 PROCEDURE — 99204 OFFICE O/P NEW MOD 45 MIN: CPT | Mod: S$PBB,,, | Performed by: ORTHOPAEDIC SURGERY

## 2024-03-20 PROCEDURE — 72110 X-RAY EXAM L-2 SPINE 4/>VWS: CPT | Mod: TC

## 2024-03-20 PROCEDURE — 72110 X-RAY EXAM L-2 SPINE 4/>VWS: CPT | Mod: 26,,, | Performed by: ORTHOPAEDIC SURGERY

## 2024-03-20 PROCEDURE — 99215 OFFICE O/P EST HI 40 MIN: CPT | Mod: PBBFAC,25 | Performed by: ORTHOPAEDIC SURGERY

## 2024-03-20 RX ORDER — PREGABALIN 75 MG/1
75 CAPSULE ORAL 2 TIMES DAILY
Qty: 60 CAPSULE | Refills: 5 | Status: SHIPPED | OUTPATIENT
Start: 2024-03-20 | End: 2024-04-01

## 2024-03-20 NOTE — PROGRESS NOTES
AP, lateral, flexion/extension views of the lumbar spine reviewed    On the AP there is degenerative lumbar curvature to the left.  There are 5 non-rib-bearing lumbar vertebrae.  On the lateral there is decreased lumbar lordosis.  There is spondylotic disease with decreased disc height and osteophyte formation noted.  Grade 1 anterolisthesis at L3-4, grade 2 anterolisthesis at L4-5 with severe fixed kyphotic deformity.    Impression:  Spondylotic changes of the lumbar spine as noted above

## 2024-03-20 NOTE — PROGRESS NOTES
MDM/time:  Greater than 45 minutes spent on this encounter including 15 minutes reviewing imaging and notes, 20 minutes with the patient, 10 minutes documentation    ASSESSMENT:  54 y.o. female with lumbar spondylolithesis L3-4 and L4-5 with radiculopathy      PLAN:  PT lumbar spine ([facility])  Stop Neurontin and start Lyrica 75mg 1 tab BID   work note  Referral to pain management to discuss epidural injections ([facility])  Follow up 4 months    HPI:  54 y.o. female here for evaluation of left thigh pain.  Patient reports history of left thigh pain that has been ongoing for about 3 weeks.  Patient reports she stands for 3 hours at a time without having a break at work and finds this difficult due to back pain.  She went to the emergency room approximately a week ago was given an injection with no relief of pain.  She does report a history of a left total knee replacement April of 2020 and a right total hip replacement 11/2019.  Denies difficulty with  strength but does have some numbness in her fingertips in bilateral hands.  No issues with balance or falls.  Denies bladder bowel incontinence.  Difficulty walking distance due to back pain.  Currently taking ibuprofen and Flexeril for pain.  No recent physical therapy.  No prior pain management.  No recent MRI.  No prior spine surgery.    IMAGING:  AP, lateral, flexion/extension views of the lumbar spine reviewed     On the AP there is degenerative lumbar curvature to the left.  There are 5 non-rib-bearing lumbar vertebrae.  On the lateral there is decreased lumbar lordosis. There is spondylotic disease with decreased disc height and osteophyte formation noted.  Grade 1 anterolisthesis at L3-4, grade 2 anterolisthesis at L4-5 with severe fixed kyphotic deformity.     Impression:  Spondylotic changes of the lumbar spine as noted above     Past Medical History:   Diagnosis Date    Anxiety with depression     Arthritis     Class 3 severe obesity due to excess calories  without serious comorbidity with body mass index (BMI) of 40.0 to 44.9 in adult 03/20/2023    Essential hypertension 03/20/2023    History of gastric ulcer 05/01/2023    Hypothyroidism     Vestibular dizziness 10/23/2023     Past Surgical History:   Procedure Laterality Date    ARTHROSCOPY OF KNEE Right 05/25/2023    Procedure: ARTHROSCOPY, KNEE;  Surgeon: Tacos Aburto MD;  Location: [facility];  Service: Orthopedics;  Laterality: Right;    HYSTERECTOMY      JOINT REPLACEMENT Right     hip replacement    KNEE ARTHROPLASTY Left     KNEE ARTHROSCOPY W/ MENISCECTOMY Right 05/25/2023    Procedure: ARTHROSCOPY, KNEE, WITH MENISCECTOMY;  Surgeon: Tacos Aburto MD;  [facility];  Service: Orthopedics;  Laterality: Right;    LUMPECTOMY, BREAST       Social History     Tobacco Use    Smoking status: Never     Passive exposure: Never    Smokeless tobacco: Never   Substance Use Topics    Alcohol use: Never    Drug use: Never      Current Outpatient Medications   Medication Instructions    ciprofloxacin-dexAMETHasone 0.3-0.1% (CIPRODEX) 0.3-0.1 % DrpS 4 drops, Right Ear, 2 times daily    clonazePAM (KLONOPIN) 0.5 mg, Oral, 2 times daily PRN    cyclobenzaprine (FLEXERIL) 5 MG tablet Take 1 tablet 3 times a day by oral route as needed for 9 days.    cyclobenzaprine (FLEXERIL) 10 mg, Oral, 3 times daily PRN    diphenoxylate-atropine 2.5-0.025 mg (LOMOTIL) 2.5-0.025 mg per tablet 1 tablet, Oral, 4 times daily PRN    furosemide (LASIX) 20 mg, Oral, Every other day    gabapentin (NEURONTIN) 300 mg, Oral, 3 times daily PRN    ibuprofen (ADVIL,MOTRIN) 800 mg, Oral, 3 times daily    levothyroxine (SYNTHROID) 25 mcg, Oral, Before breakfast    losartan-hydrochlorothiazide 50-12.5 mg (HYZAAR) 50-12.5 mg per tablet 1 tablet, Oral, Daily    meclizine (ANTIVERT) 25 mg, Oral, 3 times daily PRN    NAPROXEN ORAL 500 mg, Oral, Daily PRN    ondansetron (ZOFRAN-ODT) 8 mg, Oral, Every 6 hours PRN    ondansetron  (ZOFRAN-ODT) 4 mg, Oral, Every 6 hours PRN    promethazine (PHENERGAN) 25 mg, Oral, Every 6 hours PRN        EXAM:  Constitutional  General Appearance:  BMI 42.13 obese, NAD  Psychiatric   Orientation: Oriented to time, oriented to place, oriented to person  Mood and Affect: Active and alert, normal mood, normal affect  Gait and Station   Appearance:  antalgic gait, unable to tandem gait, unable to walk on toes, unable to walk on heels    LUMBAR  Musculoskeletal System   Hips: Normal appearance, no leg length discrepancy, normal motion; left, normal motion; right    Lumbar Spine                   Inspection:  Normal alignment, normal sagittal balance                  Range of motion: decreased flexion, extension, lateral bending, rotation. Pain with range of motion                  Bony Palpation of the Lumbar Spine:  No tenderness of the spinous process, no tenderness of the sacrum, no tenderness of the coccyx                  Bony Palpation of the Right Hip:  No tenderness of the iliac crest, no tenderness of the sciatic notch, no tenderness of the SI joint                  Bony Palpation of the Left Hip:  No tenderness of the iliac crest, no tenderness of the sciatic notch, no tenderness of the SI joint                  Soft Tissue Palpation on the Right:  No tenderness of the paraspinal region, no tenderness of the iliolumbar region                  Soft Tissue Palpation on the Left:  No tenderness of the paraspinal region, no tenderness of the iliolumbar region    Motor Strength   L1 Right:  Hip flexion iliopsoas 5/5    L1 Left:  Hip flexion iliopsoas 5/5              L2-L4 Right:  Knee extension quadriceps 5/5, tibialis anterior 5/5              L2-L4 Left:  Knee extension quadriceps 5/5, tibialis anterior 5/5   L5 Right:  Extensor hallucis llongus 5/5,    L5 Left:  Extensor hallucis longus 5/5,    S1 Right:  Plantar flexion gastrocnemius 5/5   S1 Left:  Plantar flexion gastrocnemius 5/5    Neurological  System   Ankle Reflex Right:  normal   Ankle Reflex Left: normal   Knee Reflex Right:  normal   Knee Reflex Left:  normal   Sensation on the Right:  L2 normal, L3 normal, L4 normal, L5 normal, S1 normal   Sensation on the Left:  L2 normal, L3 normal, L4 normal, L5 normal, S1 normal              Special Test on the Right:  Seated straight leg raising test negative, no clonus of the ankle              Special Test on the Left:  Seated straight leg raising test negative, no clonus of the ankle    Skin   Lumbosacral Spine:  Normal skin    Cardiovascular System   Arterial Pulses Right:  Posterior tibialis normal, dorsalis pedis normal   Arterial Pulses Left:  Posterior tibialis normal, dorsalis pedis normal   Edema Right: None   Edema Left:  None

## 2024-03-20 NOTE — LETTER
March 20, 2024      [facility]  [address]  [place] [address]  Phone: [phone]  Fax: [phone]       Patient: Stuart Cloud   YOB: 1970  Date of Visit: 03/20/2024    To Whom It May Concern:    Bismark Cloud  was at [facility] on 03/20/2024. The patient may return to work/school on 03/25/2024 with no restrictions. If you have any questions or concerns, or if I can be of further assistance, please do not hesitate to contact me.    Sincerely,    Dr. Matthew Adams

## 2024-03-22 ENCOUNTER — OFFICE VISIT (OUTPATIENT)
Dept: FAMILY MEDICINE | Facility: CLINIC | Age: 54
End: 2024-03-22
Payer: COMMERCIAL

## 2024-03-22 VITALS
HEART RATE: 87 BPM | DIASTOLIC BLOOD PRESSURE: 75 MMHG | WEIGHT: 262.63 LBS | OXYGEN SATURATION: 98 % | HEIGHT: 66 IN | TEMPERATURE: 97 F | BODY MASS INDEX: 42.21 KG/M2 | SYSTOLIC BLOOD PRESSURE: 135 MMHG

## 2024-03-22 DIAGNOSIS — Z02.79 ISSUE OF MEDICAL CERTIFICATE: Primary | ICD-10-CM

## 2024-03-22 PROCEDURE — 99212 OFFICE O/P EST SF 10 MIN: CPT | Mod: ,,,

## 2024-03-22 PROCEDURE — 3075F SYST BP GE 130 - 139MM HG: CPT | Mod: ,,,

## 2024-03-22 PROCEDURE — 3078F DIAST BP <80 MM HG: CPT | Mod: ,,,

## 2024-03-22 PROCEDURE — 3008F BODY MASS INDEX DOCD: CPT | Mod: ,,,

## 2024-03-27 ENCOUNTER — HOSPITAL ENCOUNTER (EMERGENCY)
Facility: HOSPITAL | Age: 54
Discharge: HOME | End: 2024-03-27
Payer: COMMERCIAL

## 2024-03-27 VITALS
DIASTOLIC BLOOD PRESSURE: 83 MMHG | SYSTOLIC BLOOD PRESSURE: 141 MMHG | TEMPERATURE: 98 F | HEART RATE: 78 BPM | HEIGHT: 66 IN | OXYGEN SATURATION: 97 % | BODY MASS INDEX: 42.91 KG/M2 | RESPIRATION RATE: 16 BRPM | WEIGHT: 267 LBS

## 2024-03-27 DIAGNOSIS — M54.10 RADICULOPATHY, UNSPECIFIED SPINAL REGION: Primary | ICD-10-CM

## 2024-03-27 PROCEDURE — 63600175 PHARM REV CODE 636 W HCPCS: Performed by: GENERAL PRACTICE

## 2024-03-27 PROCEDURE — 99283 EMERGENCY DEPT VISIT LOW MDM: CPT | Mod: ,,, | Performed by: FAMILY MEDICINE

## 2024-03-27 PROCEDURE — 96372 THER/PROPH/DIAG INJ SC/IM: CPT | Performed by: GENERAL PRACTICE

## 2024-03-27 PROCEDURE — 99284 EMERGENCY DEPT VISIT MOD MDM: CPT | Mod: 25

## 2024-03-27 RX ORDER — KETOROLAC TROMETHAMINE 30 MG/ML
60 INJECTION, SOLUTION INTRAMUSCULAR; INTRAVENOUS
Status: COMPLETED | OUTPATIENT
Start: 2024-03-27 | End: 2024-03-27

## 2024-03-27 RX ADMIN — KETOROLAC TROMETHAMINE 60 MG: 30 INJECTION, SOLUTION INTRAMUSCULAR at 11:03

## 2024-03-27 NOTE — ED PROVIDER NOTES
Encounter Date: 3/27/2024       History     Chief Complaint   Patient presents with    Leg Pain     Left leg back from slipped disc in back       Patient is a 53 y/o female who presents to the ED with complaint or left leg pain. Patient states that she has seen spine surgery and was referred to physical therapy due to a slipped disk. Per chart review she say Dr. Adams 1 week ago for lumbar spondylolithesis L3-4 and L4-5 with radiculopathy. She was prescribed lyrica but reports it has not helped her pain. She was referred to physical therapy in the past but did not start it yet. Patient denies weakness, numbness, fever, chills, nausea, vomiting, SOB, chest pain.         Review of patient's allergies indicates:   Allergen Reactions    Percocet [oxycodone-acetaminophen] Itching    Latex     Opioids - morphine analogues Hives    Sulfa (sulfonamide antibiotics) Rash    Zithromax z-casimiro [azithromycin] Rash     Past Medical History:   Diagnosis Date    Arthritis     Class 3 severe obesity due to excess calories without serious comorbidity with body mass index (BMI) of 40.0 to 44.9 in adult 03/20/2023    Essential hypertension 03/20/2023    History of gastric ulcer 05/01/2023    Hypothyroidism     Vestibular dizziness 10/23/2023     Past Surgical History:   Procedure Laterality Date    ARTHROSCOPY OF KNEE Right 05/25/2023    Procedure: ARTHROSCOPY, KNEE;  Surgeon: Tacos Aburto MD;  Location: [facility];  Service: Orthopedics;  Laterality: Right;    HYSTERECTOMY      INJECTION OF ANESTHETIC AGENT INTO SACROILIAC JOINT Bilateral 5/7/2024    Procedure: BLOCK, SACROILIAC JOINT;  Surgeon: Leny Lang MD;  Location: [facility];  Service: Pain Management;  Laterality: Bilateral;    JOINT REPLACEMENT Right     hip replacement    KNEE ARTHROPLASTY Left     KNEE ARTHROSCOPY W/ MENISCECTOMY Right 05/25/2023    Procedure: ARTHROSCOPY, KNEE, WITH MENISCECTOMY;  Surgeon: Tacos Aburto MD;  Location: [facility]  ORTHO OR;  Service: Orthopedics;  Laterality: Right;    LUMPECTOMY, BREAST      ROBOT-ASSISTED CHOLECYSTECTOMY USING DA ANG XI N/A 5/9/2024    Procedure: XI ROBOTIC CHOLECYSTECTOMY;  Surgeon: Mao Dempsey DO;  Location: [facility] OR;  Service: General;  Laterality: N/A;     Family History   Problem Relation Name Age of Onset    Hypertension Maternal Grandmother      Stroke Maternal Grandmother      Stroke Maternal Grandfather      Hypertension Mother      Stroke Mother      Diabetes Mother       Social History     Tobacco Use    Smoking status: Never     Passive exposure: Never    Smokeless tobacco: Never   Substance Use Topics    Alcohol use: Never    Drug use: Never     Review of Systems   Constitutional:  Negative for fever.   HENT:  Negative for sore throat.    Respiratory:  Negative for shortness of breath.    Cardiovascular:  Negative for chest pain.   Gastrointestinal:  Negative for nausea.   Genitourinary:  Negative for dysuria.   Musculoskeletal:  Negative for back pain.        Left leg pain   Skin:  Negative for rash.   Neurological:  Negative for weakness.   Hematological:  Does not bruise/bleed easily.       Physical Exam     Initial Vitals [03/27/24 1026]   BP Pulse Resp Temp SpO2   (!) 141/83 78 16 97.9 °F (36.6 °C) 97 %      MAP       --         Physical Exam    Vitals reviewed.  Constitutional: She appears well-developed and well-nourished. She is not diaphoretic. No distress.   HENT:   Head: Normocephalic and atraumatic.   Eyes: Conjunctivae and EOM are normal. Pupils are equal, round, and reactive to light.   Neck: Neck supple.   Normal range of motion.  Cardiovascular:  Normal rate and regular rhythm.           Pulmonary/Chest: Breath sounds normal.   Abdominal: Abdomen is soft. Bowel sounds are normal.   Musculoskeletal:         General: Tenderness (left thigh) present.      Cervical back: Normal range of motion and neck supple.     Neurological: She is alert and oriented to person, place,  and time.   Skin: Skin is warm. Capillary refill takes less than 2 seconds.   Psychiatric: She has a normal mood and affect. Thought content normal.         Medical Screening Exam   See Full Note    ED Course   Procedures  Labs Reviewed - No data to display       Imaging Results    None          Medications   ketorolac injection 60 mg (60 mg Intramuscular Given 3/27/24 1107)     Medical Decision Making  53 y/o female with complaint or left leg pain. She has seen spine surgery for lumbar spondylolithesis L3-4 and L4-5 with radiculopathy. She was referred to physical therapy.  Given ketorolac once and she will need to f/u with spine surgery and PCP  Continue home lyrica and flexeril  Case discussed with Dr. Townsend    Risk  Prescription drug management.              Attending Attestation:   Physician Attestation Statement for Resident:  As the supervising MD   Physician Attestation Statement: I have personally seen and examined this patient.   I agree with the above history.  -:   As the supervising MD I agree with the above PE.     As the supervising MD I agree with the above treatment, course, plan, and disposition.                                           Clinical Impression:   Final diagnoses:  [M54.10] Radiculopathy, unspecified spinal region (Primary)        ED Disposition Condition    Discharge Stable          ED Prescriptions    None       Follow-up Information    None          Ang Weems MD  Resident  03/27/24 1149       Sarbjit Townsend, DO  05/17/24 0646

## 2024-03-27 NOTE — Clinical Note
"Stuart Moonadonay" Pedro Luis was seen and treated in our emergency department on 3/27/2024.  She may return to work on 03/28/2024.       If you have any questions or concerns, please don't hesitate to call.      Ang Weems MD"

## 2024-03-27 NOTE — DISCHARGE INSTRUCTIONS
Apply ice/heat pads. Continue home flexeril, lyrica and physical therapy. Follow up with PCP in 1 week and spine surgery.   Problem: PAIN - ADULT  Goal: Verbalizes/displays adequate comfort level or baseline comfort level  Description: Interventions:  - Encourage patient to monitor pain and request assistance  - Assess pain using appropriate pain scale  - Administer analgesics based on type and severity of pain and evaluate response  - Implement non-pharmacological measures as appropriate and evaluate response  - Consider cultural and social influences on pain and pain management  - Notify physician/advanced practitioner if interventions unsuccessful or patient reports new pain  Outcome: Progressing

## 2024-03-28 ENCOUNTER — CLINICAL SUPPORT (OUTPATIENT)
Dept: REHABILITATION | Facility: HOSPITAL | Age: 54
End: 2024-03-28
Payer: COMMERCIAL

## 2024-03-28 DIAGNOSIS — R53.1 DECREASED STRENGTH: ICD-10-CM

## 2024-03-28 DIAGNOSIS — M54.16 LUMBAR RADICULOPATHY, CHRONIC: Primary | ICD-10-CM

## 2024-03-28 DIAGNOSIS — R52 PAIN: ICD-10-CM

## 2024-03-28 PROCEDURE — 97162 PT EVAL MOD COMPLEX 30 MIN: CPT

## 2024-03-28 NOTE — PLAN OF CARE
[facility]  Physical Therapy Initial Evaluation    Date: 3/28/2024   Name: Stuart Cloud  Clinic Number: [id number]    Therapy Diagnosis:   Encounter Diagnoses   Name Primary?    Lumbar radiculopathy, chronic Yes    Pain     Decreased strength      Physician: Maria L Phillips FNP-C    Physician Orders: PT Eval and Treat   Medical Diagnosis from Referral: Lumbar Radiculopathy  Evaluation Date: 3/28/2024  Authorization Period Expiration: 3/19/25  Plan of Care Expiration: 6/20/24  Visit # / Visits authorized: 1/ 90    Time In: 715  Time Out: 755  Total Appointment Time (timed & untimed codes): 40 minutes    Precautions: Standard    Subjective   Date of onset: chronic condition    History of current condition - Stuart reports: Imaging shows disc bulges that is affecting the nerves. Left leg get a shooting/burning/tingling into the L2/3 dermatome on the top of the thigh. Injection yesterday took away most of the pain. Constant dull ache/pinching in the left low back but the radicular symptoms are intermittent. Worse with prolonged standing. Relief with stretches, medication. History of Right total hip replacement, left knee replacement and right knee scope. Patient mentioned her hands going numb and will be set up for a nerve conduction study to rule out carpal tunnel. Patient is going to be attending physical therapy with pre-approved medical leave from her job.   Currently working: [employer] 12 hour day of standing  Patient is pleasant and motivated for improvement with therapy     Medical History:   Past Medical History:   Diagnosis Date    Anxiety with depression     Arthritis     Class 3 severe obesity due to excess calories without serious comorbidity with body mass index (BMI) of 40.0 to 44.9 in adult 03/20/2023    Essential hypertension 03/20/2023    History of gastric ulcer 05/01/2023    Hypothyroidism     Vestibular dizziness 10/23/2023       Surgical History:   Stuart Cloud   has a past surgical history that includes Hysterectomy; lumpectomy, breast; Arthroscopy of knee (Right, 05/25/2023); Knee arthroscopy w/ meniscectomy (Right, 05/25/2023); Joint replacement (Right); and Knee Arthroplasty (Left).    Medications:   Stuart has a current medication list which includes the following prescription(s): ciprofloxacin-dexamethasone 0.3-0.1%, clonazepam, cyclobenzaprine, cyclobenzaprine, diphenoxylate-atropine 2.5-0.025 mg, furosemide, gabapentin, ibuprofen, levothyroxine, losartan-hydrochlorothiazide 50-12.5 mg, meclizine, naproxen, ondansetron, ondansetron, pregabalin, and promethazine, and the following Facility-Administered Medications: sodium chloride 0.9%.    Allergies:   Review of patient's allergies indicates:   Allergen Reactions    Percocet [oxycodone-acetaminophen] Itching    Latex     Opioids - morphine analogues Hives    Sulfa (sulfonamide antibiotics) Rash    Zithromax z-casimiro [azithromycin] Rash        Imaging, bone scan films: AP, lateral, flexion/extension views of the lumbar spine reviewed  On the AP there is degenerative lumbar curvature to the left.  There are 5 non-rib-bearing lumbar vertebrae.  On the lateral there is decreased lumbar lordosis. There is spondylotic disease with decreased disc height and osteophyte formation noted.  Grade 1 anterolisthesis at L3-4, grade 2 anterolisthesis at L4-5 with severe fixed kyphotic deformity.  Impression:  Spondylotic changes of the lumbar spine as noted above        Pain:  Current 0/10, worst 10/10,  Location: left back , lower legs, and upper legs   Description: Aching, Dull, Burning, Sharp, and Shooting  Aggravating Factors: Standing  Easing Factors: pain medication and rest        Objective     Posture:   Keeps trunk flexed in static stance   Increased lumbar lordosis    Decreased light touch over left L2/3 dermatome (lateral thigh)    Manual muscle test: right hip flexion 4/5 (s/p total hip), left hip flexion 5/5  Manual muscle  test: bilateral hamstring 5/5  Manual muscle test: bilateral quad 5/5  Manual muscle test: left glute med 5/5, right glute med 3+/5  Manual muscle test: left glute max 4-/5, right glute max 5/5    Palpation for tenderness: left greater trochanter    (-) bilateral sitting slump but did have tenderness in the left thigh  (-) bilateral JOHNATHAN  (-) bilateral straight leg raise     Left leg longer in supine  No quad lag    Weak left multifidus firing    Antalgic gait with no use of assistive device      Limitation/Restriction for FOTO Survey    Therapist reviewed FOTO scores for Stuart Cloud on 3/28/2024.   FOTO documents entered into [employer] - see Media section.    Intake Score: 46%         Home Exercises and Patient Education Provided    Education provided:   Eval Findings  - Patient educated on biomechanical justification for therapeutic exercise and importance of compliance with HEP in order to improve overall impairments and QOL   Patient was educated on all the above exercise prior/during/after for proper posture, positioning, and execution for safe performance with home exercise program.   Patient educated on the importance of improved core and upper and lower extremity strength in order to improve alignment of the spine and upper and lower extremities with static positions and dynamic movement.   Patient educated on the importance of strong core and lower extremity musculature in order to improve both static and dynamic balance, improve gait mechanics, reduce fall risk and improve household and community mobility.     Written Home Exercises Provided:  eval findings .  Exercises were reviewed and Stuart was able to demonstrate them prior to the end of the session.  Stuart demonstrated good  understanding of the education provided.     See EMR under  -  for exercises provided  - .    Assessment   Stuart is a 54 y.o. female referred to outpatient Physical Therapy with a medical diagnosis of lumbar radiculopathy.  Patient presents with history of right total hip replacement and left total knee replacement with right knee scope. Patient does have a leg length difference, increased lumbar lordosis and an antalgic gait pattern. No use of assistive device. Patient has pain in the low back when attempting to  full upright position. Therefore, relaxing into the posture eases the symptoms mildly. The pain is constant in mainly along the left side of the low back and radiates into the left lateral/anterior thigh. Patient's pain is worse with prolonged standing but does ease with sitting and rest. Significant right hip instability and left core weakness. Bilateral legs are strong individually but the stability musculature is weak. Patient has generalized decreased flexibility. Patient does report bilateral hand numbness and will be getting a nerve test to rule out carpal tunnel. 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 Patient will benefit from skilled physical therapy at this time to address deficits.     Patient prognosis is Fair.     Patient will benefit from skilled outpatient Physical Therapy to address the deficits stated above and in the chart below, provide patient /family education, and to maximize patientt's level of independence.     Plan of care discussed with patient: Yes  Patient's spiritual, cultural and educational needs considered and patient is agreeable to the plan of care and goals as stated below:     Anticipated Barriers for therapy: multiple deficits from previous surgeries      Medical Necessity is demonstrated by the following  History  Co-morbidities and personal factors that may impact the plan of care [] LOW: no personal factors / co-morbidities  [x] MODERATE: 1-2 personal factors / co-morbidities  [] HIGH: 3+ personal factors / co-morbidities    Moderate / High Support Documentation:   Co-morbidities affecting plan of care: right knee scope, left knee  total knee replacement, right total hip replacement, low back pain, leg length difference, high level of pain with standing    Personal Factors:   lifestyle/obesity     Examination  Body Structures and Functions, activity limitations and participation restrictions that may impact the plan of care [] LOW: addressing 1-2 elements  [x] MODERATE: 3+ elements  [] HIGH: 4+ elements (please support below)    Moderate / High Support Documentation: leg length, right total hip replacement, hip/core instability, radicular symptoms     Clinical Presentation [] LOW: stable  [x] MODERATE: Evolving  [] HIGH: Unstable     Decision Making/ Complexity Score: moderate         Goals:  Short Term Goals: 6 weeks   Patient will be able to sleep ~ 3 hours without waking from pain  Patient will improve manual muscle test to 4-/5 for stabilization  Patient will maintain prolonged positions x 15 minutes with 4 /10 pain  Patient will report a change in status with low back pain from constant to intermittent  Patient will report no left leg radicular symptoms by 6 weeks    Long Term Goals: 12 weeks   Patient will be able to sleep through the night without waking from pain  Patient will improve manual muscle test to 4/5 for stabilization   Patient will maintain prolonged positions x 30 minutes with  2/10 pain  Patient will be independent with home exercise program by D/C    Plan   Plan of care Certification: 3/28/2024 to 6/20/24.    Outpatient Physical Therapy 2 times weekly for 12 weeks to include the following interventions: Aquatic Therapy, Hybresis with Dex, Cervical/Lumbar Traction, Electrical Stimulation IFC/Premod, Gait Training, Manual Therapy, Moist Heat/ Ice, Neuromuscular Re-ed, Patient Education, Self Care, Therapeutic Activities, Therapeutic Exercise, Ultrasound, and Dry Needling.     REE BARAKAT, PT        I CERTIFY THE NEED FOR THESE SERVICES FURNISHED UNDER THIS PLAN OF TREATMENT AND WHILE UNDER MY CARE.    Physician's  comments:      Physician's Signature: ____________________________________________Date________________        Please fax this MD signed form to:  [facility]  [phone] fax

## 2024-04-01 ENCOUNTER — CLINICAL SUPPORT (OUTPATIENT)
Dept: REHABILITATION | Facility: HOSPITAL | Age: 54
End: 2024-04-01
Payer: COMMERCIAL

## 2024-04-01 ENCOUNTER — OFFICE VISIT (OUTPATIENT)
Dept: FAMILY MEDICINE | Facility: CLINIC | Age: 54
End: 2024-04-01
Payer: COMMERCIAL

## 2024-04-01 VITALS
DIASTOLIC BLOOD PRESSURE: 78 MMHG | HEART RATE: 88 BPM | HEIGHT: 66 IN | BODY MASS INDEX: 43.07 KG/M2 | WEIGHT: 268 LBS | SYSTOLIC BLOOD PRESSURE: 120 MMHG

## 2024-04-01 DIAGNOSIS — E03.9 ACQUIRED HYPOTHYROIDISM: ICD-10-CM

## 2024-04-01 DIAGNOSIS — I10 ESSENTIAL HYPERTENSION: ICD-10-CM

## 2024-04-01 DIAGNOSIS — R52 PAIN: Primary | ICD-10-CM

## 2024-04-01 DIAGNOSIS — M62.838 MUSCLE SPASM: Primary | ICD-10-CM

## 2024-04-01 DIAGNOSIS — G62.9 NEUROPATHY: ICD-10-CM

## 2024-04-01 DIAGNOSIS — F41.9 ANXIETY: ICD-10-CM

## 2024-04-01 DIAGNOSIS — R53.1 DECREASED STRENGTH: ICD-10-CM

## 2024-04-01 PROCEDURE — 96372 THER/PROPH/DIAG INJ SC/IM: CPT | Mod: ,,, | Performed by: FAMILY MEDICINE

## 2024-04-01 PROCEDURE — 99214 OFFICE O/P EST MOD 30 MIN: CPT | Mod: 25,,, | Performed by: FAMILY MEDICINE

## 2024-04-01 PROCEDURE — 97112 NEUROMUSCULAR REEDUCATION: CPT

## 2024-04-01 PROCEDURE — 1159F MED LIST DOCD IN RCRD: CPT | Mod: ,,, | Performed by: FAMILY MEDICINE

## 2024-04-01 PROCEDURE — 97110 THERAPEUTIC EXERCISES: CPT

## 2024-04-01 PROCEDURE — 3008F BODY MASS INDEX DOCD: CPT | Mod: ,,, | Performed by: FAMILY MEDICINE

## 2024-04-01 PROCEDURE — 3074F SYST BP LT 130 MM HG: CPT | Mod: ,,, | Performed by: FAMILY MEDICINE

## 2024-04-01 PROCEDURE — 3078F DIAST BP <80 MM HG: CPT | Mod: ,,, | Performed by: FAMILY MEDICINE

## 2024-04-01 RX ORDER — METHYLPREDNISOLONE ACETATE 40 MG/ML
40 INJECTION, SUSPENSION INTRA-ARTICULAR; INTRALESIONAL; INTRAMUSCULAR; SOFT TISSUE
Status: COMPLETED | OUTPATIENT
Start: 2024-04-01 | End: 2024-04-01

## 2024-04-01 RX ORDER — CLONAZEPAM 0.5 MG/1
0.5 TABLET ORAL 2 TIMES DAILY PRN
Qty: 30 TABLET | Refills: 2 | Status: SHIPPED | OUTPATIENT
Start: 2024-04-01 | End: 2024-06-30

## 2024-04-01 RX ORDER — LEVOTHYROXINE SODIUM 25 UG/1
25 TABLET ORAL
Qty: 30 TABLET | Refills: 2 | Status: SHIPPED | OUTPATIENT
Start: 2024-04-01

## 2024-04-01 RX ORDER — LOSARTAN POTASSIUM AND HYDROCHLOROTHIAZIDE 12.5; 5 MG/1; MG/1
1 TABLET ORAL DAILY
Qty: 90 TABLET | Refills: 3 | Status: SHIPPED | OUTPATIENT
Start: 2024-04-01 | End: 2025-04-01

## 2024-04-01 RX ORDER — DEXAMETHASONE SODIUM PHOSPHATE 4 MG/ML
4 INJECTION, SOLUTION INTRA-ARTICULAR; INTRALESIONAL; INTRAMUSCULAR; INTRAVENOUS; SOFT TISSUE
Status: COMPLETED | OUTPATIENT
Start: 2024-04-01 | End: 2024-04-01

## 2024-04-01 RX ORDER — CYCLOBENZAPRINE HCL 10 MG
10 TABLET ORAL 3 TIMES DAILY PRN
Qty: 30 TABLET | Refills: 2 | Status: SHIPPED | OUTPATIENT
Start: 2024-04-01 | End: 2024-06-30

## 2024-04-01 RX ORDER — KETOROLAC TROMETHAMINE 30 MG/ML
30 INJECTION, SOLUTION INTRAMUSCULAR; INTRAVENOUS
Status: COMPLETED | OUTPATIENT
Start: 2024-04-01 | End: 2024-04-01

## 2024-04-01 RX ORDER — GABAPENTIN 300 MG/1
300 CAPSULE ORAL 3 TIMES DAILY PRN
Qty: 90 CAPSULE | Refills: 5 | Status: SHIPPED | OUTPATIENT
Start: 2024-04-01 | End: 2025-04-01

## 2024-04-01 RX ADMIN — KETOROLAC TROMETHAMINE 30 MG: 30 INJECTION, SOLUTION INTRAMUSCULAR; INTRAVENOUS at 02:04

## 2024-04-01 RX ADMIN — DEXAMETHASONE SODIUM PHOSPHATE 4 MG: 4 INJECTION, SOLUTION INTRA-ARTICULAR; INTRALESIONAL; INTRAMUSCULAR; INTRAVENOUS; SOFT TISSUE at 02:04

## 2024-04-01 RX ADMIN — METHYLPREDNISOLONE ACETATE 40 MG: 40 INJECTION, SUSPENSION INTRA-ARTICULAR; INTRALESIONAL; INTRAMUSCULAR; SOFT TISSUE at 02:04

## 2024-04-01 NOTE — PROGRESS NOTES
[facility]   Physical Therapy Treatment Note      Name: Stuart Cloud  Clinic Number: [id number]    Therapy Diagnosis:   Encounter Diagnoses   Name Primary?    Pain Yes    Decreased strength      Physician: Maria L Phillips FNP-C    Visit Date: 4/1/2024    Physician Orders: PT Eval and Treat   Medical Diagnosis from Referral: Lumbar Radiculopathy  Evaluation Date: 3/28/2024  Authorization Period Expiration: 3/19/25  Plan of Care Expiration: 6/20/24  Visit # / Visits authorized: 2/ no limits    PTA Visit #: 0/5     Time In: 708  Time Out: 800  Total Billable Time: 52 minutes    Subjective     Pt reports: No pain today. The leg is doing the usual.  She  -  compliant with home exercise program.    Pain: 0/10  Location: bilateral back      Objective      Objective Measures updated at progress report unless specified.     Treatment     Stuart received the treatments listed below:      therapeutic exercises to develop strength, endurance, ROM, flexibility, posture, and core stabilization for 27 minutes including:  Bike x 5 minutes  Calt stretch 3 x 30 seconds bilateral  Bilateral HS stretch on step 3 x 15 seconds  Sitting trunk ball stretch x 5 each direction  Sitting piriformis stretch 3 x 1 seconds bilateral    manual therapy techniques: Joint mobilizations, Manual traction, Myofacial release, Soft tissue Mobilization, and Friction Massage were applied to the: - for - minutes, including:  -    neuromuscular re-education activities to improve: Balance, Coordination, Kinesthetic, Sense, Proprioception, and Posture for 20 minutes. The following activities were included:  Cybex back extension 4pl x 20  Cybex leg press 4pl x 30 with right leg only  Cybex HS curl 4pl x 30    therapeutic activities to improve functional performance for -  minutes, including:  -    gait training to improve functional mobility and safety for -  minutes, including:  -    direct contact modalities after being  cleared for contraindications:     supervised modalities after being cleared for contradictions:     hot pack for - minutes to -.    cold pack for - minutes to -.    Patient Education and Home Exercises       Education provided:   - not given home exercise program today    Written Home Exercises Provided:  - . Exercises were reviewed and Stuart was able to demonstrate them prior to the end of the session.  Stuart demonstrated good  understanding of the education provided. See EMR under Patient Instructions for exercises provided during therapy sessions    Assessment     Patient fatigues quickly with exercises. Focused on strengthening/stretching and stabilization exercises. Patient was able to complete 30 repetitions of the leg exercises but did state the back was really feeling it and hurting after the extension exercise. Will continue to progress as able.     Stuart Is progressing well towards her goals.   Pt prognosis is Good.     Pt will continue to benefit from skilled outpatient physical therapy to address the deficits listed in the problem list box on initial evaluation, provide pt/family education and to maximize pt's level of independence in the home and community environment.     Pt's spiritual, cultural and educational needs considered and pt agreeable to plan of care and goals.     Anticipated barriers to physical therapy: multiple deficits from previous surgeries    Goals: Short Term Goals: 6 weeks   Patient will be able to sleep ~ 3 hours without waking from pain  Patient will improve manual muscle test to 4-/5 for stabilization  Patient will maintain prolonged positions x 15 minutes with 4 /10 pain  Patient will report a change in status with low back pain from constant to intermittent  Patient will report no left leg radicular symptoms by 6 weeks    Long Term Goals: 12 weeks   Patient will be able to sleep through the night without waking from pain  Patient will improve manual muscle test to 4/5  for stabilization   Patient will maintain prolonged positions x 30 minutes with  2/10 pain  Patient will be independent with home exercise program by D/C    Plan     Progress stabilization, strengthening exercises and progress toward goals     REE BARAKAT, PT

## 2024-04-01 NOTE — PROGRESS NOTES
"              Sarbjit Akins IV, DO  [facility] [place]  [address], [place] [address]  Phone: [phone]      Subjective     Name: Stuart Cloud   Sex: female  YOB: 1970 (54 y.o.)  MRN: [id number]  Visit Date: 04/01/2024   Chief Complaint: Medication Refill and Back Pain        HISTORY OF PRESENT ILLNESS:    Chief Complaint   Patient presents with    Medication Refill    Back Pain       HPI  See tests that keep you healthy and the problem oriented documentation below.    Tests to Keep You Healthy    Mammogram: SCHEDULED  Colon Cancer Screening: SCHEDULED  Last Blood Pressure <= 139/89 (4/1/2024): Yes      Portions of this note may have been created with voice recognition software. Occasional "wrong-word" or "sound-a-like" substitutions may have occurred due to the inherent limitations of voice recognition software. Please, read the note carefully and recognize, using context, where substitutions have occurred.     PAST MEDICAL HISTORY:  Significant Diagnoses - Patient  has a past medical history of Anxiety with depression, Arthritis, Class 3 severe obesity due to excess calories without serious comorbidity with body mass index (BMI) of 40.0 to 44.9 in adult (03/20/2023), Essential hypertension (03/20/2023), History of gastric ulcer (05/01/2023), Hypothyroidism, and Vestibular dizziness (10/23/2023).  Medications - Patient has a current medication list which includes the following long-term medication(s): clonazepam, gabapentin, levothyroxine, and losartan-hydrochlorothiazide 50-12.5 mg.   Allergies - Patient is allergic to percocet [oxycodone-acetaminophen], latex, opioids - morphine analogues, sulfa (sulfonamide antibiotics), and zithromax z-casimiro [azithromycin].  Surgeries - Patient  has a past surgical history that includes Hysterectomy; lumpectomy, breast; Arthroscopy of knee (Right, 05/25/2023); Knee arthroscopy w/ meniscectomy (Right, 05/25/2023); Joint replacement " (Right); and Knee Arthroplasty (Left).  Family History - Patient family history includes Diabetes in her mother; Hypertension in her maternal grandmother and mother; Stroke in her maternal grandfather, maternal grandmother, and mother.      SOCIAL HISTORY:  Tobacco - Patient  reports that she has never smoked. She has never been exposed to tobacco smoke. She has never used smokeless tobacco.   Alcohol - Patient  reports no history of alcohol use.   Recreational Drugs - Patient  reports no history of drug use.       Review of Systems   All other systems reviewed and are negative.       Past Medical History:   Diagnosis Date    Anxiety with depression     Arthritis     Class 3 severe obesity due to excess calories without serious comorbidity with body mass index (BMI) of 40.0 to 44.9 in adult 03/20/2023    Essential hypertension 03/20/2023    History of gastric ulcer 05/01/2023    Hypothyroidism     Vestibular dizziness 10/23/2023        Review of patient's allergies indicates:   Allergen Reactions    Percocet [oxycodone-acetaminophen] Itching    Latex     Opioids - morphine analogues Hives    Sulfa (sulfonamide antibiotics) Rash    Zithromax z-casimiro [azithromycin] Rash        Past Surgical History:   Procedure Laterality Date    ARTHROSCOPY OF KNEE Right 05/25/2023    Procedure: ARTHROSCOPY, KNEE;  Surgeon: Tacos Aburto MD;  Location: [facility] OR;  Service: Orthopedics;  Laterality: Right;    HYSTERECTOMY      JOINT REPLACEMENT Right     hip replacement    KNEE ARTHROPLASTY Left     KNEE ARTHROSCOPY W/ MENISCECTOMY Right 05/25/2023    Procedure: ARTHROSCOPY, KNEE, WITH MENISCECTOMY;  Surgeon: Tacos Aburto MD;  Location: [facility] OR;  Service: Orthopedics;  Laterality: Right;    LUMPECTOMY, BREAST          Family History   Problem Relation Age of Onset    Hypertension Maternal Grandmother     Stroke Maternal Grandmother     Stroke Maternal Grandfather     Hypertension Mother   "   Stroke Mother     Diabetes Mother        Current Outpatient Medications   Medication Instructions    clonazePAM (KLONOPIN) 0.5 mg, Oral, 2 times daily PRN    cyclobenzaprine (FLEXERIL) 10 mg, Oral, 3 times daily PRN    gabapentin (NEURONTIN) 300 mg, Oral, 3 times daily PRN    ibuprofen (ADVIL,MOTRIN) 800 mg, Oral, 3 times daily    levothyroxine (SYNTHROID) 25 mcg, Oral, Before breakfast    losartan-hydrochlorothiazide 50-12.5 mg (HYZAAR) 50-12.5 mg per tablet 1 tablet, Oral, Daily    NAPROXEN ORAL 500 mg, Oral, Daily PRN    ondansetron (ZOFRAN-ODT) 8 mg, Oral, Every 6 hours PRN    promethazine (PHENERGAN) 25 mg, Oral, Every 6 hours PRN        Objective     /78   Pulse 88   Ht 5' 6" (1.676 m)   Wt 121.6 kg (268 lb)   BMI 43.26 kg/m²     Physical Exam  Constitutional:       General: She is not in acute distress.     Appearance: Normal appearance. She is not ill-appearing.   HENT:      Head: Normocephalic and atraumatic.      Right Ear: External ear normal.      Left Ear: External ear normal.   Eyes:      Extraocular Movements: Extraocular movements intact.      Conjunctiva/sclera: Conjunctivae normal.   Cardiovascular:      Rate and Rhythm: Normal rate.      Heart sounds: No murmur heard.  Pulmonary:      Effort: Pulmonary effort is normal.   Musculoskeletal:      Cervical back: Normal range of motion.   Skin:     General: Skin is warm and dry.      Coloration: Skin is not jaundiced.      Findings: No rash.   Neurological:      Mental Status: She is alert.   Psychiatric:         Mood and Affect: Mood normal.        All recently obtained labs have been reviewed and discussed in detail with the patient.   Assessment     1. Muscle spasm    2. Anxiety    3. Acquired hypothyroidism    4. Essential hypertension    5. Neuropathy         Plan        Problem List Items Addressed This Visit          Neuro    Neuropathy (Chronic)    Relevant Medications    gabapentin (NEURONTIN) 300 MG capsule    " dexAMETHasone injection 4 mg (Completed)    methylPREDNISolone acetate injection 40 mg (Completed)       Cardiac/Vascular    Essential hypertension (Chronic)    Relevant Medications    losartan-hydrochlorothiazide 50-12.5 mg (HYZAAR) 50-12.5 mg per tablet       Endocrine    Hypothyroidism (Chronic)    Relevant Medications    levothyroxine (SYNTHROID) 25 MCG tablet       Orthopedic    Muscle spasm - Primary    Relevant Medications    cyclobenzaprine (FLEXERIL) 10 MG tablet    ketorolac injection 30 mg (Completed)     Other Visit Diagnoses       Anxiety        Relevant Medications    clonazePAM (KLONOPIN) 0.5 MG tablet            No follow-ups on file.    Patient advised that is symptoms worsen, they should call or report directly to local emergency department.    Sarbjit Akins,   [facility]

## 2024-04-04 ENCOUNTER — CLINICAL SUPPORT (OUTPATIENT)
Dept: REHABILITATION | Facility: HOSPITAL | Age: 54
End: 2024-04-04
Payer: COMMERCIAL

## 2024-04-04 DIAGNOSIS — R53.1 DECREASED STRENGTH: ICD-10-CM

## 2024-04-04 DIAGNOSIS — R52 PAIN: Primary | ICD-10-CM

## 2024-04-04 PROCEDURE — 97110 THERAPEUTIC EXERCISES: CPT

## 2024-04-04 PROCEDURE — 97112 NEUROMUSCULAR REEDUCATION: CPT

## 2024-04-04 NOTE — PROGRESS NOTES
[facility]   Physical Therapy Treatment Note      Name: Stuart Cloud  Clinic Number: [id number]    Therapy Diagnosis:   Encounter Diagnoses   Name Primary?    Pain Yes    Decreased strength      Physician: Maria L Phillips FNP-C    Visit Date: 4/4/2024    Physician Orders: PT Eval and Treat   Medical Diagnosis from Referral: Lumbar Radiculopathy  Evaluation Date: 3/28/2024  Authorization Period Expiration: 3/19/25  Plan of Care Expiration: 6/20/24  Visit # / Visits authorized: 3/ no limits    PTA Visit #: 0/5     Time In: 145  Time Out: 230  Total Billable Time:  45 minutes    Subjective     Pt reports: No pain today. The left thigh is tingling and tight     She  -  compliant with home exercise program.    Pain: 0/10  Location: bilateral back      Objective      Objective Measures updated at progress report unless specified.     Treatment     Stuart received the treatments listed below:      therapeutic exercises to develop strength, endurance, ROM, flexibility, posture, and core stabilization for 20 minutes including:  Bike x 5 minutes  Calt stretch 3 x 30 seconds bilateral  Bilateral HS stretch on step 3 x 15 seconds  Sitting trunk ball stretch x 5 each direction  Sitting piriformis stretch 3 x 10 seconds bilateral    manual therapy techniques: Joint mobilizations, Manual traction, Myofacial release, Soft tissue Mobilization, and Friction Massage were applied to the: - for - minutes, including:  -    neuromuscular re-education activities to improve: Balance, Coordination, Kinesthetic, Sense, Proprioception, and Posture for 25 minutes. The following activities were included:  Cybex back extension 4pl x 30  Cybex leg press 4pl x 30 with right leg only  Cybex HS curl 4pl x 30  Cybex leg press bilateral 7pl x 20  Cybex right leg press 5pl x 30    therapeutic activities to improve functional performance for -  minutes, including:  -    gait training to improve functional mobility  and safety for -  minutes, including:  -    direct contact modalities after being cleared for contraindications:     supervised modalities after being cleared for contradictions:     hot pack for - minutes to -.    cold pack for - minutes to -.    Patient Education and Home Exercises       Education provided:   - not given home exercise program today    Written Home Exercises Provided:  - . Exercises were reviewed and Stuart was able to demonstrate them prior to the end of the session.  Stuart demonstrated good  understanding of the education provided. See EMR under Patient Instructions for exercises provided during therapy sessions    Assessment     Patient fatigues quickly with exercises. Focused on strengthening/stretching and stabilization exercises.  Will continue to progress as able.     Stuart Is progressing well towards her goals.   Pt prognosis is Good.     Pt will continue to benefit from skilled outpatient physical therapy to address the deficits listed in the problem list box on initial evaluation, provide pt/family education and to maximize pt's level of independence in the home and community environment.     Pt's spiritual, cultural and educational needs considered and pt agreeable to plan of care and goals.     Anticipated barriers to physical therapy: multiple deficits from previous surgeries    Goals: Short Term Goals: 6 weeks   Patient will be able to sleep ~ 3 hours without waking from pain  Patient will improve manual muscle test to 4-/5 for stabilization  Patient will maintain prolonged positions x 15 minutes with 4 /10 pain  Patient will report a change in status with low back pain from constant to intermittent  Patient will report no left leg radicular symptoms by 6 weeks    Long Term Goals: 12 weeks   Patient will be able to sleep through the night without waking from pain  Patient will improve manual muscle test to 4/5 for stabilization   Patient will maintain prolonged positions x 30  minutes with  2/10 pain  Patient will be independent with home exercise program by D/C    Plan     Progress stabilization, strengthening exercises and progress toward goals     REE BARAKAT, PT

## 2024-04-05 ENCOUNTER — OFFICE VISIT (OUTPATIENT)
Dept: FAMILY MEDICINE | Facility: CLINIC | Age: 54
End: 2024-04-05
Payer: COMMERCIAL

## 2024-04-05 VITALS
OXYGEN SATURATION: 98 % | WEIGHT: 262.81 LBS | DIASTOLIC BLOOD PRESSURE: 77 MMHG | HEART RATE: 80 BPM | TEMPERATURE: 98 F | HEIGHT: 66 IN | BODY MASS INDEX: 42.24 KG/M2 | SYSTOLIC BLOOD PRESSURE: 123 MMHG

## 2024-04-05 DIAGNOSIS — M62.838 MUSCLE SPASM: Primary | ICD-10-CM

## 2024-04-05 PROCEDURE — 1159F MED LIST DOCD IN RCRD: CPT | Mod: ,,,

## 2024-04-05 PROCEDURE — 3074F SYST BP LT 130 MM HG: CPT | Mod: ,,,

## 2024-04-05 PROCEDURE — 1160F RVW MEDS BY RX/DR IN RCRD: CPT | Mod: ,,,

## 2024-04-05 PROCEDURE — 3078F DIAST BP <80 MM HG: CPT | Mod: ,,,

## 2024-04-05 PROCEDURE — 3008F BODY MASS INDEX DOCD: CPT | Mod: ,,,

## 2024-04-05 PROCEDURE — 99213 OFFICE O/P EST LOW 20 MIN: CPT | Mod: ,,,

## 2024-04-05 RX ORDER — NAPROXEN 500 MG/1
500 TABLET ORAL DAILY PRN
Qty: 30 TABLET | Refills: 0 | Status: SHIPPED | OUTPATIENT
Start: 2024-04-05 | End: 2024-04-25

## 2024-04-05 RX ORDER — PREDNISONE 10 MG/1
TABLET ORAL
Qty: 2 TABLET | Refills: 0 | Status: SHIPPED | OUTPATIENT
Start: 2024-04-05 | End: 2024-04-25

## 2024-04-05 RX ORDER — PREDNISONE 20 MG/1
TABLET ORAL
Qty: 17 TABLET | Refills: 0 | Status: SHIPPED | OUTPATIENT
Start: 2024-04-05 | End: 2024-04-25

## 2024-04-05 NOTE — LETTER
April 5, 2024      [facility]  [address] [facility] [place] [address]  Phone: [phone]  Fax: [phone]       Patient: Stuart Cloud   YOB: 1970  Date of Visit: 04/05/2024    To Whom It May Concern:    Bismark Cloud  was at [facility] on 04/05/2024. The patient may return to work/school on 4/6/2024 with no restrictions as a part of approved leave for back. If you have any questions or concerns, or if I can be of further assistance, please do not hesitate to contact me.    Sincerely,    DAKOTA Lee

## 2024-04-05 NOTE — ASSESSMENT & PLAN NOTE
Prednisone taper  Naproxen 500mg bid  Continue other prescribed meds  Appt made with Dr. Lang at [facility] for 4[facility]  Cont PT

## 2024-04-05 NOTE — PROGRESS NOTES
PATIENT NAME: Stuart Cloud  : 1970  DATE: 24  MRN: [id number]      Reason for Visit / Chief Complaint: Back Pain (Exam B)       Update PCP  Update Chief Complaint         History of Present Illness / Problem Focused Workflow     Stuart Cloud presents to the clinic with Back Pain (Exam B)     Presents to clinic for muscle spasm and back pain. She was seen at another clinic on  and received decadron, depomedrol and toradol IM. She has been going to physical therapy. She denies loss of bowel or bladder function.     Back Pain  Associated symptoms include leg pain. Pertinent negatives include no abdominal pain, chest pain, dysuria, fever or headaches.       Review of Systems     @Review of Systems   Constitutional:  Negative for chills and fever.   Eyes:  Negative for visual disturbance.   Respiratory:  Negative for chest tightness and shortness of breath.    Cardiovascular:  Negative for chest pain and leg swelling.   Gastrointestinal:  Negative for abdominal pain, nausea and vomiting.   Genitourinary:  Negative for decreased urine volume, dysuria, flank pain and frequency.   Musculoskeletal:  Positive for back pain and leg pain.   Integumentary:  Negative for rash and wound.   Neurological:  Negative for headaches.       Medical / Social / Family History     Past Medical History:   Diagnosis Date    Anxiety with depression     Arthritis     Class 3 severe obesity due to excess calories without serious comorbidity with body mass index (BMI) of 40.0 to 44.9 in adult 2023    Essential hypertension 2023    History of gastric ulcer 2023    Hypothyroidism     Vestibular dizziness 10/23/2023       Past Surgical History:   Procedure Laterality Date    ARTHROSCOPY OF KNEE Right 2023    Procedure: ARTHROSCOPY, KNEE;  Surgeon: Tacos Aburto MD;  Location: [facility];  Service: Orthopedics;  Laterality: Right;    HYSTERECTOMY      JOINT REPLACEMENT Right     hip  replacement    KNEE ARTHROPLASTY Left     KNEE ARTHROSCOPY W/ MENISCECTOMY Right 05/25/2023    Procedure: ARTHROSCOPY, KNEE, WITH MENISCECTOMY;  Surgeon: Tacos Aburto MD;  Location: [facility];  Service: Orthopedics;  Laterality: Right;    LUMPECTOMY, BREAST         Social History  Ms.  reports that she has never smoked. She has never been exposed to tobacco smoke. She has never used smokeless tobacco. She reports that she does not drink alcohol and does not use drugs.    Family History  Ms.'s family history includes Diabetes in her mother; Hypertension in her maternal grandmother and mother; Stroke in her maternal grandfather, maternal grandmother, and mother.    Medications and Allergies     Medications  Outpatient Medications Marked as Taking for the 4/5/24 encounter (Office Visit) with Maria L Phillips FNP-C   Medication Sig Dispense Refill    clonazePAM (KLONOPIN) 0.5 MG tablet Take 1 tablet (0.5 mg total) by mouth 2 (two) times daily as needed for Anxiety. 30 tablet 2    cyclobenzaprine (FLEXERIL) 10 MG tablet Take 1 tablet (10 mg total) by mouth 3 (three) times daily as needed for Muscle spasms. 30 tablet 2    gabapentin (NEURONTIN) 300 MG capsule Take 1 capsule (300 mg total) by mouth 3 (three) times daily as needed (nuropathy). 90 capsule 5    levothyroxine (SYNTHROID) 25 MCG tablet Take 1 tablet (25 mcg total) by mouth before breakfast. 30 tablet 2    losartan-hydrochlorothiazide 50-12.5 mg (HYZAAR) 50-12.5 mg per tablet Take 1 tablet by mouth once daily. 90 tablet 3       Allergies  Review of patient's allergies indicates:   Allergen Reactions    Percocet [oxycodone-acetaminophen] Itching    Latex     Opioids - morphine analogues Hives    Sulfa (sulfonamide antibiotics) Rash    Zithromax z-casimiro [azithromycin] Rash       Physical Examination     Vitals:    04/05/24 1108   BP: 123/77   Pulse: 80   Temp: 97.8 °F (36.6 °C)     Physical Exam  Vitals and nursing note reviewed.    Constitutional:       Appearance: Normal appearance. She is obese.   HENT:      Head: Normocephalic.      Right Ear: External ear normal.      Left Ear: External ear normal.      Mouth/Throat:      Mouth: Mucous membranes are moist.      Pharynx: Uvula midline.   Eyes:      Extraocular Movements: Extraocular movements intact.      Conjunctiva/sclera: Conjunctivae normal.      Pupils: Pupils are equal, round, and reactive to light.   Cardiovascular:      Rate and Rhythm: Normal rate and regular rhythm.      Pulses: Normal pulses.      Heart sounds: Normal heart sounds.   Pulmonary:      Effort: Pulmonary effort is normal.      Breath sounds: Normal breath sounds.   Abdominal:      General: Abdomen is flat. Bowel sounds are normal.      Palpations: Abdomen is soft.      Tenderness: There is no right CVA tenderness or left CVA tenderness.   Musculoskeletal:         General: Tenderness present.      Cervical back: Normal range of motion and neck supple.      Lumbar back: Spasms and tenderness present. Decreased range of motion.      Right lower le+ Edema present.      Left lower le+ Edema present.   Skin:     General: Skin is warm and dry.      Capillary Refill: Capillary refill takes less than 2 seconds.      Comments: Minimal erythema to left anterior thigh with some warmth.    Neurological:      General: No focal deficit present.      Mental Status: She is alert and oriented to person, place, and time.   Psychiatric:         Mood and Affect: Mood normal.         Behavior: Behavior normal.         Thought Content: Thought content normal.         Judgment: Judgment normal.             Lab Results   Component Value Date    WBC 5.59 2023    HGB 12.9 2023    HCT 37.6 (L) 2023    MCV 85.8 2023     2023          Sodium   Date Value Ref Range Status   2023 143 136 - 145 mmol/L Final     Potassium   Date Value Ref Range Status   2023 3.2 (L) 3.5 - 5.1 mmol/L Final      Chloride   Date Value Ref Range Status   11/08/2023 111 (H) 98 - 107 mmol/L Final     CO2   Date Value Ref Range Status   11/08/2023 27 21 - 32 mmol/L Final     Glucose   Date Value Ref Range Status   11/08/2023 126 (H) 74 - 106 mg/dL Final     BUN   Date Value Ref Range Status   11/08/2023 14 7 - 18 mg/dL Final     Creatinine   Date Value Ref Range Status   11/08/2023 0.81 0.55 - 1.02 mg/dL Final     Calcium   Date Value Ref Range Status   11/08/2023 8.8 8.5 - 10.1 mg/dL Final     Total Protein   Date Value Ref Range Status   11/08/2023 6.0 (L) 6.4 - 8.2 g/dL Final     Albumin   Date Value Ref Range Status   11/08/2023 3.1 (L) 3.5 - 5.0 g/dL Final     Bilirubin, Total   Date Value Ref Range Status   11/08/2023 0.5 >0.0 - 1.2 mg/dL Final     Alk Phos   Date Value Ref Range Status   11/08/2023 79 41 - 108 U/L Final     AST   Date Value Ref Range Status   11/08/2023 26 15 - 37 U/L Final     ALT   Date Value Ref Range Status   11/08/2023 32 13 - 56 U/L Final     Anion Gap   Date Value Ref Range Status   11/08/2023 8 7 - 16 mmol/L Final      Procedures   Assessment and Plan (including Health Maintenance)      Problem List  Smart Sets  Document Outside HM   :    Plan:  Problem List Items Addressed This Visit          Orthopedic    Muscle spasm - Primary    Current Assessment & Plan     Prednisone taper  Naproxen 500mg bid  Continue other prescribed meds  Appt made with Dr. Lang at [facility] for 4/15  Cont PT            Health Maintenance Topics with due status: Not Due       Topic Last Completion Date    Hemoglobin A1c (Diabetic Prevention Screening) 03/29/2023    Lipid Panel 03/29/2023       Future Appointments   Date Time Provider Department Center   4/9/2024  7:00 AM Leonroa Winchester, PT [facility]   4/11/2024  7:00 AM Leonora Winchester, PT [facility]   4/11/2024  8:15 AM Sage Ordonez MD [facility]   4/15/2024 10:30 AM Leny Lang MD [facility]  [facility]   4/15/2024  2:30 PM Marilynn Munroe, PTA [facility]   4/17/2024  2:30 PM Marilynn Munroe, PTA [facility]   4/23/2024  3:15 PM Leonora Winchester, PT [facility]   4/25/2024  3:15 PM Marilynn Munroe, PTA [facility]   4/29/2024  4:00 PM Marilynn Munroe, PTA [facility]   5/1/2024  4:00 PM Marilynn Munroe, PTA [facility]   5/6/2024  4:00 PM Marilynn Munroe, PTA [facility]   5/9/2024  4:00 PM Marilynn Munroe, PTA [facility]   6/3/2024 12:30 PM [facility]OM 01 [facility]   6/24/2024  1:40 PM [facility]   7/22/2024  4:00 PM Matthew Adams MD [facility]        Health Maintenance Due   Topic Date Due    Hepatitis C Screening  Never done    COVID-19 Vaccine (1) Never done    HIV Screening  Never done    TETANUS VACCINE  Never done    Mammogram  Never done    Colorectal Cancer Screening  Never done    Shingles Vaccine (1 of 2) Never done    Influenza Vaccine (1) 09/01/2023        Follow up if symptoms worsen or fail to improve.     Signature:  DAKOTA MARTINEZ        Date of encounter: 4/5/24

## 2024-04-05 NOTE — PATIENT INSTRUCTIONS
Prednisone (two prescription bottles)  Day 1-7: 20mg twice daily  Day 8: 20mg in am and 10mg pm  Day 9: 10mg in am and 10 mg pm  Day 10: 10mg in am and 5mg pm  Day 11: 5mg in am and 5mg pm  Day 12: 5mg in the morning and stop.    Take naprosyn twice daily in addition to other meds prescribed  Keep appointment with Dr. Lang.

## 2024-04-09 ENCOUNTER — CLINICAL SUPPORT (OUTPATIENT)
Dept: REHABILITATION | Facility: HOSPITAL | Age: 54
End: 2024-04-09
Payer: COMMERCIAL

## 2024-04-09 DIAGNOSIS — R53.1 DECREASED STRENGTH: ICD-10-CM

## 2024-04-09 DIAGNOSIS — R52 PAIN: Primary | ICD-10-CM

## 2024-04-09 PROCEDURE — 97112 NEUROMUSCULAR REEDUCATION: CPT

## 2024-04-09 PROCEDURE — 97110 THERAPEUTIC EXERCISES: CPT

## 2024-04-09 NOTE — PROGRESS NOTES
[facility]   Physical Therapy Treatment Note      Name: Stuart Cloud  Clinic Number: [id number]    Therapy Diagnosis:   Encounter Diagnoses   Name Primary?    Pain Yes    Decreased strength      Physician: Maria L Phillips FNP-C    Visit Date: 4/9/2024    Physician Orders: PT Eval and Treat   Medical Diagnosis from Referral: Lumbar Radiculopathy  Evaluation Date: 3/28/2024  Authorization Period Expiration: 3/19/25  Plan of Care Expiration: 6/20/24  Visit # / Visits authorized: 4/ no limits    PTA Visit #: 0/5     Time In: 715  Time Out: 800  Total Billable Time:  45 minutes    Subjective     Pt reports: The left pain has become an intermittent pain instead of constant. Today I have a mild burning in the thigh    She  -  compliant with home exercise program.    Pain: 3/10  Location: bilateral back      Objective      Objective Measures updated at progress report unless specified.     Treatment     Stuart received the treatments listed below:      therapeutic exercises to develop strength, endurance, ROM, flexibility, posture, and core stabilization for 20 minutes including:  Bike x 5 minutes  Calt stretch 3 x 30 seconds bilateral  Bilateral HS stretch on step 3 x 15 seconds  Sitting trunk ball stretch x 5 each direction  Sitting piriformis stretch 3 x 10 seconds bilateral    manual therapy techniques: Joint mobilizations, Manual traction, Myofacial release, Soft tissue Mobilization, and Friction Massage were applied to the: - for - minutes, including:  -    neuromuscular re-education activities to improve: Balance, Coordination, Kinesthetic, Sense, Proprioception, and Posture for 25 minutes. The following activities were included:  Cybex back extension 4pl x 30  Cybex leg press 4pl x 30 with right leg only  Cybex HS curl 5pl x 30 bilateral   Cybex leg press bilateral 7pl x 20  Cybex hip abduction 1pl x 20 bilateral   Cybex hip extension 3pl x 20 bilateral     therapeutic  activities to improve functional performance for -  minutes, including:  -    gait training to improve functional mobility and safety for -  minutes, including:  -    direct contact modalities after being cleared for contraindications:     supervised modalities after being cleared for contradictions:     hot pack for - minutes to -.    cold pack for - minutes to -.    Patient Education and Home Exercises       Education provided:   - not given home exercise program today    Written Home Exercises Provided:  - . Exercises were reviewed and Stuart was able to demonstrate them prior to the end of the session.  Stuart demonstrated good  understanding of the education provided. See EMR under Patient Instructions for exercises provided during therapy sessions    Assessment     Patient fatigues quickly with exercises. Focused on strengthening/stretching and stabilization exercises.  Added dynamic hip stabilization exercises and patient did fatigue with 20 repetitions. Will continue to progress as able.     Stuart Is progressing well towards her goals.   Pt prognosis is Good.     Pt will continue to benefit from skilled outpatient physical therapy to address the deficits listed in the problem list box on initial evaluation, provide pt/family education and to maximize pt's level of independence in the home and community environment.     Pt's spiritual, cultural and educational needs considered and pt agreeable to plan of care and goals.     Anticipated barriers to physical therapy: multiple deficits from previous surgeries    Goals: Short Term Goals: 6 weeks   Patient will be able to sleep ~ 3 hours without waking from pain  Patient will improve manual muscle test to 4-/5 for stabilization  Patient will maintain prolonged positions x 15 minutes with 4 /10 pain  Patient will report a change in status with low back pain from constant to intermittent  Patient will report no left leg radicular symptoms by 6 weeks    Long Term  Goals: 12 weeks   Patient will be able to sleep through the night without waking from pain  Patient will improve manual muscle test to 4/5 for stabilization   Patient will maintain prolonged positions x 30 minutes with  2/10 pain  Patient will be independent with home exercise program by D/C    Plan     Progress stabilization, strengthening exercises and progress toward goals     REE BRAAKAT, PT

## 2024-04-11 ENCOUNTER — CLINICAL SUPPORT (OUTPATIENT)
Dept: REHABILITATION | Facility: HOSPITAL | Age: 54
End: 2024-04-11
Payer: COMMERCIAL

## 2024-04-11 ENCOUNTER — OFFICE VISIT (OUTPATIENT)
Dept: NEUROLOGY | Facility: CLINIC | Age: 54
End: 2024-04-11
Payer: COMMERCIAL

## 2024-04-11 VITALS
HEART RATE: 81 BPM | OXYGEN SATURATION: 99 % | HEIGHT: 66 IN | BODY MASS INDEX: 42.11 KG/M2 | SYSTOLIC BLOOD PRESSURE: 140 MMHG | DIASTOLIC BLOOD PRESSURE: 50 MMHG | WEIGHT: 262 LBS | RESPIRATION RATE: 18 BRPM

## 2024-04-11 DIAGNOSIS — G62.9 NEUROPATHY: Primary | Chronic | ICD-10-CM

## 2024-04-11 DIAGNOSIS — F41.9 ANXIETY AND DEPRESSION: ICD-10-CM

## 2024-04-11 DIAGNOSIS — R53.1 DECREASED STRENGTH: ICD-10-CM

## 2024-04-11 DIAGNOSIS — R41.3 OTHER AMNESIA: ICD-10-CM

## 2024-04-11 DIAGNOSIS — F32.A ANXIETY AND DEPRESSION: ICD-10-CM

## 2024-04-11 DIAGNOSIS — R52 PAIN: Primary | ICD-10-CM

## 2024-04-11 PROCEDURE — 3008F BODY MASS INDEX DOCD: CPT | Mod: ,,, | Performed by: PSYCHIATRY & NEUROLOGY

## 2024-04-11 PROCEDURE — 97110 THERAPEUTIC EXERCISES: CPT

## 2024-04-11 PROCEDURE — 3078F DIAST BP <80 MM HG: CPT | Mod: ,,, | Performed by: PSYCHIATRY & NEUROLOGY

## 2024-04-11 PROCEDURE — 99214 OFFICE O/P EST MOD 30 MIN: CPT | Mod: S$PBB,,, | Performed by: PSYCHIATRY & NEUROLOGY

## 2024-04-11 PROCEDURE — 99215 OFFICE O/P EST HI 40 MIN: CPT | Mod: PBBFAC | Performed by: PSYCHIATRY & NEUROLOGY

## 2024-04-11 PROCEDURE — 3077F SYST BP >= 140 MM HG: CPT | Mod: ,,, | Performed by: PSYCHIATRY & NEUROLOGY

## 2024-04-11 PROCEDURE — 97112 NEUROMUSCULAR REEDUCATION: CPT

## 2024-04-11 PROCEDURE — 1159F MED LIST DOCD IN RCRD: CPT | Mod: ,,, | Performed by: PSYCHIATRY & NEUROLOGY
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
## 2024-04-11 NOTE — PROGRESS NOTES
[facility]   Physical Therapy Treatment Note      Name: Stuart Cloud  Clinic Number: [id number]    Therapy Diagnosis:   Encounter Diagnoses   Name Primary?    Pain Yes    Decreased strength      Physician: Maria L Phillips FNP-C    Visit Date: 4/11/2024    Physician Orders: PT Eval and Treat   Medical Diagnosis from Referral: Lumbar Radiculopathy  Evaluation Date: 3/28/2024  Authorization Period Expiration: 3/19/25  Plan of Care Expiration: 6/20/24  Visit # / Visits authorized: 5/ no limits    PTA Visit #: 0/5     Time In: 705  Time Out: 755  Total Billable Time: 50 minutes    Subjective     Pt reports: My back only hurts when I am at work. It felt like it is on fire yesterday and I took medicine which helped ease up the pain. The left thigh is tender but not getting the sharp shooting pain like I used to.     She  -  compliant with home exercise program.    Pain: 2/10  Location: bilateral back      Objective      Objective Measures updated at progress report unless specified.     Treatment     Stuart received the treatments listed below:      therapeutic exercises to develop strength, endurance, ROM, flexibility, posture, and core stabilization for 20 minutes including:  Bike x 5 minutes  Calt stretch 3 x 30 seconds bilateral  Bilateral HS stretch on step 3 x 15 seconds  Sitting trunk ball stretch x 5 each direction  Sitting piriformis stretch 3 x 10 seconds bilateral    manual therapy techniques: Joint mobilizations, Manual traction, Myofacial release, Soft tissue Mobilization, and Friction Massage were applied to the: - for - minutes, including:  -    neuromuscular re-education activities to improve: Balance, Coordination, Kinesthetic, Sense, Proprioception, and Posture for 25 minutes. The following activities were included:  Cybex back extension 4pl x 30  Cybex leg press 4pl x 30 with right leg only  Cybex HS curl 5pl x 30 bilateral   Cybex leg press bilateral 7pl x 30  Cybex  hip abduction 1pl x 20 bilateral   Cybex hip extension 3pl x 20 bilateral     therapeutic activities to improve functional performance for -  minutes, including:  -    gait training to improve functional mobility and safety for -  minutes, including:  -    direct contact modalities after being cleared for contraindications:     supervised modalities after being cleared for contradictions:     hot pack for - minutes to -.    cold pack for - minutes to -.    Patient Education and Home Exercises       Education provided:   - not given home exercise program today    Written Home Exercises Provided:  - . Exercises were reviewed and Stuart was able to demonstrate them prior to the end of the session.  Stuart demonstrated good  understanding of the education provided. See EMR under Patient Instructions for exercises provided during therapy sessions    Assessment     Patient fatigues quickly with exercises. Focused on strengthening/stretching and stabilization exercises. Will add home exercise program next session. Will continue to progress as able.     Stuart Is progressing well towards her goals.   Pt prognosis is Good.     Pt will continue to benefit from skilled outpatient physical therapy to address the deficits listed in the problem list box on initial evaluation, provide pt/family education and to maximize pt's level of independence in the home and community environment.     Pt's spiritual, cultural and educational needs considered and pt agreeable to plan of care and goals.     Anticipated barriers to physical therapy: multiple deficits from previous surgeries    Goals: Short Term Goals: 6 weeks   Patient will be able to sleep ~ 3 hours without waking from pain  Patient will improve manual muscle test to 4-/5 for stabilization  Patient will maintain prolonged positions x 15 minutes with 4 /10 pain  Patient will report a change in status with low back pain from constant to intermittent  Patient will report no left  leg radicular symptoms by 6 weeks    Long Term Goals: 12 weeks   Patient will be able to sleep through the night without waking from pain  Patient will improve manual muscle test to 4/5 for stabilization   Patient will maintain prolonged positions x 30 minutes with  2/10 pain  Patient will be independent with home exercise program by D/C    Plan     Progress stabilization, strengthening exercises and progress toward goals     REE BARAKAT, PT

## 2024-04-15 ENCOUNTER — HOSPITAL ENCOUNTER (OUTPATIENT)
Dept: RADIOLOGY | Facility: HOSPITAL | Age: 54
Discharge: HOME | End: 2024-04-15
Attending: PAIN MEDICINE
Payer: COMMERCIAL

## 2024-04-15 ENCOUNTER — CLINICAL SUPPORT (OUTPATIENT)
Dept: REHABILITATION | Facility: HOSPITAL | Age: 54
End: 2024-04-15
Payer: COMMERCIAL

## 2024-04-15 ENCOUNTER — OFFICE VISIT (OUTPATIENT)
Dept: PAIN MEDICINE | Facility: CLINIC | Age: 54
End: 2024-04-15
Payer: COMMERCIAL

## 2024-04-15 VITALS
HEART RATE: 83 BPM | DIASTOLIC BLOOD PRESSURE: 79 MMHG | WEIGHT: 254 LBS | HEIGHT: 67 IN | SYSTOLIC BLOOD PRESSURE: 131 MMHG | RESPIRATION RATE: 20 BRPM | BODY MASS INDEX: 39.87 KG/M2

## 2024-04-15 DIAGNOSIS — M46.1 BILATERAL SACROILIITIS: ICD-10-CM

## 2024-04-15 DIAGNOSIS — R52 PAIN: Primary | ICD-10-CM

## 2024-04-15 DIAGNOSIS — R53.1 DECREASED STRENGTH: ICD-10-CM

## 2024-04-15 DIAGNOSIS — M54.17 LUMBOSACRAL RADICULOPATHY: ICD-10-CM

## 2024-04-15 DIAGNOSIS — Z79.899 ENCOUNTER FOR LONG-TERM (CURRENT) USE OF OTHER MEDICATIONS: Primary | ICD-10-CM

## 2024-04-15 PROCEDURE — 99215 OFFICE O/P EST HI 40 MIN: CPT | Mod: PBBFAC,25 | Performed by: PAIN MEDICINE

## 2024-04-15 PROCEDURE — 1159F MED LIST DOCD IN RCRD: CPT | Mod: ,,, | Performed by: PAIN MEDICINE

## 2024-04-15 PROCEDURE — 97110 THERAPEUTIC EXERCISES: CPT

## 2024-04-15 PROCEDURE — 97112 NEUROMUSCULAR REEDUCATION: CPT

## 2024-04-15 PROCEDURE — 3078F DIAST BP <80 MM HG: CPT | Mod: ,,, | Performed by: PAIN MEDICINE

## 2024-04-15 PROCEDURE — G0481 DRUG TEST DEF 8-14 CLASSES: HCPCS | Mod: ,,, | Performed by: CLINICAL MEDICAL LABORATORY

## 2024-04-15 PROCEDURE — 72202 X-RAY EXAM SI JOINTS 3/> VWS: CPT | Mod: 26,,, | Performed by: RADIOLOGY

## 2024-04-15 PROCEDURE — 3008F BODY MASS INDEX DOCD: CPT | Mod: ,,, | Performed by: PAIN MEDICINE

## 2024-04-15 PROCEDURE — 3075F SYST BP GE 130 - 139MM HG: CPT | Mod: ,,, | Performed by: PAIN MEDICINE

## 2024-04-15 PROCEDURE — 99203 OFFICE O/P NEW LOW 30 MIN: CPT | Mod: S$PBB,,, | Performed by: PAIN MEDICINE

## 2024-04-15 PROCEDURE — 72202 X-RAY EXAM SI JOINTS 3/> VWS: CPT | Mod: TC

## 2024-04-15 NOTE — PATIENT INSTRUCTIONS
"     HOME EXERCISE PROGRAM  Created by Violeta Winchester DPT, [facility]  Apr 15th, 2024  View videos at [email or web address]        Piriformis Stretch    Start by sitting on the edge of a chair and place your foot/ankle on top of the opposite leg in front of you. Then, slowly hinge forward at the hips keeping the upper back straight/flat (maintain good posture and keep shoulders back).    Relax your arms next to the side of your body or relax them on your leg (DO NOT use your hands to push down on the knee since this will actually shorten the piriformis instead of stretch it).    Just hinge forward at the hips and you will feel the stretch in the hip/buttock region. Hold for 30 seconds at a time before doing it on the other side. Repeat 3 Times   Hold 30 Seconds   Complete 1 Set   Perform 1 Times a Day          Hip Abduction    On you side, straighten and raise the top leg up and slowly lower it back down. Repeat 10 Times   Complete 3 Sets   Perform 1 Times a Day          SIDE LYING TRUNK ROTATION    While lying on your side with your arms out-stretched in front of your body, slowly twist your upper body to the side and rotated your spine. Your arms and head should also be rotating along with the spine as shown. Follow your head with your hand.    Video # [id number] Repeat 3 Times   Hold 15 Seconds   Complete 1 Set   Perform 1 Times a Day          Hamstring Stretch    Sit on edge of chair with heel on floor or stool. Keeping knee straight, lean forward over leg until stretch is felt in hamstrings. Repeat 3 Times   Hold 30 Seconds   Complete 1 Set   Perform 1 Times a Day          BRIDGING    While lying on your back, tighten your lower abdominals, squeeze your buttocks and then raise your buttocks off the floor/bed as creating a "Bridge" with your body.    Video # [id number] Repeat 10 Times   Hold 1 Second   Complete 3 Sets   Perform 1 Times a Day          STANDING CALF STRETCH - GASTROCNEMIUS    Start by standing in front of a wall " or other sturdy object. Step forward with one foot and maintain your toes on both feet to be pointed straight forward. Keep the leg behind you with a straight knee during the stretch.    Lean forward towards the wall and support yourself with your arms as you allow your front knee to bend until a gentle stretch is felt along the back of your leg that is most behind you.    Move closer or further away from the wall to control the stretch of the back leg. Also you can adjust the bend of the front knee to control the stretch as well.    Video # [id number] Repeat 3 Times   Hold 15 Seconds   Complete 1 Set   Perform 1 Times a Day

## 2024-04-15 NOTE — PROGRESS NOTES
[facility]   Physical Therapy Treatment Note      Name: Stuart Cloud  Clinic Number: [id number]    Therapy Diagnosis:   No diagnosis found.    Physician: Maria L Phillips FNP-C    Visit Date: 4/15/2024    Physician Orders: PT Eval and Treat   Medical Diagnosis from Referral: Lumbar Radiculopathy  Evaluation Date: 3/28/2024  Authorization Period Expiration: 3/19/25  Plan of Care Expiration: 6/20/24  Visit # / Visits authorized: 6/ no limits    PTA Visit #: 0/5     Time In: 700  Time Out: 800  Total Billable Time: 60 minutes    Subjective     Pt reports: No pain this am. Two days ago the left buttock did hurt.     She  -  compliant with home exercise program.    Pain: 0/10  Location: bilateral back      Objective      Objective Measures updated at progress report unless specified.     Treatment     Stuart received the treatments listed below:      therapeutic exercises to develop strength, endurance, ROM, flexibility, posture, and core stabilization for 25 minutes including:  Bike x 5 minutes  Calt stretch 3 x 30 seconds bilateral  Bilateral HS stretch on step 3 x 15 seconds  Sitting trunk ball stretch x 5 each direction  Sitting piriformis stretch 3 x 10 seconds bilateral  Added and discussed home exercise program     manual therapy techniques: Joint mobilizations, Manual traction, Myofacial release, Soft tissue Mobilization, and Friction Massage were applied to the: - for - minutes, including:  -    neuromuscular re-education activities to improve: Balance, Coordination, Kinesthetic, Sense, Proprioception, and Posture for 30 minutes. The following activities were included:  Cybex back extension 4pl x 40 (skip this exercise)  Cybex leg press 4pl x 30 with right leg only  Cybex HS curl 5pl x 40 bilateral   Cybex leg press bilateral 7pl x 30  Cybex hip abduction 1pl x 30 bilateral   Cybex hip extension 3pl x 30 bilateral   Cybex quad 2pl x 30    therapeutic activities to improve  functional performance for -  minutes, including:  -    gait training to improve functional mobility and safety for -  minutes, including:  -    direct contact modalities after being cleared for contraindications:     supervised modalities after being cleared for contradictions:     hot pack for - minutes to -.    cold pack for - minutes to -.    Patient Education and Home Exercises       Education provided:   - home exercise program given 4/15    Written Home Exercises Provided:  yes . Exercises were reviewed and Stuart was able to demonstrate them prior to the end of the session.  Stuart demonstrated good  understanding of the education provided. See EMR under Patient Instructions for exercises provided during therapy sessions    Assessment     Patient fatigues quickly with exercises. Focused on strengthening/stretching and stabilization exercises. Added cybex quad and patient felt the exercise mainly in the left quad.  Patient was able to increase repetitions with no adverse effects. Added home exercise program and discussed. Will continue to progress as able.     Stuart Is progressing well towards her goals.   Pt prognosis is Good.     Pt will continue to benefit from skilled outpatient physical therapy to address the deficits listed in the problem list box on initial evaluation, provide pt/family education and to maximize pt's level of independence in the home and community environment.     Pt's spiritual, cultural and educational needs considered and pt agreeable to plan of care and goals.     Anticipated barriers to physical therapy: multiple deficits from previous surgeries    Goals: Short Term Goals: 6 weeks   Patient will be able to sleep ~ 3 hours without waking from pain  Patient will improve manual muscle test to 4-/5 for stabilization  Patient will maintain prolonged positions x 15 minutes with 4 /10 pain  Patient will report a change in status with low back pain from constant to  intermittent  Patient will report no left leg radicular symptoms by 6 weeks    Long Term Goals: 12 weeks   Patient will be able to sleep through the night without waking from pain  Patient will improve manual muscle test to 4/5 for stabilization   Patient will maintain prolonged positions x 30 minutes with  2/10 pain  Patient will be independent with home exercise program by D/C    Plan     Progress stabilization, strengthening exercises and progress toward goals     REE BARAKAT, PT

## 2024-04-15 NOTE — H&P (VIEW-ONLY)
Chronic Pain - New Consult    Referring Physician: Brie Méndez NP       SUBJECTIVE: Disclaimer: This note has been generated using voice-recognition software. There may be typographical errors that have been missed during proof-reading      Initial encounter:    Stuart Cloud presents to the clinic for the evaluation of lower back  pain.       54-year-old female presents for new patient evaluation and  consultation from Brie Méndez NP.  Patient reports an onset of lower back, bilateral hip and groin pain approximately 2 years ago.  She denies precipitating events but notes that the pain has progressively worsened.  The pain is often exacerbated with prolonged standing.  She describes the pain as shooting, sharp and burning, particularly in the left groin and thigh.  She is currently involved in physical therapy and x-rays of the lumbar spine and hips revealed degenerative changes.  She received a right total hip arthroplasty November 2019 and a left total knee arthroplasty September 2020.  She was evaluated by Dr. Ordonez and Lyrica was prescribed for neuropathy and a psychological evaluation was also recommended for a history of substance abuse disorder. Toradol injections in the emergency department provided transient relief.  Naproxen, Lyrica and gabapentin  provided minimal relief.  She was referred to our clinic for possible nerve block injections prior to surgical consideration.  She was instructed to complete physical therapy. If her lower back pain remains intractable, I will order an MRI of the lumbar spine.  During physical therapy she was found to have bilateral sacroiliitis and leg length discrepancy.  X-rays of the SI joints were obtained today and I will schedule bilateral SI joint injections for bilateral sacroiliitis based on physical examination.        Pain Assessment  Pain Assessment: 0-10  Pain Score:   5  Pain Location: Back  Pain Orientation: Lower  Pain Radiating Towards: malcom groin  "pain, left thigh pain  Pain Descriptors: Constant, Burning, Aching, Throbbing, Tingling, Nagging, Shooting, Sharp, Radiating, Tender  Pain Frequency: Continuous  Pain Onset: Awakened from sleep  Clinical Progression:  (new pt)  Aggravating Factors: Standing, Walking, Stairs  Pain Intervention(s): Medication (See eMAR), Heat applied, Warm pack, Rest (hot baths)      Physical Therapy/Home Exercise: yes, currently involved in PT      Pain Medications:  has a current medication list which includes the following prescription(s): clonazepam, cyclobenzaprine, gabapentin, levothyroxine, losartan-hydrochlorothiazide 50-12.5 mg, naproxen, ondansetron, prednisone, prednisone, and promethazine, and the following Facility-Administered Medications: sodium chloride 0.9%.      Tried in Past:  NSAIDS-yes  TCA-no  SNRI-no  Anti-convulsants-yes  Muscle Relaxants-yes  Opioids-no  Benzodiazepines-no     4A"s of Opioid Risk Assessment  Activity: Patient has difficulty performing  ADL  Analgesia:  Patient's pain is partially controlled by current medication.   Aberrant Behavior:  reviewed with no aberrant drug seeking/taking behavior     report:  Reviewed and consistent with medication use as prescribed.    Patient denies suicidal or homicidal ideations    Pain interventional therapy-no    Chiropractor -no  Acupuncture - no  TENS unit -no  Massage therapy-no  Spinal decompression -no  Joint replacement -yes, right JOANNA and left TKA       Review of Systems   Constitutional: Negative.    HENT: Negative.     Eyes: Negative.    Respiratory: Negative.     Cardiovascular: Negative.    Gastrointestinal: Negative.    Endocrine: Negative.    Genitourinary: Negative.    Musculoskeletal:  Positive for back pain and gait problem.   Integumentary:  Negative.   Hematological: Negative.    Psychiatric/Behavioral:  Positive for sleep disturbance.              X-Ray Sacroiliac Joints Complete  Narrative: EXAMINATION:  XR SACROILIAC JOINTS " COMPLETE    CLINICAL HISTORY:  Sacroiliitis, not elsewhere classified    COMPARISON:  None available    TECHNIQUE:  XR SACROILIAC JOINTS COMPLETE    FINDINGS:  No evidence of fracture seen.  The alignment of the sacroiliac joints appears normal.  No sacroiliac joint degenerative change is present.  Degenerative changes lumbar spine similar to previous exams.  No soft tissue abnormality is seen.  Impression: No evidence of sacroiliac joint abnormality demonstrated    Electronically signed by: Tutu Carcamo  Date:    04/15/2024  Time:    12:16         Past Medical History:   Diagnosis Date    Arthritis     Class 3 severe obesity due to excess calories without serious comorbidity with body mass index (BMI) of 40.0 to 44.9 in adult 03/20/2023    Essential hypertension 03/20/2023    History of gastric ulcer 05/01/2023    Hypothyroidism     Vestibular dizziness 10/23/2023     Past Surgical History:   Procedure Laterality Date    ARTHROSCOPY OF KNEE Right 05/25/2023    Procedure: ARTHROSCOPY, KNEE;  Surgeon: Tacos Aburto MD;  Location: [facility] OR;  Service: Orthopedics;  Laterality: Right;    HYSTERECTOMY      JOINT REPLACEMENT Right     hip replacement    KNEE ARTHROPLASTY Left     KNEE ARTHROSCOPY W/ MENISCECTOMY Right 05/25/2023    Procedure: ARTHROSCOPY, KNEE, WITH MENISCECTOMY;  Surgeon: Tacos Aburto MD;  Location: [facility] OR;  Service: Orthopedics;  Laterality: Right;    LUMPECTOMY, BREAST       Social History     Socioeconomic History    Marital status: Single   Tobacco Use    Smoking status: Never     Passive exposure: Never    Smokeless tobacco: Never   Substance and Sexual Activity    Alcohol use: Never    Drug use: Never    Sexual activity: Not Currently     Family History   Problem Relation Name Age of Onset    Hypertension Maternal Grandmother      Stroke Maternal Grandmother      Stroke Maternal Grandfather      Hypertension Mother      Stroke Mother      Diabetes Mother       Review  "of patient's allergies indicates:   Allergen Reactions    Percocet [oxycodone-acetaminophen] Itching    Latex     Opioids - morphine analogues Hives    Sulfa (sulfonamide antibiotics) Rash    Zithromax z-casimiro [azithromycin] Rash         OBJECTIVE:  Vitals:    04/15/24 1049   BP: 131/79   Pulse: 83   Resp: 20     /79   Pulse 83   Resp 20   Ht 5' 6.5" (1.689 m)   Wt 115.2 kg (254 lb)   BMI 40.38 kg/m²   Physical Exam  Vitals and nursing note reviewed.   Constitutional:       General: She is not in acute distress.     Appearance: Normal appearance. She is not ill-appearing, toxic-appearing or diaphoretic.   HENT:      Head: Normocephalic and atraumatic.      Nose: Nose normal.      Mouth/Throat:      Mouth: Mucous membranes are moist.   Eyes:      Extraocular Movements: Extraocular movements intact.      Pupils: Pupils are equal, round, and reactive to light.   Cardiovascular:      Rate and Rhythm: Normal rate and regular rhythm.      Heart sounds: Normal heart sounds.   Pulmonary:      Effort: Pulmonary effort is normal. No respiratory distress.      Breath sounds: Normal breath sounds. No stridor. No wheezing or rhonchi.   Abdominal:      General: Bowel sounds are normal.      Palpations: Abdomen is soft.   Musculoskeletal:         General: No swelling or deformity.      Cervical back: Normal and normal range of motion. No spasms or tenderness. No pain with movement. Normal range of motion.      Thoracic back: Normal.      Lumbar back: Tenderness and bony tenderness present. No spasms. Decreased range of motion. Negative right straight leg raise test and negative left straight leg raise test. No scoliosis.      Right lower leg: No edema.      Left lower leg: No edema.      Comments: SI Joint Exam:     Palpation of the bilateral  SI joint is painful.  JOHNATHAN/ Contreras's test is positive bilaterally  Gaenslen/Yoeman's test is positive bilaterally  Thigh thrust test is positive bilaterally     Skin:     General: " Skin is warm.   Neurological:      General: No focal deficit present.      Mental Status: She is alert and oriented to person, place, and time. Mental status is at baseline.      Cranial Nerves: No cranial nerve deficit.      Sensory: Sensation is intact. No sensory deficit.      Motor: No weakness.      Coordination: Coordination normal.      Gait: Gait abnormal.      Deep Tendon Reflexes: Reflexes are normal and symmetric.   Psychiatric:         Mood and Affect: Mood normal.         Behavior: Behavior normal.            ASSESSMENT: 54 y.o. year old female with pain, consistent with     Encounter Diagnoses   Name Primary?    Lumbosacral radiculopathy     Encounter for long-term (current) use of other medications Yes    Bilateral sacroiliitis         PLAN:   1. reviewed  2..Addiction, Dependency, Tolerance, Opioid abuse-misuse, Death, Diversion Discussed. Overdose reversal drug Naloxone discussed  3. Opioid contract signed today  4.Refill/ Continue medications for pain control and function       Requested Prescriptions      No prescriptions requested or ordered in this encounter     5. Obtain Xray of bilateral SI joints  6.Urine drug screen and confirmation testing was ordered as documented on the requisition form in order to monitor for compliance with prescribed opiates and to ensure that there was no misuse/abuse of other non-prescribed opiates or illicit drugs.  I will determine if the patient is suitable for continuation of opioid therapy after obtaining  the definitive urine drug screen for confirmation.  7. Schedule bilateral SI joint injections under fluoroscopy  8.Indications for this procedure for this specific patient include the following     - Pain is in upper level of iliac crest and gluteal fold ( Spine and pelvis) for greater than 3 months  - Repeat injections are done with 75% relief from diagnostic injections and not done more frequently than every 2 months with a max of 6 in a year  - Patient   failed conservative therapies and injection is being provided as part of a comprehensive pain program  - Exam is negative of any neurological findings or radiculopathy and consistent with SI joint pain  -Patient has nonradicular pain and greatest below the L5 vertebrae, localized over the posterior SI joint  - Exam positive for compression test (Priscila's test) , Contreras's test, Gaenslen and compression thrust test  -Pain severity causes functional deficits and patient is unable to perform ADLs despite conservative treatments  - Patient  is participating in an active ongoing rehabilitation program, ongoing home exercise program, or functional restoration program including ice, heat and rest  - For positive diagnostic or therapeutic blocks to provide greater than 50% consistent pain relief in an individual's level of back pain, increased function and decrease use of pain meds       Orders Placed This Encounter   Procedures    X-Ray Sacroiliac Joints Complete     Standing Status:   Future     Number of Occurrences:   1     Standing Expiration Date:   4/15/2025     Order Specific Question:   May the Radiologist modify the order per protocol to meet the clinical needs of the patient?     Answer:   Yes     Order Specific Question:   Release to patient     Answer:   Immediate    Drug Screen Definitive 14, Urine     Standing Status:   Future     Number of Occurrences:   1     Standing Expiration Date:   6/14/2025     Order Specific Question:   Specimen Source     Answer:   Urine    POCT Urine Drug Screen Presump     Interpretive Information:     Negative:  No drug detected at the cut off level.   Positive:  This result represents presumptive positive for the   tested drug, other substances may yield a positive response other   than the analyte of interest. This result should be utilized for   diagnostic purpose only. Confirmation testing will be performed upon physician request only.       Case Request Operating Room:  BLOCK, SACROILIAC JOINT     Order Specific Question:   Medical Necessity:     Answer:   Medically Non-Urgent [100]     Order Specific Question:   CPT Code:     Answer:   NE INJECTION,SACROILIAC JOINT [70492]     Order Specific Question:   Positioning:     Answer:   Prone [1003]     Order Specific Question:   Post-Procedure Disposition:     Answer:   PACU [1]     Order Specific Question:   Estimated Length of Stay:     Answer:   0 midnight     Order Specific Question:   Is an on-site pathologist required for this procedure?     Answer:   N/A      9.Monitored Anesthesia Care medical necessity authorization request:    Monitor anesthesia request is medically indicated for the scheduled nerve block procedure due to:  - needle phobia and anxiety, placing  the patient at risk during the provided service.  -patient has a BMI greater than 40  -patient has an ASA class greater than 3 and requires constant presence of an anesthesiologist during the procedure:  -patient has severe problems with muscles and muscle spasticity that makes it hard to lie still  -patient suffers from chronic pain and is unable to function due to  diminished ADLs    10.The planned medically necessary  surgical procedure is performed in a hospital outpatient department and not in an ambulatory surgical center due to:     -there is no geographically assessable ambulatory surgery center that has the  necessary equipment and fluoroscopy needed for the procedure     -there is no geographically assessable ambulatory surgical center available at which the physician has privileges     -an ASC's  specific  guideline regarding the individuals weight or health conditions that prevent the use of an ASC       -injections must be performed under fluoroscopy image guidance with contrast unless the patient has a documented contrast allergy or pregnancy       The total time spent for evaluation and management on 04/16/2024 including reviewing separately obtained  history, performing a medically appropriate exam and evaluation, documenting clinical information in the health record, independently interpreting results and communicating them to the patient/family/caregiver, and ordering medications/tests/procedures was between 15-29 minutes.    The above plan and management options were discussed at length with patient. Patient is in agreement with the above and verbalized understanding. It will be communicated with the referring physician via electronic record, fax, or mail.    Leny Lang  04/16/2024

## 2024-04-15 NOTE — PROGRESS NOTES
Chronic Pain - New Consult    Referring Physician: Brie Méndez NP       SUBJECTIVE: Disclaimer: This note has been generated using voice-recognition software. There may be typographical errors that have been missed during proof-reading      Initial encounter:    Stuart Cloud presents to the clinic for the evaluation of lower back  pain.       54-year-old female presents for new patient evaluation and  consultation from Brie Méndez NP.  Patient reports an onset of lower back, bilateral hip and groin pain approximately 2 years ago.  She denies precipitating events but notes that the pain has progressively worsened.  The pain is often exacerbated with prolonged standing.  She describes the pain as shooting, sharp and burning, particularly in the left groin and thigh.  She is currently involved in physical therapy and x-rays of the lumbar spine and hips revealed degenerative changes.  She received a right total hip arthroplasty November 2019 and a left total knee arthroplasty September 2020.  She was evaluated by Dr. Ordonez and Lyrica was prescribed for neuropathy and a psychological evaluation was also recommended for a history of substance abuse disorder. Toradol injections in the emergency department provided transient relief.  Naproxen, Lyrica and gabapentin  provided minimal relief.  She was referred to our clinic for possible nerve block injections prior to surgical consideration.  She was instructed to complete physical therapy. If her lower back pain remains intractable, I will order an MRI of the lumbar spine.  During physical therapy she was found to have bilateral sacroiliitis and leg length discrepancy.  X-rays of the SI joints were obtained today and I will schedule bilateral SI joint injections for bilateral sacroiliitis based on physical examination.        Pain Assessment  Pain Assessment: 0-10  Pain Score:   5  Pain Location: Back  Pain Orientation: Lower  Pain Radiating Towards: malcom groin  "pain, left thigh pain  Pain Descriptors: Constant, Burning, Aching, Throbbing, Tingling, Nagging, Shooting, Sharp, Radiating, Tender  Pain Frequency: Continuous  Pain Onset: Awakened from sleep  Clinical Progression:  (new pt)  Aggravating Factors: Standing, Walking, Stairs  Pain Intervention(s): Medication (See eMAR), Heat applied, Warm pack, Rest (hot baths)      Physical Therapy/Home Exercise: yes, currently involved in PT      Pain Medications:  has a current medication list which includes the following prescription(s): clonazepam, cyclobenzaprine, gabapentin, levothyroxine, losartan-hydrochlorothiazide 50-12.5 mg, naproxen, ondansetron, prednisone, prednisone, and promethazine, and the following Facility-Administered Medications: sodium chloride 0.9%.      Tried in Past:  NSAIDS-yes  TCA-no  SNRI-no  Anti-convulsants-yes  Muscle Relaxants-yes  Opioids-no  Benzodiazepines-no     4A"s of Opioid Risk Assessment  Activity: Patient has difficulty performing  ADL  Analgesia:  Patient's pain is partially controlled by current medication.   Aberrant Behavior:  reviewed with no aberrant drug seeking/taking behavior     report:  Reviewed and consistent with medication use as prescribed.    Patient denies suicidal or homicidal ideations    Pain interventional therapy-no    Chiropractor -no  Acupuncture - no  TENS unit -no  Massage therapy-no  Spinal decompression -no  Joint replacement -yes, right JOANNA and left TKA       Review of Systems   Constitutional: Negative.    HENT: Negative.     Eyes: Negative.    Respiratory: Negative.     Cardiovascular: Negative.    Gastrointestinal: Negative.    Endocrine: Negative.    Genitourinary: Negative.    Musculoskeletal:  Positive for back pain and gait problem.   Integumentary:  Negative.   Hematological: Negative.    Psychiatric/Behavioral:  Positive for sleep disturbance.              X-Ray Sacroiliac Joints Complete  Narrative: EXAMINATION:  XR SACROILIAC JOINTS " COMPLETE    CLINICAL HISTORY:  Sacroiliitis, not elsewhere classified    COMPARISON:  None available    TECHNIQUE:  XR SACROILIAC JOINTS COMPLETE    FINDINGS:  No evidence of fracture seen.  The alignment of the sacroiliac joints appears normal.  No sacroiliac joint degenerative change is present.  Degenerative changes lumbar spine similar to previous exams.  No soft tissue abnormality is seen.  Impression: No evidence of sacroiliac joint abnormality demonstrated    Electronically signed by: Tutu Carcamo  Date:    04/15/2024  Time:    12:16         Past Medical History:   Diagnosis Date    Arthritis     Class 3 severe obesity due to excess calories without serious comorbidity with body mass index (BMI) of 40.0 to 44.9 in adult 03/20/2023    Essential hypertension 03/20/2023    History of gastric ulcer 05/01/2023    Hypothyroidism     Vestibular dizziness 10/23/2023     Past Surgical History:   Procedure Laterality Date    ARTHROSCOPY OF KNEE Right 05/25/2023    Procedure: ARTHROSCOPY, KNEE;  Surgeon: Tacos Aburto MD;  Location: [facility] OR;  Service: Orthopedics;  Laterality: Right;    HYSTERECTOMY      JOINT REPLACEMENT Right     hip replacement    KNEE ARTHROPLASTY Left     KNEE ARTHROSCOPY W/ MENISCECTOMY Right 05/25/2023    Procedure: ARTHROSCOPY, KNEE, WITH MENISCECTOMY;  Surgeon: Tacos Aburto MD;  Location: [facility] OR;  Service: Orthopedics;  Laterality: Right;    LUMPECTOMY, BREAST       Social History     Socioeconomic History    Marital status: Single   Tobacco Use    Smoking status: Never     Passive exposure: Never    Smokeless tobacco: Never   Substance and Sexual Activity    Alcohol use: Never    Drug use: Never    Sexual activity: Not Currently     Family History   Problem Relation Name Age of Onset    Hypertension Maternal Grandmother      Stroke Maternal Grandmother      Stroke Maternal Grandfather      Hypertension Mother      Stroke Mother      Diabetes Mother       Review  "of patient's allergies indicates:   Allergen Reactions    Percocet [oxycodone-acetaminophen] Itching    Latex     Opioids - morphine analogues Hives    Sulfa (sulfonamide antibiotics) Rash    Zithromax z-casimiro [azithromycin] Rash         OBJECTIVE:  Vitals:    04/15/24 1049   BP: 131/79   Pulse: 83   Resp: 20     /79   Pulse 83   Resp 20   Ht 5' 6.5" (1.689 m)   Wt 115.2 kg (254 lb)   BMI 40.38 kg/m²   Physical Exam  Vitals and nursing note reviewed.   Constitutional:       General: She is not in acute distress.     Appearance: Normal appearance. She is not ill-appearing, toxic-appearing or diaphoretic.   HENT:      Head: Normocephalic and atraumatic.      Nose: Nose normal.      Mouth/Throat:      Mouth: Mucous membranes are moist.   Eyes:      Extraocular Movements: Extraocular movements intact.      Pupils: Pupils are equal, round, and reactive to light.   Cardiovascular:      Rate and Rhythm: Normal rate and regular rhythm.      Heart sounds: Normal heart sounds.   Pulmonary:      Effort: Pulmonary effort is normal. No respiratory distress.      Breath sounds: Normal breath sounds. No stridor. No wheezing or rhonchi.   Abdominal:      General: Bowel sounds are normal.      Palpations: Abdomen is soft.   Musculoskeletal:         General: No swelling or deformity.      Cervical back: Normal and normal range of motion. No spasms or tenderness. No pain with movement. Normal range of motion.      Thoracic back: Normal.      Lumbar back: Tenderness and bony tenderness present. No spasms. Decreased range of motion. Negative right straight leg raise test and negative left straight leg raise test. No scoliosis.      Right lower leg: No edema.      Left lower leg: No edema.      Comments: SI Joint Exam:     Palpation of the bilateral  SI joint is painful.  JOHNATHAN/ Contreras's test is positive bilaterally  Gaenslen/Yoeman's test is positive bilaterally  Thigh thrust test is positive bilaterally     Skin:     General: " Skin is warm.   Neurological:      General: No focal deficit present.      Mental Status: She is alert and oriented to person, place, and time. Mental status is at baseline.      Cranial Nerves: No cranial nerve deficit.      Sensory: Sensation is intact. No sensory deficit.      Motor: No weakness.      Coordination: Coordination normal.      Gait: Gait abnormal.      Deep Tendon Reflexes: Reflexes are normal and symmetric.   Psychiatric:         Mood and Affect: Mood normal.         Behavior: Behavior normal.            ASSESSMENT: 54 y.o. year old female with pain, consistent with     Encounter Diagnoses   Name Primary?    Lumbosacral radiculopathy     Encounter for long-term (current) use of other medications Yes    Bilateral sacroiliitis         PLAN:   1. reviewed  2..Addiction, Dependency, Tolerance, Opioid abuse-misuse, Death, Diversion Discussed. Overdose reversal drug Naloxone discussed  3. Opioid contract signed today  4.Refill/ Continue medications for pain control and function       Requested Prescriptions      No prescriptions requested or ordered in this encounter     5. Obtain Xray of bilateral SI joints  6.Urine drug screen and confirmation testing was ordered as documented on the requisition form in order to monitor for compliance with prescribed opiates and to ensure that there was no misuse/abuse of other non-prescribed opiates or illicit drugs.  I will determine if the patient is suitable for continuation of opioid therapy after obtaining  the definitive urine drug screen for confirmation.  7. Schedule bilateral SI joint injections under fluoroscopy  8.Indications for this procedure for this specific patient include the following     - Pain is in upper level of iliac crest and gluteal fold ( Spine and pelvis) for greater than 3 months  - Repeat injections are done with 75% relief from diagnostic injections and not done more frequently than every 2 months with a max of 6 in a year  - Patient   failed conservative therapies and injection is being provided as part of a comprehensive pain program  - Exam is negative of any neurological findings or radiculopathy and consistent with SI joint pain  -Patient has nonradicular pain and greatest below the L5 vertebrae, localized over the posterior SI joint  - Exam positive for compression test (Priscila's test) , Contreras's test, Gaenslen and compression thrust test  -Pain severity causes functional deficits and patient is unable to perform ADLs despite conservative treatments  - Patient  is participating in an active ongoing rehabilitation program, ongoing home exercise program, or functional restoration program including ice, heat and rest  - For positive diagnostic or therapeutic blocks to provide greater than 50% consistent pain relief in an individual's level of back pain, increased function and decrease use of pain meds       Orders Placed This Encounter   Procedures    X-Ray Sacroiliac Joints Complete     Standing Status:   Future     Number of Occurrences:   1     Standing Expiration Date:   4/15/2025     Order Specific Question:   May the Radiologist modify the order per protocol to meet the clinical needs of the patient?     Answer:   Yes     Order Specific Question:   Release to patient     Answer:   Immediate    Drug Screen Definitive 14, Urine     Standing Status:   Future     Number of Occurrences:   1     Standing Expiration Date:   6/14/2025     Order Specific Question:   Specimen Source     Answer:   Urine    POCT Urine Drug Screen Presump     Interpretive Information:     Negative:  No drug detected at the cut off level.   Positive:  This result represents presumptive positive for the   tested drug, other substances may yield a positive response other   than the analyte of interest. This result should be utilized for   diagnostic purpose only. Confirmation testing will be performed upon physician request only.       Case Request Operating Room:  BLOCK, SACROILIAC JOINT     Order Specific Question:   Medical Necessity:     Answer:   Medically Non-Urgent [100]     Order Specific Question:   CPT Code:     Answer:   ND INJECTION,SACROILIAC JOINT [17328]     Order Specific Question:   Positioning:     Answer:   Prone [1003]     Order Specific Question:   Post-Procedure Disposition:     Answer:   PACU [1]     Order Specific Question:   Estimated Length of Stay:     Answer:   0 midnight     Order Specific Question:   Is an on-site pathologist required for this procedure?     Answer:   N/A      9.Monitored Anesthesia Care medical necessity authorization request:    Monitor anesthesia request is medically indicated for the scheduled nerve block procedure due to:  - needle phobia and anxiety, placing  the patient at risk during the provided service.  -patient has a BMI greater than 40  -patient has an ASA class greater than 3 and requires constant presence of an anesthesiologist during the procedure:  -patient has severe problems with muscles and muscle spasticity that makes it hard to lie still  -patient suffers from chronic pain and is unable to function due to  diminished ADLs    10.The planned medically necessary  surgical procedure is performed in a hospital outpatient department and not in an ambulatory surgical center due to:     -there is no geographically assessable ambulatory surgery center that has the  necessary equipment and fluoroscopy needed for the procedure     -there is no geographically assessable ambulatory surgical center available at which the physician has privileges     -an ASC's  specific  guideline regarding the individuals weight or health conditions that prevent the use of an ASC       -injections must be performed under fluoroscopy image guidance with contrast unless the patient has a documented contrast allergy or pregnancy       The total time spent for evaluation and management on 04/16/2024 including reviewing separately obtained  history, performing a medically appropriate exam and evaluation, documenting clinical information in the health record, independently interpreting results and communicating them to the patient/family/caregiver, and ordering medications/tests/procedures was between 15-29 minutes.    The above plan and management options were discussed at length with patient. Patient is in agreement with the above and verbalized understanding. It will be communicated with the referring physician via electronic record, fax, or mail.    Leny Lang  04/16/2024

## 2024-04-15 NOTE — PATIENT INSTRUCTIONS
Bilateral SI joint injection scheduled for 5/2/2024 @ 1:15pm    Begin holding Naproxen on 4/29/2024    Procedure Instructions:    Nothing to eat or drink for 8 hours or after midnight including gum, candy, mints, or tobacco products.  If you are scheduled for 1:30 or later nothing to eat or drink after 5 a.m. the morning of the procedure, including gum, candy, mints, or tobacco products.  Must have a  at least 18 yrs of age to stay present at all times  No Diabetic medications the morning of procedure, check blood sugar the morning of procedure, if it is greater than 200 call the office at [phone]  If you are started on antibiotics or have been prescribed antibiotics, have a fever, or have any other type of infection call to reschedule procedure.  If you take blood pressure medications you can take it at your regular scheduled time with a small sip of WATER!  Dentures are to be removed prior to procedure or we can provide you with a denture cup to remove them prior to being taken back for procedure.   False eyelashes are to be removed before the morning of procedure.    Contacts will have to be removed prior to procedure.  No jewelry is to be worn to the procedure.     HOLD ASPIRIN AND ASPIRIN PRODUCTS  (ASPIRIN, BC POWDER ETC. ) FOR 7 DAYS  PRIOR TO PROCEDURE  HOLD NSAIDS( ibuprofen, mobic, meloxicam, advil, diclofenac, naproxen, relafen, celebrex,  methotrexate, aleve etc....)  FOR 3 DAYS   PRIOR TO PROCEDURE

## 2024-04-17 ENCOUNTER — CLINICAL SUPPORT (OUTPATIENT)
Dept: REHABILITATION | Facility: HOSPITAL | Age: 54
End: 2024-04-17
Payer: COMMERCIAL

## 2024-04-17 DIAGNOSIS — R52 PAIN: Primary | ICD-10-CM

## 2024-04-17 DIAGNOSIS — R53.1 DECREASED STRENGTH: ICD-10-CM

## 2024-04-17 PROCEDURE — 97110 THERAPEUTIC EXERCISES: CPT | Mod: CQ

## 2024-04-17 PROCEDURE — 97112 NEUROMUSCULAR REEDUCATION: CPT | Mod: CQ

## 2024-04-17 NOTE — PROGRESS NOTES
[facility]   Physical Therapy Treatment Note      Name: Stuart Cloud  Clinic Number: [id number]    Therapy Diagnosis:   Encounter Diagnoses   Name Primary?    Pain Yes    Decreased strength        Physician: Maria L Phillips FNP-C    Visit Date: 4/17/2024    Physician Orders: PT Eval and Treat   Medical Diagnosis from Referral: Lumbar Radiculopathy  Evaluation Date: 3/28/2024  Authorization Period Expiration: 3/19/25  Plan of Care Expiration: 6/20/24  Visit # / Visits authorized: 7/ no limits    PTA Visit #: 1/5     Time In: 2:30 pm  Time Out: 3:15 pm  Total Billable Time: 45 minutes    Subjective     Pt reports: Pain is like a 5/10 today all in left leg, like numbness and tingling.     She was not compliant with home exercise program.    Pain: 5/10  Location: bilateral back      Objective      Objective Measures updated at progress report unless specified.     Treatment     Stuart received the treatments listed below:      therapeutic exercises to develop strength, endurance, ROM, flexibility, posture, and core stabilization for 15 minutes including:  Bike x 5 minutes  Calt stretch 3 x 30 seconds bilateral  Bilateral HS stretch on step 3 x 15 seconds  Sitting trunk ball stretch x 5 each direction  Sitting piriformis stretch 3 x 10 seconds bilateral  Added and discussed home exercise program     manual therapy techniques: Joint mobilizations, Manual traction, Myofacial release, Soft tissue Mobilization, and Friction Massage were applied to the: - for - minutes, including:  -    neuromuscular re-education activities to improve: Balance, Coordination, Kinesthetic, Sense, Proprioception, and Posture for 30 minutes. The following activities were included:  Cybex back extension 4pl x 40  Cybex leg press 4pl x 30 with right leg only  Cybex leg press bilateral 7pl x 30  Cybex hamstring curls 3 x 10 5pl  Cybex hip abduction 1pl x 30 bilateral   Cybex hip extension 3pl x 30 bilateral    Cybex quad 2pl x 30    therapeutic activities to improve functional performance for -  minutes, including:  -    gait training to improve functional mobility and safety for -  minutes, including:  -    direct contact modalities after being cleared for contraindications:     supervised modalities after being cleared for contradictions:     hot pack for - minutes to -.    cold pack for - minutes to -.    Patient Education and Home Exercises       Education provided:   - home exercise program given 4/15    Written Home Exercises Provided:  yes . Exercises were reviewed and Stuart was able to demonstrate them prior to the end of the session.  Stuart demonstrated good  understanding of the education provided. See EMR under Patient Instructions for exercises provided during therapy sessions    Assessment     Patient fatigues quickly with exercises. Focused on strengthening/stretching and stabilization exercises. Patient was able to increase repetitions with no adverse effects.  Will continue to progress as able.     Stuart Is progressing well towards her goals.   Pt prognosis is Good.     Pt will continue to benefit from skilled outpatient physical therapy to address the deficits listed in the problem list box on initial evaluation, provide pt/family education and to maximize pt's level of independence in the home and community environment.     Pt's spiritual, cultural and educational needs considered and pt agreeable to plan of care and goals.     Anticipated barriers to physical therapy: multiple deficits from previous surgeries    Goals: Short Term Goals: 6 weeks   Patient will be able to sleep ~ 3 hours without waking from pain  Patient will improve manual muscle test to 4-/5 for stabilization  Patient will maintain prolonged positions x 15 minutes with 4 /10 pain  Patient will report a change in status with low back pain from constant to intermittent  Patient will report no left leg radicular symptoms by 6  weeks    Long Term Goals: 12 weeks   Patient will be able to sleep through the night without waking from pain  Patient will improve manual muscle test to 4/5 for stabilization   Patient will maintain prolonged positions x 30 minutes with  2/10 pain  Patient will be independent with home exercise program by D/C    Plan     Progress stabilization, strengthening exercises and progress toward goals     Marilynn Munroe PTA

## 2024-04-18 LAB
6-ACETYLMORPHINE, URINE (RUSH): NEGATIVE 10 NG/ML
7-AMINOCLONAZEPAM, URINE (RUSH): 89.9 25 NG/ML
A-HYDROXYALPRAZOLAM, URINE (RUSH): NEGATIVE 25 NG/ML
AMPHET UR QL SCN: NEGATIVE
BENZOYLECGONINE, URINE (RUSH): NEGATIVE 100 NG/ML
BUPRENORPHINE UR QL SCN: NEGATIVE 25 NG/ML
CODEINE, URINE (RUSH): NEGATIVE 25 NG/ML
CREAT UR-MCNC: 136 MG/DL (ref 28–219)
EDDP, URINE (RUSH): NEGATIVE 25 NG/ML
FENTANYL, URINE (RUSH): NEGATIVE 2.5 NG/ML
HYDROCODONE, URINE (RUSH): NEGATIVE 25 NG/ML
HYDROMORPHONE, URINE (RUSH): NEGATIVE 25 NG/ML
METHADONE UR QL SCN: NEGATIVE 25 NG/ML
METHAMPHET UR QL SCN: NEGATIVE
MORPHINE, URINE (RUSH): NEGATIVE 25 NG/ML
NORBUPRENORPHINE, URINE (RUSH): NEGATIVE 25 NG/ML
NORDIAZEPAM, URINE (RUSH): NEGATIVE 25 NG/ML
NORFENTANYL OXALATE, URINE (RUSH): NEGATIVE 5 NG/ML
NORHYDROCODONE, URINE (RUSH): NEGATIVE 50 NG/ML
NOROXYCODONE HCL, URINE (RUSH): NEGATIVE 50 NG/ML
OXYCODONE UR QL SCN: NEGATIVE 25 NG/ML
OXYMORPHONE, URINE (RUSH): NEGATIVE 25 NG/ML
PH UR STRIP: 6 PH UNITS
SP GR UR STRIP: 1.02
TAPENTADOL, URINE (RUSH): NEGATIVE 25 NG/ML
TEMAZEPAM, URINE (RUSH): NEGATIVE 25 NG/ML
THC-COOH, URINE (RUSH): NEGATIVE 25 NG/ML
TRAMADOL, URINE (RUSH): NEGATIVE 100 NG/ML

## 2024-04-22 ENCOUNTER — CLINICAL SUPPORT (OUTPATIENT)
Dept: REHABILITATION | Facility: HOSPITAL | Age: 54
End: 2024-04-22
Payer: COMMERCIAL

## 2024-04-22 DIAGNOSIS — R53.1 DECREASED STRENGTH: ICD-10-CM

## 2024-04-22 DIAGNOSIS — R52 PAIN: Primary | ICD-10-CM

## 2024-04-22 PROCEDURE — 97110 THERAPEUTIC EXERCISES: CPT

## 2024-04-22 PROCEDURE — 97112 NEUROMUSCULAR REEDUCATION: CPT

## 2024-04-22 NOTE — PROGRESS NOTES
[facility]   Physical Therapy Treatment Note      Name: Stuart Cloud  Clinic Number: [id number]    Therapy Diagnosis:   Encounter Diagnoses   Name Primary?    Pain Yes    Decreased strength        Physician: Maria L Phillips FNP-C    Visit Date: 4/22/2024    Physician Orders: PT Eval and Treat   Medical Diagnosis from Referral: Lumbar Radiculopathy  Evaluation Date: 3/28/2024  Authorization Period Expiration: 3/19/25  Plan of Care Expiration: 6/20/24  Visit # / Visits authorized: 8/ no limits    PTA Visit #: 1/5     Time In: 140 pm  Time Out: 225 pm  Total Billable Time: 45 minutes    Subjective     Pt reports: Getting injections on 5/2. The MD thinks it is more my hip and not the low back. Bending with cleaning house seems to make it worse.     She was not compliant with home exercise program.    Pain: 4/10  Location: bilateral back      Objective      Objective Measures updated at progress report unless specified.     Treatment     Stuart received the treatments listed below:      therapeutic exercises to develop strength, endurance, ROM, flexibility, posture, and core stabilization for 15 minutes including:  Bike x 5 minutes  Calt stretch 3 x 30 seconds bilateral  Bilateral HS stretch on step 3 x 15 seconds  Sitting trunk ball stretch x 5 each direction  Sitting piriformis stretch 3 x 10 seconds bilateral  Added and discussed home exercise program (not today)    manual therapy techniques: Joint mobilizations, Manual traction, Myofacial release, Soft tissue Mobilization, and Friction Massage were applied to the: - for - minutes, including:  -    neuromuscular re-education activities to improve: Balance, Coordination, Kinesthetic, Sense, Proprioception, and Posture for 30 minutes. The following activities were included:  Cybex back extension 4pl x 40  Cybex leg press 4pl x 30 with right leg only  Cybex leg press bilateral 7pl x 30  Cybex hamstring curls 3 x 10 5pl  Cybex hip  abduction 1pl x 30 bilateral   Cybex hip extension 3pl x 30 bilateral   Cybex quad 2pl x 30    therapeutic activities to improve functional performance for -  minutes, including:  -    gait training to improve functional mobility and safety for -  minutes, including:  -    direct contact modalities after being cleared for contraindications:     supervised modalities after being cleared for contradictions:     hot pack for - minutes to -.    cold pack for - minutes to -.    Patient Education and Home Exercises       Education provided:   - home exercise program given 4/15    Written Home Exercises Provided:  yes . Exercises were reviewed and Stuart was able to demonstrate them prior to the end of the session.  Stuart demonstrated good  understanding of the education provided. See EMR under Patient Instructions for exercises provided during therapy sessions    Assessment     Patient fatigues quickly with exercises. Focused on strengthening/stretching and stabilization exercises. Patient was able to complete added hip flexion with no increase in symptoms.  Will continue to progress as able.     Stuart Is progressing well towards her goals.   Pt prognosis is Good.     Pt will continue to benefit from skilled outpatient physical therapy to address the deficits listed in the problem list box on initial evaluation, provide pt/family education and to maximize pt's level of independence in the home and community environment.     Pt's spiritual, cultural and educational needs considered and pt agreeable to plan of care and goals.     Anticipated barriers to physical therapy: multiple deficits from previous surgeries    Goals: Short Term Goals: 6 weeks   Patient will be able to sleep ~ 3 hours without waking from pain  Patient will improve manual muscle test to 4-/5 for stabilization  Patient will maintain prolonged positions x 15 minutes with 4 /10 pain  Patient will report a change in status with low back pain from  constant to intermittent  Patient will report no left leg radicular symptoms by 6 weeks    Long Term Goals: 12 weeks   Patient will be able to sleep through the night without waking from pain  Patient will improve manual muscle test to 4/5 for stabilization   Patient will maintain prolonged positions x 30 minutes with  2/10 pain  Patient will be independent with home exercise program by D/C    Plan     Progress stabilization, strengthening exercises and progress toward goals     REE BARAKAT, PT

## 2024-04-23 ENCOUNTER — HOSPITAL ENCOUNTER (EMERGENCY)
Facility: HOSPITAL | Age: 54
Discharge: HOME | End: 2024-04-23
Attending: EMERGENCY MEDICINE
Payer: COMMERCIAL

## 2024-04-23 VITALS
RESPIRATION RATE: 17 BRPM | TEMPERATURE: 98 F | SYSTOLIC BLOOD PRESSURE: 156 MMHG | DIASTOLIC BLOOD PRESSURE: 91 MMHG | BODY MASS INDEX: 41.12 KG/M2 | HEIGHT: 67 IN | HEART RATE: 78 BPM | OXYGEN SATURATION: 100 % | WEIGHT: 262 LBS

## 2024-04-23 DIAGNOSIS — R11.2 NAUSEA AND VOMITING, UNSPECIFIED VOMITING TYPE: Primary | ICD-10-CM

## 2024-04-23 LAB
ALBUMIN SERPL BCP-MCNC: 3.1 G/DL (ref 3.5–5)
ALBUMIN/GLOB SERPL: 1.1 {RATIO}
ALP SERPL-CCNC: 74 U/L (ref 41–108)
ALT SERPL W P-5'-P-CCNC: 30 U/L (ref 13–56)
ANION GAP SERPL CALCULATED.3IONS-SCNC: 8 MMOL/L (ref 7–16)
AST SERPL W P-5'-P-CCNC: 18 U/L (ref 15–37)
BASOPHILS # BLD AUTO: 0.03 K/UL (ref 0–0.2)
BASOPHILS NFR BLD AUTO: 0.7 % (ref 0–1)
BILIRUB SERPL-MCNC: 0.3 MG/DL (ref ?–1.2)
BILIRUB UR QL STRIP: NEGATIVE
BUN SERPL-MCNC: 16 MG/DL (ref 7–18)
BUN/CREAT SERPL: 24 (ref 6–20)
CALCIUM SERPL-MCNC: 8.5 MG/DL (ref 8.5–10.1)
CHLORIDE SERPL-SCNC: 108 MMOL/L (ref 98–107)
CLARITY UR: CLEAR
CO2 SERPL-SCNC: 31 MMOL/L (ref 21–32)
COLOR UR: ABNORMAL
CREAT SERPL-MCNC: 0.68 MG/DL (ref 0.55–1.02)
DIFFERENTIAL METHOD BLD: ABNORMAL
EGFR (NO RACE VARIABLE) (RUSH/TITUS): 105 ML/MIN/1.73M2
EOSINOPHIL # BLD AUTO: 0.16 K/UL (ref 0–0.5)
EOSINOPHIL NFR BLD AUTO: 3.7 % (ref 1–4)
ERYTHROCYTE [DISTWIDTH] IN BLOOD BY AUTOMATED COUNT: 12.3 % (ref 11.5–14.5)
GLOBULIN SER-MCNC: 2.9 G/DL (ref 2–4)
GLUCOSE SERPL-MCNC: 94 MG/DL (ref 74–106)
GLUCOSE UR STRIP-MCNC: NORMAL MG/DL
HCT VFR BLD AUTO: 38.3 % (ref 38–47)
HGB BLD-MCNC: 12.7 G/DL (ref 12–16)
IMM GRANULOCYTES # BLD AUTO: 0 K/UL (ref 0–0.04)
IMM GRANULOCYTES NFR BLD: 0 % (ref 0–0.4)
KETONES UR STRIP-SCNC: NEGATIVE MG/DL
LEUKOCYTE ESTERASE UR QL STRIP: ABNORMAL
LIPASE SERPL-CCNC: 16 U/L (ref 73–393)
LYMPHOCYTES # BLD AUTO: 1.65 K/UL (ref 1–4.8)
LYMPHOCYTES NFR BLD AUTO: 38.6 % (ref 27–41)
MCH RBC QN AUTO: 29.3 PG (ref 27–31)
MCHC RBC AUTO-ENTMCNC: 33.2 G/DL (ref 32–36)
MCV RBC AUTO: 88.5 FL (ref 80–96)
MONOCYTES # BLD AUTO: 0.35 K/UL (ref 0–0.8)
MONOCYTES NFR BLD AUTO: 8.2 % (ref 2–6)
MPC BLD CALC-MCNC: 11 FL (ref 9.4–12.4)
MUCOUS, UA: ABNORMAL /LPF
NEUTROPHILS # BLD AUTO: 2.09 K/UL (ref 1.8–7.7)
NEUTROPHILS NFR BLD AUTO: 48.8 % (ref 53–65)
NITRITE UR QL STRIP: NEGATIVE
NRBC # BLD AUTO: 0 X10E3/UL
NRBC, AUTO (.00): 0 %
PH UR STRIP: 5.5 PH UNITS
PLATELET # BLD AUTO: 225 K/UL (ref 150–400)
POTASSIUM SERPL-SCNC: 3.8 MMOL/L (ref 3.5–5.1)
PROT SERPL-MCNC: 6 G/DL (ref 6.4–8.2)
PROT UR QL STRIP: NEGATIVE
RBC # BLD AUTO: 4.33 M/UL (ref 4.2–5.4)
RBC # UR STRIP: NEGATIVE /UL
RBC #/AREA URNS HPF: 1 /HPF
SODIUM SERPL-SCNC: 143 MMOL/L (ref 136–145)
SP GR UR STRIP: 1.02
SQUAMOUS #/AREA URNS LPF: ABNORMAL /HPF
UROBILINOGEN UR STRIP-ACNC: NORMAL MG/DL
WBC # BLD AUTO: 4.28 K/UL (ref 4.5–11)
WBC #/AREA URNS HPF: 2 /HPF

## 2024-04-23 PROCEDURE — 99284 EMERGENCY DEPT VISIT MOD MDM: CPT | Mod: ,,, | Performed by: EMERGENCY MEDICINE

## 2024-04-23 PROCEDURE — 83690 ASSAY OF LIPASE: CPT | Performed by: EMERGENCY MEDICINE

## 2024-04-23 PROCEDURE — 81001 URINALYSIS AUTO W/SCOPE: CPT | Performed by: EMERGENCY MEDICINE

## 2024-04-23 PROCEDURE — 25000003 PHARM REV CODE 250: Performed by: EMERGENCY MEDICINE

## 2024-04-23 PROCEDURE — 36415 COLL VENOUS BLD VENIPUNCTURE: CPT | Performed by: EMERGENCY MEDICINE

## 2024-04-23 PROCEDURE — 85025 COMPLETE CBC W/AUTO DIFF WBC: CPT | Performed by: EMERGENCY MEDICINE

## 2024-04-23 PROCEDURE — 80053 COMPREHEN METABOLIC PANEL: CPT | Performed by: EMERGENCY MEDICINE

## 2024-04-23 PROCEDURE — 99283 EMERGENCY DEPT VISIT LOW MDM: CPT

## 2024-04-23 RX ORDER — ONDANSETRON 4 MG/1
4 TABLET, FILM COATED ORAL EVERY 6 HOURS
Qty: 12 TABLET | Refills: 0 | Status: SHIPPED | OUTPATIENT
Start: 2024-04-23

## 2024-04-23 RX ORDER — ONDANSETRON 4 MG/1
4 TABLET, ORALLY DISINTEGRATING ORAL
Status: COMPLETED | OUTPATIENT
Start: 2024-04-23 | End: 2024-04-23

## 2024-04-23 RX ADMIN — ONDANSETRON 4 MG: 4 TABLET, ORALLY DISINTEGRATING ORAL at 10:04

## 2024-04-23 NOTE — ED PROVIDER NOTES
Encounter Date: 4/23/2024       History     Chief Complaint   Patient presents with    Abdominal Pain     RLQ pain since yesterday    Nausea    Back Pain     Chronic lower back pain     53 Y/O FEMALE WITH RIGHT-SIDED ABDOMINAL PAIN SINCE YESTERDAY.  SHE ALSO REPORTS NAUSEA.  PAIN IS WORSE WITH PALPATION, BUT PATIENT DOES NOT GUARD NOR DOES SHE HAVE REBOUND.        Review of patient's allergies indicates:   Allergen Reactions    Percocet [oxycodone-acetaminophen] Itching    Latex     Opioids - morphine analogues Hives    Sulfa (sulfonamide antibiotics) Rash    Zithromax z-casimiro [azithromycin] Rash     Past Medical History:   Diagnosis Date    Arthritis     Class 3 severe obesity due to excess calories without serious comorbidity with body mass index (BMI) of 40.0 to 44.9 in adult 03/20/2023    Essential hypertension 03/20/2023    History of gastric ulcer 05/01/2023    Hypothyroidism     Vestibular dizziness 10/23/2023     Past Surgical History:   Procedure Laterality Date    ARTHROSCOPY OF KNEE Right 05/25/2023    Procedure: ARTHROSCOPY, KNEE;  Surgeon: Tacos Aburto MD;  Location: [facility];  Service: Orthopedics;  Laterality: Right;    HYSTERECTOMY      JOINT REPLACEMENT Right     hip replacement    KNEE ARTHROPLASTY Left     KNEE ARTHROSCOPY W/ MENISCECTOMY Right 05/25/2023    Procedure: ARTHROSCOPY, KNEE, WITH MENISCECTOMY;  Surgeon: Tacos Aburto MD;  Location: [facility];  Service: Orthopedics;  Laterality: Right;    LUMPECTOMY, BREAST       Family History   Problem Relation Name Age of Onset    Hypertension Maternal Grandmother      Stroke Maternal Grandmother      Stroke Maternal Grandfather      Hypertension Mother      Stroke Mother      Diabetes Mother       Social History     Tobacco Use    Smoking status: Never     Passive exposure: Never    Smokeless tobacco: Never   Substance Use Topics    Alcohol use: Never    Drug use: Never     Review of Systems   All other systems reviewed and  are negative.      Physical Exam     Initial Vitals [04/23/24 0837]   BP Pulse Resp Temp SpO2   (!) 156/91 78 17 98 °F (36.7 °C) 100 %      MAP       --         Physical Exam    Nursing note and vitals reviewed.  Constitutional: She appears well-developed and well-nourished.   HENT:   Head: Normocephalic and atraumatic.   Nose: Nose normal.   Mouth/Throat: Oropharynx is clear and moist.   Eyes: Conjunctivae and EOM are normal. Pupils are equal, round, and reactive to light.   Neck: Neck supple.   Normal range of motion.  Cardiovascular:  Normal rate, regular rhythm and normal heart sounds.           Pulmonary/Chest: Breath sounds normal.   Abdominal: Abdomen is soft. Bowel sounds are normal. There is abdominal tenderness.   Musculoskeletal:         General: Normal range of motion.      Cervical back: Normal range of motion and neck supple.     Neurological: She is alert and oriented to person, place, and time. She has normal strength. GCS score is 15. GCS eye subscore is 4. GCS verbal subscore is 5. GCS motor subscore is 6.   Skin: Skin is warm and dry. Capillary refill takes less than 2 seconds.         Medical Screening Exam   See Full Note    ED Course   Procedures  Labs Reviewed   COMPREHENSIVE METABOLIC PANEL - Abnormal; Notable for the following components:       Result Value    Chloride 108 (*)     BUN/Creatinine Ratio 24 (*)     Total Protein 6.0 (*)     Albumin 3.1 (*)     All other components within normal limits   LIPASE - Abnormal; Notable for the following components:    Lipase 16 (*)     All other components within normal limits   URINALYSIS, REFLEX TO URINE CULTURE - Abnormal; Notable for the following components:    Leukocytes, UA Small (*)     All other components within normal limits   CBC WITH DIFFERENTIAL - Abnormal; Notable for the following components:    WBC 4.28 (*)     Neutrophils % 48.8 (*)     Monocytes % 8.2 (*)     All other components within normal limits   URINALYSIS, MICROSCOPIC -  Abnormal; Notable for the following components:    Squamous Epithelial Cells, UA Occasional (*)     Mucous Occasional (*)     All other components within normal limits   CBC W/ AUTO DIFFERENTIAL    Narrative:     The following orders were created for panel order CBC auto differential.  Procedure                               Abnormality         Status                     ---------                               -----------         ------                     CBC with Differential[[id number]]       Abnormal            Final result                 Please view results for these tests on the individual orders.          Imaging Results    None          Medications   ondansetron disintegrating tablet 4 mg (4 mg Oral Given 4/23/24 1040)     Medical Decision Making  ABD PAIN, NAUSEA, VOMITING    DDX:  GASTROENTERITIS VS APPENDICITIS VS URINARY VS OTHER    OUTPATIENT FOLLOW UP...    Amount and/or Complexity of Data Reviewed  Labs: ordered. Decision-making details documented in ED Course.    Risk  Prescription drug management.                                      Clinical Impression:   Final diagnoses:  [R11.2] Nausea and vomiting, unspecified vomiting type (Primary)        ED Disposition Condition    Discharge Stable          ED Prescriptions       Medication Sig Dispense Start Date End Date Auth. Provider    ondansetron (ZOFRAN) 4 MG tablet Take 1 tablet (4 mg total) by mouth every 6 (six) hours. 12 tablet 4/23/2024 -- Trey Driver MD          Follow-up Information       Follow up With Specialties Details Why Contact Info    [facility] Emergency Department Emergency Medicine  As needed [address] [place] [id number] [phone]             Trey Driver MD  04/23/24 9626

## 2024-04-23 NOTE — PROGRESS NOTES
PATIENT NAME: Stuart Cloud  : 1970  DATE: 3/22/24  MRN: [id number]          Reason for Visit / Chief Complaint: Follow-up (EXAM D)       Update PCP  Update Chief Complaint         History of Present Illness / Problem Focused Workflow     Stuart Cloud presents to the clinic with Follow-up (EXAM D)     3/22/2024  Returns to clinic for adjustments on FMLA form previously filled out. Advised to keep follow up appt with specialists. Updated FMLA paperwork to be scanned into  per LPN. No new or worsening symptoms reported.       3/19/2024  Presents to clinic today for FMLA paperwork discussion. She was advised after last visit to return to work 3/13 or to contact clinic to be seen again if she needed additional time off. No known contact to clinic. No appointment scheduled last week for follow up on problem. She did not have imaging performed from last week until yesterday when she called trying to get paperwork done. Initial decision to take patient off work until appointment with ortho spine that was orignially scheduled for  but she wishes to return to work and feels she can work with duty restrictions. Paperwork filled out for continuous leave from 3/7/24 to 3/13/24 and intermittent leave on dates of future appointment. Bending and prolonged standing should be avoided. Take medications as directed.     Previous visit:  Presents to clinic for ER follow up. Went to ER yesterday for L thigh pain. Lumbar xray was performed and showed diffuse degenerative changes. Referral to Dr. Matthew Adams was placed by ED provider. She received toradol IM which helped last night and prescribed ibuprofen and tizanidine. She is concerned about L thigh heat and redness. She is also concerned that back pain may be from UTI. Denies fever/chills.     Thyroid Problem  Patient reports no palpitations.   Leg Pain     Follow-up  Pertinent negatives include no chest pain, chills, fever, headaches, nausea or vomiting.        Review of Systems     @Review of Systems   Constitutional:  Negative for chills and fever.   HENT:  Negative for trouble swallowing.    Eyes:  Negative for visual disturbance.   Respiratory:  Negative for chest tightness and shortness of breath.    Cardiovascular:  Positive for leg swelling. Negative for chest pain and palpitations.   Gastrointestinal:  Negative for nausea and vomiting.   Genitourinary:  Negative for decreased urine volume, dysuria, flank pain and frequency.   Musculoskeletal:  Positive for back pain and leg pain.   Neurological:  Negative for headaches.       Medical / Social / Family History     Past Medical History:   Diagnosis Date    Arthritis     Class 3 severe obesity due to excess calories without serious comorbidity with body mass index (BMI) of 40.0 to 44.9 in adult 03/20/2023    Essential hypertension 03/20/2023    History of gastric ulcer 05/01/2023    Hypothyroidism     Vestibular dizziness 10/23/2023       Past Surgical History:   Procedure Laterality Date    ARTHROSCOPY OF KNEE Right 05/25/2023    Procedure: ARTHROSCOPY, KNEE;  Surgeon: Tacos Aburto MD;  Location: [facility];  Service: Orthopedics;  Laterality: Right;    HYSTERECTOMY      JOINT REPLACEMENT Right     hip replacement    KNEE ARTHROPLASTY Left     KNEE ARTHROSCOPY W/ MENISCECTOMY Right 05/25/2023    Procedure: ARTHROSCOPY, KNEE, WITH MENISCECTOMY;  Surgeon: Tacos Aburto MD;  Location: [facility];  Service: Orthopedics;  Laterality: Right;    LUMPECTOMY, BREAST         Social History  Ms.  reports that she has never smoked. She has never been exposed to tobacco smoke. She has never used smokeless tobacco. She reports that she does not drink alcohol and does not use drugs.    Family History  Ms.'s family history includes Diabetes in her mother; Hypertension in her maternal grandmother and mother; Stroke in her maternal grandfather, maternal grandmother, and mother.    Medications and Allergies      Medications  Current Outpatient Medications   Medication Sig Dispense Refill    clonazePAM (KLONOPIN) 0.5 MG tablet Take 1 tablet (0.5 mg total) by mouth 2 (two) times daily as needed for Anxiety. 30 tablet 2    cyclobenzaprine (FLEXERIL) 10 MG tablet Take 1 tablet (10 mg total) by mouth 3 (three) times daily as needed for Muscle spasms. 30 tablet 2    gabapentin (NEURONTIN) 300 MG capsule Take 1 capsule (300 mg total) by mouth 3 (three) times daily as needed (nuropathy). 90 capsule 5    levothyroxine (SYNTHROID) 25 MCG tablet Take 1 tablet (25 mcg total) by mouth before breakfast. 30 tablet 2    losartan-hydrochlorothiazide 50-12.5 mg (HYZAAR) 50-12.5 mg per tablet Take 1 tablet by mouth once daily. 90 tablet 3    naproxen (NAPROSYN) 500 MG tablet Take 1 tablet (500 mg total) by mouth daily as needed. 30 tablet 0    ondansetron (ZOFRAN) 4 MG tablet Take 1 tablet (4 mg total) by mouth every 6 (six) hours. 12 tablet 0    ondansetron (ZOFRAN-ODT) 8 MG TbDL Take 1 tablet (8 mg total) by mouth every 6 (six) hours as needed (nausea). 20 tablet 0    predniSONE (DELTASONE) 10 MG tablet Half tab on evening of day 10, morning and evening of day 11, and morning of day 12. 2 tablet 0    predniSONE (DELTASONE) 20 MG tablet Twice daily for 7 days; Day 8 take 20 mg in the morning and 1/2 tab (10mg) in the evening, Day 9 1/2 tab (10mg) am and pm, Day 10 1/2 tab (10mg) am and start other bottle with instructions 17 tablet 0    promethazine (PHENERGAN) 25 MG tablet Take 1 tablet (25 mg total) by mouth every 6 (six) hours as needed for Nausea. 15 tablet 0     No current facility-administered medications for this visit.     Facility-Administered Medications Ordered in Other Visits   Medication Dose Route Frequency Provider Last Rate Last Admin    0.9%  NaCl infusion   Intravenous Continuous Tacos Aburto MD 75 mL/hr at 05/25/23 1709 New Bag at 05/25/23 1709       Allergies  Review of patient's allergies indicates:   Allergen  Reactions    Percocet [oxycodone-acetaminophen] Itching    Latex     Opioids - morphine analogues Hives    Sulfa (sulfonamide antibiotics) Rash    Zithromax z-casimiro [azithromycin] Rash       Physical Examination     Vitals:    24 0822   BP: 135/75   Pulse: 87   Temp: 97.4 °F (36.3 °C)     Physical Exam  Vitals and nursing note reviewed.   Constitutional:       Appearance: Normal appearance. She is obese.   HENT:      Head: Normocephalic.      Right Ear: External ear normal.      Left Ear: External ear normal.      Mouth/Throat:      Mouth: Mucous membranes are moist.      Pharynx: Uvula midline.   Eyes:      Extraocular Movements: Extraocular movements intact.      Conjunctiva/sclera: Conjunctivae normal.      Pupils: Pupils are equal, round, and reactive to light.   Cardiovascular:      Rate and Rhythm: Normal rate and regular rhythm.      Pulses: Normal pulses.      Heart sounds: Normal heart sounds.   Pulmonary:      Effort: Pulmonary effort is normal.      Breath sounds: Normal breath sounds.   Abdominal:      General: Abdomen is flat. Bowel sounds are normal.      Palpations: Abdomen is soft.      Tenderness: There is no right CVA tenderness or left CVA tenderness.   Musculoskeletal:         General: Tenderness present.      Cervical back: Normal range of motion and neck supple.      Lumbar back: Tenderness present. Decreased range of motion.      Right lower le+ Edema present.      Left lower le+ Edema present.   Skin:     General: Skin is warm and dry.      Capillary Refill: Capillary refill takes less than 2 seconds.      Comments: Minimal erythema to left anterior thigh with some warmth.    Neurological:      General: No focal deficit present.      Mental Status: She is alert and oriented to person, place, and time.   Psychiatric:         Mood and Affect: Mood normal.         Behavior: Behavior normal.         Thought Content: Thought content normal.         Judgment: Judgment normal.                Lab Results   Component Value Date    WBC 4.28 (L) 04/23/2024    HGB 12.7 04/23/2024    HCT 38.3 04/23/2024    MCV 88.5 04/23/2024     04/23/2024          Sodium   Date Value Ref Range Status   04/23/2024 143 136 - 145 mmol/L Final     Potassium   Date Value Ref Range Status   04/23/2024 3.8 3.5 - 5.1 mmol/L Final     Chloride   Date Value Ref Range Status   04/23/2024 108 (H) 98 - 107 mmol/L Final     CO2   Date Value Ref Range Status   04/23/2024 31 21 - 32 mmol/L Final     Glucose   Date Value Ref Range Status   04/23/2024 94 74 - 106 mg/dL Final     BUN   Date Value Ref Range Status   04/23/2024 16 7 - 18 mg/dL Final     Creatinine   Date Value Ref Range Status   04/23/2024 0.68 0.55 - 1.02 mg/dL Final     Calcium   Date Value Ref Range Status   04/23/2024 8.5 8.5 - 10.1 mg/dL Final     Total Protein   Date Value Ref Range Status   04/23/2024 6.0 (L) 6.4 - 8.2 g/dL Final     Albumin   Date Value Ref Range Status   04/23/2024 3.1 (L) 3.5 - 5.0 g/dL Final     Bilirubin, Total   Date Value Ref Range Status   04/23/2024 0.3 >0.0 - 1.2 mg/dL Final     Alk Phos   Date Value Ref Range Status   04/23/2024 74 41 - 108 U/L Final     AST   Date Value Ref Range Status   04/23/2024 18 15 - 37 U/L Final     ALT   Date Value Ref Range Status   04/23/2024 30 13 - 56 U/L Final     Anion Gap   Date Value Ref Range Status   04/23/2024 8 7 - 16 mmol/L Final      Procedures   Assessment and Plan (including Health Maintenance)      Problem List  Smart Sets  Document Outside HM   :    Plan:           Problem List Items Addressed This Visit    None        Health Maintenance Topics with due status: Not Due       Topic Last Completion Date    Hemoglobin A1c (Diabetic Prevention Screening) 03/29/2023    Lipid Panel 03/29/2023       Future Appointments   Date Time Provider Department Center   4/25/2024  3:15 PM Marilynn Munroe PTA [facility]   4/29/2024  7:45 AM [facility] MRI1 [facility] MRI [facility]   4/29/2024   4:00 PM Marilynn Munroe PTA [facility]    5/1/2024  4:00 PM Marilynn Munroe PTA [facility]   5/6/2024  4:00 PM Marilynn Munroe PTA [facility]   5/9/2024  4:00 PM Marilynn Munroe PTA [facility]    5/14/2024  8:30 AM [facility] Ordone[facility]   6/3/2024 12:30 PM [facility] GI ROOM 01 [facility]O [facility]   6/24/2024  1:40 PM [facility]   7/22/2024  4:00 PM Matthew Adams MD [facility] SPINE [facility]        Health Maintenance Due   Topic Date Due    Hepatitis C Screening  Never done    HIV Screening  Never done    TETANUS VACCINE  Never done    Mammogram  Never done    Colorectal Cancer Screening  Never done    Shingles Vaccine (1 of 2) Never done    Influenza Vaccine (1) 09/01/2023    COVID-19 Vaccine (1 - 2023-24 season) Never done        Follow up if symptoms worsen or fail to improve.     Signature:  DAKOTA MARTINEZ        Date of encounter: 3/22/24

## 2024-04-23 NOTE — DISCHARGE INSTRUCTIONS
TAKE NAUSEA MEDICATION AS DIRECTED.  DRINK PLENTY OF FLUIDS.  FOLLOW UP WITH YOUR PRIMARY CARE PROVIDER OR RETURN TO THE EMERGENCY DEPARTMENT IF SYMPTOMS PERSIST OR WORSEN OR OTHERWISE AS NEEDED.

## 2024-04-23 NOTE — Clinical Note
"Stuart"Stuart" Pedro Luis was seen and treated in our emergency department on 4/23/2024.  She may return to work on 04/24/2024.       If you have any questions or concerns, please don't hesitate to call.      Trey Driver MD"

## 2024-04-24 ENCOUNTER — HOSPITAL ENCOUNTER (EMERGENCY)
Facility: HOSPITAL | Age: 54
Discharge: HOME | End: 2024-04-24
Attending: FAMILY MEDICINE
Payer: COMMERCIAL

## 2024-04-24 VITALS
WEIGHT: 260 LBS | RESPIRATION RATE: 17 BRPM | BODY MASS INDEX: 41.78 KG/M2 | DIASTOLIC BLOOD PRESSURE: 84 MMHG | TEMPERATURE: 98 F | HEIGHT: 66 IN | SYSTOLIC BLOOD PRESSURE: 126 MMHG | HEART RATE: 64 BPM | OXYGEN SATURATION: 96 %

## 2024-04-24 DIAGNOSIS — R11.2 NAUSEA AND VOMITING, UNSPECIFIED VOMITING TYPE: Primary | ICD-10-CM

## 2024-04-24 LAB
ALBUMIN SERPL BCP-MCNC: 3.3 G/DL (ref 3.5–5)
ALBUMIN/GLOB SERPL: 1.2 {RATIO}
ALP SERPL-CCNC: 77 U/L (ref 41–108)
ALT SERPL W P-5'-P-CCNC: 26 U/L (ref 13–56)
AMYLASE SERPL-CCNC: 54 U/L (ref 25–115)
ANION GAP SERPL CALCULATED.3IONS-SCNC: 10 MMOL/L (ref 7–16)
AST SERPL W P-5'-P-CCNC: 32 U/L (ref 15–37)
BASOPHILS # BLD AUTO: 0.04 K/UL (ref 0–0.2)
BASOPHILS NFR BLD AUTO: 0.8 % (ref 0–1)
BILIRUB SERPL-MCNC: 0.3 MG/DL (ref ?–1.2)
BUN SERPL-MCNC: 17 MG/DL (ref 7–18)
BUN/CREAT SERPL: 20 (ref 6–20)
CALCIUM SERPL-MCNC: 9 MG/DL (ref 8.5–10.1)
CHLORIDE SERPL-SCNC: 113 MMOL/L (ref 98–107)
CO2 SERPL-SCNC: 25 MMOL/L (ref 21–32)
CREAT SERPL-MCNC: 0.85 MG/DL (ref 0.55–1.02)
DIFFERENTIAL METHOD BLD: ABNORMAL
EGFR (NO RACE VARIABLE) (RUSH/TITUS): 83 ML/MIN/1.73M2
EOSINOPHIL # BLD AUTO: 0.16 K/UL (ref 0–0.5)
EOSINOPHIL NFR BLD AUTO: 3.3 % (ref 1–4)
ERYTHROCYTE [DISTWIDTH] IN BLOOD BY AUTOMATED COUNT: 12.3 % (ref 11.5–14.5)
GLOBULIN SER-MCNC: 2.7 G/DL (ref 2–4)
GLUCOSE SERPL-MCNC: 100 MG/DL (ref 74–106)
HCT VFR BLD AUTO: 40.2 % (ref 38–47)
HGB BLD-MCNC: 13.1 G/DL (ref 12–16)
IMM GRANULOCYTES # BLD AUTO: 0.01 K/UL (ref 0–0.04)
IMM GRANULOCYTES NFR BLD: 0.2 % (ref 0–0.4)
LIPASE SERPL-CCNC: 20 U/L (ref 16–77)
LYMPHOCYTES # BLD AUTO: 2.01 K/UL (ref 1–4.8)
LYMPHOCYTES NFR BLD AUTO: 41.2 % (ref 27–41)
MCH RBC QN AUTO: 29.3 PG (ref 27–31)
MCHC RBC AUTO-ENTMCNC: 32.6 G/DL (ref 32–36)
MCV RBC AUTO: 89.9 FL (ref 80–96)
MONOCYTES # BLD AUTO: 0.42 K/UL (ref 0–0.8)
MONOCYTES NFR BLD AUTO: 8.6 % (ref 2–6)
MPC BLD CALC-MCNC: 11.5 FL (ref 9.4–12.4)
NEUTROPHILS # BLD AUTO: 2.24 K/UL (ref 1.8–7.7)
NEUTROPHILS NFR BLD AUTO: 45.9 % (ref 53–65)
NRBC # BLD AUTO: 0 X10E3/UL
NRBC, AUTO (.00): 0 %
PLATELET # BLD AUTO: 227 K/UL (ref 150–400)
POTASSIUM SERPL-SCNC: 3.9 MMOL/L (ref 3.5–5.1)
PROT SERPL-MCNC: 6 G/DL (ref 6.4–8.2)
RBC # BLD AUTO: 4.47 M/UL (ref 4.2–5.4)
SODIUM SERPL-SCNC: 144 MMOL/L (ref 136–145)
WBC # BLD AUTO: 4.88 K/UL (ref 4.5–11)

## 2024-04-24 PROCEDURE — 96374 THER/PROPH/DIAG INJ IV PUSH: CPT

## 2024-04-24 PROCEDURE — 96376 TX/PRO/DX INJ SAME DRUG ADON: CPT

## 2024-04-24 PROCEDURE — 96375 TX/PRO/DX INJ NEW DRUG ADDON: CPT

## 2024-04-24 PROCEDURE — 96361 HYDRATE IV INFUSION ADD-ON: CPT

## 2024-04-24 PROCEDURE — 36415 COLL VENOUS BLD VENIPUNCTURE: CPT | Performed by: FAMILY MEDICINE

## 2024-04-24 PROCEDURE — 83690 ASSAY OF LIPASE: CPT | Performed by: FAMILY MEDICINE

## 2024-04-24 PROCEDURE — 63600175 PHARM REV CODE 636 W HCPCS: Performed by: EMERGENCY MEDICINE

## 2024-04-24 PROCEDURE — 85025 COMPLETE CBC W/AUTO DIFF WBC: CPT | Performed by: FAMILY MEDICINE

## 2024-04-24 PROCEDURE — 82150 ASSAY OF AMYLASE: CPT | Performed by: FAMILY MEDICINE

## 2024-04-24 PROCEDURE — 25000003 PHARM REV CODE 250: Performed by: FAMILY MEDICINE

## 2024-04-24 PROCEDURE — 99284 EMERGENCY DEPT VISIT MOD MDM: CPT | Mod: ,,, | Performed by: FAMILY MEDICINE

## 2024-04-24 PROCEDURE — 80053 COMPREHEN METABOLIC PANEL: CPT | Performed by: FAMILY MEDICINE

## 2024-04-24 PROCEDURE — 99285 EMERGENCY DEPT VISIT HI MDM: CPT | Mod: 25

## 2024-04-24 PROCEDURE — 63600175 PHARM REV CODE 636 W HCPCS: Performed by: FAMILY MEDICINE

## 2024-04-24 PROCEDURE — 25500020 PHARM REV CODE 255: Performed by: EMERGENCY MEDICINE

## 2024-04-24 RX ORDER — HYDROMORPHONE HYDROCHLORIDE 2 MG/ML
1 INJECTION, SOLUTION INTRAMUSCULAR; INTRAVENOUS; SUBCUTANEOUS
Status: COMPLETED | OUTPATIENT
Start: 2024-04-24 | End: 2024-04-24

## 2024-04-24 RX ORDER — ONDANSETRON HYDROCHLORIDE 2 MG/ML
8 INJECTION, SOLUTION INTRAVENOUS ONCE
Status: COMPLETED | OUTPATIENT
Start: 2024-04-24 | End: 2024-04-24

## 2024-04-24 RX ADMIN — HYDROMORPHONE HYDROCHLORIDE 1 MG: 2 INJECTION, SOLUTION INTRAMUSCULAR; INTRAVENOUS; SUBCUTANEOUS at 06:04

## 2024-04-24 RX ADMIN — SODIUM CHLORIDE 1000 ML: 9 INJECTION, SOLUTION INTRAVENOUS at 05:04

## 2024-04-24 RX ADMIN — ONDANSETRON 8 MG: 2 INJECTION INTRAMUSCULAR; INTRAVENOUS at 05:04

## 2024-04-24 RX ADMIN — IOPAMIDOL 100 ML: 755 INJECTION, SOLUTION INTRAVENOUS at 08:04

## 2024-04-24 RX ADMIN — HYDROMORPHONE HYDROCHLORIDE 1 MG: 2 INJECTION, SOLUTION INTRAMUSCULAR; INTRAVENOUS; SUBCUTANEOUS at 10:04

## 2024-04-24 NOTE — DISCHARGE INSTRUCTIONS
TAKE NAUSEA MEDICATION AS DIRECTED.  DRINK PLENTY OF FLUIDS.  FOLLOW UP IF SYMPTOMS PERSIST OR WORSEN OR OTHERWISE AS NEEDED.

## 2024-04-24 NOTE — ED PROVIDER NOTES
Encounter Date: 4/24/2024       History     Chief Complaint   Patient presents with    Nausea     Pt came in yesterday with the same problem. States it has not gotten better and gets worse when she eats.     Abdominal Pain     To the right side. States pain is stabbing and goes and comes.     Vomiting     Pt vomitted in triage waiting room.      Patient in after 24-48 hours of abdominal pain nausea vomiting hurting on the right upper quadrant radiating down to the right lower quadrant.  Was seen yesterday lab work done was told to come back to the ER--if symptoms did not get any better.        Review of patient's allergies indicates:   Allergen Reactions    Percocet [oxycodone-acetaminophen] Itching    Latex     Opioids - morphine analogues Hives    Sulfa (sulfonamide antibiotics) Rash    Zithromax z-casimiro [azithromycin] Rash     Past Medical History:   Diagnosis Date    Arthritis     Class 3 severe obesity due to excess calories without serious comorbidity with body mass index (BMI) of 40.0 to 44.9 in adult 03/20/2023    Essential hypertension 03/20/2023    History of gastric ulcer 05/01/2023    Hypothyroidism     Vestibular dizziness 10/23/2023     Past Surgical History:   Procedure Laterality Date    ARTHROSCOPY OF KNEE Right 05/25/2023    Procedure: ARTHROSCOPY, KNEE;  Surgeon: Tacos Aburto MD;  Location: [facility];  Service: Orthopedics;  Laterality: Right;    HYSTERECTOMY      JOINT REPLACEMENT Right     hip replacement    KNEE ARTHROPLASTY Left     KNEE ARTHROSCOPY W/ MENISCECTOMY Right 05/25/2023    Procedure: ARTHROSCOPY, KNEE, WITH MENISCECTOMY;  Surgeon: Tacos Aburto MD;  Location: [facility];  Service: Orthopedics;  Laterality: Right;    LUMPECTOMY, BREAST       Family History   Problem Relation Name Age of Onset    Hypertension Maternal Grandmother      Stroke Maternal Grandmother      Stroke Maternal Grandfather      Hypertension Mother      Stroke Mother      Diabetes Mother        Social History     Tobacco Use    Smoking status: Never     Passive exposure: Never    Smokeless tobacco: Never   Substance Use Topics    Alcohol use: Never    Drug use: Never     Review of Systems   Constitutional: Negative.  Negative for fever.   HENT: Negative.  Negative for sore throat.    Eyes: Negative.    Respiratory: Negative.  Negative for shortness of breath.    Cardiovascular: Negative.  Negative for chest pain.   Gastrointestinal:  Positive for nausea and vomiting.   Endocrine: Negative.    Genitourinary: Negative.  Negative for dysuria.   Musculoskeletal: Negative.  Negative for back pain.   Skin: Negative.  Negative for rash.   Allergic/Immunologic: Negative.    Neurological: Negative.  Negative for weakness.   Hematological: Negative.  Does not bruise/bleed easily.   Psychiatric/Behavioral: Negative.     All other systems reviewed and are negative.      Physical Exam     Initial Vitals [04/24/24 0443]   BP Pulse Resp Temp SpO2   (!) 146/91 82 18 97.8 °F (36.6 °C) 99 %      MAP       --         Physical Exam    Constitutional: She appears well-developed and well-nourished.   HENT:   Head: Normocephalic and atraumatic.   Right Ear: External ear normal.   Left Ear: External ear normal.   Nose: Nose normal.   Mouth/Throat: Oropharynx is clear and moist.   Eyes: Conjunctivae and EOM are normal. Pupils are equal, round, and reactive to light.   Neck: Neck supple.   Normal range of motion.  Cardiovascular:  Normal rate, regular rhythm, normal heart sounds and intact distal pulses.           Pulmonary/Chest: Breath sounds normal.   Abdominal: Abdomen is soft. Bowel sounds are normal.   Vomiting in the room   Genitourinary:    Vagina and uterus normal.     Musculoskeletal:         General: Normal range of motion.      Cervical back: Normal range of motion and neck supple.     Neurological: She is alert and oriented to person, place, and time. She has normal strength and normal reflexes.   Skin: Skin is  warm. Capillary refill takes less than 2 seconds.   Psychiatric: She has a normal mood and affect. Her behavior is normal. Judgment and thought content normal.         Medical Screening Exam   See Full Note    ED Course   Procedures  Labs Reviewed   COMPREHENSIVE METABOLIC PANEL - Abnormal; Notable for the following components:       Result Value    Chloride 113 (*)     Total Protein 6.0 (*)     Albumin 3.3 (*)     All other components within normal limits   CBC WITH DIFFERENTIAL - Abnormal; Notable for the following components:    Neutrophils % 45.9 (*)     Lymphocytes % 41.2 (*)     Monocytes % 8.6 (*)     All other components within normal limits   AMYLASE - Normal   LIPASE - Normal   CBC W/ AUTO DIFFERENTIAL    Narrative:     The following orders were created for panel order CBC Auto Differential.  Procedure                               Abnormality         Status                     ---------                               -----------         ------                     CBC with Differential[[id number]]       Abnormal            Final result                 Please view results for these tests on the individual orders.          Imaging Results              CT Abdomen Pelvis With IV Contrast NO Oral Contrast (Final result)  Result time 04/24/24 08:37:46      Final result by Desmond Mackenzie MD (04/24/24 08:37:46)                   Impression:      No acute abnormality identified in the abdomen or pelvis.    Fatty infiltration of the liver.      Electronically signed by: Desmond Mackenzie  Date:    04/24/2024  Time:    08:37               Narrative:    EXAMINATION:  CT ABDOMEN PELVIS WITH IV CONTRAST    CLINICAL HISTORY:  Abdominal abscess/infection suspected;    TECHNIQUE:  Low dose axial images, sagittal and coronal reformations were obtained from the lung bases to the pubic symphysis following the IV administration of 100 mL of Isovue 370 .  Oral contrast was not given.    The CT examination was performed using one or more  of the following dose reduction techniques: Automated exposure control, adjustment of the mA and kV according to patient's size, use of acute or iterative reconstruction techniques.    COMPARISON:  None.    FINDINGS:  Lung bases are clear.    Fatty infiltration of the liver.  No focal hepatic lesion.  Gallbladder, adrenals, spleen, and pancreas appear normal.  Kidneys appear normal.  No renal stone or hydronephrosis.    No pneumoperitoneum.  No ascites.    The appendix is seen centrally in the pelvis.  No evidence of appendicitis.  No bowel obstruction.  No acute bowel abnormality.  No adenopathy.  Urinary bladder unremarkable.  Uterus absent.  No adnexal lesion.  Abdominal aorta and iliacs are normal in course and caliber.    Right hip prosthetic.  No acute fracture.  Degenerative changes of the spine.                                       US Abdomen Limited (Final result)  Result time 04/24/24 07:37:19      Final result by Александр Cheng DO (04/24/24 07:37:19)                   Impression:      The gallbladder is normal in appearance. Positive Trivedi sign.    Ultrasound images captured and stored.      Electronically signed by: Александр Cheng  Date:    04/24/2024  Time:    07:37               Narrative:    EXAMINATION:  US ABDOMEN LIMITED    CLINICAL HISTORY:  Patient in for ultrasound gallbladder right upper quadrant pain;    TECHNIQUE:  Multiplane limited abdominal ultrasound with grayscale and color Doppler.    COMPARISON:  2023    FINDINGS:  The liver demonstrates no definite focal abnormality.  The liver demonstrates homogeneous echotexture and is normal in size.    The gallbladder is normal in appearance.  Positive Trivedi sign.    The common bile duct measures 4-5 mm.    The right kidney measures 10 cm.  The right kidney is normal.    The pancreas as visualized is unremarkable in appearance.    The visualized aorta and IVC are unremarkable in appearance.                                        Medications   sodium chloride 0.9% bolus 1,000 mL 1,000 mL (0 mLs Intravenous Stopped 4/24/24 0612)   ondansetron injection 8 mg (8 mg Intravenous Given 4/24/24 0520)   HYDROmorphone (PF) injection 1 mg (1 mg Intravenous Given 4/24/24 0609)   iopamidoL (ISOVUE-370) injection 100 mL (100 mLs Intravenous Given 4/24/24 0830)   HYDROmorphone (PF) injection 1 mg (1 mg Intravenous Given 4/24/24 1002)     Medical Decision Making  Amount and/or Complexity of Data Reviewed  Labs: ordered.  Radiology: ordered.    Risk  Prescription drug management.                          Medical Decision Making:   Initial Assessment:   Patient in after 24-48 hours of abdominal pain nausea vomiting hurting on the right upper quadrant radiating down to the right lower quadrant.  Was seen yesterday lab work done was told to come back to the ER--if symptoms did not get any better.        Differential Diagnosis:   Ultrasounds negative             Clinical Impression:   Final diagnoses:  [R11.2] Nausea and vomiting, unspecified vomiting type (Primary)        ED Disposition Condition    Discharge Stable          ED Prescriptions    None       Follow-up Information       Follow up With Specialties Details Why Contact Info    PRIMARY CARE PROVIDER   As needed              Sarbjit Townsend,   04/25/24 4066

## 2024-04-24 NOTE — Clinical Note
"Stuart Recio" Pedro Luis was seen and treated in our emergency department on 4/24/2024.  She may return to work on 04/25/2024.       If you have any questions or concerns, please don't hesitate to call.      FEI Carolina RN    "

## 2024-04-25 ENCOUNTER — OFFICE VISIT (OUTPATIENT)
Dept: FAMILY MEDICINE | Facility: CLINIC | Age: 54
End: 2024-04-25
Payer: COMMERCIAL

## 2024-04-25 VITALS
TEMPERATURE: 98 F | BODY MASS INDEX: 42.01 KG/M2 | SYSTOLIC BLOOD PRESSURE: 127 MMHG | OXYGEN SATURATION: 98 % | DIASTOLIC BLOOD PRESSURE: 84 MMHG | HEART RATE: 82 BPM | HEIGHT: 66 IN | WEIGHT: 261.38 LBS

## 2024-04-25 DIAGNOSIS — Z12.11 COLON CANCER SCREENING: ICD-10-CM

## 2024-04-25 DIAGNOSIS — R10.11 RUQ ABDOMINAL PAIN: Primary | ICD-10-CM

## 2024-04-25 DIAGNOSIS — Z12.11 COLON CANCER SCREENING: Primary | ICD-10-CM

## 2024-04-25 PROCEDURE — 99213 OFFICE O/P EST LOW 20 MIN: CPT | Mod: ,,,

## 2024-04-25 PROCEDURE — 3074F SYST BP LT 130 MM HG: CPT | Mod: ,,,

## 2024-04-25 PROCEDURE — 3079F DIAST BP 80-89 MM HG: CPT | Mod: ,,,

## 2024-04-25 PROCEDURE — 3008F BODY MASS INDEX DOCD: CPT | Mod: ,,,

## 2024-04-25 RX ORDER — PANTOPRAZOLE SODIUM 40 MG/1
40 TABLET, DELAYED RELEASE ORAL DAILY
Qty: 90 TABLET | Refills: 3 | Status: SHIPPED | OUTPATIENT
Start: 2024-04-25 | End: 2025-04-25

## 2024-04-25 RX ORDER — POLYETHYLENE GLYCOL 3350, SODIUM SULFATE ANHYDROUS, SODIUM BICARBONATE, SODIUM CHLORIDE, POTASSIUM CHLORIDE 236; 22.74; 6.74; 5.86; 2.97 G/4L; G/4L; G/4L; G/4L; G/4L
4 POWDER, FOR SOLUTION ORAL ONCE
Qty: 4000 ML | Refills: 0 | Status: SHIPPED | OUTPATIENT
Start: 2024-04-25 | End: 2024-04-25

## 2024-04-25 RX ORDER — NAPROXEN 500 MG/1
500 TABLET ORAL DAILY PRN
Qty: 30 TABLET | Refills: 0 | Status: SHIPPED | OUTPATIENT
Start: 2024-04-25

## 2024-04-25 NOTE — LETTER
April 25, 2024      [facility]  [address] [facility] [place] [address]  Phone: [phone]  Fax: [phone]       Patient: Stuart Cloud   YOB: 1970  Date of Visit: 04/25/2024    To Whom It May Concern:    Bismark Cloud  was at [facility] on 04/25/2024. The patient may return to work/school on 04/26/2024 with no restrictions. If you have any questions or concerns, or if I can be of further assistance, please do not hesitate to contact me.    Sincerely,    DAKOTA Lee

## 2024-04-25 NOTE — LETTER
April 25, 2024      [facility]  [address] [facility] [place] [address]  Phone: [phone]  Fax: [phone]       Patient: Stuart Cloud   YOB: 1970  Date of Visit: 04/25/2024    To Whom It May Concern:    Bismark Cloud  was at [facility] on 04/25/2024. The patient may return to work/school on 4/27/2024 per pre approved medical leave with no restrictions. If you have any questions or concerns, or if I can be of further assistance, please do not hesitate to contact me.    Sincerely,    DAKOTA Lee

## 2024-04-25 NOTE — LETTER
May 1, 2024      [facility]  [address] [place] [address]  Phone: [phone]  Fax: [phone]       Patient: Stuart Cloud   YOB: 1970  Date of Visit: 05/01/2024    To Whom It May Concern:    Bismark Cloud  was at [facility] on 05/01/2024. The patient may return to work/school on 05/02/2024 with no restrictions. If you have any questions or concerns, or if I can be of further assistance, please do not hesitate to contact me.    Sincerely,    DAKOTA Lee

## 2024-04-25 NOTE — LETTER
April 25, 2024      [facility]  [address] [facility] [place] [address]  Phone: [phone]  Fax: [phone]       Patient: Stuart Cloud   YOB: 1970  Date of Visit: 04/25/2024    To Whom It May Concern:    Bismark Cloud  was at [facility] on 04/25/2024. The patient may return to work/school on 4/27/2024 with no restrictions. If you have any questions or concerns, or if I can be of further assistance, please do not hesitate to contact me.    Sincerely,    DAKOTA Lee

## 2024-04-29 ENCOUNTER — CLINICAL SUPPORT (OUTPATIENT)
Dept: REHABILITATION | Facility: HOSPITAL | Age: 54
End: 2024-04-29
Payer: COMMERCIAL

## 2024-04-29 DIAGNOSIS — R53.1 DECREASED STRENGTH: ICD-10-CM

## 2024-04-29 DIAGNOSIS — R52 PAIN: Primary | ICD-10-CM

## 2024-04-29 PROCEDURE — 97110 THERAPEUTIC EXERCISES: CPT | Mod: CQ

## 2024-04-29 PROCEDURE — 97112 NEUROMUSCULAR REEDUCATION: CPT | Mod: CQ

## 2024-04-29 NOTE — PROGRESS NOTES
[facility]   Physical Therapy Treatment Note      Name: Stuart Cloud  Clinic Number: [id number]    Therapy Diagnosis:   Encounter Diagnoses   Name Primary?    Pain Yes    Decreased strength        Physician: Maria L Phillips FNP-C    Visit Date: 4/29/2024    Physician Orders: PT Eval and Treat   Medical Diagnosis from Referral: Lumbar Radiculopathy  Evaluation Date: 3/28/2024  Authorization Period Expiration: 3/19/25  Plan of Care Expiration: 6/20/24  Visit # / Visits authorized: 9/ no limits    PTA Visit #: 2/5     Time In: 3:55 pm  Time Out: 4:37 pm  Total Billable Time: 42 minutes    Subjective     Pt reports: Getting injections on 5/7. The MD thinks it is more my hip and not the low back.     She was not compliant with home exercise program.    Pain: 7/10  Location: bilateral back      Objective      Objective Measures updated at progress report unless specified.     Treatment     Stuart received the treatments listed below:      therapeutic exercises to develop strength, endurance, ROM, flexibility, posture, and core stabilization for 12 minutes including:  Bike x 5 minutes  Calt stretch 3 x 30 seconds bilateral  Bilateral HS stretch on step 3 x 15 seconds  Sitting trunk ball stretch x 5 each direction  Sitting piriformis stretch 3 x 30 seconds bilateral  Added and discussed home exercise program (not today)    manual therapy techniques: Joint mobilizations, Manual traction, Myofacial release, Soft tissue Mobilization, and Friction Massage were applied to the: - for - minutes, including:  -    neuromuscular re-education activities to improve: Balance, Coordination, Kinesthetic, Sense, Proprioception, and Posture for 25 minutes. The following activities were included:  Cybex back extension 4pl x 40  Cybex leg press 4pl x 30 with right leg only  Cybex leg press bilateral 7pl x 30  Cybex hamstring curls 3 x 10 5pl  Cybex hip abduction 2pl x 30 bilateral   Cybex hip extension 2  pl x 30 bilateral   Cybex hip flexion 2pl 2 x 10 bilateral   Cybex quad 2pl x 30    therapeutic activities to improve functional performance for -  minutes, including:  -    gait training to improve functional mobility and safety for -  minutes, including:  -    direct contact modalities after being cleared for contraindications:     supervised modalities after being cleared for contradictions:     hot pack for - minutes to -.    cold pack for - minutes to -.    Patient Education and Home Exercises       Education provided:   - home exercise program given 4/15    Written Home Exercises Provided:  yes . Exercises were reviewed and Stuart was able to demonstrate them prior to the end of the session.  Stuart demonstrated good  understanding of the education provided. See EMR under Patient Instructions for exercises provided during therapy sessions    Assessment     Patient fatigues quickly with exercises. Focused on strengthening/stretching and stabilization exercises. Patient was able to complete added hip flexion with no increase in symptoms.  Will continue to progress as able.     Stuart Is progressing well towards her goals.   Pt prognosis is Good.     Pt will continue to benefit from skilled outpatient physical therapy to address the deficits listed in the problem list box on initial evaluation, provide pt/family education and to maximize pt's level of independence in the home and community environment.     Pt's spiritual, cultural and educational needs considered and pt agreeable to plan of care and goals.     Anticipated barriers to physical therapy: multiple deficits from previous surgeries    Goals: Short Term Goals: 6 weeks   Patient will be able to sleep ~ 3 hours without waking from pain  Patient will improve manual muscle test to 4-/5 for stabilization  Patient will maintain prolonged positions x 15 minutes with 4 /10 pain  Patient will report a change in status with low back pain from constant to  intermittent  Patient will report no left leg radicular symptoms by 6 weeks    Long Term Goals: 12 weeks   Patient will be able to sleep through the night without waking from pain  Patient will improve manual muscle test to 4/5 for stabilization   Patient will maintain prolonged positions x 30 minutes with  2/10 pain  Patient will be independent with home exercise program by D/C    Plan     Progress stabilization, strengthening exercises and progress toward goals     Marilynn Munroe PTA

## 2024-04-30 ENCOUNTER — OFFICE VISIT (OUTPATIENT)
Dept: GASTROENTEROLOGY | Facility: CLINIC | Age: 54
End: 2024-04-30
Payer: COMMERCIAL

## 2024-04-30 VITALS
DIASTOLIC BLOOD PRESSURE: 72 MMHG | SYSTOLIC BLOOD PRESSURE: 127 MMHG | HEIGHT: 66 IN | WEIGHT: 267 LBS | HEART RATE: 78 BPM | BODY MASS INDEX: 42.91 KG/M2

## 2024-04-30 DIAGNOSIS — R13.10 SWALLOWING PROBLEM: ICD-10-CM

## 2024-04-30 DIAGNOSIS — R10.13 EPIGASTRIC ABDOMINAL PAIN: ICD-10-CM

## 2024-04-30 DIAGNOSIS — K59.04 CHRONIC IDIOPATHIC CONSTIPATION: ICD-10-CM

## 2024-04-30 DIAGNOSIS — R13.10 DYSPHAGIA, UNSPECIFIED TYPE: Primary | ICD-10-CM

## 2024-04-30 PROCEDURE — 99215 OFFICE O/P EST HI 40 MIN: CPT | Mod: S$PBB,,,

## 2024-04-30 PROCEDURE — 3008F BODY MASS INDEX DOCD: CPT | Mod: ,,,

## 2024-04-30 PROCEDURE — 99214 OFFICE O/P EST MOD 30 MIN: CPT | Mod: PBBFAC

## 2024-04-30 PROCEDURE — 3078F DIAST BP <80 MM HG: CPT | Mod: ,,,

## 2024-04-30 PROCEDURE — 3074F SYST BP LT 130 MM HG: CPT | Mod: ,,,

## 2024-04-30 NOTE — PROGRESS NOTES
Gastroenterology Clinic Note    Patient ID: [id number]   Referring MD: Maria L Phillips FNP-C   Chief Complaint:   Chief Complaint   Patient presents with    Choking     Blood and mucus when she coughs, raspy voice     Abdominal Cramping    Constipation    Nausea       History of Present Illness   Stuart Cloud is an 54 y.o. AAF who is referred for trouble swallowing.  Patient complains of postprandial epigastric pain, nausea, and vomiting over the past 3-4 weeks.  She is also experiencing dysphagia with solid foods.  She reports occasional hematemesis.  She had EGD 5-6 years ago.  She denies weight loss.  She has trouble with constipation.  Denies hematochezia or melena.    Previous workup:  CT abdomen pelvis with IV contrast; ultrasound abdomen limited    Colonoscopy for CRC screening has been ordered by PCP.    Review of Systems   Constitutional:  Negative for weight loss.   Gastrointestinal:  Positive for abdominal pain, constipation, heartburn, nausea and vomiting. Negative for blood in stool, diarrhea and melena.       Past Medical History      Past Medical History:   Diagnosis Date    Arthritis     Class 3 severe obesity due to excess calories without serious comorbidity with body mass index (BMI) of 40.0 to 44.9 in adult 03/20/2023    Essential hypertension 03/20/2023    History of gastric ulcer 05/01/2023    Hypothyroidism     Vestibular dizziness 10/23/2023       Past Surgical History     Past Surgical History:   Procedure Laterality Date    ARTHROSCOPY OF KNEE Right 05/25/2023    Procedure: ARTHROSCOPY, KNEE;  Surgeon: Tacos Aburto MD;  Location: [facility];  Service: Orthopedics;  Laterality: Right;    HYSTERECTOMY      INJECTION OF ANESTHETIC AGENT INTO SACROILIAC JOINT Bilateral 5/7/2024    Procedure: BLOCK, SACROILIAC JOINT;  Surgeon: Leny Lang MD;  Location: [facility];  Service: Pain Management;  Laterality: Bilateral;    JOINT REPLACEMENT Right     hip replacement     KNEE ARTHROPLASTY Left     KNEE ARTHROSCOPY W/ MENISCECTOMY Right 05/25/2023    Procedure: ARTHROSCOPY, KNEE, WITH MENISCECTOMY;  Surgeon: Tacos Aburto MD;  Location: [facility] OR;  Service: Orthopedics;  Laterality: Right;    LUMPECTOMY, BREAST      ROBOT-ASSISTED CHOLECYSTECTOMY USING DA ANG XI N/A 5/9/2024    Procedure: XI ROBOTIC CHOLECYSTECTOMY;  Surgeon: Mao Dempsey DO;  Location: [facility] OR;  Service: General;  Laterality: N/A;       Allergies     Review of patient's allergies indicates:   Allergen Reactions    Percocet [oxycodone-acetaminophen] Itching    Latex     Opioids - morphine analogues Hives    Sulfa (sulfonamide antibiotics) Rash    Zithromax z-casimiro [azithromycin] Rash       Immunization History     Immunization History   Administered Date(s) Administered    Influenza - Quadrivalent - PF *Preferred* (6 months and older) 11/03/2022    PPD Test 11/01/2022       Past Family History      Family History   Problem Relation Name Age of Onset    Hypertension Maternal Grandmother      Stroke Maternal Grandmother      Stroke Maternal Grandfather      Hypertension Mother      Stroke Mother      Diabetes Mother         Past Social History      Social History     Socioeconomic History    Marital status: Single   Tobacco Use    Smoking status: Never     Passive exposure: Never    Smokeless tobacco: Never   Substance and Sexual Activity    Alcohol use: Never    Drug use: Never    Sexual activity: Not Currently       Current Medications     Outpatient Medications Marked as Taking for the 4/30/24 encounter (Office Visit) with Elayne Santo FNP   Medication Sig Dispense Refill    clonazePAM (KLONOPIN) 0.5 MG tablet Take 1 tablet (0.5 mg total) by mouth 2 (two) times daily as needed for Anxiety. 30 tablet 2    cyclobenzaprine (FLEXERIL) 10 MG tablet Take 1 tablet (10 mg total) by mouth 3 (three) times daily as needed for Muscle spasms. 30 tablet 2    gabapentin (NEURONTIN) 300 MG capsule Take 1  "capsule (300 mg total) by mouth 3 (three) times daily as needed (nuropathy). 90 capsule 5    levothyroxine (SYNTHROID) 25 MCG tablet Take 1 tablet (25 mcg total) by mouth before breakfast. 30 tablet 2    losartan-hydrochlorothiazide 50-12.5 mg (HYZAAR) 50-12.5 mg per tablet Take 1 tablet by mouth once daily. 90 tablet 3    naproxen (NAPROSYN) 500 MG tablet Take 1 tablet (500 mg total) by mouth daily as needed. 30 tablet 0    ondansetron (ZOFRAN) 4 MG tablet Take 1 tablet (4 mg total) by mouth every 6 (six) hours. 12 tablet 0    pantoprazole (PROTONIX) 40 MG tablet Take 1 tablet (40 mg total) by mouth once daily. 90 tablet 3        I have reviewed the current medications, allergies, vital signs, past medical and surgical history, family medical history, and social history for this encounter and agree with all findings.    OBJECTIVE    Physical Exam    /72   Pulse 78   Ht 5' 6" (1.676 m)   Wt 121.1 kg (267 lb)   BMI 43.09 kg/m²   GEN: Well appearing, cooperative, NAD.  Obese.  NECK: Supple, no LAD  CV: Normal rate  RESP: Unlabored  ABD: ND, no guarding  EXT: No clubbing, cyanosis, or edema  SKIN: Warm and dry  NEURO: AAO x4.     LABS    CBC (with or without Differential):   Lab Results   Component Value Date    WBC 5.34 05/02/2024    HGB 12.6 05/02/2024    HCT 36.6 (L) 05/02/2024    MCV 85.9 05/02/2024    MCH 29.6 05/02/2024    MCHC 34.4 05/02/2024    RDW 12.6 05/02/2024     05/02/2024    MPV 10.9 05/02/2024    NEUTOPHILPCT 63.8 05/02/2024    DIFFTYPE Auto 05/02/2024     BMP/CMP:   Lab Results   Component Value Date     05/02/2024    K 3.8 05/02/2024     (H) 05/02/2024    CO2 28 05/02/2024    BUN 11 05/02/2024    CREATININE 0.66 05/02/2024     05/02/2024    CALCIUM 9.1 05/02/2024    ALBUMIN 2.9 (L) 05/02/2024    AST 26 05/02/2024    ALT 35 05/02/2024    ALKPHOS 62 05/02/2024        IMAGING  CT abdomen and pelvis with IV contrast 04/2024  - no acute abnormality identified in the " abdomen or pelvis  - fatty infiltration of the liver    Ultrasound abdomen limited 04/2024  - the gallbladder is normal in appearance  - positive Trivedi sign      ASSESSMENT  Stuart Cloud is a 54 y.o. AAF with history of hypertension and hypothyroidism who is referred for trouble swallowing.    1. Dysphagia, unspecified type    2. Swallowing problem    3. Epigastric abdominal pain    4. Chronic idiopathic constipation           PLAN    - schedule EGD with possible dilation for dysphagia, hematemesis, epigastric pain; rule out PUD  Patient Instructions   - After your bowel cleanse, I recommend starting a daily fiber supplement with Metamucil, Citrucel or Fibercon (can purchase at your local pharmacy)  - I recommend that you also use Miralax 17 grams daily-to-twice daily as needed to have a regular, soft BM without straining - you can adjust how often you need miralax, but start with daily dosing and go from there  - I recommend drinking at least 60-80 ounces of water daily unless you have a medical condition that requires fluid restriction        No orders of the defined types were placed in this encounter.        The risks and benefits of my recommendations, as well as other treatment options were discussed with the patient today. All questions were answered.    45 minutes of total time spent on the encounter, which includes face to face time and non-face to face time preparing to see the patient (eg, review of tests), obtaining and/or reviewing separately obtained history, documenting clinical information in the electronic or other health record, Independently interpreting results (not separately reported) and communicating results to the patient/family/caregiver, or care coordination (not separately reported).        Elayne Santo, ISIDROP/ACNP  [facility]

## 2024-04-30 NOTE — PATIENT INSTRUCTIONS
- After your bowel cleanse, I recommend starting a daily fiber supplement with Metamucil, Citrucel or Fibercon (can purchase at your local pharmacy)  - I recommend that you also use Miralax 17 grams daily-to-twice daily as needed to have a regular, soft BM without straining - you can adjust how often you need miralax, but start with daily dosing and go from there  - I recommend drinking at least 60-80 ounces of water daily unless you have a medical condition that requires fluid restriction

## 2024-05-01 ENCOUNTER — OFFICE VISIT (OUTPATIENT)
Dept: FAMILY MEDICINE | Facility: CLINIC | Age: 54
End: 2024-05-01
Payer: COMMERCIAL

## 2024-05-01 ENCOUNTER — CLINICAL SUPPORT (OUTPATIENT)
Dept: REHABILITATION | Facility: HOSPITAL | Age: 54
End: 2024-05-01
Payer: COMMERCIAL

## 2024-05-01 VITALS
DIASTOLIC BLOOD PRESSURE: 85 MMHG | TEMPERATURE: 99 F | HEART RATE: 71 BPM | SYSTOLIC BLOOD PRESSURE: 143 MMHG | WEIGHT: 266.38 LBS | HEIGHT: 66 IN | BODY MASS INDEX: 42.81 KG/M2 | OXYGEN SATURATION: 98 %

## 2024-05-01 DIAGNOSIS — J06.9 VIRAL URI: Primary | ICD-10-CM

## 2024-05-01 DIAGNOSIS — R53.1 DECREASED STRENGTH: ICD-10-CM

## 2024-05-01 DIAGNOSIS — R52 PAIN: Primary | ICD-10-CM

## 2024-05-01 LAB
CTP QC/QA: YES
SARS-COV-2 RDRP RESP QL NAA+PROBE: NEGATIVE

## 2024-05-01 PROCEDURE — 3079F DIAST BP 80-89 MM HG: CPT | Mod: ,,,

## 2024-05-01 PROCEDURE — 1160F RVW MEDS BY RX/DR IN RCRD: CPT | Mod: ,,,

## 2024-05-01 PROCEDURE — 99212 OFFICE O/P EST SF 10 MIN: CPT | Mod: ,,,

## 2024-05-01 PROCEDURE — 97110 THERAPEUTIC EXERCISES: CPT | Mod: CQ

## 2024-05-01 PROCEDURE — 3077F SYST BP >= 140 MM HG: CPT | Mod: ,,,

## 2024-05-01 PROCEDURE — 3008F BODY MASS INDEX DOCD: CPT | Mod: ,,,

## 2024-05-01 PROCEDURE — 97112 NEUROMUSCULAR REEDUCATION: CPT | Mod: CQ

## 2024-05-01 PROCEDURE — 1159F MED LIST DOCD IN RCRD: CPT | Mod: ,,,

## 2024-05-01 PROCEDURE — 87635 SARS-COV-2 COVID-19 AMP PRB: CPT | Mod: QW,,,

## 2024-05-01 NOTE — PATIENT INSTRUCTIONS
Covid test negative  Take zyrtec d over the counter.  Flonase  Zinc, vitamin d, vitamin c otc  Use saline nose sprays  Cool mist humidifier with distilled water  Warm salt water gargles  Warm tea with honey and lemon  Chloraseptic spray OTC.   Tylenol/ibuprofen for pain/fever greater than 100.4  Increase water intake.   Return to clinic if there is no improvement.

## 2024-05-01 NOTE — PROGRESS NOTES
PATIENT NAME: Stuart Cloud  : 1970  DATE: 24  MRN: [id number]      Reason for Visit / Chief Complaint: covid exposure (Exam A)       Update PCP  Update Chief Complaint         History of Present Illness / Problem Focused Workflow     Stuart Cloud presents to the clinic with covid exposure (Exam A)     Presents to clinic with runny nose, congestion, and scratchy throat starting last night. Denies fever/chills.     Review of Systems     @Review of Systems   Constitutional:  Negative for chills and fever.   HENT:  Positive for rhinorrhea, sneezing and sore throat. Negative for ear discharge, ear pain, trouble swallowing and voice change.    Eyes:  Negative for visual disturbance.   Respiratory:  Positive for cough. Negative for chest tightness, shortness of breath and wheezing.    Cardiovascular:  Negative for chest pain and palpitations.   Gastrointestinal:  Negative for diarrhea, nausea and vomiting.   Genitourinary:  Negative for decreased urine volume, dysuria and flank pain.   Musculoskeletal:  Negative for neck pain and neck stiffness.   Integumentary:  Negative for rash and wound.   Neurological:  Negative for weakness and headaches.   Hematological:  Negative for adenopathy.     Medical / Social / Family History     Past Medical History:   Diagnosis Date    Arthritis     Class 3 severe obesity due to excess calories without serious comorbidity with body mass index (BMI) of 40.0 to 44.9 in adult 2023    Essential hypertension 2023    History of gastric ulcer 2023    Hypothyroidism     Vestibular dizziness 10/23/2023       Past Surgical History:   Procedure Laterality Date    ARTHROSCOPY OF KNEE Right 2023    Procedure: ARTHROSCOPY, KNEE;  Surgeon: Tacos Aburto MD;  Location: [facility];  Service: Orthopedics;  Laterality: Right;    HYSTERECTOMY      JOINT REPLACEMENT Right     hip replacement    KNEE ARTHROPLASTY Left     KNEE ARTHROSCOPY W/  MENISCECTOMY Right 05/25/2023    Procedure: ARTHROSCOPY, KNEE, WITH MENISCECTOMY;  Surgeon: Tacos Aburto MD;  Location: [facility];  Service: Orthopedics;  Laterality: Right;    LUMPECTOMY, BREAST         Social History  Ms.  reports that she has never smoked. She has never been exposed to tobacco smoke. She has never used smokeless tobacco. She reports that she does not drink alcohol and does not use drugs.    Family History  Ms.'s family history includes Diabetes in her mother; Hypertension in her maternal grandmother and mother; Stroke in her maternal grandfather, maternal grandmother, and mother.    Medications and Allergies     Medications  Current Outpatient Medications   Medication Sig Dispense Refill    clonazePAM (KLONOPIN) 0.5 MG tablet Take 1 tablet (0.5 mg total) by mouth 2 (two) times daily as needed for Anxiety. 30 tablet 2    cyclobenzaprine (FLEXERIL) 10 MG tablet Take 1 tablet (10 mg total) by mouth 3 (three) times daily as needed for Muscle spasms. 30 tablet 2    gabapentin (NEURONTIN) 300 MG capsule Take 1 capsule (300 mg total) by mouth 3 (three) times daily as needed (nuropathy). 90 capsule 5    levothyroxine (SYNTHROID) 25 MCG tablet Take 1 tablet (25 mcg total) by mouth before breakfast. 30 tablet 2    losartan-hydrochlorothiazide 50-12.5 mg (HYZAAR) 50-12.5 mg per tablet Take 1 tablet by mouth once daily. 90 tablet 3    naproxen (NAPROSYN) 500 MG tablet Take 1 tablet (500 mg total) by mouth daily as needed. 30 tablet 0    ondansetron (ZOFRAN) 4 MG tablet Take 1 tablet (4 mg total) by mouth every 6 (six) hours. 12 tablet 0    pantoprazole (PROTONIX) 40 MG tablet Take 1 tablet (40 mg total) by mouth once daily. 90 tablet 3     No current facility-administered medications for this visit.     Facility-Administered Medications Ordered in Other Visits   Medication Dose Route Frequency Provider Last Rate Last Admin    0.9%  NaCl infusion   Intravenous Continuous Tacos Aburto,  MD 75 mL/hr at 05/25/23 1709 New Bag at 05/25/23 1709       Allergies  Review of patient's allergies indicates:   Allergen Reactions    Percocet [oxycodone-acetaminophen] Itching    Latex     Opioids - morphine analogues Hives    Sulfa (sulfonamide antibiotics) Rash    Zithromax z-casimiro [azithromycin] Rash       Physical Examination     Vitals:    05/01/24 1006   BP: (!) 143/85   Pulse: 71   Temp: 98.6 °F (37 °C)     Physical Exam  Vitals and nursing note reviewed.   Constitutional:       Appearance: Normal appearance. She is obese.   HENT:      Head: Normocephalic.      Right Ear: External ear normal.      Left Ear: External ear normal.      Nose: Congestion and rhinorrhea present. Rhinorrhea is clear.      Right Turbinates: Swollen.      Left Turbinates: Swollen.      Mouth/Throat:      Mouth: Mucous membranes are moist.      Pharynx: Oropharynx is clear. Uvula midline. Posterior oropharyngeal erythema present. No pharyngeal swelling, oropharyngeal exudate or uvula swelling.      Tonsils: No tonsillar exudate or tonsillar abscesses. 1+ on the right. 1+ on the left.   Eyes:      Extraocular Movements: Extraocular movements intact.      Conjunctiva/sclera: Conjunctivae normal.      Pupils: Pupils are equal, round, and reactive to light.   Cardiovascular:      Rate and Rhythm: Normal rate and regular rhythm.      Pulses: Normal pulses.      Heart sounds: Normal heart sounds.   Pulmonary:      Effort: Pulmonary effort is normal.      Breath sounds: Normal breath sounds.   Abdominal:      General: Abdomen is flat. Bowel sounds are normal.      Palpations: Abdomen is soft.   Musculoskeletal:         General: Normal range of motion.      Cervical back: Normal range of motion and neck supple.   Skin:     General: Skin is warm and dry.      Capillary Refill: Capillary refill takes less than 2 seconds.   Neurological:      General: No focal deficit present.      Mental Status: She is alert and oriented to person, place,  and time.   Psychiatric:         Mood and Affect: Mood normal.         Behavior: Behavior normal.         Thought Content: Thought content normal.         Judgment: Judgment normal.             Lab Results   Component Value Date    WBC 4.88 04/24/2024    HGB 13.1 04/24/2024    HCT 40.2 04/24/2024    MCV 89.9 04/24/2024     04/24/2024          Sodium   Date Value Ref Range Status   04/24/2024 144 136 - 145 mmol/L Final     Potassium   Date Value Ref Range Status   04/24/2024 3.9 3.5 - 5.1 mmol/L Final     Chloride   Date Value Ref Range Status   04/24/2024 113 (H) 98 - 107 mmol/L Final     CO2   Date Value Ref Range Status   04/24/2024 25 21 - 32 mmol/L Final     Glucose   Date Value Ref Range Status   04/24/2024 100 74 - 106 mg/dL Final     BUN   Date Value Ref Range Status   04/24/2024 17 7 - 18 mg/dL Final     Creatinine   Date Value Ref Range Status   04/24/2024 0.85 0.55 - 1.02 mg/dL Final     Calcium   Date Value Ref Range Status   04/24/2024 9.0 8.5 - 10.1 mg/dL Final     Total Protein   Date Value Ref Range Status   04/24/2024 6.0 (L) 6.4 - 8.2 g/dL Final     Albumin   Date Value Ref Range Status   04/24/2024 3.3 (L) 3.5 - 5.0 g/dL Final     Bilirubin, Total   Date Value Ref Range Status   04/24/2024 0.3 >0.0 - 1.2 mg/dL Final     Alk Phos   Date Value Ref Range Status   04/24/2024 77 41 - 108 U/L Final     AST   Date Value Ref Range Status   04/24/2024 32 15 - 37 U/L Final     ALT   Date Value Ref Range Status   04/24/2024 26 13 - 56 U/L Final     Anion Gap   Date Value Ref Range Status   04/24/2024 10 7 - 16 mmol/L Final      Procedures   Assessment and Plan (including Health Maintenance)      Problem List  Smart Sets  Document Outside HM   :    Plan:   Problem List Items Addressed This Visit          ENT    Viral URI - Primary    Current Assessment & Plan     Covid test negative  Take zyrtec d over the counter.  Flonase  Zinc, vitamin d, vitamin c otc  Use saline nose sprays  Cool mist humidifier  with distilled water  Warm salt water gargles  Warm tea with honey and lemon  Chloraseptic spray OTC.   Tylenol/ibuprofen for pain/fever greater than 100.4  Increase water intake.   Return to clinic if there is no improvement.          Relevant Orders    POCT COVID-19 Rapid Screening (Completed)       Health Maintenance Topics with due status: Not Due       Topic Last Completion Date    Influenza Vaccine 11/03/2022    Hemoglobin A1c (Diabetic Prevention Screening) 03/29/2023    Lipid Panel 03/29/2023          Clinical Reference Documents Added to Patient Instructions         Document    VIRAL UPPER RESPIRATORY INFECTION DISCHARGE INSTRUCTIONS, ADULT (ENGLISH)            Future Appointments   Date Time Provider Department Center   5/1/2024  4:00 PM Marilynn Munroe PTA [facility]    5/2/2024  9:00 AM [facility]  NM2 [facility]    5/6/2024  4:00 PM Marilynn Munroe PTA [facility]    5/9/2024  4:00 PM Marilynn Munroe PTA [facility]    5/13/2024  2:00 PM [facility] GI ROOM 01 [facility]   5/14/2024  8:30 AM Sage Ordonez MD OB NEURO [facility]   6/3/2024 12:30 PM [facility] ROOM 01 [facility]   6/24/2024  1:40 PM [facility] MAMMO2 [facility]   7/22/2024  4:00 PM Matthew Adams MD OB SPINE [facility]   10/30/2024 10:20 AM Elayne Santo FNP [facility] GASTR [facility]        Health Maintenance Due   Topic Date Due    Hepatitis C Screening  Never done    HIV Screening  Never done    TETANUS VACCINE  Never done    Mammogram  Never done    Colorectal Cancer Screening  Never done    Shingles Vaccine (1 of 2) Never done    COVID-19 Vaccine (1 - 2023-24 season) Never done        Follow up if symptoms worsen or fail to improve.     Signature: DAKOTA Lee        Date of encounter: 5/1/24

## 2024-05-01 NOTE — PROGRESS NOTES
[facility]   Physical Therapy Treatment Note      Name: Stuart Cloud  Clinic Number: [id number]    Therapy Diagnosis:   Encounter Diagnoses   Name Primary?    Pain Yes    Decreased strength        Physician: Maria L Phillips FNP-C    Visit Date: 5/1/2024    Physician Orders: PT Eval and Treat   Medical Diagnosis from Referral: Lumbar Radiculopathy  Evaluation Date: 3/28/2024  Authorization Period Expiration: 3/19/25  Plan of Care Expiration: 6/20/24  Visit # / Visits authorized: 10/ no limits    PTA Visit #: 2/5     Time In: 3:50 pm  Time Out: 4:28 pm  Total Billable Time: 38 minutes    Subjective     Pt reports: Getting injections on 5/7. The MD thinks it is more my hip and not the low back.     She was not compliant with home exercise program.    Pain: 7/10  Location: bilateral back      Objective      Objective Measures updated at progress report unless specified.     Treatment     Stuart received the treatments listed below:      therapeutic exercises to develop strength, endurance, ROM, flexibility, posture, and core stabilization for 13 minutes including:  Bike x 5 minutes  Calt stretch 3 x 30 seconds bilateral  Bilateral HS stretch on step 3 x 15 seconds  Sitting trunk ball stretch x 5 each direction  Sitting piriformis stretch 3 x 30 seconds bilateral  Added and discussed home exercise program (not today)    manual therapy techniques: Joint mobilizations, Manual traction, Myofacial release, Soft tissue Mobilization, and Friction Massage were applied to the: - for - minutes, including:  -    neuromuscular re-education activities to improve: Balance, Coordination, Kinesthetic, Sense, Proprioception, and Posture for 25 minutes. The following activities were included:  Cybex back extension 4pl x 40  Cybex leg press 4pl x 30 with right leg only  Cybex leg press bilateral 7pl x 30  Cybex hamstring curls 3 x 10 5pl NTV  Cybex hip abduction 2pl x 30 bilateral   Cybex hip  extension 2 pl x 30 bilateral   Cybex hip flexion 2pl 2 x 10 bilateral   Cybex quad 2pl x 30    therapeutic activities to improve functional performance for -  minutes, including:  -    gait training to improve functional mobility and safety for -  minutes, including:  -    direct contact modalities after being cleared for contraindications:     supervised modalities after being cleared for contradictions:     hot pack for - minutes to -.    cold pack for - minutes to -.    Patient Education and Home Exercises       Education provided:   - home exercise program given 4/15    Written Home Exercises Provided:  yes . Exercises were reviewed and Stuart was able to demonstrate them prior to the end of the session.  Stuart demonstrated good  understanding of the education provided. See EMR under Patient Instructions for exercises provided during therapy sessions    Assessment     Patient fatigues quickly with exercises. Focused on strengthening/stretching and stabilization exercises. Patient was able to complete added hip flexion with no increase in symptoms.  Will continue to progress as able.     Stuart Is progressing well towards her goals.   Pt prognosis is Good.     Pt will continue to benefit from skilled outpatient physical therapy to address the deficits listed in the problem list box on initial evaluation, provide pt/family education and to maximize pt's level of independence in the home and community environment.     Pt's spiritual, cultural and educational needs considered and pt agreeable to plan of care and goals.     Anticipated barriers to physical therapy: multiple deficits from previous surgeries    Goals: Short Term Goals: 6 weeks   Patient will be able to sleep ~ 3 hours without waking from pain  Patient will improve manual muscle test to 4-/5 for stabilization  Patient will maintain prolonged positions x 15 minutes with 4 /10 pain  Patient will report a change in status with low back pain from  constant to intermittent  Patient will report no left leg radicular symptoms by 6 weeks    Long Term Goals: 12 weeks   Patient will be able to sleep through the night without waking from pain  Patient will improve manual muscle test to 4/5 for stabilization   Patient will maintain prolonged positions x 30 minutes with  2/10 pain  Patient will be independent with home exercise program by D/C    Plan     Progress stabilization, strengthening exercises and progress toward goals     Marilynn Munroe, MAGALYS

## 2024-05-02 ENCOUNTER — TELEPHONE (OUTPATIENT)
Dept: FAMILY MEDICINE | Facility: CLINIC | Age: 54
End: 2024-05-02
Payer: COMMERCIAL

## 2024-05-02 ENCOUNTER — HOSPITAL ENCOUNTER (EMERGENCY)
Facility: HOSPITAL | Age: 54
Discharge: HOME | End: 2024-05-02
Payer: COMMERCIAL

## 2024-05-02 ENCOUNTER — HOSPITAL ENCOUNTER (OUTPATIENT)
Dept: RADIOLOGY | Facility: HOSPITAL | Age: 54
Discharge: HOME | End: 2024-05-02
Payer: COMMERCIAL

## 2024-05-02 VITALS
BODY MASS INDEX: 42.98 KG/M2 | TEMPERATURE: 98 F | HEART RATE: 72 BPM | WEIGHT: 266.31 LBS | DIASTOLIC BLOOD PRESSURE: 81 MMHG | SYSTOLIC BLOOD PRESSURE: 144 MMHG | RESPIRATION RATE: 18 BRPM | OXYGEN SATURATION: 99 %

## 2024-05-02 DIAGNOSIS — M54.9 BACK PAIN: ICD-10-CM

## 2024-05-02 DIAGNOSIS — R10.11 RUQ ABDOMINAL PAIN: ICD-10-CM

## 2024-05-02 DIAGNOSIS — K82.8 DYSFUNCTIONAL GALLBLADDER: Primary | ICD-10-CM

## 2024-05-02 DIAGNOSIS — R10.11 RIGHT UPPER QUADRANT PAIN: ICD-10-CM

## 2024-05-02 DIAGNOSIS — R94.8 ABNORMAL BILIARY HIDA SCAN: Primary | ICD-10-CM

## 2024-05-02 LAB
ALBUMIN SERPL BCP-MCNC: 2.9 G/DL (ref 3.5–5)
ALBUMIN/GLOB SERPL: 1 {RATIO}
ALP SERPL-CCNC: 62 U/L (ref 41–108)
ALT SERPL W P-5'-P-CCNC: 35 U/L (ref 13–56)
AMYLASE SERPL-CCNC: 38 U/L (ref 25–115)
ANION GAP SERPL CALCULATED.3IONS-SCNC: 8 MMOL/L (ref 7–16)
AST SERPL W P-5'-P-CCNC: 26 U/L (ref 15–37)
BACTERIA #/AREA URNS HPF: ABNORMAL /HPF
BASOPHILS # BLD AUTO: 0.03 K/UL (ref 0–0.2)
BASOPHILS NFR BLD AUTO: 0.6 % (ref 0–1)
BILIRUB SERPL-MCNC: 0.4 MG/DL (ref ?–1.2)
BILIRUB UR QL STRIP: NEGATIVE
BUN SERPL-MCNC: 11 MG/DL (ref 7–18)
BUN/CREAT SERPL: 17 (ref 6–20)
CALCIUM SERPL-MCNC: 9.1 MG/DL (ref 8.5–10.1)
CHLORIDE SERPL-SCNC: 110 MMOL/L (ref 98–107)
CLARITY UR: ABNORMAL
CO2 SERPL-SCNC: 28 MMOL/L (ref 21–32)
COLOR UR: ABNORMAL
CREAT SERPL-MCNC: 0.66 MG/DL (ref 0.55–1.02)
DIFFERENTIAL METHOD BLD: ABNORMAL
EGFR (NO RACE VARIABLE) (RUSH/TITUS): 106 ML/MIN/1.73M2
EOSINOPHIL # BLD AUTO: 0.15 K/UL (ref 0–0.5)
EOSINOPHIL NFR BLD AUTO: 2.8 % (ref 1–4)
ERYTHROCYTE [DISTWIDTH] IN BLOOD BY AUTOMATED COUNT: 12.6 % (ref 11.5–14.5)
GLOBULIN SER-MCNC: 3 G/DL (ref 2–4)
GLUCOSE SERPL-MCNC: 103 MG/DL (ref 74–106)
GLUCOSE UR STRIP-MCNC: NORMAL MG/DL
HCT VFR BLD AUTO: 36.6 % (ref 38–47)
HGB BLD-MCNC: 12.6 G/DL (ref 12–16)
IMM GRANULOCYTES # BLD AUTO: 0.02 K/UL (ref 0–0.04)
IMM GRANULOCYTES NFR BLD: 0.4 % (ref 0–0.4)
KETONES UR STRIP-SCNC: NEGATIVE MG/DL
LEUKOCYTE ESTERASE UR QL STRIP: ABNORMAL
LIPASE SERPL-CCNC: 15 U/L (ref 73–393)
LYMPHOCYTES # BLD AUTO: 1.35 K/UL (ref 1–4.8)
LYMPHOCYTES NFR BLD AUTO: 25.3 % (ref 27–41)
MCH RBC QN AUTO: 29.6 PG (ref 27–31)
MCHC RBC AUTO-ENTMCNC: 34.4 G/DL (ref 32–36)
MCV RBC AUTO: 85.9 FL (ref 80–96)
MONOCYTES # BLD AUTO: 0.38 K/UL (ref 0–0.8)
MONOCYTES NFR BLD AUTO: 7.1 % (ref 2–6)
MPC BLD CALC-MCNC: 10.9 FL (ref 9.4–12.4)
MUCOUS, UA: ABNORMAL /LPF
NEUTROPHILS # BLD AUTO: 3.41 K/UL (ref 1.8–7.7)
NEUTROPHILS NFR BLD AUTO: 63.8 % (ref 53–65)
NITRITE UR QL STRIP: NEGATIVE
NRBC # BLD AUTO: 0 X10E3/UL
NRBC, AUTO (.00): 0 %
PH UR STRIP: 8.5 PH UNITS
PLATELET # BLD AUTO: 226 K/UL (ref 150–400)
POTASSIUM SERPL-SCNC: 3.8 MMOL/L (ref 3.5–5.1)
PROT SERPL-MCNC: 5.9 G/DL (ref 6.4–8.2)
PROT UR QL STRIP: 10
RBC # BLD AUTO: 4.26 M/UL (ref 4.2–5.4)
RBC # UR STRIP: NEGATIVE /UL
RBC #/AREA URNS HPF: 4 /HPF
SODIUM SERPL-SCNC: 142 MMOL/L (ref 136–145)
SP GR UR STRIP: 1.02
SQUAMOUS #/AREA URNS LPF: ABNORMAL /HPF
TROPONIN I SERPL DL<=0.01 NG/ML-MCNC: <4 PG/ML
UROBILINOGEN UR STRIP-ACNC: NORMAL MG/DL
WBC # BLD AUTO: 5.34 K/UL (ref 4.5–11)
WBC #/AREA URNS HPF: 6 /HPF

## 2024-05-02 PROCEDURE — 99284 EMERGENCY DEPT VISIT MOD MDM: CPT | Mod: 25

## 2024-05-02 PROCEDURE — 99284 EMERGENCY DEPT VISIT MOD MDM: CPT | Mod: ,,, | Performed by: NURSE PRACTITIONER

## 2024-05-02 PROCEDURE — 25000003 PHARM REV CODE 250: Performed by: NURSE PRACTITIONER

## 2024-05-02 PROCEDURE — 82150 ASSAY OF AMYLASE: CPT | Performed by: NURSE PRACTITIONER

## 2024-05-02 PROCEDURE — 63600175 PHARM REV CODE 636 W HCPCS: Performed by: NURSE PRACTITIONER

## 2024-05-02 PROCEDURE — 96361 HYDRATE IV INFUSION ADD-ON: CPT

## 2024-05-02 PROCEDURE — 36415 COLL VENOUS BLD VENIPUNCTURE: CPT | Performed by: NURSE PRACTITIONER

## 2024-05-02 PROCEDURE — 78227 HEPATOBIL SYST IMAGE W/DRUG: CPT | Mod: TC

## 2024-05-02 PROCEDURE — 85025 COMPLETE CBC W/AUTO DIFF WBC: CPT | Performed by: NURSE PRACTITIONER

## 2024-05-02 PROCEDURE — 96375 TX/PRO/DX INJ NEW DRUG ADDON: CPT

## 2024-05-02 PROCEDURE — 83690 ASSAY OF LIPASE: CPT | Performed by: NURSE PRACTITIONER

## 2024-05-02 PROCEDURE — 81003 URINALYSIS AUTO W/O SCOPE: CPT | Performed by: NURSE PRACTITIONER

## 2024-05-02 PROCEDURE — 78227 HEPATOBIL SYST IMAGE W/DRUG: CPT | Mod: 26,,, | Performed by: RADIOLOGY

## 2024-05-02 PROCEDURE — 96374 THER/PROPH/DIAG INJ IV PUSH: CPT

## 2024-05-02 PROCEDURE — 93005 ELECTROCARDIOGRAM TRACING: CPT

## 2024-05-02 PROCEDURE — 93010 ELECTROCARDIOGRAM REPORT: CPT | Mod: ,,, | Performed by: HOSPITALIST

## 2024-05-02 PROCEDURE — A9537 TC99M MEBROFENIN: HCPCS

## 2024-05-02 PROCEDURE — 80053 COMPREHEN METABOLIC PANEL: CPT | Performed by: NURSE PRACTITIONER

## 2024-05-02 PROCEDURE — 84484 ASSAY OF TROPONIN QUANT: CPT | Performed by: NURSE PRACTITIONER

## 2024-05-02 RX ORDER — KETOROLAC TROMETHAMINE 30 MG/ML
30 INJECTION, SOLUTION INTRAMUSCULAR; INTRAVENOUS
Status: COMPLETED | OUTPATIENT
Start: 2024-05-02 | End: 2024-05-02

## 2024-05-02 RX ORDER — HYDROCODONE BITARTRATE AND ACETAMINOPHEN 7.5; 325 MG/1; MG/1
1 TABLET ORAL EVERY 6 HOURS PRN
Status: DISCONTINUED | OUTPATIENT
Start: 2024-05-02 | End: 2024-05-02 | Stop reason: HOSPADM

## 2024-05-02 RX ORDER — KIT FOR THE PREPARATION OF TECHNETIUM TC 99M MEBROFENIN 45 MG/10ML
5 INJECTION, POWDER, LYOPHILIZED, FOR SOLUTION INTRAVENOUS
Status: COMPLETED | OUTPATIENT
Start: 2024-05-02 | End: 2024-05-02

## 2024-05-02 RX ORDER — ONDANSETRON HYDROCHLORIDE 2 MG/ML
4 INJECTION, SOLUTION INTRAVENOUS
Status: COMPLETED | OUTPATIENT
Start: 2024-05-02 | End: 2024-05-02

## 2024-05-02 RX ORDER — ONDANSETRON 4 MG/1
4 TABLET, ORALLY DISINTEGRATING ORAL EVERY 6 HOURS PRN
Qty: 15 TABLET | Refills: 0 | Status: SHIPPED | OUTPATIENT
Start: 2024-05-02

## 2024-05-02 RX ADMIN — MEBROFENIN 5 MILLICURIE: 45 INJECTION, POWDER, LYOPHILIZED, FOR SOLUTION INTRAVENOUS at 09:05

## 2024-05-02 RX ADMIN — ONDANSETRON 4 MG: 2 INJECTION INTRAMUSCULAR; INTRAVENOUS at 02:05

## 2024-05-02 RX ADMIN — KETOROLAC TROMETHAMINE 30 MG: 30 INJECTION, SOLUTION INTRAMUSCULAR at 02:05

## 2024-05-02 RX ADMIN — HYDROCODONE BITARTRATE AND ACETAMINOPHEN 1 TABLET: 7.5; 325 TABLET ORAL at 07:05

## 2024-05-02 RX ADMIN — SODIUM CHLORIDE 1000 ML: 9 INJECTION, SOLUTION INTRAVENOUS at 02:05

## 2024-05-02 NOTE — TELEPHONE ENCOUNTER
----- Message from DAKOTA Lee sent at 5/2/2024  1:32 PM CDT -----  Her gallbladder is underfunctioning. I have placed a referral to see the general surgeon to discuss treatment options.

## 2024-05-02 NOTE — ED PROVIDER NOTES
Encounter Date: 5/2/2024       History     Chief Complaint   Patient presents with    Weakness    Dizziness    Nausea     Symptoms began after HIDA scan Procedure this morning     Patient presented to the ER with complaints of right upper quadrant abdominal pain, nausea/vomiting.  Patient states this started last year and has been intermittent.  Over the past 3 months it is progressively gotten worse.  Patient reports she has been worked up numerous times in the ER and primary care provider's office without relief of her symptoms.  Patient reports she was HIDA scan today and was told that her gallbladder was not functioning properly.  Patient reports she was brought to the ER by EMS it was states she needs to have something done for her pain.  She denies fever.  No dysuria.    The history is provided by the patient. No  was used.     Review of patient's allergies indicates:   Allergen Reactions    Percocet [oxycodone-acetaminophen] Itching    Latex     Opioids - morphine analogues Hives    Sulfa (sulfonamide antibiotics) Rash    Zithromax z-casimiro [azithromycin] Rash     Past Medical History:   Diagnosis Date    Arthritis     Class 3 severe obesity due to excess calories without serious comorbidity with body mass index (BMI) of 40.0 to 44.9 in adult 03/20/2023    Essential hypertension 03/20/2023    History of gastric ulcer 05/01/2023    Hypothyroidism     Vestibular dizziness 10/23/2023     Past Surgical History:   Procedure Laterality Date    ARTHROSCOPY OF KNEE Right 05/25/2023    Procedure: ARTHROSCOPY, KNEE;  Surgeon: Tacos Aburto MD;  Location: [facility];  Service: Orthopedics;  Laterality: Right;    HYSTERECTOMY      JOINT REPLACEMENT Right     hip replacement    KNEE ARTHROPLASTY Left     KNEE ARTHROSCOPY W/ MENISCECTOMY Right 05/25/2023    Procedure: ARTHROSCOPY, KNEE, WITH MENISCECTOMY;  Surgeon: Tacos Aburto MD;  Location: [facility];  Service: Orthopedics;   Laterality: Right;    LUMPECTOMY, BREAST       Family History   Problem Relation Name Age of Onset    Hypertension Maternal Grandmother      Stroke Maternal Grandmother      Stroke Maternal Grandfather      Hypertension Mother      Stroke Mother      Diabetes Mother       Social History     Tobacco Use    Smoking status: Never     Passive exposure: Never    Smokeless tobacco: Never   Substance Use Topics    Alcohol use: Never    Drug use: Never     Review of Systems   Constitutional:  Positive for activity change, appetite change and fatigue.   Gastrointestinal:  Positive for abdominal distention, abdominal pain, nausea and vomiting.   All other systems reviewed and are negative.      Physical Exam     Initial Vitals   BP Pulse Resp Temp SpO2   05/02/24 1243 05/02/24 1243 05/02/24 1441 05/02/24 1243 05/02/24 1243   (!) 153/88 63 16 97.5 °F (36.4 °C) 99 %      MAP       --                Physical Exam    Nursing note and vitals reviewed.  Constitutional: She appears well-developed and well-nourished.   HENT:   Head: Normocephalic.   Nose: Nose normal.   Mouth/Throat: Oropharynx is clear and moist.   Eyes: Conjunctivae and EOM are normal.   Neck: Neck supple.   Normal range of motion.  Cardiovascular:  Normal rate, normal heart sounds and intact distal pulses.           Pulmonary/Chest: Breath sounds normal.   Abdominal: Abdomen is soft and protuberant. Bowel sounds are normal. There is abdominal tenderness in the right upper quadrant and epigastric area. There is positive Trivedi's sign.   Musculoskeletal:         General: Normal range of motion.      Cervical back: Normal range of motion and neck supple.     Neurological: She is alert and oriented to person, place, and time. She has normal strength. GCS score is 15. GCS eye subscore is 4. GCS verbal subscore is 5. GCS motor subscore is 6.   Skin: Skin is warm and dry. Capillary refill takes less than 2 seconds.   Psychiatric: She has a normal mood and affect. Her  behavior is normal. Judgment and thought content normal.         Medical Screening Exam   See Full Note    ED Course   Procedures  Labs Reviewed   COMPREHENSIVE METABOLIC PANEL - Abnormal; Notable for the following components:       Result Value    Chloride 110 (*)     Total Protein 5.9 (*)     Albumin 2.9 (*)     All other components within normal limits   URINALYSIS, REFLEX TO URINE CULTURE - Abnormal; Notable for the following components:    pH, UA 8.5 (*)     Leukocytes, UA Small (*)     Protein, UA 10 (*)     All other components within normal limits   LIPASE - Abnormal; Notable for the following components:    Lipase 15 (*)     All other components within normal limits   CBC WITH DIFFERENTIAL - Abnormal; Notable for the following components:    Hematocrit 36.6 (*)     Lymphocytes % 25.3 (*)     Monocytes % 7.1 (*)     All other components within normal limits   URINALYSIS, MICROSCOPIC - Abnormal; Notable for the following components:    WBC, UA 6 (*)     RBC, UA 4 (*)     Bacteria, UA Occasional (*)     Squamous Epithelial Cells, UA Few (*)     Mucous Occasional (*)     All other components within normal limits   AMYLASE - Normal   TROPONIN I - Normal   CBC W/ AUTO DIFFERENTIAL    Narrative:     The following orders were created for panel order CBC auto differential.  Procedure                               Abnormality         Status                     ---------                               -----------         ------                     CBC with Differential[[id number]]       Abnormal            Final result                 Please view results for these tests on the individual orders.          Imaging Results    None          Medications   sodium chloride 0.9% bolus 1,000 mL 1,000 mL (0 mLs Intravenous Stopped 5/2/24 1508)   ondansetron injection 4 mg (4 mg Intravenous Given 5/2/24 1408)   ketorolac injection 30 mg (30 mg Intravenous Given 5/2/24 1440)     Medical Decision Making  Patient presented to the ER  with complaints of right upper quadrant abdominal pain, nausea/vomiting.  Patient states this started last year and has been intermittent.  Over the past 3 months it is progressively gotten worse.  Patient reports she has been worked up numerous times in the ER and primary care provider's office without relief of her symptoms.  Patient reports she was HIDA scan today and was told that her gallbladder was not functioning properly.  Patient reports she was brought to the ER by EMS it was states she needs to have something done for her pain.  She denies fever.  No dysuria.      Amount and/or Complexity of Data Reviewed  External Data Reviewed: notes.     Details: HIDA scan results from today reviewed.  All bladder functioning at 8%  Labs: ordered. Decision-making details documented in ED Course.  Discussion of management or test interpretation with external provider(s): Dr. Dempsey consulted, recommendation to have patient follow up in general surgery clinic outpatient.     Patient was discharged home with diagnosis of right upper quadrant pain and abnormal biliary HIDA scan.  Patient was given referral to follow up in general surgery clinic.  Patient was instructed to call office in a.m. to schedule the appointment.  She was also given prescription for Zofran to treat her nausea vomiting.  She was instructed to use clear liquid diet until symptoms resolve and progress diet as tolerated.  Patient verbalizes understanding agrees with plan of care.    Risk  Prescription drug management.                                      Clinical Impression:   Final diagnoses:  [M54.9] Back pain  [R94.8] Abnormal biliary HIDA scan (Primary)  [R10.11] Right upper quadrant pain        ED Disposition Condition    Discharge Stable          ED Prescriptions       Medication Sig Dispense Start Date End Date Auth. Provider    ondansetron (ZOFRAN-ODT) 4 MG TbDL Take 1 tablet (4 mg total) by mouth every 6 (six) hours as needed (nausea and  diarrhea). 15 tablet 5/2/2024 -- rTan Ortega, SHANICE          Follow-up Information       Follow up With Specialties Details Why Contact Info    Mao Dempsey, DO General Surgery, Surgery Schedule an appointment as soon as possible for a visit   [address] [facility] [address]  [phone]               Tran Ortega FNP  05/07/24 0120

## 2024-05-02 NOTE — DISCHARGE INSTRUCTIONS
Clear liquid diet for 24 hours.  Advance as tolerated.  Call Dr. Dempsey's office to schedule follow up appointment in general surgery clinic.  Return to the ER with new or worsening symptoms.

## 2024-05-02 NOTE — Clinical Note
"Stuart Recio" Pedro Luis was seen and treated in our emergency department on 5/2/2024.  She may return to work on 05/06/2024.       If you have any questions or concerns, please don't hesitate to call.      Tran Ortega, FNP"

## 2024-05-06 ENCOUNTER — TELEPHONE (OUTPATIENT)
Dept: SURGERY | Facility: CLINIC | Age: 54
End: 2024-05-06
Payer: COMMERCIAL

## 2024-05-06 ENCOUNTER — OFFICE VISIT (OUTPATIENT)
Dept: SURGERY | Facility: CLINIC | Age: 54
End: 2024-05-06
Payer: COMMERCIAL

## 2024-05-06 ENCOUNTER — CLINICAL SUPPORT (OUTPATIENT)
Dept: CARDIOLOGY | Facility: CLINIC | Age: 54
End: 2024-05-06
Payer: COMMERCIAL

## 2024-05-06 DIAGNOSIS — K82.8 BILIARY DYSKINESIA: ICD-10-CM

## 2024-05-06 DIAGNOSIS — Z01.818 PRE-PROCEDURAL EXAMINATION: Primary | ICD-10-CM

## 2024-05-06 DIAGNOSIS — Z01.818 PRE-PROCEDURAL EXAMINATION: ICD-10-CM

## 2024-05-06 DIAGNOSIS — R94.8 ABNORMAL BILIARY HIDA SCAN: ICD-10-CM

## 2024-05-06 PROCEDURE — 99215 OFFICE O/P EST HI 40 MIN: CPT | Mod: PBBFAC | Performed by: STUDENT IN AN ORGANIZED HEALTH CARE EDUCATION/TRAINING PROGRAM

## 2024-05-06 PROCEDURE — 99212 OFFICE O/P EST SF 10 MIN: CPT | Mod: PBBFAC

## 2024-05-06 PROCEDURE — 1159F MED LIST DOCD IN RCRD: CPT | Mod: ,,, | Performed by: STUDENT IN AN ORGANIZED HEALTH CARE EDUCATION/TRAINING PROGRAM

## 2024-05-06 PROCEDURE — 99204 OFFICE O/P NEW MOD 45 MIN: CPT | Mod: S$PBB,,, | Performed by: STUDENT IN AN ORGANIZED HEALTH CARE EDUCATION/TRAINING PROGRAM

## 2024-05-06 RX ORDER — INDOCYANINE GREEN AND WATER 25 MG
6.25 KIT INJECTION ONCE
Status: CANCELLED | OUTPATIENT
Start: 2024-05-09

## 2024-05-06 RX ORDER — CEFAZOLIN SODIUM 2 G/50ML
2 SOLUTION INTRAVENOUS
Status: CANCELLED | OUTPATIENT
Start: 2024-05-06

## 2024-05-06 RX ORDER — SODIUM CHLORIDE 9 MG/ML
INJECTION, SOLUTION INTRAVENOUS CONTINUOUS
Status: CANCELLED | OUTPATIENT
Start: 2024-05-06

## 2024-05-06 NOTE — PATIENT INSTRUCTIONS
[facility]      Your surgery is scheduled for 5/9/24 at [facility] on the ground floor of the Ambulatory building. You should arrive at 0530 at the Ambulatory Care Center located at [address].                                                                                                                                                                                                                                                                                                                                                           Preoperative Instructions        Your pre-op lab work will be Today on the 1st floor of the [facility] building.  EKG is located on 2nd floor of [facility].                                                                                                                                             Day of Surgery Instructions      Bring a list of all your medications with you the day of your surgery. You can also give the list to your doctor or nurse during your final clinic appointment before surgery.      Do not eat any solid foods or drink any liquids after 12:00 AM (midnight). This includes gum, hard candy, mints, and chewing tobacco.  Medications: Take any medications specified with a small sip of water the morning of your surgery.  Brush your teeth: You may brush your teeth and rinse your mouth. Do not swallow any water or toothpaste.  Clothing: A button front shirt and loose-fitting clothes are the most comfortable before and after surgery. We also recommend low-heeled shoes.  Hair: Avoid buns, ponytails, or hairpieces at the back of the head. Remove or avoid any clips, pins or bands that bind hair. Do not use hairspray. Before going to surgery, you will need to remove any wigs or hairpieces.  We will cover your hair during surgery. Your privacy regarding personal appearance will be respected.  Fingernails: Please be sure to  remove all nail polish before you arrive for surgery. We understand that tips, wraps, gels, etc., are expensive; however, we ask these products to be removed from at least one finger on each hand. Your fingertips are used to accurately monitor your oxygen level during surgery by a device called an oximeter.  Glasses and Contact Lenses: Wear glasses when possible. If contact lenses must be worn, bring a lens case and solution. If glasses are worn, bring a case for them.  Hearing Aids: If you rely on a hearing aid, wear it to the hospital on the day of surgery. This will ensure you can hear and understand everything we need to communicate with you.  Valuables: Jewelry, including body piercings, Dentures, money, and credit cards should be left at home. [facility] is not responsible for valuables that are not secured in our surgery center.  Makeup, Perfume, Creams, Lotions and Deodorants: Do not use any of these products on the day of surgery. Remove false eyelashes prior to surgery.  Implanted Medical Devices: If you have an implanted device, such as a pacemaker or AICD, bring the device information card (if you have it) with you.  Medical Equipment: If you have been fitted for a brace to wear after surgery or you have been given crutches, bring those with you to the surgery center.  Shower: Take a shower with Hibiclens® (chlorhexidine) (available over the counter). This reduces the chance of infection. PLEASE USE CHLORHEXIDINE WASH THE NIGHT BEFORE SURGERY AND THE MORNING OF SURGERY.      Medication instructions:  You may take blood pressure medication with a small drink of water the morning of surgery.    Stop taking blood thinners 7 days before surgery.    IF YOU ARE ON ANY OF THESE BLOOD THINNERS, MAKE SURE YOUR PHYSICIAN IS AWARE.  Eliquis/Apixaban            Wafarin/Coumadin,Jantoven  Xarelto/Rivaroxaban      Pletal/Cilostazol  Plavix/Clopidogrel          Pradaxa/Dibigatran      If you are diabetic      Follow the  diabetic medicine instructions you received during your pre-operative visit.  DO NOT take your insulin or diabetic medications the morning of surgery.  When you arrive at the surgical center, be sure to tell the nurse you are diabetic.    The following blood sugar medications have to be stopped prior to surgery:    Hold 24 hours prior to surgery:    Libraglutide - Saxenda, Victoza  Lixisenatide --Adlxyin  Exenatide  --  Byetta  Empaglifozin--Jardiance  Sitaglitin--Januvia    Hold 1 week prior to surgery:    Semaglutide - Ozempic, Wegouy, Rybelsus  Dulaglutide - Trulicity  Tirzepatide - Mounjaro  Exenatide (extended release inj)-- Bydureon BCise      Hold 48 hours prior to surgery:    Metformin, Glucovance, Metaglip, Fortamet, Glucophage, Riomet, Avandamet, Glimepiride            Other Items to bring with you and know      Insurance card  Identification card such as 's license, passport, or other picture ID  Copy of your advance directives  List of medications and allergies, if not already provided  Name and phone number of person to contact if your condition changes significantly. YOU CANNOT DRIVE YOURSELF HOME FROM THE HOSPITAL THE DAY OF SURGERY.  PLEASE UNDERSTAND THAT OUR OFFICE DOES NOT GIVE PATHOLOGY RESULTS OR TEST RESULTS OVER THE PHONE. THIS WILL BE DISCUSSED WITH YOU ON YOUR FOLLOW UP APPOINTMENT.          Alcohol and Surgery  We want to help you prepare for and recover from surgery as quickly and safely as possible. Be open and honest with your provider about how many drinks you have per day. Excessive alcohol use is defined as drinking more than three drinks per day. It can affect the outcome of your surgery. Binge drinking (consuming large amounts of alcohol infrequently, such as on weekends) can also affect the outcome of your surgery.  Alcohol withdrawal  If you drink more than three drinks a day, you could have a complication, called alcohol withdrawal, after surgery.  Alcohol withdrawal is a  set of symptoms that people have when they suddenly stop drinking after using alcohol  for a long time. During withdrawal, a person's central nervous system overreacts. This can cause mild symptoms such as shakiness, sweating or hallucinating. It can also cause other more serious side effects. If not treated properly, alcohol withdrawal can cause potentially life-threatening complications after surgery. This can include tremors, seizures, hallucinations, delirium tremors, and even death. Untreated alcohol withdrawal often leads to a longer stay in the hospital, potentially in the Intensive Care Unit.  Chronic heavy drinking also can interfere with several organ systems and biochemical processes in the body.  This interference can cause serious, even life-threatening complications.  Your care team can offer alcohol withdrawal treatment to help:  Decrease the risk of seizures and delirium tremors after surgery  Decrease the risk we will need to restrain you for your own safety or the safety of others  Decrease your risk of falling after surgery  Reduce the use of potent sedative medications  Reduce the time you stay in the hospital after surgery  Reduce the time you might spend on a mechanical ventilator to help you breathe  Lower incidence of organ failure and biochemical complications  Talk to a member of your care team or your primary care physician about your alcohol use if you feel you may be at risk of any of these complications.        Smoking and Surgery  Quitting smoking is extremely important for a successful surgery and recovery. Cigarette smoking compromises your immune system. This increases your risk of an infection after surgery. Quitting the habit before surgery will decrease the surgical risks associated with smoking.

## 2024-05-06 NOTE — TELEPHONE ENCOUNTER
----- Message from Dorothy Carpenter sent at 5/6/2024 12:51 PM CDT -----  Regarding: Work excuse  Pt need a work excuse for today and when do need to go back to work. Call back # [phone]    Spoke with pt, states she will be back to get work excuse for today. Informed pt that work excuse will be at .

## 2024-05-06 NOTE — PROGRESS NOTES
History & Physical    Subjective     History of Present Illness:  54-year-old  female presented to the clinic with complaints of right upper quadrant abdominal pain, nausea/vomiting.  Patient states this started last year and has been intermittent.  Over the past 3 months it is progressively gotten worse.  Now whenever she eats, she develops pain.  Patient had a CT scan of the abdomen pelvis which came back unremarkable, ultrasound of the gallbladder showed normal gallbladder appearance, but a positive Trivedi sign; common bile duct around 4 mm.  Liver enzymes unremarkable.  Patient then had a HIDA scan which showed ejection fraction of 8%, ruling in biliary dyskinesia.  Past surgical history of partial hysterectomy, diagnostic laparoscopy.  Currently, Denies any chest pain/shortness of breath, nausea/vomiting/diarrhea, fever/chills.    Chief Complaint   Patient presents with    Consult       Review of patient's allergies indicates:   Allergen Reactions    Percocet [oxycodone-acetaminophen] Itching    Latex     Opioids - morphine analogues Hives    Sulfa (sulfonamide antibiotics) Rash    Zithromax z-casimiro [azithromycin] Rash       Current Outpatient Medications   Medication Sig Dispense Refill    clonazePAM (KLONOPIN) 0.5 MG tablet Take 1 tablet (0.5 mg total) by mouth 2 (two) times daily as needed for Anxiety. 30 tablet 2    cyclobenzaprine (FLEXERIL) 10 MG tablet Take 1 tablet (10 mg total) by mouth 3 (three) times daily as needed for Muscle spasms. 30 tablet 2    gabapentin (NEURONTIN) 300 MG capsule Take 1 capsule (300 mg total) by mouth 3 (three) times daily as needed (nuropathy). 90 capsule 5    levothyroxine (SYNTHROID) 25 MCG tablet Take 1 tablet (25 mcg total) by mouth before breakfast. 30 tablet 2    losartan-hydrochlorothiazide 50-12.5 mg (HYZAAR) 50-12.5 mg per tablet Take 1 tablet by mouth once daily. 90 tablet 3    naproxen (NAPROSYN) 500 MG tablet Take 1 tablet (500 mg total) by mouth  daily as needed. 30 tablet 0    ondansetron (ZOFRAN) 4 MG tablet Take 1 tablet (4 mg total) by mouth every 6 (six) hours. 12 tablet 0    ondansetron (ZOFRAN-ODT) 4 MG TbDL Take 1 tablet (4 mg total) by mouth every 6 (six) hours as needed (nausea and diarrhea). 15 tablet 0    pantoprazole (PROTONIX) 40 MG tablet Take 1 tablet (40 mg total) by mouth once daily. 90 tablet 3     No current facility-administered medications for this visit.     Facility-Administered Medications Ordered in Other Visits   Medication Dose Route Frequency Provider Last Rate Last Admin    0.9%  NaCl infusion   Intravenous Continuous Tacos Aburto MD 75 mL/hr at 05/25/23 1709 New Bag at 05/25/23 1709       Past Medical History:   Diagnosis Date    Arthritis     Class 3 severe obesity due to excess calories without serious comorbidity with body mass index (BMI) of 40.0 to 44.9 in adult 03/20/2023    Essential hypertension 03/20/2023    History of gastric ulcer 05/01/2023    Hypothyroidism     Vestibular dizziness 10/23/2023     Past Surgical History:   Procedure Laterality Date    ARTHROSCOPY OF KNEE Right 05/25/2023    Procedure: ARTHROSCOPY, KNEE;  Surgeon: Tacos Aburto MD;  Location: [facility] OR;  Service: Orthopedics;  Laterality: Right;    HYSTERECTOMY      JOINT REPLACEMENT Right     hip replacement    KNEE ARTHROPLASTY Left     KNEE ARTHROSCOPY W/ MENISCECTOMY Right 05/25/2023    Procedure: ARTHROSCOPY, KNEE, WITH MENISCECTOMY;  Surgeon: Tacos Aburto MD;  Location: [facility] OR;  Service: Orthopedics;  Laterality: Right;    LUMPECTOMY, BREAST       Family History   Problem Relation Name Age of Onset    Hypertension Maternal Grandmother      Stroke Maternal Grandmother      Stroke Maternal Grandfather      Hypertension Mother      Stroke Mother      Diabetes Mother       Social History     Tobacco Use    Smoking status: Never     Passive exposure: Never    Smokeless tobacco: Never   Substance Use Topics    Alcohol  use: Never    Drug use: Never        Review of Systems:  Review of Systems   Constitutional: Negative.  Negative for fatigue and unexpected weight change.   HENT: Negative.  Negative for trouble swallowing.    Eyes: Negative.    Respiratory: Negative.  Negative for chest tightness and shortness of breath.    Cardiovascular: Negative.  Negative for chest pain.   Gastrointestinal:  Positive for abdominal pain. Negative for blood in stool, nausea and vomiting.   Endocrine: Negative.    Genitourinary: Negative.  Negative for hematuria.   Musculoskeletal: Negative.  Negative for back pain and myalgias.   Neurological: Negative.  Negative for dizziness, speech difficulty, weakness and light-headedness.   Psychiatric/Behavioral: Negative.  Negative for agitation and behavioral problems.           Objective     Vital Signs (Most Recent)              Physical Exam:  Physical Exam  Constitutional:       General: She is not in acute distress.     Appearance: Normal appearance.   HENT:      Head: Normocephalic.   Cardiovascular:      Rate and Rhythm: Normal rate.   Pulmonary:      Effort: Pulmonary effort is normal. No respiratory distress.   Abdominal:      General: There is no distension.      Tenderness: There is abdominal tenderness in the right upper quadrant. There is no guarding or rebound. Positive signs include Trivedi's sign.   Musculoskeletal:         General: Normal range of motion.   Skin:     General: Skin is warm.      Coloration: Skin is not jaundiced.   Neurological:      General: No focal deficit present.      Mental Status: She is alert and oriented to person, place, and time.      Cranial Nerves: No cranial nerve deficit.            Assessment and Plan   Biliary dyskinesia    PLAN:    To the OR for robotic cholecystectomy.      Risks and benefits explained with the patient including risks for infection, bleeding, injury to surrounding structures, hematoma/seroma formation with need for possible evacuation,  possible open.  The patient verbalized understanding, agrees and wishes to proceed with surgery.

## 2024-05-06 NOTE — H&P (VIEW-ONLY)
History & Physical    Subjective     History of Present Illness:  54-year-old  female presented to the clinic with complaints of right upper quadrant abdominal pain, nausea/vomiting.  Patient states this started last year and has been intermittent.  Over the past 3 months it is progressively gotten worse.  Now whenever she eats, she develops pain.  Patient had a CT scan of the abdomen pelvis which came back unremarkable, ultrasound of the gallbladder showed normal gallbladder appearance, but a positive Trivedi sign; common bile duct around 4 mm.  Liver enzymes unremarkable.  Patient then had a HIDA scan which showed ejection fraction of 8%, ruling in biliary dyskinesia.  Past surgical history of partial hysterectomy, diagnostic laparoscopy.  Currently, Denies any chest pain/shortness of breath, nausea/vomiting/diarrhea, fever/chills.    Chief Complaint   Patient presents with    Consult       Review of patient's allergies indicates:   Allergen Reactions    Percocet [oxycodone-acetaminophen] Itching    Latex     Opioids - morphine analogues Hives    Sulfa (sulfonamide antibiotics) Rash    Zithromax z-casimiro [azithromycin] Rash       Current Outpatient Medications   Medication Sig Dispense Refill    clonazePAM (KLONOPIN) 0.5 MG tablet Take 1 tablet (0.5 mg total) by mouth 2 (two) times daily as needed for Anxiety. 30 tablet 2    cyclobenzaprine (FLEXERIL) 10 MG tablet Take 1 tablet (10 mg total) by mouth 3 (three) times daily as needed for Muscle spasms. 30 tablet 2    gabapentin (NEURONTIN) 300 MG capsule Take 1 capsule (300 mg total) by mouth 3 (three) times daily as needed (nuropathy). 90 capsule 5    levothyroxine (SYNTHROID) 25 MCG tablet Take 1 tablet (25 mcg total) by mouth before breakfast. 30 tablet 2    losartan-hydrochlorothiazide 50-12.5 mg (HYZAAR) 50-12.5 mg per tablet Take 1 tablet by mouth once daily. 90 tablet 3    naproxen (NAPROSYN) 500 MG tablet Take 1 tablet (500 mg total) by mouth  daily as needed. 30 tablet 0    ondansetron (ZOFRAN) 4 MG tablet Take 1 tablet (4 mg total) by mouth every 6 (six) hours. 12 tablet 0    ondansetron (ZOFRAN-ODT) 4 MG TbDL Take 1 tablet (4 mg total) by mouth every 6 (six) hours as needed (nausea and diarrhea). 15 tablet 0    pantoprazole (PROTONIX) 40 MG tablet Take 1 tablet (40 mg total) by mouth once daily. 90 tablet 3     No current facility-administered medications for this visit.     Facility-Administered Medications Ordered in Other Visits   Medication Dose Route Frequency Provider Last Rate Last Admin    0.9%  NaCl infusion   Intravenous Continuous Tacos Aburto MD 75 mL/hr at 05/25/23 1709 New Bag at 05/25/23 1709       Past Medical History:   Diagnosis Date    Arthritis     Class 3 severe obesity due to excess calories without serious comorbidity with body mass index (BMI) of 40.0 to 44.9 in adult 03/20/2023    Essential hypertension 03/20/2023    History of gastric ulcer 05/01/2023    Hypothyroidism     Vestibular dizziness 10/23/2023     Past Surgical History:   Procedure Laterality Date    ARTHROSCOPY OF KNEE Right 05/25/2023    Procedure: ARTHROSCOPY, KNEE;  Surgeon: Tacos Aburto MD;  Location: [facility] OR;  Service: Orthopedics;  Laterality: Right;    HYSTERECTOMY      JOINT REPLACEMENT Right     hip replacement    KNEE ARTHROPLASTY Left     KNEE ARTHROSCOPY W/ MENISCECTOMY Right 05/25/2023    Procedure: ARTHROSCOPY, KNEE, WITH MENISCECTOMY;  Surgeon: Tacos Aburto MD;  Location: [facility] OR;  Service: Orthopedics;  Laterality: Right;    LUMPECTOMY, BREAST       Family History   Problem Relation Name Age of Onset    Hypertension Maternal Grandmother      Stroke Maternal Grandmother      Stroke Maternal Grandfather      Hypertension Mother      Stroke Mother      Diabetes Mother       Social History     Tobacco Use    Smoking status: Never     Passive exposure: Never    Smokeless tobacco: Never   Substance Use Topics    Alcohol  use: Never    Drug use: Never        Review of Systems:  Review of Systems   Constitutional: Negative.  Negative for fatigue and unexpected weight change.   HENT: Negative.  Negative for trouble swallowing.    Eyes: Negative.    Respiratory: Negative.  Negative for chest tightness and shortness of breath.    Cardiovascular: Negative.  Negative for chest pain.   Gastrointestinal:  Positive for abdominal pain. Negative for blood in stool, nausea and vomiting.   Endocrine: Negative.    Genitourinary: Negative.  Negative for hematuria.   Musculoskeletal: Negative.  Negative for back pain and myalgias.   Neurological: Negative.  Negative for dizziness, speech difficulty, weakness and light-headedness.   Psychiatric/Behavioral: Negative.  Negative for agitation and behavioral problems.           Objective     Vital Signs (Most Recent)              Physical Exam:  Physical Exam  Constitutional:       General: She is not in acute distress.     Appearance: Normal appearance.   HENT:      Head: Normocephalic.   Cardiovascular:      Rate and Rhythm: Normal rate.   Pulmonary:      Effort: Pulmonary effort is normal. No respiratory distress.   Abdominal:      General: There is no distension.      Tenderness: There is abdominal tenderness in the right upper quadrant. There is no guarding or rebound. Positive signs include Trivedi's sign.   Musculoskeletal:         General: Normal range of motion.   Skin:     General: Skin is warm.      Coloration: Skin is not jaundiced.   Neurological:      General: No focal deficit present.      Mental Status: She is alert and oriented to person, place, and time.      Cranial Nerves: No cranial nerve deficit.            Assessment and Plan   Biliary dyskinesia    PLAN:    To the OR for robotic cholecystectomy.      Risks and benefits explained with the patient including risks for infection, bleeding, injury to surrounding structures, hematoma/seroma formation with need for possible evacuation,  possible open.  The patient verbalized understanding, agrees and wishes to proceed with surgery.

## 2024-05-06 NOTE — LETTER
May 6, 2024      [facility]  [address]  [place] [address]  Phone: [phone]  Fax: [phone]       Patient: Stuart Cloud   YOB: 1970  Date of Visit: 05/06/2024    To Whom It May Concern:    Bismark Cloud  was at [facility] on 05/06/2024. The patient is scheduled to have surgery on 5/9/24 and may return to work on 5/27/24 with no restrictions. If you have any questions or concerns, or if I can be of further assistance, please do not hesitate to contact me.    Sincerely,    DO Faith Rodriguez LPN

## 2024-05-07 ENCOUNTER — HOSPITAL ENCOUNTER (OUTPATIENT)
Facility: HOSPITAL | Age: 54
Discharge: HOME | End: 2024-05-07
Attending: PAIN MEDICINE | Admitting: PAIN MEDICINE
Payer: COMMERCIAL

## 2024-05-07 ENCOUNTER — ANESTHESIA EVENT (OUTPATIENT)
Dept: PAIN MEDICINE | Facility: HOSPITAL | Age: 54
End: 2024-05-07
Payer: COMMERCIAL

## 2024-05-07 ENCOUNTER — ANESTHESIA (OUTPATIENT)
Dept: PAIN MEDICINE | Facility: HOSPITAL | Age: 54
End: 2024-05-07
Payer: COMMERCIAL

## 2024-05-07 VITALS
HEIGHT: 66 IN | RESPIRATION RATE: 12 BRPM | TEMPERATURE: 98 F | BODY MASS INDEX: 42.01 KG/M2 | WEIGHT: 261.38 LBS | SYSTOLIC BLOOD PRESSURE: 129 MMHG | DIASTOLIC BLOOD PRESSURE: 70 MMHG | OXYGEN SATURATION: 98 % | HEART RATE: 82 BPM

## 2024-05-07 DIAGNOSIS — M46.1 BILATERAL SACROILIITIS: ICD-10-CM

## 2024-05-07 PROCEDURE — 27000284 HC CANNULA NASAL: Performed by: NURSE ANESTHETIST, CERTIFIED REGISTERED

## 2024-05-07 PROCEDURE — 63600175 PHARM REV CODE 636 W HCPCS: Performed by: PAIN MEDICINE

## 2024-05-07 PROCEDURE — 37000009 HC ANESTHESIA EA ADD 15 MINS: Performed by: PAIN MEDICINE

## 2024-05-07 PROCEDURE — 27096 INJECT SACROILIAC JOINT: CPT | Mod: 50,,, | Performed by: PAIN MEDICINE

## 2024-05-07 PROCEDURE — D9220A PRA ANESTHESIA: Mod: 23,,, | Performed by: NURSE ANESTHETIST, CERTIFIED REGISTERED

## 2024-05-07 PROCEDURE — 25000003 PHARM REV CODE 250: Performed by: NURSE ANESTHETIST, CERTIFIED REGISTERED

## 2024-05-07 PROCEDURE — 27096 INJECT SACROILIAC JOINT: CPT | Mod: 50 | Performed by: PAIN MEDICINE

## 2024-05-07 PROCEDURE — 37000008 HC ANESTHESIA 1ST 15 MINUTES: Performed by: PAIN MEDICINE

## 2024-05-07 PROCEDURE — 63600175 PHARM REV CODE 636 W HCPCS: Performed by: NURSE ANESTHETIST, CERTIFIED REGISTERED

## 2024-05-07 RX ORDER — SODIUM CHLORIDE 9 MG/ML
INJECTION, SOLUTION INTRAVENOUS CONTINUOUS
Status: ACTIVE | OUTPATIENT
Start: 2024-05-07

## 2024-05-07 RX ORDER — PROPOFOL 10 MG/ML
VIAL (ML) INTRAVENOUS
Status: DISCONTINUED | OUTPATIENT
Start: 2024-05-07 | End: 2024-05-07

## 2024-05-07 RX ORDER — TRIAMCINOLONE ACETONIDE 40 MG/ML
INJECTION, SUSPENSION INTRA-ARTICULAR; INTRAMUSCULAR CODE/TRAUMA/SEDATION MEDICATION
Status: DISCONTINUED | OUTPATIENT
Start: 2024-05-07 | End: 2024-05-07 | Stop reason: HOSPADM

## 2024-05-07 RX ORDER — BUPIVACAINE HYDROCHLORIDE 2.5 MG/ML
INJECTION, SOLUTION INFILTRATION; PERINEURAL CODE/TRAUMA/SEDATION MEDICATION
Status: DISCONTINUED | OUTPATIENT
Start: 2024-05-07 | End: 2024-05-07 | Stop reason: HOSPADM

## 2024-05-07 RX ORDER — LIDOCAINE HYDROCHLORIDE 20 MG/ML
INJECTION, SOLUTION EPIDURAL; INFILTRATION; INTRACAUDAL; PERINEURAL
Status: DISCONTINUED | OUTPATIENT
Start: 2024-05-07 | End: 2024-05-07

## 2024-05-07 RX ADMIN — LIDOCAINE HYDROCHLORIDE 80 MG: 20 INJECTION, SOLUTION INTRAVENOUS at 02:05

## 2024-05-07 RX ADMIN — SODIUM CHLORIDE: 9 INJECTION, SOLUTION INTRAVENOUS at 02:05

## 2024-05-07 RX ADMIN — PROPOFOL 80 MG: 10 INJECTION, EMULSION INTRAVENOUS at 02:05

## 2024-05-07 NOTE — OP NOTE
Procedure Note    Procedure Date: 5/7/2024    Procedure Performed: Bilateral Sacroiliac joint injection, with Fluoroscopic Guidance    Indications: Patient has failed conservative therapy.      Pre-op diagnosis: Bilateral Sacroiliitis    Post-op diagnosis: same    Physician: SHARON Lang MD    Anesthesia: MAC    Medications injected: 0.25% Bupivacaine, Kenalog 40mg    Local anesthetic used: 1% Lidocaine, 2ml    Estimated Blood Loss: None    Complications:None    Technique:  The patient was interviewed in the holding area and Risks/Benefits were discussed, including, but not limited to, the possibility of new or different pain, bleeding or infection.   All questions were answered.  The patient understood and accepted risks.  Consent was reviewed and signed.   A time out was taken to identify the patient, procedure and side of the procedure.  The skin was prepped with chloroprep and sterile drapes were applied. The bilateral  SI joint was identified by fluoroscopy in an oblique plane. Skin and subcutaneous tissues overyling the target area was anesthetized with 1-2 mL of Lidocaine 1%.  A 22g 3 1/2 inch spinal needle was advanced under intermittent fluoroscopy until joint space was entered at the junction of the superior 2/3 & inferior 1/3 of the joint.  There was no paresthesia with needle placement. Aspiration was negative for blood. Next, a 2 cc solution from a mixture of 40 mg kenalog and 3mL of 0.25% Marcaine was injected without difficulty or resistance into each joint. The needle was removed and a sterile Band-Aid dressing was applied to the puncture site. The patient tolerated the procedure well.  The patient was monitored after the procedure.  The patient was given post procedure and discharge instructions to follow at home. The patient was discharged in a stable condition

## 2024-05-07 NOTE — DISCHARGE SUMMARY
[facility]  Discharge Note  Short Stay    Procedure(s) (LRB):  BLOCK, SACROILIAC JOINT (Bilateral)      OUTCOME: Patient tolerated treatment/procedure well without complication and is now ready for discharge.    DISPOSITION: Home or Self Care    FINAL DIAGNOSIS:  Bilateral sacroiliitis    FOLLOWUP: In clinic    DISCHARGE INSTRUCTIONS:  Bilateral SI joint injections under fluoroscopy     TIME SPENT ON DISCHARGE: 5 minutes

## 2024-05-07 NOTE — TRANSFER OF CARE
"Anesthesia Transfer of Care Note    Patient: Stuart Cloud    Procedure(s) Performed: Procedure(s) (LRB):  BLOCK, SACROILIAC JOINT (Bilateral)    Patient location: Other: (Pain Tx Center) Pain Tx Center    Anesthesia Type: general    Transport from OR: Transported from OR on room air with adequate spontaneous ventilation    Post pain: adequate analgesia    Post assessment: no apparent anesthetic complications    Post vital signs: stable    Level of consciousness: sedated and responds to stimulation    Nausea/Vomiting: no nausea/vomiting    Complications: none    Transfer of care protocol was followed      Last vitals: Visit Vitals  BP (!) 142/85   Pulse 84   Temp 36.7 °C (98 °F)   Resp 12   Ht 5' 6" (1.676 m)   Wt 118.6 kg (261 lb 6.4 oz)   SpO2 98%   BMI 42.19 kg/m²     "

## 2024-05-07 NOTE — ANESTHESIA PREPROCEDURE EVALUATION
05/07/2024  Stuart Cloud is a 54 y.o., female.  Past Medical History:   Diagnosis Date    Arthritis     Class 3 severe obesity due to excess calories without serious comorbidity with body mass index (BMI) of 40.0 to 44.9 in adult 03/20/2023    Essential hypertension 03/20/2023    History of gastric ulcer 05/01/2023    Hypothyroidism     Vestibular dizziness 10/23/2023       Past Surgical History:   Procedure Laterality Date    ARTHROSCOPY OF KNEE Right 05/25/2023    Procedure: ARTHROSCOPY, KNEE;  Surgeon: Tacos Aburto MD;  Location: [facility];  Service: Orthopedics;  Laterality: Right;    HYSTERECTOMY      JOINT REPLACEMENT Right     hip replacement    KNEE ARTHROPLASTY Left     KNEE ARTHROSCOPY W/ MENISCECTOMY Right 05/25/2023    Procedure: ARTHROSCOPY, KNEE, WITH MENISCECTOMY;  Surgeon: Tacos Aburto MD;  Location: [facility];  Service: Orthopedics;  Laterality: Right;    LUMPECTOMY, BREAST         Family History   Problem Relation Name Age of Onset    Hypertension Maternal Grandmother      Stroke Maternal Grandmother      Stroke Maternal Grandfather      Hypertension Mother      Stroke Mother      Diabetes Mother         Social History     Socioeconomic History    Marital status: Single   Tobacco Use    Smoking status: Never     Passive exposure: Never    Smokeless tobacco: Never   Substance and Sexual Activity    Alcohol use: Never    Drug use: Never    Sexual activity: Not Currently       No current facility-administered medications for this encounter.     Facility-Administered Medications Ordered in Other Encounters   Medication Dose Route Frequency Provider Last Rate Last Admin    0.9%  NaCl infusion   Intravenous Continuous Tacos Aburto MD 75 mL/hr at 05/25/23 1709 New Bag at 05/25/23 1709       Review of patient's allergies indicates:   Allergen Reactions    Percocet  [oxycodone-acetaminophen] Itching    Latex     Opioids - morphine analogues Hives    Sulfa (sulfonamide antibiotics) Rash    Zithromax z-casimiro [azithromycin] Rash     Patient Active Problem List   Diagnosis    Essential hypertension    Hypothyroidism    Class 3 severe obesity due to excess calories without serious comorbidity with body mass index (BMI) of 40.0 to 44.9 in adult    History of gastric ulcer    Inflammation of soft tissue    Neuropathy    Dysuria    Swallowing problem    Pain    Decreased strength    Muscle spasm    Viral URI         Pre-op Assessment    I have reviewed the Patient Summary Reports.     I have reviewed the Nursing Notes. I have reviewed the NPO Status.   I have reviewed the Medications.     Review of Systems  Anesthesia Hx:  No problems with previous Anesthesia                Cardiovascular:     Hypertension                                  Hypertension         Endocrine:   Hypothyroidism       Hypothyroidism        Morbid Obesity / BMI > 40      Physical Exam  General: Well nourished, Alert, Oriented and Cooperative    Airway:  Mallampati: III   Mouth Opening: Normal  Neck ROM: Normal ROM    Dental:  Intact    Chest/Lungs:  Normal Respiratory Rate    Heart:  Rate: Normal        Anesthesia Plan  Type of Anesthesia, risks & benefits discussed:    Anesthesia Type: Gen Natural Airway, MAC  Intra-op Monitoring Plan: Standard ASA Monitors  Post Op Pain Control Plan: multimodal analgesia and IV/PO Opioids PRN  Induction:  IV  Informed Consent: Informed consent signed with the Patient and all parties understand the risks and agree with anesthesia plan.  All questions answered. Patient consented to blood products? Yes  ASA Score: 3  Day of Surgery Review of History & Physical: I have interviewed and examined the patient. I have reviewed the patient's H&P dated: There are no significant changes.     Ready For Surgery From Anesthesia Perspective.     .

## 2024-05-07 NOTE — PLAN OF CARE
Plan:  D/c pt via wheelchair at 1553  Informed pt if does not void in 8 hours to go to ER. Notify if redness, drainage, from injection site or fever over next 3-4 days. Rest and drink plenty of fluids for the remainder of the day. No lifting over 5 lbs. For the remainder of the day. Continue regular medications as prescribed. May take pain medications as prescribed.     Pain improved 100%  Pre-procedure pain: 10  Post-procedure pain: 0

## 2024-05-07 NOTE — BRIEF OP NOTE
The  Discharge Note  Short Stay    Admit Date: 5/7/2024    Discharge Date: 5/7/2024    Attending Physician: Leny Lang     Discharge Provider: Leny Lang    Diagnosis:  Bilateral sacroiliitis    Procedure performed:  Bilateral SI joint injection under fluoroscopy    Findings: Procedure tolerated well and without complications. Consistent with diagnosis.    EBL: 0cc    Specimens: None    Discharged Condition: Good    Final Diagnoses: Bilateral sacroiliitis [M46.1]    Disposition: Home or Self Care    Hospital Course: No complications, uneventful    Outcome of Hospitalization, Treatment, Procedure, or Surgery:  Patient was admitted for outpatient interventional pain management procedure. The patient tolerated the procedure well with no complications.    Follow up/Patient Instructions:  Follow up as scheduled in Pain Management office in 3-4 weeks.  Patient has received instructions and follow up date and time.    Medications:  Continue previous medications

## 2024-05-08 ENCOUNTER — TELEPHONE (OUTPATIENT)
Dept: SURGERY | Facility: CLINIC | Age: 54
End: 2024-05-08
Payer: COMMERCIAL

## 2024-05-08 LAB
OHS QRS DURATION: 74 MS
OHS QTC CALCULATION: 426 MS

## 2024-05-08 NOTE — TELEPHONE ENCOUNTER
----- Message from Leslee Luevano sent at 5/8/2024 11:18 AM CDT -----  Regarding: RETURN CALL   PATIENT CALLING TO SEE IF YOU HAVE A LATER TIME FOR SURGERY ON 05/09/24, CALL BACK NUMBER [phone]    Spoke with pt, pt states she does not need a later time for surgery.

## 2024-05-09 ENCOUNTER — HOSPITAL ENCOUNTER (OUTPATIENT)
Facility: HOSPITAL | Age: 54
Discharge: HOME | End: 2024-05-09
Attending: STUDENT IN AN ORGANIZED HEALTH CARE EDUCATION/TRAINING PROGRAM | Admitting: STUDENT IN AN ORGANIZED HEALTH CARE EDUCATION/TRAINING PROGRAM
Payer: COMMERCIAL

## 2024-05-09 ENCOUNTER — ANESTHESIA (OUTPATIENT)
Dept: SURGERY | Facility: HOSPITAL | Age: 54
End: 2024-05-09
Payer: COMMERCIAL

## 2024-05-09 ENCOUNTER — TELEPHONE (OUTPATIENT)
Dept: SURGERY | Facility: CLINIC | Age: 54
End: 2024-05-09
Payer: COMMERCIAL

## 2024-05-09 ENCOUNTER — ANESTHESIA EVENT (OUTPATIENT)
Dept: SURGERY | Facility: HOSPITAL | Age: 54
End: 2024-05-09
Payer: COMMERCIAL

## 2024-05-09 VITALS — RESPIRATION RATE: 13 BRPM | HEART RATE: 78 BPM | OXYGEN SATURATION: 100 %

## 2024-05-09 VITALS
RESPIRATION RATE: 16 BRPM | HEART RATE: 65 BPM | HEIGHT: 66 IN | DIASTOLIC BLOOD PRESSURE: 69 MMHG | WEIGHT: 261 LBS | TEMPERATURE: 98 F | BODY MASS INDEX: 41.95 KG/M2 | SYSTOLIC BLOOD PRESSURE: 108 MMHG | OXYGEN SATURATION: 96 %

## 2024-05-09 DIAGNOSIS — Z01.818 PRE-PROCEDURAL EXAMINATION: ICD-10-CM

## 2024-05-09 DIAGNOSIS — K82.8 BILIARY DYSKINESIA: Primary | ICD-10-CM

## 2024-05-09 PROBLEM — R10.11 RUQ ABDOMINAL PAIN: Status: ACTIVE | Noted: 2024-05-09

## 2024-05-09 PROCEDURE — 37000009 HC ANESTHESIA EA ADD 15 MINS: Performed by: STUDENT IN AN ORGANIZED HEALTH CARE EDUCATION/TRAINING PROGRAM

## 2024-05-09 PROCEDURE — 36000710: Performed by: STUDENT IN AN ORGANIZED HEALTH CARE EDUCATION/TRAINING PROGRAM

## 2024-05-09 PROCEDURE — D9220A PRA ANESTHESIA: Mod: ANES,,, | Performed by: ANESTHESIOLOGY

## 2024-05-09 PROCEDURE — 88304 TISSUE EXAM BY PATHOLOGIST: CPT | Mod: TC,SUR | Performed by: STUDENT IN AN ORGANIZED HEALTH CARE EDUCATION/TRAINING PROGRAM

## 2024-05-09 PROCEDURE — 36000711: Performed by: STUDENT IN AN ORGANIZED HEALTH CARE EDUCATION/TRAINING PROGRAM

## 2024-05-09 PROCEDURE — 63600175 PHARM REV CODE 636 W HCPCS: Performed by: NURSE ANESTHETIST, CERTIFIED REGISTERED

## 2024-05-09 PROCEDURE — 27000655: Performed by: NURSE ANESTHETIST, CERTIFIED REGISTERED

## 2024-05-09 PROCEDURE — 25000003 PHARM REV CODE 250: Performed by: NURSE ANESTHETIST, CERTIFIED REGISTERED

## 2024-05-09 PROCEDURE — 27000716 HC OXISENSOR PROBE, ANY SIZE: Performed by: NURSE ANESTHETIST, CERTIFIED REGISTERED

## 2024-05-09 PROCEDURE — 27000165 HC TUBE, ETT CUFFED: Performed by: NURSE ANESTHETIST, CERTIFIED REGISTERED

## 2024-05-09 PROCEDURE — 27000510 HC BLANKET BAIR HUGGER ANY SIZE: Performed by: NURSE ANESTHETIST, CERTIFIED REGISTERED

## 2024-05-09 PROCEDURE — 63600175 PHARM REV CODE 636 W HCPCS: Performed by: ANESTHESIOLOGY

## 2024-05-09 PROCEDURE — 25000003 PHARM REV CODE 250: Performed by: STUDENT IN AN ORGANIZED HEALTH CARE EDUCATION/TRAINING PROGRAM

## 2024-05-09 PROCEDURE — 71000016 HC POSTOP RECOV ADDL HR: Performed by: STUDENT IN AN ORGANIZED HEALTH CARE EDUCATION/TRAINING PROGRAM

## 2024-05-09 PROCEDURE — 47562 LAPAROSCOPIC CHOLECYSTECTOMY: CPT | Mod: ,,, | Performed by: STUDENT IN AN ORGANIZED HEALTH CARE EDUCATION/TRAINING PROGRAM

## 2024-05-09 PROCEDURE — 71000033 HC RECOVERY, INTIAL HOUR: Performed by: STUDENT IN AN ORGANIZED HEALTH CARE EDUCATION/TRAINING PROGRAM

## 2024-05-09 PROCEDURE — 25000003 PHARM REV CODE 250: Performed by: ANESTHESIOLOGY

## 2024-05-09 PROCEDURE — D9220A PRA ANESTHESIA: Mod: CRNA,,, | Performed by: NURSE ANESTHETIST, CERTIFIED REGISTERED

## 2024-05-09 PROCEDURE — 63600175 PHARM REV CODE 636 W HCPCS: Mod: JZ,JG | Performed by: STUDENT IN AN ORGANIZED HEALTH CARE EDUCATION/TRAINING PROGRAM

## 2024-05-09 PROCEDURE — 88304 TISSUE EXAM BY PATHOLOGIST: CPT | Mod: 26,,, | Performed by: PATHOLOGY

## 2024-05-09 PROCEDURE — 37000008 HC ANESTHESIA 1ST 15 MINUTES: Performed by: STUDENT IN AN ORGANIZED HEALTH CARE EDUCATION/TRAINING PROGRAM

## 2024-05-09 PROCEDURE — 27201423 OPTIME MED/SURG SUP & DEVICES STERILE SUPPLY: Performed by: STUDENT IN AN ORGANIZED HEALTH CARE EDUCATION/TRAINING PROGRAM

## 2024-05-09 PROCEDURE — 71000015 HC POSTOP RECOV 1ST HR: Performed by: STUDENT IN AN ORGANIZED HEALTH CARE EDUCATION/TRAINING PROGRAM

## 2024-05-09 PROCEDURE — 27000689 HC BLADE LARYNGOSCOPE ANY SIZE: Performed by: NURSE ANESTHETIST, CERTIFIED REGISTERED

## 2024-05-09 RX ORDER — BUPIVACAINE HYDROCHLORIDE 2.5 MG/ML
INJECTION, SOLUTION EPIDURAL; INFILTRATION; INTRACAUDAL
Status: DISCONTINUED | OUTPATIENT
Start: 2024-05-09 | End: 2024-05-09 | Stop reason: HOSPADM

## 2024-05-09 RX ORDER — MIDAZOLAM HYDROCHLORIDE 1 MG/ML
INJECTION INTRAMUSCULAR; INTRAVENOUS
Status: DISCONTINUED | OUTPATIENT
Start: 2024-05-09 | End: 2024-05-09

## 2024-05-09 RX ORDER — ROCURONIUM BROMIDE 10 MG/ML
INJECTION, SOLUTION INTRAVENOUS
Status: DISCONTINUED | OUTPATIENT
Start: 2024-05-09 | End: 2024-05-09

## 2024-05-09 RX ORDER — IPRATROPIUM BROMIDE AND ALBUTEROL SULFATE 2.5; .5 MG/3ML; MG/3ML
3 SOLUTION RESPIRATORY (INHALATION) ONCE
Status: DISCONTINUED | OUTPATIENT
Start: 2024-05-09 | End: 2024-05-09 | Stop reason: HOSPADM

## 2024-05-09 RX ORDER — PHENYLEPHRINE HYDROCHLORIDE 10 MG/ML
INJECTION INTRAVENOUS
Status: DISCONTINUED | OUTPATIENT
Start: 2024-05-09 | End: 2024-05-09

## 2024-05-09 RX ORDER — MEPERIDINE HYDROCHLORIDE 25 MG/ML
25 INJECTION INTRAMUSCULAR; INTRAVENOUS; SUBCUTANEOUS EVERY 10 MIN PRN
Status: DISCONTINUED | OUTPATIENT
Start: 2024-05-09 | End: 2024-05-09 | Stop reason: HOSPADM

## 2024-05-09 RX ORDER — PROPOFOL 10 MG/ML
VIAL (ML) INTRAVENOUS
Status: DISCONTINUED | OUTPATIENT
Start: 2024-05-09 | End: 2024-05-09

## 2024-05-09 RX ORDER — TRAMADOL HYDROCHLORIDE 50 MG/1
50 TABLET ORAL EVERY 6 HOURS PRN
Qty: 20 TABLET | Refills: 0 | Status: SHIPPED | OUTPATIENT
Start: 2024-05-09 | End: 2024-05-21 | Stop reason: SDUPTHER

## 2024-05-09 RX ORDER — DIPHENHYDRAMINE HYDROCHLORIDE 50 MG/ML
25 INJECTION INTRAMUSCULAR; INTRAVENOUS EVERY 6 HOURS PRN
Status: DISCONTINUED | OUTPATIENT
Start: 2024-05-09 | End: 2024-05-09 | Stop reason: HOSPADM

## 2024-05-09 RX ORDER — LIDOCAINE HYDROCHLORIDE 20 MG/ML
INJECTION, SOLUTION EPIDURAL; INFILTRATION; INTRACAUDAL; PERINEURAL
Status: DISCONTINUED | OUTPATIENT
Start: 2024-05-09 | End: 2024-05-09

## 2024-05-09 RX ORDER — INDOCYANINE GREEN AND WATER 25 MG
6.25 KIT INJECTION ONCE
Status: COMPLETED | OUTPATIENT
Start: 2024-05-09 | End: 2024-05-09

## 2024-05-09 RX ORDER — ONDANSETRON HYDROCHLORIDE 2 MG/ML
4 INJECTION, SOLUTION INTRAVENOUS DAILY PRN
Status: DISCONTINUED | OUTPATIENT
Start: 2024-05-09 | End: 2024-05-09 | Stop reason: HOSPADM

## 2024-05-09 RX ORDER — ONDANSETRON HYDROCHLORIDE 2 MG/ML
INJECTION, SOLUTION INTRAVENOUS
Status: DISCONTINUED | OUTPATIENT
Start: 2024-05-09 | End: 2024-05-09

## 2024-05-09 RX ORDER — FENTANYL CITRATE 50 UG/ML
INJECTION, SOLUTION INTRAMUSCULAR; INTRAVENOUS
Status: DISCONTINUED | OUTPATIENT
Start: 2024-05-09 | End: 2024-05-09

## 2024-05-09 RX ORDER — KETOROLAC TROMETHAMINE 30 MG/ML
30 INJECTION, SOLUTION INTRAMUSCULAR; INTRAVENOUS ONCE
Status: COMPLETED | OUTPATIENT
Start: 2024-05-09 | End: 2024-05-09

## 2024-05-09 RX ORDER — ACETAMINOPHEN 10 MG/ML
1000 INJECTION, SOLUTION INTRAVENOUS ONCE
Status: COMPLETED | OUTPATIENT
Start: 2024-05-09 | End: 2024-05-09

## 2024-05-09 RX ORDER — HYDROMORPHONE HYDROCHLORIDE 2 MG/ML
0.5 INJECTION, SOLUTION INTRAMUSCULAR; INTRAVENOUS; SUBCUTANEOUS ONCE
Status: COMPLETED | OUTPATIENT
Start: 2024-05-09 | End: 2024-05-09

## 2024-05-09 RX ORDER — CEFAZOLIN SODIUM 1 G/3ML
INJECTION, POWDER, FOR SOLUTION INTRAMUSCULAR; INTRAVENOUS
Status: DISCONTINUED | OUTPATIENT
Start: 2024-05-09 | End: 2024-05-09

## 2024-05-09 RX ORDER — SCOLOPAMINE TRANSDERMAL SYSTEM 1 MG/1
1 PATCH, EXTENDED RELEASE TRANSDERMAL
Status: DISCONTINUED | OUTPATIENT
Start: 2024-05-09 | End: 2024-05-09 | Stop reason: HOSPADM

## 2024-05-09 RX ORDER — DOCUSATE SODIUM 100 MG/1
100 CAPSULE, LIQUID FILLED ORAL 2 TIMES DAILY
Qty: 28 CAPSULE | Refills: 0 | Status: SHIPPED | OUTPATIENT
Start: 2024-05-09

## 2024-05-09 RX ORDER — HYDROMORPHONE HYDROCHLORIDE 2 MG/ML
0.5 INJECTION, SOLUTION INTRAMUSCULAR; INTRAVENOUS; SUBCUTANEOUS EVERY 5 MIN PRN
Status: COMPLETED | OUTPATIENT
Start: 2024-05-09 | End: 2024-05-09

## 2024-05-09 RX ORDER — GLYCOPYRROLATE 0.2 MG/ML
INJECTION INTRAMUSCULAR; INTRAVENOUS
Status: DISCONTINUED | OUTPATIENT
Start: 2024-05-09 | End: 2024-05-09

## 2024-05-09 RX ORDER — SODIUM CHLORIDE 9 MG/ML
INJECTION, SOLUTION INTRAVENOUS CONTINUOUS
Status: DISCONTINUED | OUTPATIENT
Start: 2024-05-09 | End: 2024-05-09 | Stop reason: HOSPADM

## 2024-05-09 RX ORDER — VECURONIUM BROMIDE FOR INJECTION 1 MG/ML
INJECTION, POWDER, LYOPHILIZED, FOR SOLUTION INTRAVENOUS
Status: DISCONTINUED | OUTPATIENT
Start: 2024-05-09 | End: 2024-05-09

## 2024-05-09 RX ADMIN — HYDROMORPHONE HYDROCHLORIDE 0.5 MG: 2 INJECTION INTRAMUSCULAR; INTRAVENOUS; SUBCUTANEOUS at 09:05

## 2024-05-09 RX ADMIN — CEFAZOLIN 2 G: 1 INJECTION, POWDER, FOR SOLUTION INTRAMUSCULAR; INTRAVENOUS; PARENTERAL at 07:05

## 2024-05-09 RX ADMIN — GLYCOPYRROLATE 0.2 MG: 0.2 INJECTION INTRAMUSCULAR; INTRAVENOUS at 08:05

## 2024-05-09 RX ADMIN — FENTANYL CITRATE 50 MCG: 50 INJECTION INTRAMUSCULAR; INTRAVENOUS at 07:05

## 2024-05-09 RX ADMIN — SCOPOLAMINE 1 PATCH: 1.5 PATCH, EXTENDED RELEASE TRANSDERMAL at 07:05

## 2024-05-09 RX ADMIN — ONDANSETRON 4 MG: 2 INJECTION INTRAMUSCULAR; INTRAVENOUS at 10:05

## 2024-05-09 RX ADMIN — INDOCYANINE GREEN AND WATER 6.25 MG: KIT at 06:05

## 2024-05-09 RX ADMIN — VECURONIUM BROMIDE 2 MG: 1 INJECTION, POWDER, LYOPHILIZED, FOR SOLUTION INTRAVENOUS at 08:05

## 2024-05-09 RX ADMIN — KETOROLAC TROMETHAMINE 30 MG: 30 INJECTION, SOLUTION INTRAMUSCULAR at 11:05

## 2024-05-09 RX ADMIN — SUGAMMADEX 200 MG: 100 INJECTION, SOLUTION INTRAVENOUS at 09:05

## 2024-05-09 RX ADMIN — DIPHENHYDRAMINE HYDROCHLORIDE 25 MG: 50 INJECTION INTRAMUSCULAR; INTRAVENOUS at 11:05

## 2024-05-09 RX ADMIN — HYDROMORPHONE HYDROCHLORIDE 0.5 MG: 2 INJECTION, SOLUTION INTRAMUSCULAR; INTRAVENOUS; SUBCUTANEOUS at 10:05

## 2024-05-09 RX ADMIN — ONDANSETRON 4 MG: 2 INJECTION INTRAMUSCULAR; INTRAVENOUS at 08:05

## 2024-05-09 RX ADMIN — ACETAMINOPHEN 1000 MG: 10 INJECTION, SOLUTION INTRAVENOUS at 10:05

## 2024-05-09 RX ADMIN — MIDAZOLAM HYDROCHLORIDE 2 MG: 1 INJECTION, SOLUTION INTRAMUSCULAR; INTRAVENOUS at 07:05

## 2024-05-09 RX ADMIN — SODIUM CHLORIDE: 9 INJECTION, SOLUTION INTRAVENOUS at 11:05

## 2024-05-09 RX ADMIN — ROCURONIUM BROMIDE 50 MG: 10 INJECTION, SOLUTION INTRAVENOUS at 07:05

## 2024-05-09 RX ADMIN — FENTANYL CITRATE 50 MCG: 50 INJECTION INTRAMUSCULAR; INTRAVENOUS at 08:05

## 2024-05-09 RX ADMIN — SODIUM CHLORIDE: 9 INJECTION, SOLUTION INTRAVENOUS at 07:05

## 2024-05-09 RX ADMIN — SODIUM CHLORIDE: 9 INJECTION, SOLUTION INTRAVENOUS at 08:05

## 2024-05-09 RX ADMIN — LIDOCAINE HYDROCHLORIDE 70 MG: 20 INJECTION, SOLUTION INTRAVENOUS at 07:05

## 2024-05-09 RX ADMIN — PROPOFOL 180 MG: 10 INJECTION, EMULSION INTRAVENOUS at 07:05

## 2024-05-09 RX ADMIN — PHENYLEPHRINE HYDROCHLORIDE 100 MCG: 10 INJECTION INTRAVENOUS at 08:05

## 2024-05-09 RX ADMIN — SODIUM CHLORIDE: 9 INJECTION, SOLUTION INTRAVENOUS at 06:05

## 2024-05-09 NOTE — BRIEF OP NOTE
[facility] - Periop Services  Brief Operative Note    Surgery Date: 5/9/2024     Surgeons and Role:     * Mao Dempsey DO - Primary    Assisting Surgeon: None    Pre-op Diagnosis:  Biliary dyskinesia [K82.8]    Post-op Diagnosis:  Post-Op Diagnosis Codes:     * Biliary dyskinesia [K82.8]    Procedure(s) (LRB):  XI ROBOTIC CHOLECYSTECTOMY (N/A)    Anesthesia: General    Operative Findings: distended gallbladder    Estimated Blood Loss: 15 mL         Specimens:   Specimen (24h ago, onward)       Start     Ordered    05/09/24 0859  Surgical Pathology  RELEASE UPON ORDERING         05/09/24 0859                      Discharge Note    OUTCOME: Patient tolerated treatment/procedure well without complication and is now ready for discharge.    DISPOSITION: Home or Self Care    FINAL DIAGNOSIS:  Biliary dyskinesia    FOLLOWUP: In clinic    DISCHARGE INSTRUCTIONS:    Discharge Procedure Orders   Diet Adult Regular     Lifting restrictions   Order Comments: No lifting over 20 lb for the next 2 weeks      Remove dressing in 72 hours   Order Comments: Ok to shower tomorrow. Allow soap/water to rinse over wounds. Remove bandaids in 72 hours.  Leave strips in place. Do not scrub glue/strips off; will remove in clinic in 2 weeks. No tub baths or swimming.  Follow up in clinic in 2 weeks     Activity as tolerated

## 2024-05-09 NOTE — TELEPHONE ENCOUNTER
----- Message from Raysa Munroe sent at 5/9/2024  3:41 PM CDT -----   #PATIENT HAD SURGERY TODAY SHE IS NEEDING TO TALK WITH YOU CALL BACK NUMBER [phone]

## 2024-05-09 NOTE — ANESTHESIA PROCEDURE NOTES
Intubation    Date/Time: 5/9/2024 7:42 AM    Performed by: Sarbjit Lloyd CRNA  Authorized by: Karthik Harris MD    Intubation:     Induction:  Intravenous    Intubated:  Postinduction    Mask Ventilation:  Easy mask    Attempts:  1    Attempted By:  CRNA    Method of Intubation:  Direct    Blade:  Skylar 3    Laryngeal View Grade: Grade IIb - only the arytenoids and epiglottis seen      Difficult Airway Encountered?: No      Complications:  None    Airway Device:  Oral endotracheal tube    Airway Device Size:  7.5    Style/Cuff Inflation:  Cuffed (inflated to minimal occlusive pressure)    Inflation Amount (mL):  7    Tube secured:  22    Secured at:  The teeth    Placement Verified By:  Capnometry    Complicating Factors:  Anterior larynx, obesity and poor neck/head extension    Findings Post-Intubation:  BS equal bilateral and atraumatic/condition of teeth unchanged  Notes:      Smooth, tolerated well

## 2024-05-09 NOTE — TRANSFER OF CARE
"Anesthesia Transfer of Care Note    Patient: Stuart Cloud    Procedure(s) Performed: Procedure(s) (LRB):  XI ROBOTIC CHOLECYSTECTOMY (N/A)    Patient location: PACU    Anesthesia Type: general    Transport from OR: Transported from OR on 6-10 L/min O2 by face mask with adequate spontaneous ventilation    Post pain: adequate analgesia    Post assessment: no apparent anesthetic complications    Post vital signs: stable    Level of consciousness: awake, alert and oriented    Nausea/Vomiting: no nausea/vomiting    Complications: none    Transfer of care protocol was followedComments: Good SV continue, NAD noted, VSS, RTRN      Last vitals: Visit Vitals  /80   Pulse 72   Temp 36.4 °C (97.5 °F) (Oral)   Resp 16   Ht 5' 6" (1.676 m)   Wt 118.4 kg (261 lb)   SpO2 98%   Breastfeeding No   BMI 42.13 kg/m²     "

## 2024-05-09 NOTE — OR NURSING
"0931 PT REC'D TO PACU. VSS. SATS 100% ON 6L SIMPLE FACE MASK. WILL TITRATE ATOL. DENIES PAIN AT THIS TIME. DRSG TO ABD C/D/I X5. WILL CONT TO MONITOR.    0951 UPDATE GIVEN TO FRIEND VIA TEXT MESSAGE.     1015 UNABLE TO MEET PAIN REQUIREMENTS. PT FREQUENTLY DRIFTING TO SLEEP. GOES BACK AND FORTH BETWEEN STATING PAIN HAS RESOLVED AND HAVING PAIN RATED "12 OR 15".    1020 TRANSFERRED TO  IN STABLE CONDITION. REPORT GIVEN TO ASC RN. /77 HR 70 O2 SAT 97% ON RA.     "

## 2024-05-09 NOTE — ANESTHESIA POSTPROCEDURE EVALUATION
Anesthesia Post Evaluation    Patient: Stuart Cloud    Procedure(s) Performed: Procedure(s) (LRB):  XI ROBOTIC CHOLECYSTECTOMY (N/A)    Final Anesthesia Type: general      Patient location during evaluation: PACU  Patient participation: Yes- Able to Participate  Level of consciousness: awake and alert  Post-procedure vital signs: reviewed and stable  Pain management: adequate  Airway patency: patent  JUS mitigation strategies: Multimodal analgesia  PONV status at discharge: No PONV  Anesthetic complications: no      Cardiovascular status: blood pressure returned to baseline  Respiratory status: unassisted  Hydration status: euvolemic  Follow-up not needed.              Vitals Value Taken Time   /69 05/09/24 1146   Temp 36.4 °C (97.5 °F) 05/09/24 0933   Pulse 57 05/09/24 1147   Resp 16 05/09/24 1130   SpO2 96 % 05/09/24 1147   Vitals shown include unfiled device data.      Event Time   Out of Recovery 10:15:00         Pain/Fernando Score: Pain Rating Prior to Med Admin: 8 (5/9/2024 11:17 AM)  Pain Rating Post Med Admin: 5 (5/9/2024 10:15 AM)  Fernando Score: 10 (5/9/2024 12:05 PM)

## 2024-05-09 NOTE — ANESTHESIA PREPROCEDURE EVALUATION
05/09/2024  Stuart Cloud is a 54 y.o., female.      Pre-op Assessment    I have reviewed the Patient Summary Reports.     I have reviewed the Nursing Notes. I have reviewed the NPO Status.   I have reviewed the Medications.     Review of Systems  Anesthesia Hx:             Denies Family Hx of Anesthesia complications.    Denies Personal Hx of Anesthesia complications.                    Social:  Non-Smoker, No Alcohol Use       Cardiovascular:     Hypertension                                  Hypertension         Musculoskeletal:  Musculoskeletal Normal                Endocrine:   Hypothyroidism       Hypothyroidism        Morbid Obesity / BMI > 40      Physical Exam  General: Well nourished, Cooperative, Alert and Oriented    Airway:  Mallampati: II / II  Mouth Opening: Normal  TM Distance: Normal  Neck ROM: Normal ROM    Dental:  Intact    Chest/Lungs:  Clear to auscultation    Heart:  Rate: Normal  Rhythm: Regular Rhythm  Sounds: Normal        Chemistry        Component Value Date/Time     05/02/2024 1402    K 3.8 05/02/2024 1402     (H) 05/02/2024 1402    CO2 28 05/02/2024 1402    BUN 11 05/02/2024 1402    CREATININE 0.66 05/02/2024 1402     05/02/2024 1402        Component Value Date/Time    CALCIUM 9.1 05/02/2024 1402    ALKPHOS 62 05/02/2024 1402    AST 26 05/02/2024 1402    ALT 35 05/02/2024 1402    BILITOT 0.4 05/02/2024 1402        Lab Results   Component Value Date    WBC 5.34 05/02/2024    HGB 12.6 05/02/2024    HCT 36.6 (L) 05/02/2024     05/02/2024     Results for orders placed or performed during the hospital encounter of 05/02/24   EKG 12-lead    Collection Time: 05/02/24  2:32 PM   Result Value Ref Range    QRS Duration 74 ms    OHS QTC Calculation 426 ms    Narrative    Test Reason : M54.9,    Vent. Rate : 058 BPM     Atrial Rate : 058 BPM     P-R Int : 208 ms           QRS Dur : 074 ms      QT Int : 434 ms       P-R-T Axes : 067 030 023 degrees     QTc Int : 426 ms    Sinus bradycardia with sinus arrhythmia  Otherwise normal ECG  When compared with ECG of 08-NOV-2023 04:29,  No significant change was found  Confirmed by Yehuda ARCE, Travis HERRERA ([id number]) on 5/8/2024 9:41:29 PM    Referred By: [email or web address]   SELF           Confirmed By:Travis Blackman MD         Anesthesia Plan  Type of Anesthesia, risks & benefits discussed:    Anesthesia Type: Gen ETT  Intra-op Monitoring Plan: Standard ASA Monitors  Post Op Pain Control Plan: multimodal analgesia  Induction:  IV  Airway Plan: Direct  Informed Consent: Informed consent signed with the Patient and all parties understand the risks and agree with anesthesia plan.  All questions answered.   ASA Score: 3  Day of Surgery Review of History & Physical: H&P Update referred to the surgeon/provider.I have interviewed and examined the patient. I have reviewed the patient's H&P dated:     Ready For Surgery From Anesthesia Perspective.     .

## 2024-05-09 NOTE — OP NOTE
[facility] - Periop Services  Surgery Department  Operative Note    SUMMARY     Date of Procedure: 5/9/2024     Procedure: Procedure(s) (LRB):  XI ROBOTIC CHOLECYSTECTOMY (N/A)     Surgeons and Role:     * Mao Dempsey DO - Primary    Assisting Surgeon: None    Pre-Operative Diagnosis: Biliary dyskinesia [K82.8]    Post-Operative Diagnosis: Post-Op Diagnosis Codes:     * Biliary dyskinesia [K82.8]    Anesthesia: General    Operative Findings (including complications, if any): distended gallbladder    Description of Technical Procedures: Patient was taken the operating room and placed on the operating table in the supine position.  Patient underwent general anesthesia per the anesthesia team.  The abdomen was then prepped and draped in usual sterile fashion.  An incision was made in left upper quadrant a Veress needle was inserted and the abdomen was insufflated to 15 mmHg; patient tolerated insufflation well.  We then placed an 8 mm trocar in the left abdomen under visualization with the laparoscope; no injuries identified.  We then placed 2 additional 8 mm trocars at the umbilicus and right abdomen, followed by a 12 mm trocar in the right upper quadrant; all done under visualization.  We inserted a Ray-Yenni sponge, grasped the gallbladder and retracted cephalad. The gallbladder was distended gallbladder. We then docked the robot.  We then identified the cystic duct and the common bile duct with ICG.  We dissected out the cystic duct and the cystic artery and found these 2 structures going to the gallbladder with the liver bed behind these structures, thus obtaining the critical view.  We doubly clipped and transected the duct and the artery.  We then took the gallbladder off the liver with hook cautery without difficulty.  Bleeding controlled with electrocautery.  Gallbladder was removed from the 12 mm trocar site with the Endo-Catch bag; sent to pathology.  Ray-Yenni sponge removed.  We closed the  fascia at the 12 mm trocar site with 0 Vicryl via a Dave-Corine under visualization.  Robot was undocked.  Abdomen desufflated and trocars removed.  Skin incisions were approximated with a 4-0 Monocryl deep dermal suture.  The skin was cleaned and dried, Mastisol Steri-Strips applied.  Patient was awakened, extubated and taken the PACU in stable condition.  Patient tolerated procedure well.         Estimated Blood Loss (EBL): 15 mL           Implants: * No implants in log *    Specimens:   Specimen (24h ago, onward)       Start     Ordered    05/09/24 0859  Surgical Pathology  RELEASE UPON ORDERING         05/09/24 0859                            Condition: Good    Disposition: PACU - hemodynamically stable.    Attestation: Op Note Attestation: I was physically present and scrubbed for the entire procedure.

## 2024-05-10 ENCOUNTER — TELEPHONE (OUTPATIENT)
Dept: SURGERY | Facility: CLINIC | Age: 54
End: 2024-05-10
Payer: COMMERCIAL

## 2024-05-10 LAB
ESTROGEN SERPL-MCNC: NORMAL PG/ML
INSULIN SERPL-ACNC: NORMAL U[IU]/ML
LAB AP GROSS DESCRIPTION: NORMAL
LAB AP LABORATORY NOTES: NORMAL
T3RU NFR SERPL: NORMAL %

## 2024-05-10 RX ORDER — IBUPROFEN 800 MG/1
800 TABLET ORAL 3 TIMES DAILY PRN
Qty: 21 TABLET | Refills: 3 | Status: SHIPPED | OUTPATIENT
Start: 2024-05-10

## 2024-05-10 NOTE — TELEPHONE ENCOUNTER
----- Message from Tammy Gonzalez sent at 5/10/2024 10:25 AM CDT -----  Regarding: MEDICATION  PATIENT SAYS THE TRAMADOL IS NOT HELPING THE PAIN. IS THERE SOMETHING ELSE SHE CAN TAKE ? PHARMACY IS [facility]. CALL BACK NUMBER IS [phone].    Pt states she is still in pain and tramadol is not working for her.

## 2024-05-10 NOTE — TELEPHONE ENCOUNTER
----- Message from Tammy Gonzalez sent at 5/10/2024 11:13 AM CDT -----  Regarding: EMAIL  WOULD LIKE TO GET EMAIL SO SHE CAN SEND PAPER WORK. CALL BACK ED IS [phone].    Informed pt of email to send paper work to.

## 2024-05-10 NOTE — PROGRESS NOTES
PATIENT NAME: Stuart Cloud  : 1970  DATE: 24  MRN: [id number]      Reason for Visit / Chief Complaint: Follow-up (Exam C)       Update PCP  Update Chief Complaint         History of Present Illness / Problem Focused Workflow     Stuart Cloud presents to the clinic with Follow-up (Exam C)     Presents to clinic with RUQ discomfort that radiates into back. Has had this evaluated in the past but previous imaging and labs were negative for cholecystitis. Has not had HIDA to check for biliary dyskinesia. Reports nausea and pain after eating. Has started occurring more frequently and lasting longer than in the past. Denies fever/chills. No n/v present currently. Denie diarrhea.     Follow-up  Associated symptoms include abdominal pain, nausea and vomiting. Pertinent negatives include no chest pain, chills, fatigue, headaches or weakness.     Review of Systems     @Review of Systems   Constitutional:  Negative for appetite change, chills, fatigue and unexpected weight change.   Eyes:  Negative for visual disturbance.   Respiratory:  Negative for shortness of breath.    Cardiovascular:  Negative for chest pain.   Gastrointestinal:  Positive for abdominal pain, nausea and vomiting. Negative for blood in stool, constipation and diarrhea.   Genitourinary:  Negative for decreased urine volume, difficulty urinating, dysuria, flank pain, frequency and hematuria.   Neurological:  Negative for dizziness, weakness and headaches.   Hematological:  Negative for adenopathy. Does not bruise/bleed easily.     Medical / Social / Family History     Past Medical History:   Diagnosis Date    Arthritis     Class 3 severe obesity due to excess calories without serious comorbidity with body mass index (BMI) of 40.0 to 44.9 in adult 2023    Essential hypertension 2023    History of gastric ulcer 2023    Hypothyroidism     Vestibular dizziness 10/23/2023       Past Surgical History:   Procedure Laterality  Date    ARTHROSCOPY OF KNEE Right 05/25/2023    Procedure: ARTHROSCOPY, KNEE;  Surgeon: Tacos Aburto MD;  Location: [facility] ORTHO OR;  Service: Orthopedics;  Laterality: Right;    HYSTERECTOMY      INJECTION OF ANESTHETIC AGENT INTO SACROILIAC JOINT Bilateral 5/7/2024    Procedure: BLOCK, SACROILIAC JOINT;  Surgeon: Leny Lang MD;  Location: [facility] PAIN MGMT;  Service: Pain Management;  Laterality: Bilateral;    JOINT REPLACEMENT Right     hip replacement    KNEE ARTHROPLASTY Left     KNEE ARTHROSCOPY W/ MENISCECTOMY Right 05/25/2023    Procedure: ARTHROSCOPY, KNEE, WITH MENISCECTOMY;  Surgeon: Tacos Aburto MD;  Location: [facility] ORTHO OR;  Service: Orthopedics;  Laterality: Right;    LUMPECTOMY, BREAST         Social History  Ms.  reports that she has never smoked. She has never been exposed to tobacco smoke. She has never used smokeless tobacco. She reports that she does not drink alcohol and does not use drugs.    Family History  Ms.'s family history includes Diabetes in her mother; Hypertension in her maternal grandmother and mother; Stroke in her maternal grandfather, maternal grandmother, and mother.    Medications and Allergies     Medications  Outpatient Medications Marked as Taking for the 4/25/24 encounter (Office Visit) with Maria L Phillips FNP-C   Medication Sig Dispense Refill    clonazePAM (KLONOPIN) 0.5 MG tablet Take 1 tablet (0.5 mg total) by mouth 2 (two) times daily as needed for Anxiety. 30 tablet 2    cyclobenzaprine (FLEXERIL) 10 MG tablet Take 1 tablet (10 mg total) by mouth 3 (three) times daily as needed for Muscle spasms. 30 tablet 2    gabapentin (NEURONTIN) 300 MG capsule Take 1 capsule (300 mg total) by mouth 3 (three) times daily as needed (nuropathy). 90 capsule 5    levothyroxine (SYNTHROID) 25 MCG tablet Take 1 tablet (25 mcg total) by mouth before breakfast. 30 tablet 2    losartan-hydrochlorothiazide 50-12.5 mg (HYZAAR) 50-12.5 mg per tablet Take 1  tablet by mouth once daily. 90 tablet 3    ondansetron (ZOFRAN) 4 MG tablet Take 1 tablet (4 mg total) by mouth every 6 (six) hours. 12 tablet 0    [DISCONTINUED] ondansetron (ZOFRAN-ODT) 8 MG TbDL Take 1 tablet (8 mg total) by mouth every 6 (six) hours as needed (nausea). 20 tablet 0    [DISCONTINUED] polyethylene glycol (GOLYTELY) 236-22.74-6.74 -5.86 gram suspension Take 4,000 mLs (4 L total) by mouth once. for 1 dose 4000 mL 0       Allergies  Review of patient's allergies indicates:   Allergen Reactions    Percocet [oxycodone-acetaminophen] Itching    Latex     Opioids - morphine analogues Hives    Sulfa (sulfonamide antibiotics) Rash    Zithromax z-casimiro [azithromycin] Rash       Physical Examination     Vitals:    04/25/24 1353   BP: 127/84   Pulse: 82   Temp: 97.7 °F (36.5 °C)     Physical Exam  Vitals and nursing note reviewed.   Constitutional:       Appearance: Normal appearance. She is obese.   HENT:      Head: Normocephalic.      Right Ear: External ear normal.      Left Ear: External ear normal.      Mouth/Throat:      Mouth: Mucous membranes are moist.      Pharynx: Uvula midline.   Eyes:      Extraocular Movements: Extraocular movements intact.      Conjunctiva/sclera: Conjunctivae normal.      Pupils: Pupils are equal, round, and reactive to light.   Cardiovascular:      Rate and Rhythm: Normal rate and regular rhythm.      Pulses: Normal pulses.      Heart sounds: Normal heart sounds.   Pulmonary:      Effort: Pulmonary effort is normal.      Breath sounds: Normal breath sounds.   Abdominal:      General: Abdomen is flat. Bowel sounds are normal. There is no distension.      Palpations: Abdomen is soft. There is no mass.      Tenderness: There is generalized abdominal tenderness. There is no right CVA tenderness, left CVA tenderness, guarding or rebound.      Hernia: No hernia is present.   Musculoskeletal:         General: Normal range of motion.      Cervical back: Normal range of motion  and neck supple.      Lumbar back: Spasms present.   Skin:     General: Skin is warm and dry.      Capillary Refill: Capillary refill takes less than 2 seconds.      Comments: Minimal erythema to left anterior thigh with some warmth.    Neurological:      General: No focal deficit present.      Mental Status: She is alert and oriented to person, place, and time.   Psychiatric:         Mood and Affect: Mood normal.         Behavior: Behavior normal.         Thought Content: Thought content normal.         Judgment: Judgment normal.             Lab Results   Component Value Date    WBC 5.34 05/02/2024    HGB 12.6 05/02/2024    HCT 36.6 (L) 05/02/2024    MCV 85.9 05/02/2024     05/02/2024          Sodium   Date Value Ref Range Status   05/02/2024 142 136 - 145 mmol/L Final     Potassium   Date Value Ref Range Status   05/02/2024 3.8 3.5 - 5.1 mmol/L Final     Chloride   Date Value Ref Range Status   05/02/2024 110 (H) 98 - 107 mmol/L Final     CO2   Date Value Ref Range Status   05/02/2024 28 21 - 32 mmol/L Final     Glucose   Date Value Ref Range Status   05/02/2024 103 74 - 106 mg/dL Final     BUN   Date Value Ref Range Status   05/02/2024 11 7 - 18 mg/dL Final     Creatinine   Date Value Ref Range Status   05/02/2024 0.66 0.55 - 1.02 mg/dL Final     Calcium   Date Value Ref Range Status   05/02/2024 9.1 8.5 - 10.1 mg/dL Final     Total Protein   Date Value Ref Range Status   05/02/2024 5.9 (L) 6.4 - 8.2 g/dL Final     Albumin   Date Value Ref Range Status   05/02/2024 2.9 (L) 3.5 - 5.0 g/dL Final     Bilirubin, Total   Date Value Ref Range Status   05/02/2024 0.4 >0.0 - 1.2 mg/dL Final     Alk Phos   Date Value Ref Range Status   05/02/2024 62 41 - 108 U/L Final     AST   Date Value Ref Range Status   05/02/2024 26 15 - 37 U/L Final     ALT   Date Value Ref Range Status   05/02/2024 35 13 - 56 U/L Final     Anion Gap   Date Value Ref Range Status   05/02/2024 8 7 - 16 mmol/L Final      Procedures   Assessment  and Plan (including Health Maintenance)      Problem List  Smart Sets  Document Outside HM   :    Plan:   Problem List Items Addressed This Visit          GI    RUQ abdominal pain - Primary    Current Assessment & Plan     Protonix 40mg daily  HIDA scan  Follow up with GI   Refer to general surgery if NM scan comes back pos.          Relevant Orders    NM Hepatobiliary (HIDA) W Pharm and EF When Performed (Completed)     Other Visit Diagnoses       Colon cancer screening                Health Maintenance Topics with due status: Not Due       Topic Last Completion Date    Influenza Vaccine 11/03/2022    Hemoglobin A1c (Diabetic Prevention Screening) 03/29/2023    Lipid Panel 03/29/2023          Clinical Reference Documents Added to Patient Instructions         Document    VIRAL UPPER RESPIRATORY INFECTION DISCHARGE INSTRUCTIONS, ADULT (ENGLISH)            Future Appointments   Date Time Provider Department Center   5/13/2024  2:00 PM [facility] GI ROOM 01 [facility]   5/14/2024  8:30 AM Sage Ordonez MD [facility] NEURO [facility]   5/22/2024  1:45 PM Adrien Deluna PA [facility] PNTRE [facility]   6/3/2024  9:45 AM Mao Dempsey DO OB GENSRG [facility]   6/3/2024 12:30 PM [facility] GI ROOM 01 [facility]   6/24/2024  1:40 PM [facility] MAMMO2 OB MMIC [facility]   7/22/2024  4:00 PM Matthew Adams MD [facility] SPINE [facility]   10/30/2024 10:20 AM Elayne Santo FNP RAS GASTR [facility]        Health Maintenance Due   Topic Date Due    Hepatitis C Screening  Never done    HIV Screening  Never done    TETANUS VACCINE  Never done    Mammogram  Never done    Colorectal Cancer Screening  Never done    Shingles Vaccine (1 of 2) Never done    COVID-19 Vaccine (1 - 2023-24 season) Never done        Follow up if symptoms worsen or fail to improve.     Signature: DAKOTA Lee        Date of encounter: 4/25/24

## 2024-05-10 NOTE — ASSESSMENT & PLAN NOTE
Protonix 40mg daily  HIDA scan  Follow up with GI   Refer to general surgery if NM scan comes back pos.

## 2024-05-13 ENCOUNTER — ANESTHESIA (OUTPATIENT)
Dept: GASTROENTEROLOGY | Facility: HOSPITAL | Age: 54
End: 2024-05-13
Payer: COMMERCIAL

## 2024-05-13 ENCOUNTER — ANESTHESIA EVENT (OUTPATIENT)
Dept: GASTROENTEROLOGY | Facility: HOSPITAL | Age: 54
End: 2024-05-13
Payer: COMMERCIAL

## 2024-05-13 ENCOUNTER — HOSPITAL ENCOUNTER (OUTPATIENT)
Dept: GASTROENTEROLOGY | Facility: HOSPITAL | Age: 54
Discharge: HOME | End: 2024-05-13
Admitting: INTERNAL MEDICINE
Payer: COMMERCIAL

## 2024-05-13 VITALS
WEIGHT: 260 LBS | HEART RATE: 63 BPM | BODY MASS INDEX: 41.78 KG/M2 | TEMPERATURE: 98 F | OXYGEN SATURATION: 100 % | RESPIRATION RATE: 17 BRPM | DIASTOLIC BLOOD PRESSURE: 78 MMHG | HEIGHT: 66 IN | SYSTOLIC BLOOD PRESSURE: 137 MMHG

## 2024-05-13 DIAGNOSIS — R10.13 EPIGASTRIC ABDOMINAL PAIN: ICD-10-CM

## 2024-05-13 DIAGNOSIS — R13.10 DYSPHAGIA, UNSPECIFIED TYPE: ICD-10-CM

## 2024-05-13 PROCEDURE — 63600175 PHARM REV CODE 636 W HCPCS: Performed by: NURSE ANESTHETIST, CERTIFIED REGISTERED

## 2024-05-13 PROCEDURE — 88305 TISSUE EXAM BY PATHOLOGIST: CPT | Mod: 26,,, | Performed by: PATHOLOGY

## 2024-05-13 PROCEDURE — 43239 EGD BIOPSY SINGLE/MULTIPLE: CPT | Mod: 59,,, | Performed by: INTERNAL MEDICINE

## 2024-05-13 PROCEDURE — 43239 EGD BIOPSY SINGLE/MULTIPLE: CPT | Mod: 59 | Performed by: INTERNAL MEDICINE

## 2024-05-13 PROCEDURE — 88305 TISSUE EXAM BY PATHOLOGIST: CPT | Mod: TC,91,SUR | Performed by: INTERNAL MEDICINE

## 2024-05-13 PROCEDURE — 88342 IMHCHEM/IMCYTCHM 1ST ANTB: CPT | Mod: 26,,, | Performed by: PATHOLOGY

## 2024-05-13 PROCEDURE — D9220A PRA ANESTHESIA: Mod: ,,, | Performed by: NURSE ANESTHETIST, CERTIFIED REGISTERED

## 2024-05-13 PROCEDURE — 27201423 OPTIME MED/SURG SUP & DEVICES STERILE SUPPLY

## 2024-05-13 PROCEDURE — 37000009 HC ANESTHESIA EA ADD 15 MINS

## 2024-05-13 PROCEDURE — 43248 EGD GUIDE WIRE INSERTION: CPT | Mod: ,,, | Performed by: INTERNAL MEDICINE

## 2024-05-13 PROCEDURE — 88312 SPECIAL STAINS GROUP 1: CPT | Mod: 26,,, | Performed by: PATHOLOGY

## 2024-05-13 PROCEDURE — 43248 EGD GUIDE WIRE INSERTION: CPT | Performed by: INTERNAL MEDICINE

## 2024-05-13 PROCEDURE — 25000003 PHARM REV CODE 250: Performed by: NURSE ANESTHETIST, CERTIFIED REGISTERED

## 2024-05-13 PROCEDURE — 88312 SPECIAL STAINS GROUP 1: CPT | Mod: TC,SUR | Performed by: INTERNAL MEDICINE

## 2024-05-13 PROCEDURE — 37000008 HC ANESTHESIA 1ST 15 MINUTES

## 2024-05-13 RX ORDER — PROPOFOL 10 MG/ML
VIAL (ML) INTRAVENOUS
Status: DISCONTINUED | OUTPATIENT
Start: 2024-05-13 | End: 2024-05-13

## 2024-05-13 RX ORDER — SODIUM CHLORIDE 0.9 % (FLUSH) 0.9 %
10 SYRINGE (ML) INJECTION
Status: DISCONTINUED | OUTPATIENT
Start: 2024-05-13 | End: 2024-05-14 | Stop reason: HOSPADM

## 2024-05-13 RX ORDER — LIDOCAINE HYDROCHLORIDE 20 MG/ML
INJECTION, SOLUTION EPIDURAL; INFILTRATION; INTRACAUDAL; PERINEURAL
Status: DISCONTINUED | OUTPATIENT
Start: 2024-05-13 | End: 2024-05-13

## 2024-05-13 RX ADMIN — SODIUM CHLORIDE: 9 INJECTION, SOLUTION INTRAVENOUS at 02:05

## 2024-05-13 RX ADMIN — LIDOCAINE HYDROCHLORIDE 80 MG: 20 INJECTION, SOLUTION INTRAVENOUS at 02:05

## 2024-05-13 RX ADMIN — PROPOFOL 40 MG: 10 INJECTION, EMULSION INTRAVENOUS at 02:05

## 2024-05-13 RX ADMIN — PROPOFOL 100 MG: 10 INJECTION, EMULSION INTRAVENOUS at 02:05

## 2024-05-13 NOTE — ANESTHESIA PREPROCEDURE EVALUATION
05/13/2024  Stuart Cloud is a 54 y.o., female.      Pre-op Assessment    I have reviewed the Patient Summary Reports.     I have reviewed the Nursing Notes. I have reviewed the NPO Status.   I have reviewed the Medications.     Review of Systems  Anesthesia Hx:   History of prior surgery of interest to airway management or planning:          Denies Family Hx of Anesthesia complications.    Denies Personal Hx of Anesthesia complications.                    Cardiovascular:     Hypertension                                  Hypertension         Endocrine:   Hypothyroidism       Hypothyroidism            Active Ambulatory Problems     Diagnosis Date Noted    Essential hypertension 03/20/2023    Hypothyroidism 03/20/2023    Class 3 severe obesity due to excess calories without serious comorbidity with body mass index (BMI) of 40.0 to 44.9 in adult 03/20/2023    History of gastric ulcer 05/01/2023    Inflammation of soft tissue 03/12/2024    Neuropathy 03/12/2024    Dysuria 03/12/2024    Swallowing problem 03/12/2024    Pain 03/28/2024    Decreased strength 03/28/2024    Muscle spasm 04/01/2024    Viral URI 05/01/2024    Biliary dyskinesia 05/09/2024    RUQ abdominal pain 05/09/2024     Resolved Ambulatory Problems     Diagnosis Date Noted    No Resolved Ambulatory Problems     Past Medical History:   Diagnosis Date    Arthritis     Vestibular dizziness 10/23/2023      Review of patient's allergies indicates:   Allergen Reactions    Percocet [oxycodone-acetaminophen] Itching    Latex     Opioids - morphine analogues Hives    Sulfa (sulfonamide antibiotics) Rash    Zithromax z-casimiro [azithromycin] Rash      Current Outpatient Medications on File Prior to Encounter   Medication Sig Dispense Refill    clonazePAM (KLONOPIN) 0.5 MG tablet Take 1 tablet (0.5 mg total) by mouth 2 (two) times daily as needed for Anxiety. 30  tablet 2    cyclobenzaprine (FLEXERIL) 10 MG tablet Take 1 tablet (10 mg total) by mouth 3 (three) times daily as needed for Muscle spasms. 30 tablet 2    docusate sodium (COLACE) 100 MG capsule Take 1 capsule (100 mg total) by mouth 2 (two) times daily. 28 capsule 0    gabapentin (NEURONTIN) 300 MG capsule Take 1 capsule (300 mg total) by mouth 3 (three) times daily as needed (nuropathy). 90 capsule 5    ibuprofen (ADVIL,MOTRIN) 800 MG tablet Take 1 tablet (800 mg total) by mouth 3 (three) times daily as needed for Pain. 21 tablet 3    levothyroxine (SYNTHROID) 25 MCG tablet Take 1 tablet (25 mcg total) by mouth before breakfast. 30 tablet 2    losartan-hydrochlorothiazide 50-12.5 mg (HYZAAR) 50-12.5 mg per tablet Take 1 tablet by mouth once daily. 90 tablet 3    naproxen (NAPROSYN) 500 MG tablet Take 1 tablet (500 mg total) by mouth daily as needed. 30 tablet 0    ondansetron (ZOFRAN) 4 MG tablet Take 1 tablet (4 mg total) by mouth every 6 (six) hours. 12 tablet 0    ondansetron (ZOFRAN-ODT) 4 MG TbDL Take 1 tablet (4 mg total) by mouth every 6 (six) hours as needed (nausea and diarrhea). 15 tablet 0    pantoprazole (PROTONIX) 40 MG tablet Take 1 tablet (40 mg total) by mouth once daily. 90 tablet 3    traMADoL (ULTRAM) 50 mg tablet Take 1 tablet (50 mg total) by mouth every 6 (six) hours as needed for Pain. 20 tablet 0     Current Facility-Administered Medications on File Prior to Encounter   Medication Dose Route Frequency Provider Last Rate Last Admin    0.9%  NaCl infusion   Intravenous Continuous Tacos Aburto MD 75 mL/hr at 05/25/23 1709 New Bag at 05/25/23 1709    0.9%  NaCl infusion   Intravenous Continuous Leny Lang MD               Anesthesia Plan  Type of Anesthesia, risks & benefits discussed:    Anesthesia Type: MAC  Intra-op Monitoring Plan: Standard ASA Monitors  Post Op Pain Control Plan: multimodal analgesia  Induction:  IV  Informed Consent: Informed consent signed with the Patient and  all parties understand the risks and agree with anesthesia plan.  All questions answered. Patient consented to blood products? No  ASA Score: 3  Day of Surgery Review of History & Physical: H&P Update referred to the surgeon/provider.I have interviewed and examined the patient. I have reviewed the patient's H&P dated: There are no significant changes.     Ready For Surgery From Anesthesia Perspective.     .

## 2024-05-13 NOTE — TRANSFER OF CARE
"Anesthesia Transfer of Care Note    Patient: Stuart Cloud    Procedure(s) Performed: * No procedures listed *    Patient location: GI    Anesthesia Type: general (pt was not Arousable even with painful stimulation during the procedure)    Transport from OR: Transported from OR on room air with adequate spontaneous ventilation    Post pain: adequate analgesia    Post assessment: no apparent anesthetic complications    Post vital signs: stable    Level of consciousness: sedated and responds to stimulation    Nausea/Vomiting: no nausea/vomiting    Complications: none    Transfer of care protocol was followedComments: Good SV continue, NAD noted, VSS, RTRN       Last vitals: Visit Vitals  /73   Pulse 75   Temp 36.6 °C (97.8 °F)   Resp 18   Ht 5' 6" (1.676 m)   Wt 117.9 kg (260 lb)   SpO2 100%   Breastfeeding No   BMI 41.97 kg/m²     "

## 2024-05-13 NOTE — DISCHARGE INSTRUCTIONS
Procedure Date  5/13/24     Impression  Overall Impression:   Benign inlet patch in the upper esophagus   Grade B esophagitis in the GE junction  2 cm type I hiatal hernia  Schatzki ring in the GE junction  The middle third of the esophagus, lower third of the esophagus, cardia, fundus of the stomach, body of the stomach, incisura and 2nd part of the duodenum appeared normal. Performed random biopsy to rule out eosinophilic esophagitis.  Dilated in the esophagus with Savary-Salomon dilator to 51 Fr ending size. Dilation caused improved passage of the scope        Recommendation  Await pathology results  AVOID NSAIDs (ibuprofen, advil, aleve, BC powder, meloxicam, naproxen, etc)  I suspect reported esophageal spasms are related to uncontrolled acid reflux  Agree with trial of Protonix for reflux (started by PCP 2-3 weeks ago); use pepcid 20 mg up to 3x/day for breakthrough symptoms  If symptoms of GERD persist at follow up, can consider alternative PPI  Repeat dilation in the future as needed if symptoms improve  Follow up in GI clinic as needed       Outcome of procedure: successful EGD3  Disposition: patient to recovery following procedure; discharge to home when appropriate parameters met  Provisions for follow up: please call my office for any unexpected symptoms like chest or abdominal pain or bleeding following your procedure.  Final Diagnosis: esophagitis     THE NURSE WILL CALL YOU WITH YOUR BIOPSY RESULTS IN A FEW DAYS. IF YOU HAVE  [facility] MYCHART YOUR RESULTS WILL APPEAR THERE AS WELL.   NO DRIVING, OPERATING EQUIPMENT, OR SIGNING LEGAL DOCUMENTS FOR 24 HOURS.

## 2024-05-13 NOTE — H&P
Gastroenterology Pre-procedure H&P    History of Present Illness    Stuart Cloud is a 54 y.o. female that  has a past medical history of Arthritis, Class 3 severe obesity due to excess calories without serious comorbidity with body mass index (BMI) of 40.0 to 44.9 in adult (03/20/2023), Essential hypertension (03/20/2023), History of gastric ulcer (05/01/2023), Hypothyroidism, and Vestibular dizziness (10/23/2023).     Patient with DM-II, GERD, dysphagia here for EGD.       Past Medical History:   Diagnosis Date    Arthritis     Class 3 severe obesity due to excess calories without serious comorbidity with body mass index (BMI) of 40.0 to 44.9 in adult 03/20/2023    Essential hypertension 03/20/2023    History of gastric ulcer 05/01/2023    Hypothyroidism     Vestibular dizziness 10/23/2023       Past Surgical History:   Procedure Laterality Date    ARTHROSCOPY OF KNEE Right 05/25/2023    Procedure: ARTHROSCOPY, KNEE;  Surgeon: Tacos Aburto MD;  Location: [facility] OR;  Service: Orthopedics;  Laterality: Right;    HYSTERECTOMY      INJECTION OF ANESTHETIC AGENT INTO SACROILIAC JOINT Bilateral 5/7/2024    Procedure: BLOCK, SACROILIAC JOINT;  Surgeon: Leny Lang MD;  Location: [facility] PAIN MGMT;  Service: Pain Management;  Laterality: Bilateral;    JOINT REPLACEMENT Right     hip replacement    KNEE ARTHROPLASTY Left     KNEE ARTHROSCOPY W/ MENISCECTOMY Right 05/25/2023    Procedure: ARTHROSCOPY, KNEE, WITH MENISCECTOMY;  Surgeon: Tacos Aburto MD;  Location: [facility] OR;  Service: Orthopedics;  Laterality: Right;    LUMPECTOMY, BREAST      ROBOT-ASSISTED CHOLECYSTECTOMY USING DA ANG XI N/A 5/9/2024    Procedure: XI ROBOTIC CHOLECYSTECTOMY;  Surgeon: Mao Dempsey DO;  Location: [facility] OR;  Service: General;  Laterality: N/A;       Family History   Problem Relation Name Age of Onset    Hypertension Maternal Grandmother      Stroke Maternal Grandmother      Stroke Maternal  Grandfather      Hypertension Mother      Stroke Mother      Diabetes Mother         Social History     Socioeconomic History    Marital status: Single   Tobacco Use    Smoking status: Never     Passive exposure: Never    Smokeless tobacco: Never   Substance and Sexual Activity    Alcohol use: Never    Drug use: Never    Sexual activity: Not Currently       Current Outpatient Medications   Medication Sig Dispense Refill    clonazePAM (KLONOPIN) 0.5 MG tablet Take 1 tablet (0.5 mg total) by mouth 2 (two) times daily as needed for Anxiety. 30 tablet 2    cyclobenzaprine (FLEXERIL) 10 MG tablet Take 1 tablet (10 mg total) by mouth 3 (three) times daily as needed for Muscle spasms. 30 tablet 2    docusate sodium (COLACE) 100 MG capsule Take 1 capsule (100 mg total) by mouth 2 (two) times daily. 28 capsule 0    gabapentin (NEURONTIN) 300 MG capsule Take 1 capsule (300 mg total) by mouth 3 (three) times daily as needed (nuropathy). 90 capsule 5    ibuprofen (ADVIL,MOTRIN) 800 MG tablet Take 1 tablet (800 mg total) by mouth 3 (three) times daily as needed for Pain. 21 tablet 3    levothyroxine (SYNTHROID) 25 MCG tablet Take 1 tablet (25 mcg total) by mouth before breakfast. 30 tablet 2    losartan-hydrochlorothiazide 50-12.5 mg (HYZAAR) 50-12.5 mg per tablet Take 1 tablet by mouth once daily. 90 tablet 3    naproxen (NAPROSYN) 500 MG tablet Take 1 tablet (500 mg total) by mouth daily as needed. 30 tablet 0    ondansetron (ZOFRAN) 4 MG tablet Take 1 tablet (4 mg total) by mouth every 6 (six) hours. 12 tablet 0    ondansetron (ZOFRAN-ODT) 4 MG TbDL Take 1 tablet (4 mg total) by mouth every 6 (six) hours as needed (nausea and diarrhea). 15 tablet 0    pantoprazole (PROTONIX) 40 MG tablet Take 1 tablet (40 mg total) by mouth once daily. 90 tablet 3    traMADoL (ULTRAM) 50 mg tablet Take 1 tablet (50 mg total) by mouth every 6 (six) hours as needed for Pain. 20 tablet 0     No current facility-administered medications for this  "encounter.     Facility-Administered Medications Ordered in Other Encounters   Medication Dose Route Frequency Provider Last Rate Last Admin    0.9%  NaCl infusion   Intravenous Continuous Tacos Aburto MD 75 mL/hr at 05/25/23 1709 New Bag at 05/25/23 1709    0.9%  NaCl infusion   Intravenous Continuous Leny Lang MD           Review of patient's allergies indicates:   Allergen Reactions    Percocet [oxycodone-acetaminophen] Itching    Latex     Opioids - morphine analogues Hives    Sulfa (sulfonamide antibiotics) Rash    Zithromax z-casimiro [azithromycin] Rash       Objective:  Vitals:    05/13/24 1416   BP: 129/78   Pulse: 67   Resp: 17   Temp: 97.9 °F (36.6 °C)   TempSrc: Oral   SpO2: 99%   Weight: 117.9 kg (260 lb)   Height: 5' 6" (1.676 m)        GEN: normal appearing, NAD, AAO x3  HENT: NCAT, anicteric, OP benign  CV: normal rate, regular rhythm  RESP: CTA, symmetric rise, unlabored  ABD: soft, ND, no guarding or TTP  SKIN: warm and dry  NEURO: grossly afocal    Assessment and Plan:    Proceed with:    EGD for dysphagia       Madi Winslow MD  Gastroenterology  "

## 2024-05-14 ENCOUNTER — OFFICE VISIT (OUTPATIENT)
Dept: NEUROLOGY | Facility: CLINIC | Age: 54
End: 2024-05-14
Payer: COMMERCIAL

## 2024-05-14 VITALS
HEIGHT: 66 IN | RESPIRATION RATE: 18 BRPM | HEART RATE: 83 BPM | SYSTOLIC BLOOD PRESSURE: 118 MMHG | OXYGEN SATURATION: 99 % | DIASTOLIC BLOOD PRESSURE: 78 MMHG | BODY MASS INDEX: 41.78 KG/M2 | WEIGHT: 260 LBS

## 2024-05-14 DIAGNOSIS — G62.9 NEUROPATHY: Primary | Chronic | ICD-10-CM

## 2024-05-14 PROCEDURE — 3078F DIAST BP <80 MM HG: CPT | Mod: ,,, | Performed by: PSYCHIATRY & NEUROLOGY

## 2024-05-14 PROCEDURE — 3008F BODY MASS INDEX DOCD: CPT | Mod: ,,, | Performed by: PSYCHIATRY & NEUROLOGY

## 2024-05-14 PROCEDURE — 1159F MED LIST DOCD IN RCRD: CPT | Mod: ,,, | Performed by: PSYCHIATRY & NEUROLOGY

## 2024-05-14 PROCEDURE — 99214 OFFICE O/P EST MOD 30 MIN: CPT | Mod: S$PBB,,, | Performed by: PSYCHIATRY & NEUROLOGY

## 2024-05-14 PROCEDURE — 99215 OFFICE O/P EST HI 40 MIN: CPT | Mod: PBBFAC | Performed by: PSYCHIATRY & NEUROLOGY

## 2024-05-14 PROCEDURE — 3074F SYST BP LT 130 MM HG: CPT | Mod: ,,, | Performed by: PSYCHIATRY & NEUROLOGY

## 2024-05-14 NOTE — PATIENT INSTRUCTIONS
Cont Lyrica 75 mg bid   NCV pending next month  Cont PT/Rehab  F/u Ortho Spine as indicated   F/u Behav Health next month  F/u 3 months

## 2024-05-14 NOTE — PROGRESS NOTES
[facility], please call the patient and let her know that the biopsies showed mild gastritis but were otherwise normal. Continue current medications. Thank you!

## 2024-05-14 NOTE — ANESTHESIA POSTPROCEDURE EVALUATION
Anesthesia Post Evaluation    Patient: Stuart Cloud    Procedure(s) Performed: * No procedures listed *    Final Anesthesia Type: general (The pt was Not Arousable even with painful stimulation during procedure)      Patient location during evaluation: GI PACU (Pain Tx [facility])  Patient participation: Yes- Able to Participate  Level of consciousness: awake and alert  Post-procedure vital signs: reviewed and stable  Pain management: adequate  Airway patency: patent    PONV status at discharge: No PONV  Anesthetic complications: no      Cardiovascular status: blood pressure returned to baseline, hemodynamically stable and stable  Respiratory status: unassisted  Hydration status: euvolemic  Follow-up not needed.  Comments: Refer to nursing note for pain/fernando score upon discharge from recovery.               Vitals Value Taken Time   /78 05/14/24 0839   Temp 36.6 °C (97.8 °F) 05/13/24 1449   Pulse 83 05/14/24 0839   Resp 18 05/14/24 0839   SpO2 99 % 05/14/24 0839         Event Time   Out of Recovery 15:21:14         Pain/Fernando Score: Fernando Score: 10 (5/13/2024  2:56 PM)

## 2024-05-14 NOTE — PROGRESS NOTES
Subjective:       Patient ID: Stuart Cloud is a 54 y.o. female     Chief Complaint:    Chief Complaint   Patient presents with    headaches     Pt. States headaches have slowed down.        Allergies:  Percocet [oxycodone-acetaminophen], Latex, Opioids - morphine analogues, Sulfa (sulfonamide antibiotics), and Zithromax z-casimiro [azithromycin]    Current Medications:    Outpatient Encounter Medications as of 2024   Medication Sig Dispense Refill    clonazePAM (KLONOPIN) 0.5 MG tablet Take 1 tablet (0.5 mg total) by mouth 2 (two) times daily as needed for Anxiety. 30 tablet 2    cyclobenzaprine (FLEXERIL) 10 MG tablet Take 1 tablet (10 mg total) by mouth 3 (three) times daily as needed for Muscle spasms. 30 tablet 2    docusate sodium (COLACE) 100 MG capsule Take 1 capsule (100 mg total) by mouth 2 (two) times daily. 28 capsule 0    gabapentin (NEURONTIN) 300 MG capsule Take 1 capsule (300 mg total) by mouth 3 (three) times daily as needed (nuropathy). 90 capsule 5    ibuprofen (ADVIL,MOTRIN) 800 MG tablet Take 1 tablet (800 mg total) by mouth 3 (three) times daily as needed for Pain. 21 tablet 3    levothyroxine (SYNTHROID) 25 MCG tablet Take 1 tablet (25 mcg total) by mouth before breakfast. 30 tablet 2    losartan-hydrochlorothiazide 50-12.5 mg (HYZAAR) 50-12.5 mg per tablet Take 1 tablet by mouth once daily. 90 tablet 3    naproxen (NAPROSYN) 500 MG tablet Take 1 tablet (500 mg total) by mouth daily as needed. 30 tablet 0    ondansetron (ZOFRAN) 4 MG tablet Take 1 tablet (4 mg total) by mouth every 6 (six) hours. 12 tablet 0    ondansetron (ZOFRAN-ODT) 4 MG TbDL Take 1 tablet (4 mg total) by mouth every 6 (six) hours as needed (nausea and diarrhea). 15 tablet 0    pantoprazole (PROTONIX) 40 MG tablet Take 1 tablet (40 mg total) by mouth once daily. 90 tablet 3    traMADoL (ULTRAM) 50 mg tablet Take 1 tablet (50 mg total) by mouth every 6 (six) hours as needed for Pain. 20 tablet 0    []  polyethylene glycol (GOLYTELY) 236-22.74-6.74 -5.86 gram suspension Take 4,000 mLs (4 L total) by mouth once. for 1 dose 4000 mL 0    [DISCONTINUED] naproxen (NAPROSYN) 500 MG tablet Take 1 tablet (500 mg total) by mouth daily as needed. (Patient not taking: Reported on 4/25/2024) 30 tablet 0    [DISCONTINUED] ondansetron (ZOFRAN-ODT) 8 MG TbDL Take 1 tablet (8 mg total) by mouth every 6 (six) hours as needed (nausea). 20 tablet 0    [DISCONTINUED] predniSONE (DELTASONE) 10 MG tablet Half tab on evening of day 10, morning and evening of day 11, and morning of day 12. (Patient not taking: Reported on 4/25/2024) 2 tablet 0    [DISCONTINUED] predniSONE (DELTASONE) 20 MG tablet Twice daily for 7 days; Day 8 take 20 mg in the morning and 1/2 tab (10mg) in the evening, Day 9 1/2 tab (10mg) am and pm, Day 10 1/2 tab (10mg) am and start other bottle with instructions (Patient not taking: Reported on 4/25/2024) 17 tablet 0    [DISCONTINUED] promethazine (PHENERGAN) 25 MG tablet Take 1 tablet (25 mg total) by mouth every 6 (six) hours as needed for Nausea. (Patient not taking: Reported on 4/25/2024) 15 tablet 0     Facility-Administered Encounter Medications as of 5/14/2024   Medication Dose Route Frequency Provider Last Rate Last Admin    0.9%  NaCl infusion   Intravenous Continuous Tacos Aburto MD 75 mL/hr at 05/25/23 1709 New Bag at 05/25/23 1709    0.9%  NaCl infusion   Intravenous Continuous Leny Lang MD        [DISCONTINUED] LIDOcaine (PF) 20 mg/mL (2%) injection   Intravenous PRN Arthur Kessler CRNA   80 mg at 05/13/24 1434    [DISCONTINUED] propofol (DIPRIVAN) 10 mg/mL infusion   Intravenous PRN Arthur Kessler, CRNA   40 mg at 05/13/24 1438    [DISCONTINUED] sodium chloride 0.9% flush 10 mL  10 mL Intravenous PRN Madi Winslow MD        [DISCONTINUED] sodium chloride 0.9% infusion   Intravenous Continuous PRN Arthur Kessler, CRNA 250 mL/hr at 05/13/24 1434 New Bag at 05/13/24 1439        History of Present Illness  55 yo BF w/ mult medical and psych issues - Lyrica 75 bid helping her neuropathy fairly well in LLE- NCV pending at [place] next month   Also starting Behav Health   Being followed by Ortho Spine   Mri brain was comp NL showing nothing acute              Past Medical History:   Diagnosis Date    Arthritis     Class 3 severe obesity due to excess calories without serious comorbidity with body mass index (BMI) of 40.0 to 44.9 in adult 03/20/2023    Essential hypertension 03/20/2023    History of gastric ulcer 05/01/2023    Hypothyroidism     Vestibular dizziness 10/23/2023       Past Surgical History:   Procedure Laterality Date    ARTHROSCOPY OF KNEE Right 05/25/2023    Procedure: ARTHROSCOPY, KNEE;  Surgeon: Tacos Aburto MD;  Location: [facility] OR;  Service: Orthopedics;  Laterality: Right;    HYSTERECTOMY      INJECTION OF ANESTHETIC AGENT INTO SACROILIAC JOINT Bilateral 5/7/2024    Procedure: BLOCK, SACROILIAC JOINT;  Surgeon: Leny Lang MD;  Location: [facility];  Service: Pain Management;  Laterality: Bilateral;    JOINT REPLACEMENT Right     hip replacement    KNEE ARTHROPLASTY Left     KNEE ARTHROSCOPY W/ MENISCECTOMY Right 05/25/2023    Procedure: ARTHROSCOPY, KNEE, WITH MENISCECTOMY;  Surgeon: Tacos Aburto MD;  Location: [facility] OR;  Service: Orthopedics;  Laterality: Right;    LUMPECTOMY, BREAST      ROBOT-ASSISTED CHOLECYSTECTOMY USING DA ANG XI N/A 5/9/2024    Procedure: XI ROBOTIC CHOLECYSTECTOMY;  Surgeon: Mao Dempsey DO;  Location: [facility] OR;  Service: General;  Laterality: N/A;       Social History  Ms. Cloud  reports that she has never smoked. She has never been exposed to tobacco smoke. She has never used smokeless tobacco. She reports that she does not drink alcohol and does not use drugs.    Family History  Ms.'s Cloud family history includes Diabetes in her mother; Hypertension in her maternal grandmother and  "mother; Stroke in her maternal grandfather, maternal grandmother, and mother.    Review of Systems  ROS   Objective:   /78 (BP Location: Right arm, Patient Position: Sitting, BP Method: Large (Manual))   Pulse 83   Resp 18   Ht 5' 6" (1.676 m)   Wt 117.9 kg (260 lb)   SpO2 99%   BMI 41.97 kg/m²          Physical Exam     Assessment:     Neuropathy         Primary Diagnosis and ICD10  Neuropathy [G62.9]    Plan:     Patient Instructions   Cont Lyrica 75 mg bid   NCV pending next month  Cont PT/Rehab  F/u Ortho Spine as indicated   F/u Behav Health next month  F/u 3 months    There are no discontinued medications.    Requested Prescriptions      No prescriptions requested or ordered in this encounter       "

## 2024-05-15 ENCOUNTER — TELEPHONE (OUTPATIENT)
Dept: GASTROENTEROLOGY | Facility: CLINIC | Age: 54
End: 2024-05-15
Payer: COMMERCIAL

## 2024-05-15 ENCOUNTER — TELEPHONE (OUTPATIENT)
Dept: SURGERY | Facility: CLINIC | Age: 54
End: 2024-05-15
Payer: COMMERCIAL

## 2024-05-15 NOTE — TELEPHONE ENCOUNTER
----- Message from Dorothy Carpenter sent at 5/15/2024  1:55 PM CDT -----  Regardinnd part to earlier message  Pt have another # to fax her paper to [employer] # [phone].Call back # [phone]    Paperwork faxed on 5/15/24 to fax number listed

## 2024-05-15 NOTE — TELEPHONE ENCOUNTER
5/15 Spoke with patient and gave results.    ----- Message from Madi Winslow MD sent at 5/14/2024  4:14 PM CDT -----  Munira, please call the patient and let her know that the biopsies showed mild gastritis but were otherwise normal. Continue current medications. Thank you!

## 2024-05-15 NOTE — TELEPHONE ENCOUNTER
----- Message from Mary Adams sent at 5/15/2024 11:32 AM CDT -----  pt calling to speak with nurse - states she spoke with her on yesterday and nurse was going to discuss with dr. nino - also checking on paperwork - call back # [phone]    Spoke with pt, paperwork has been faxed to her place of employment on 5/15/24.

## 2024-05-21 ENCOUNTER — TELEPHONE (OUTPATIENT)
Dept: SURGERY | Facility: CLINIC | Age: 54
End: 2024-05-21
Payer: COMMERCIAL

## 2024-05-21 ENCOUNTER — OFFICE VISIT (OUTPATIENT)
Dept: SURGERY | Facility: CLINIC | Age: 54
End: 2024-05-21
Payer: COMMERCIAL

## 2024-05-21 DIAGNOSIS — K82.8 BILIARY DYSKINESIA: Primary | ICD-10-CM

## 2024-05-21 PROCEDURE — 99213 OFFICE O/P EST LOW 20 MIN: CPT | Mod: PBBFAC | Performed by: STUDENT IN AN ORGANIZED HEALTH CARE EDUCATION/TRAINING PROGRAM

## 2024-05-21 PROCEDURE — 1159F MED LIST DOCD IN RCRD: CPT | Mod: ,,, | Performed by: STUDENT IN AN ORGANIZED HEALTH CARE EDUCATION/TRAINING PROGRAM

## 2024-05-21 PROCEDURE — 99024 POSTOP FOLLOW-UP VISIT: CPT | Mod: ,,, | Performed by: STUDENT IN AN ORGANIZED HEALTH CARE EDUCATION/TRAINING PROGRAM

## 2024-05-21 RX ORDER — TRAMADOL HYDROCHLORIDE 50 MG/1
50 TABLET ORAL EVERY 6 HOURS PRN
Qty: 20 TABLET | Refills: 0 | Status: SHIPPED | OUTPATIENT
Start: 2024-05-21

## 2024-05-21 NOTE — TELEPHONE ENCOUNTER
----- Message from Serina Hayward sent at 5/21/2024 11:53 AM CDT -----  Pt was just seen and her RX hasn't made it to pharmacy even though I see it was sent. She said to let you know cause she is waiting downstairs. Thanks!    Spoke with pt, states she just got her Rx.

## 2024-05-23 NOTE — PROGRESS NOTES
54-year-old  female presents to the clinic status post robotic cholecystectomy.  Patient doing well.  Tolerating food.  Still having some soreness and requesting some pain medication refills.  Denies any chest pain/shortness of breath, nausea/vomiting/diarrhea, fever/chills.    Abdominal incisions clean dry and intact and healing well, no signs of infection or drainage.      Pathology showed chronic cholecystitis, benign lymph node.  No need for further follow-up.  We will refill her tramadol.  Return sooner for problems

## 2024-05-24 PROBLEM — R52 PAIN: Status: RESOLVED | Noted: 2024-03-28 | Resolved: 2024-05-24

## 2024-05-24 PROBLEM — R53.1 DECREASED STRENGTH: Status: RESOLVED | Noted: 2024-03-28 | Resolved: 2024-05-24

## 2024-05-24 NOTE — PROGRESS NOTES
Outpatient Therapy Discharge Summary     Name: Stuart Cloud  Clinic Number: [id number]    Therapy Diagnosis:   Encounter Diagnoses   Name Primary?    Pain Yes    Decreased strength      Physician: Maria L Phillips FNP-C    Physician Orders: PT Eval and Treat   Medical Diagnosis from Referral: Lumbar Radiculopathy  Evaluation Date: 3/28/2024    Date of Last visit: 5/1/24  Total Visits Received: 10      Assessment    Goals:  Short Term Goals: 6 weeks   Patient will be able to sleep ~ 3 hours without waking from pain  Patient will improve manual muscle test to 4-/5 for stabilization  Patient will maintain prolonged positions x 15 minutes with 4 /10 pain  Patient will report a change in status with low back pain from constant to intermittent  Patient will report no left leg radicular symptoms by 6 weeks    Long Term Goals: 12 weeks   Patient will be able to sleep through the night without waking from pain  Patient will improve manual muscle test to 4/5 for stabilization   Patient will maintain prolonged positions x 30 minutes with  2/10 pain  Patient will be independent with home exercise program by D/C    Discharge reason: Patient has met all of his/her goals and Patient has reached the maximum rehab potential for the present time    Plan   This patient is discharged from Physical Therapy.

## 2024-05-28 ENCOUNTER — HOSPITAL ENCOUNTER (EMERGENCY)
Facility: HOSPITAL | Age: 54
Discharge: HOME | End: 2024-05-28
Payer: COMMERCIAL

## 2024-05-28 ENCOUNTER — TELEPHONE (OUTPATIENT)
Dept: SURGERY | Facility: CLINIC | Age: 54
End: 2024-05-28
Payer: COMMERCIAL

## 2024-05-28 VITALS
HEIGHT: 66 IN | TEMPERATURE: 98 F | OXYGEN SATURATION: 100 % | WEIGHT: 261 LBS | RESPIRATION RATE: 18 BRPM | BODY MASS INDEX: 41.95 KG/M2 | SYSTOLIC BLOOD PRESSURE: 116 MMHG | DIASTOLIC BLOOD PRESSURE: 82 MMHG | HEART RATE: 92 BPM

## 2024-05-28 DIAGNOSIS — N39.0 ACUTE URINARY TRACT INFECTION: Primary | ICD-10-CM

## 2024-05-28 DIAGNOSIS — K59.00 CONSTIPATION, UNSPECIFIED CONSTIPATION TYPE: ICD-10-CM

## 2024-05-28 LAB
BILIRUB UR QL STRIP: NEGATIVE
CLARITY UR: CLEAR
COLOR UR: YELLOW
GLUCOSE UR STRIP-MCNC: NORMAL MG/DL
KETONES UR STRIP-SCNC: 10 MG/DL
LEUKOCYTE ESTERASE UR QL STRIP: ABNORMAL
MUCOUS, UA: ABNORMAL /LPF
NITRITE UR QL STRIP: NEGATIVE
PH UR STRIP: 5 PH UNITS
PROT UR QL STRIP: NEGATIVE
RBC # UR STRIP: NEGATIVE /UL
RBC #/AREA URNS HPF: 2 /HPF
SP GR UR STRIP: 1.03
SQUAMOUS #/AREA URNS LPF: ABNORMAL /HPF
UROBILINOGEN UR STRIP-ACNC: NORMAL MG/DL
WBC #/AREA URNS HPF: 1 /HPF

## 2024-05-28 PROCEDURE — 99283 EMERGENCY DEPT VISIT LOW MDM: CPT | Mod: 25

## 2024-05-28 PROCEDURE — 81001 URINALYSIS AUTO W/SCOPE: CPT | Performed by: NURSE PRACTITIONER

## 2024-05-28 RX ORDER — NITROFURANTOIN 25; 75 MG/1; MG/1
100 CAPSULE ORAL 2 TIMES DAILY
Qty: 20 CAPSULE | Refills: 0 | Status: SHIPPED | OUTPATIENT
Start: 2024-05-28 | End: 2024-06-07

## 2024-05-28 NOTE — ED PROVIDER NOTES
Encounter Date: 5/28/2024       History     Chief Complaint   Patient presents with    PELVIC PRESSURE    Back Pain     Patient presents to er with complaint of abd and lower back pain.  Patient states symptoms started acutely today while she was at work.  She denies fever.   She reports the pain radiates from her lower back to her left pelvis.  She reports feeling of pressure in her pelvis/ vagina.  She reports daily bowel movement that is normal.  She reports dysuria while in Er.      The history is provided by the patient. No  was used.     Review of patient's allergies indicates:   Allergen Reactions    Percocet [oxycodone-acetaminophen] Itching    Latex     Opioids - morphine analogues Hives    Sulfa (sulfonamide antibiotics) Rash    Zithromax z-casimiro [azithromycin] Rash     Past Medical History:   Diagnosis Date    Arthritis     Class 3 severe obesity due to excess calories without serious comorbidity with body mass index (BMI) of 40.0 to 44.9 in adult 03/20/2023    Essential hypertension 03/20/2023    History of gastric ulcer 05/01/2023    Hypothyroidism     Vestibular dizziness 10/23/2023     Past Surgical History:   Procedure Laterality Date    ARTHROSCOPY OF KNEE Right 05/25/2023    Procedure: ARTHROSCOPY, KNEE;  Surgeon: Tacos Aburto MD;  Location: [facility] OR;  Service: Orthopedics;  Laterality: Right;    HYSTERECTOMY      INJECTION OF ANESTHETIC AGENT INTO SACROILIAC JOINT Bilateral 5/7/2024    Procedure: BLOCK, SACROILIAC JOINT;  Surgeon: Leny Lang MD;  Location: [facility];  Service: Pain Management;  Laterality: Bilateral;    JOINT REPLACEMENT Right     hip replacement    KNEE ARTHROPLASTY Left     KNEE ARTHROSCOPY W/ MENISCECTOMY Right 05/25/2023    Procedure: ARTHROSCOPY, KNEE, WITH MENISCECTOMY;  Surgeon: Tacos Aburto MD;  Location: [facility] OR;  Service: Orthopedics;  Laterality: Right;    LUMPECTOMY, BREAST      ROBOT-ASSISTED CHOLECYSTECTOMY  USING DA ANG XI N/A 5/9/2024    Procedure: XI ROBOTIC CHOLECYSTECTOMY;  Surgeon: Mao Dempsey DO;  Location: [facility];  Service: General;  Laterality: N/A;     Family History   Problem Relation Name Age of Onset    Hypertension Maternal Grandmother      Stroke Maternal Grandmother      Stroke Maternal Grandfather      Hypertension Mother      Stroke Mother      Diabetes Mother       Social History     Tobacco Use    Smoking status: Never     Passive exposure: Never    Smokeless tobacco: Never   Substance Use Topics    Alcohol use: Never    Drug use: Never     Review of Systems   Constitutional:  Positive for activity change and fatigue.   Gastrointestinal:  Positive for abdominal pain.   Genitourinary:  Positive for dysuria, flank pain, pelvic pain and vaginal pain.   All other systems reviewed and are negative.      Physical Exam     Initial Vitals [05/28/24 1531]   BP Pulse Resp Temp SpO2   114/77 100 18 97.9 °F (36.6 °C) 100 %      MAP       --         Physical Exam    Nursing note and vitals reviewed.  Constitutional: She appears well-developed and well-nourished.   HENT:   Head: Normocephalic.   Nose: Nose normal.   Mouth/Throat: Oropharynx is clear and moist.   Eyes: EOM are normal.   Neck: Neck supple.   Normal range of motion.  Cardiovascular:  Normal rate, normal heart sounds and intact distal pulses.           Pulmonary/Chest: Breath sounds normal.   Abdominal: Abdomen is soft. Bowel sounds are normal.   Genitourinary:    No vaginal discharge.     Musculoskeletal:         General: Normal range of motion.      Cervical back: Normal range of motion and neck supple.     Neurological: She is alert and oriented to person, place, and time. She has normal strength. GCS score is 15. GCS eye subscore is 4. GCS verbal subscore is 5. GCS motor subscore is 6.   Skin: Skin is warm and dry. Capillary refill takes less than 2 seconds.   Psychiatric: She has a normal mood and affect.         Medical Screening  Exam   See Full Note    ED Course   Procedures  Labs Reviewed   URINALYSIS, REFLEX TO URINE CULTURE - Abnormal; Notable for the following components:       Result Value    Leukocytes, UA Small (*)     Ketones, UA 10 (*)     All other components within normal limits   URINALYSIS, MICROSCOPIC - Abnormal; Notable for the following components:    Squamous Epithelial Cells, UA Occasional (*)     Mucous Occasional (*)     All other components within normal limits          Imaging Results              X-Ray Lumbar Spine Ap And Lateral (In process)  Result time 05/28/24 16:42:09                     Medications - No data to display  Medical Decision Making  Patient presents to er with complaint of abd and lower back pain.  Patient states symptoms started acutely today while she was at work.  She denies fever.   She reports the pain radiates from her lower back to her left pelvis.  She reports feeling of pressure in her pelvis/ vagina.  She reports daily bowel movement that is normal.  She reports dysuria while in Er.        Amount and/or Complexity of Data Reviewed  Radiology: ordered. Decision-making details documented in ED Course.     Details: Lumbar spine x-ray no fractures or dislocations degenerative changes noted.  Constipation also noted  Discussion of management or test interpretation with external provider(s): Patient was discharged home with diagnosis of acute urinary tract infection and constipation.  She was given instructions to treat constipation and given prescription for Macrobid to take to treat urinary tract infection.  She was instructed to increase her water and fiber content in her diet.  She was told to follow up with the primary care provider in 2-3 days for recheck if symptoms do not improve with treatment.  Patient verbalizes understanding agrees with plan of care.    Risk  Prescription drug management.                                      Clinical Impression:   Final diagnoses:  [N39.0] Acute  urinary tract infection (Primary)  [K59.00] Constipation, unspecified constipation type        ED Disposition Condition    Discharge Stable          ED Prescriptions       Medication Sig Dispense Start Date End Date Auth. Provider    nitrofurantoin, macrocrystal-monohydrate, (MACROBID) 100 MG capsule Take 1 capsule (100 mg total) by mouth 2 (two) times daily. for 10 days 20 capsule 5/28/2024 6/7/2024 Tran Ortega FNP          Follow-up Information    None          Tran Ortega, SHANICE  05/28/24 5062

## 2024-05-28 NOTE — DISCHARGE INSTRUCTIONS
Drink one bottle of magnesium citrate today.    Follow with four 16 oz bottles of water.    Add Miralax 1 capful to daily medications x 2 weeks, then use only as needed.   Increase water and fiber content in diet.   Follow up with PCP in 2 days if symptoms do not resolve.   Return to ER with new or worsening symptoms.

## 2024-05-28 NOTE — ED TRIAGE NOTES
"Pt reports back pain and pelvic pressure as if "a babys head is about to come out". She reports it started around 12  "

## 2024-05-28 NOTE — Clinical Note
"Stuart Recio" Pedro Luis was seen and treated in our emergency department on 5/28/2024.  She may return to work on 05/29/2024.       If you have any questions or concerns, please don't hesitate to call.      Tran Ortega, FNP"

## 2024-05-28 NOTE — TELEPHONE ENCOUNTER
----- Message from Serina Hayward sent at 5/28/2024  1:35 PM CDT -----  Faith- pt returned your call, no answer. Please try again.    Returned pt call.

## 2024-05-29 ENCOUNTER — TELEPHONE (OUTPATIENT)
Dept: FAMILY MEDICINE | Facility: CLINIC | Age: 54
End: 2024-05-29
Payer: COMMERCIAL

## 2024-05-29 ENCOUNTER — OFFICE VISIT (OUTPATIENT)
Dept: PAIN MEDICINE | Facility: CLINIC | Age: 54
End: 2024-05-29
Payer: COMMERCIAL

## 2024-05-29 VITALS
DIASTOLIC BLOOD PRESSURE: 75 MMHG | WEIGHT: 262 LBS | SYSTOLIC BLOOD PRESSURE: 128 MMHG | BODY MASS INDEX: 42.11 KG/M2 | HEIGHT: 66 IN | HEART RATE: 70 BPM | RESPIRATION RATE: 18 BRPM

## 2024-05-29 DIAGNOSIS — M46.1 BILATERAL SACROILIITIS: Primary | Chronic | ICD-10-CM

## 2024-05-29 DIAGNOSIS — M54.17 LUMBOSACRAL RADICULOPATHY: Chronic | ICD-10-CM

## 2024-05-29 PROCEDURE — 1159F MED LIST DOCD IN RCRD: CPT | Mod: ,,, | Performed by: PHYSICIAN ASSISTANT

## 2024-05-29 PROCEDURE — 96372 THER/PROPH/DIAG INJ SC/IM: CPT | Mod: PBBFAC | Performed by: PHYSICIAN ASSISTANT

## 2024-05-29 PROCEDURE — 99999PBSHW PR PBB SHADOW TECHNICAL ONLY FILED TO HB: Mod: PBBFAC,,,

## 2024-05-29 PROCEDURE — 99215 OFFICE O/P EST HI 40 MIN: CPT | Mod: PBBFAC | Performed by: PHYSICIAN ASSISTANT

## 2024-05-29 PROCEDURE — 99214 OFFICE O/P EST MOD 30 MIN: CPT | Mod: S$PBB,25,, | Performed by: PHYSICIAN ASSISTANT

## 2024-05-29 PROCEDURE — 3008F BODY MASS INDEX DOCD: CPT | Mod: ,,, | Performed by: PHYSICIAN ASSISTANT

## 2024-05-29 PROCEDURE — 3074F SYST BP LT 130 MM HG: CPT | Mod: ,,, | Performed by: PHYSICIAN ASSISTANT

## 2024-05-29 PROCEDURE — 3078F DIAST BP <80 MM HG: CPT | Mod: ,,, | Performed by: PHYSICIAN ASSISTANT

## 2024-05-29 RX ORDER — KETOROLAC TROMETHAMINE 30 MG/ML
60 INJECTION, SOLUTION INTRAMUSCULAR; INTRAVENOUS
Status: COMPLETED | OUTPATIENT
Start: 2024-05-29 | End: 2024-05-29

## 2024-05-29 RX ADMIN — KETOROLAC TROMETHAMINE 60 MG: 30 INJECTION, SOLUTION INTRAMUSCULAR at 12:05

## 2024-05-29 NOTE — PROGRESS NOTES
Subjective:         Patient ID: Stuart Cloud is a 54 y.o. female.    Chief Complaint: Low-back Pain and Pelvic Pain      Pain  This is a chronic problem. The current episode started more than 1 year ago. The problem occurs daily. The problem has been gradually improving. Associated symptoms include arthralgias. Pertinent negatives include no anorexia, change in bowel habit, chest pain, chills, coughing, diaphoresis, fever, neck pain, rash, sore throat, swollen glands, urinary symptoms, vertigo or vomiting.     Review of Systems   Constitutional:  Negative for activity change, appetite change, chills, diaphoresis, fever and unexpected weight change.   HENT:  Negative for drooling, ear discharge, ear pain, facial swelling, nosebleeds, sore throat, trouble swallowing, voice change and goiter.    Eyes:  Negative for photophobia, pain, discharge, redness and visual disturbance.   Respiratory:  Negative for apnea, cough, choking, chest tightness, shortness of breath, wheezing and stridor.    Cardiovascular:  Negative for chest pain, palpitations and leg swelling.   Gastrointestinal:  Negative for abdominal distention, anorexia, change in bowel habit, diarrhea, rectal pain, vomiting and fecal incontinence.   Endocrine: Negative for cold intolerance, heat intolerance, polydipsia, polyphagia and polyuria.   Genitourinary:  Negative for bladder incontinence, dysuria, flank pain, frequency and hot flashes.   Musculoskeletal:  Positive for arthralgias, back pain and leg pain. Negative for neck pain.   Integumentary:  Negative for color change, pallor and rash.   Allergic/Immunologic: Negative for immunocompromised state.   Neurological:  Negative for dizziness, vertigo, seizures, syncope, facial asymmetry, speech difficulty, light-headedness, memory loss and coordination difficulties.   Hematological:  Negative for adenopathy. Does not bruise/bleed easily.   Psychiatric/Behavioral:  Negative for agitation, behavioral  problems, confusion, decreased concentration, dysphoric mood, hallucinations, self-injury and suicidal ideas. The patient is not nervous/anxious and is not hyperactive.            Past Medical History:   Diagnosis Date    Arthritis     Class 3 severe obesity due to excess calories without serious comorbidity with body mass index (BMI) of 40.0 to 44.9 in adult 03/20/2023    Essential hypertension 03/20/2023    History of gastric ulcer 05/01/2023    Hypothyroidism     Vestibular dizziness 10/23/2023     Past Surgical History:   Procedure Laterality Date    ARTHROSCOPY OF KNEE Right 05/25/2023    Procedure: ARTHROSCOPY, KNEE;  Surgeon: Tacos Aburto MD;  Location: [facility] OR;  Service: Orthopedics;  Laterality: Right;    CHOLECYSTECTOMY  05/09/2024    ENDOSCOPY  05/13/2024    HYSTERECTOMY      INJECTION OF ANESTHETIC AGENT INTO SACROILIAC JOINT Bilateral 05/07/2024    Procedure: BLOCK, SACROILIAC JOINT;  Surgeon: Leny Lang MD;  Location: [facility];  Service: Pain Management;  Laterality: Bilateral;    JOINT REPLACEMENT Right     hip replacement    KNEE ARTHROPLASTY Left     KNEE ARTHROSCOPY W/ MENISCECTOMY Right 05/25/2023    Procedure: ARTHROSCOPY, KNEE, WITH MENISCECTOMY;  Surgeon: Tacos Aburto MD;  Location: [facility] OR;  Service: Orthopedics;  Laterality: Right;    LUMPECTOMY, BREAST      ROBOT-ASSISTED CHOLECYSTECTOMY USING DA ANG XI N/A 05/09/2024    Procedure: XI ROBOTIC CHOLECYSTECTOMY;  Surgeon: Mao Dempsey DO;  Location: [facility] OR;  Service: General;  Laterality: N/A;     Social History     Socioeconomic History    Marital status: Single   Tobacco Use    Smoking status: Never     Passive exposure: Never    Smokeless tobacco: Never   Substance and Sexual Activity    Alcohol use: Never    Drug use: Never    Sexual activity: Not Currently     Family History   Problem Relation Name Age of Onset    Hypertension Maternal Grandmother      Stroke Maternal Grandmother    "   Stroke Maternal Grandfather      Hypertension Mother      Stroke Mother      Diabetes Mother       Review of patient's allergies indicates:   Allergen Reactions    Tylox [oxycodone-acetaminophen] Itching    Latex     Opioids - morphine analogues Hives    Sulfa (sulfonamide antibiotics) Rash    Zithromax z-casimiro [azithromycin] Rash        Objective:  Vitals:    05/29/24 1148   BP: 128/75   Pulse: 70   Resp: 18   Weight: 118.8 kg (262 lb)   Height: 5' 6" (1.676 m)   PainSc:   8         Physical Exam  Vitals and nursing note reviewed. Exam conducted with a chaperone present.   Constitutional:       General: She is awake. She is not in acute distress.     Appearance: Normal appearance. She is not ill-appearing, toxic-appearing or diaphoretic.   HENT:      Head: Normocephalic and atraumatic.      Nose: Nose normal.      Mouth/Throat:      Mouth: Mucous membranes are moist.      Pharynx: Oropharynx is clear.   Eyes:      Conjunctiva/sclera: Conjunctivae normal.      Pupils: Pupils are equal, round, and reactive to light.   Cardiovascular:      Rate and Rhythm: Normal rate.   Pulmonary:      Effort: Pulmonary effort is normal. No respiratory distress.   Abdominal:      Palpations: Abdomen is soft.      Tenderness: There is no guarding.   Musculoskeletal:         General: Normal range of motion.      Cervical back: Normal range of motion and neck supple. No rigidity.   Skin:     General: Skin is warm and dry.      Coloration: Skin is not jaundiced or pale.   Neurological:      General: No focal deficit present.      Mental Status: She is alert and oriented to person, place, and time. Mental status is at baseline.      Cranial Nerves: No cranial nerve deficit (II-XII).   Psychiatric:         Mood and Affect: Mood normal.         Behavior: Behavior normal. Behavior is cooperative.         Thought Content: Thought content normal.           X-Ray Lumbar Spine Ap And Lateral  Narrative: EXAMINATION:  XR LUMBAR SPINE AP AND " LATERAL    CLINICAL HISTORY:  lower back pain;    TECHNIQUE:  AP lateral    COMPARISON:  Prior radiograph 03/20/2024    FINDINGS:  Vertebral body heights and alignment are within normal limits. There is no acute fracture or subluxation identified.  Multilevel mild to moderate disc space loss and endplate sclerosis changes are noted at L4 through S1.  There is moderate facet arthropathy from L3 through S1 which is similar to prior.    Right hip arthroplasty hardware is partially imaged.    There is no focal soft tissue abnormality.  Impression: No acute fracture or subluxation in the lumbar spine.    Multilevel mild to moderate degenerative changes as detailed above.    Place of service: [facility]    Electronically signed by: [place]  Date:    05/28/2024  Time:    18:27       Admission on 05/28/2024, Discharged on 05/28/2024   Component Date Value Ref Range Status    Color, UA 05/28/2024 Yellow  Colorless, Straw, Yellow, Light Yellow, Dark Yellow Final    Clarity, UA 05/28/2024 Clear  Clear Final    pH, UA 05/28/2024 5.0  5.0 to 8.0 pH Units Final    Leukocytes, UA 05/28/2024 Small (A)  Negative Final    Nitrites, UA 05/28/2024 Negative  Negative Final    Protein, UA 05/28/2024 Negative  Negative Final    Glucose, UA 05/28/2024 Normal  Normal mg/dL Final    Ketones, UA 05/28/2024 10 (A)  Negative mg/dL Final    Urobilinogen, UA 05/28/2024 Normal  0.2, 1.0, Normal mg/dL Final    Bilirubin, UA 05/28/2024 Negative  Negative Final    Blood, UA 05/28/2024 Negative  Negative Final    Specific Gravity, UA 05/28/2024 1.026  <=1.030 Final    WBC, UA 05/28/2024 1  <=5 /hpf Final    RBC, UA 05/28/2024 2  <=3 /hpf Final    Squamous Epithelial Cells, UA 05/28/2024 Occasional (A)  None Seen /HPF Final    Mucous 05/28/2024 Occasional (A)  None Seen /LPF Final   Hospital Outpatient Visit on 05/13/2024   Component Date Value Ref Range Status    Case Report 05/13/2024    Final                    Value:Surgical  Pathology                                Case: [id number]                                   Authorizing Provider:  Madi Winslow MD           Collected:           05/13/2024 02:38 PM          Ordering Location:     [facility] -         Received:            05/13/2024 03:39 PM                                 Endoscopy                                                                    Pathologist:           Jonn Hardy MD                                                           Specimens:   A) - Stomach, a.  gastric bx                                                                        B) - Esophagus, b. esophagus bx                                                            Final Diagnosis 05/13/2024    Final                    Value:This result contains rich text formatting which cannot be displayed here.    Gross Description 05/13/2024    Final                    Value:This result contains rich text formatting which cannot be displayed here.    Microscopic Description 05/13/2024    Final                    Value:This result contains rich text formatting which cannot be displayed here.    Laboratory Notes 05/13/2024    Final                    Value:This result contains rich text formatting which cannot be displayed here.   Admission on 05/09/2024, Discharged on 05/09/2024   Component Date Value Ref Range Status    Case Report 05/09/2024    Final                    Value:Surgical Pathology                                Case: [id number]                                   Authorizing Provider:  Mao Dempsey DO       Collected:           05/09/2024 08:58 AM          Ordering Location:     [facility] -     Received:            05/09/2024 10:01 AM                                 [facility]                                                              Pathologist:           Jonn Hardy MD                                                           Specimen:    Gallbladder                                                                                 Final Diagnosis 05/09/2024    Final                    Value:This result contains rich text formatting which cannot be displayed here.    Gross Description 05/09/2024    Final                    Value:This result contains rich text formatting which cannot be displayed here.    Microscopic Description 05/09/2024    Final                    Value:This result contains rich text formatting which cannot be displayed here.    Laboratory Notes 05/09/2024    Final                    Value:This result contains rich text formatting which cannot be displayed here.   Admission on 05/02/2024, Discharged on 05/02/2024   Component Date Value Ref Range Status    Sodium 05/02/2024 142  136 - 145 mmol/L Final    Potassium 05/02/2024 3.8  3.5 - 5.1 mmol/L Final    Chloride 05/02/2024 110 (H)  98 - 107 mmol/L Final    CO2 05/02/2024 28  21 - 32 mmol/L Final    Anion Gap 05/02/2024 8  7 - 16 mmol/L Final    Glucose 05/02/2024 103  74 - 106 mg/dL Final    BUN 05/02/2024 11  7 - 18 mg/dL Final    Creatinine 05/02/2024 0.66  0.55 - 1.02 mg/dL Final    BUN/Creatinine Ratio 05/02/2024 17  6 - 20 Final    Calcium 05/02/2024 9.1  8.5 - 10.1 mg/dL Final    Total Protein 05/02/2024 5.9 (L)  6.4 - 8.2 g/dL Final    Albumin 05/02/2024 2.9 (L)  3.5 - 5.0 g/dL Final    Globulin 05/02/2024 3.0  2.0 - 4.0 g/dL Final    A/G Ratio 05/02/2024 1.0   Final    Bilirubin, Total 05/02/2024 0.4  >0.0 - 1.2 mg/dL Final    Alk Phos 05/02/2024 62  41 - 108 U/L Final    ALT 05/02/2024 35  13 - 56 U/L Final    AST 05/02/2024 26  15 - 37 U/L Final    eGFR 05/02/2024 106  >=60 mL/min/1.73m2 Final    Color, UA 05/02/2024 Light-Yellow  Colorless, Straw, Yellow, Light Yellow, Dark Yellow Final    Clarity, UA 05/02/2024 Turbid  Clear Final    pH, UA 05/02/2024 8.5 (A)  5.0 to 8.0 pH Units Final    Leukocytes, UA 05/02/2024 Small (A)  Negative Final    Nitrites, UA 05/02/2024 Negative  Negative Final    Protein,  UA 05/02/2024 10 (A)  Negative Final    Glucose, UA 05/02/2024 Normal  Normal mg/dL Final    Ketones, UA 05/02/2024 Negative  Negative mg/dL Final    Urobilinogen, UA 05/02/2024 Normal  0.2, 1.0, Normal mg/dL Final    Bilirubin, UA 05/02/2024 Negative  Negative Final    Blood, UA 05/02/2024 Negative  Negative Final    Specific Gravity, UA 05/02/2024 1.019  <=1.030 Final    Amylase 05/02/2024 38  25 - 115 U/L Final    Lipase 05/02/2024 15 (L)  73 - 393 U/L Final    WBC 05/02/2024 5.34  4.50 - 11.00 K/uL Final    RBC 05/02/2024 4.26  4.20 - 5.40 M/uL Final    Hemoglobin 05/02/2024 12.6  12.0 - 16.0 g/dL Final    Hematocrit 05/02/2024 36.6 (L)  38.0 - 47.0 % Final    MCV 05/02/2024 85.9  80.0 - 96.0 fL Final    MCH 05/02/2024 29.6  27.0 - 31.0 pg Final    MCHC 05/02/2024 34.4  32.0 - 36.0 g/dL Final    RDW 05/02/2024 12.6  11.5 - 14.5 % Final    Platelet Count 05/02/2024 226  150 - 400 K/uL Final    MPV 05/02/2024 10.9  9.4 - 12.4 fL Final    Neutrophils % 05/02/2024 63.8  53.0 - 65.0 % Final    Lymphocytes % 05/02/2024 25.3 (L)  27.0 - 41.0 % Final    Monocytes % 05/02/2024 7.1 (H)  2.0 - 6.0 % Final    Eosinophils % 05/02/2024 2.8  1.0 - 4.0 % Final    Basophils % 05/02/2024 0.6  0.0 - 1.0 % Final    Immature Granulocytes % 05/02/2024 0.4  0.0 - 0.4 % Final    nRBC, Auto 05/02/2024 0.0  <=0.0 % Final    Neutrophils, Abs 05/02/2024 3.41  1.80 - 7.70 K/uL Final    Lymphocytes, Absolute 05/02/2024 1.35  1.00 - 4.80 K/uL Final    Monocytes, Absolute 05/02/2024 0.38  0.00 - 0.80 K/uL Final    Eosinophils, Absolute 05/02/2024 0.15  0.00 - 0.50 K/uL Final    Basophils, Absolute 05/02/2024 0.03  0.00 - 0.20 K/uL Final    Immature Granulocytes, Absolute 05/02/2024 0.02  0.00 - 0.04 K/uL Final    nRBC, Absolute 05/02/2024 0.00  <=0.00 x10e3/uL Final    Diff Type 05/02/2024 Auto   Final    Troponin I High Sensitivity 05/02/2024 <4.0  <=60.4 pg/mL Final    QRS Duration 05/02/2024 74  ms Final    [facility] QTC Calculation 05/02/2024  426  ms Final    WBC, UA 05/02/2024 6 (H)  <=5 /hpf Final    RBC, UA 05/02/2024 4 (H)  <=3 /hpf Final    Bacteria, UA 05/02/2024 Occasional (A)  None Seen /hpf Final    Squamous Epithelial Cells, UA 05/02/2024 Few (A)  None Seen /HPF Final    Mucous 05/02/2024 Occasional (A)  None Seen /LPF Final   Office Visit on 05/01/2024   Component Date Value Ref Range Status    POC Rapid COVID 05/01/2024 Negative  Negative Final     Acceptable 05/01/2024 Yes   Final   Admission on 04/24/2024, Discharged on 04/24/2024   Component Date Value Ref Range Status    Amylase 04/24/2024 54  25 - 115 U/L Final    Lipase 04/24/2024 20  16 - 77 U/L Final    Sodium 04/24/2024 144  136 - 145 mmol/L Final    Potassium 04/24/2024 3.9  3.5 - 5.1 mmol/L Final    Chloride 04/24/2024 113 (H)  98 - 107 mmol/L Final    CO2 04/24/2024 25  21 - 32 mmol/L Final    Anion Gap 04/24/2024 10  7 - 16 mmol/L Final    Glucose 04/24/2024 100  74 - 106 mg/dL Final    BUN 04/24/2024 17  7 - 18 mg/dL Final    Creatinine 04/24/2024 0.85  0.55 - 1.02 mg/dL Final    BUN/Creatinine Ratio 04/24/2024 20  6 - 20 Final    Calcium 04/24/2024 9.0  8.5 - 10.1 mg/dL Final    Total Protein 04/24/2024 6.0 (L)  6.4 - 8.2 g/dL Final    Albumin 04/24/2024 3.3 (L)  3.5 - 5.0 g/dL Final    Globulin 04/24/2024 2.7  2.0 - 4.0 g/dL Final    A/G Ratio 04/24/2024 1.2   Final    Bilirubin, Total 04/24/2024 0.3  >0.0 - 1.2 mg/dL Final    Alk Phos 04/24/2024 77  41 - 108 U/L Final    ALT 04/24/2024 26  13 - 56 U/L Final    AST 04/24/2024 32  15 - 37 U/L Final    eGFR 04/24/2024 83  >=60 mL/min/1.73m2 Final    WBC 04/24/2024 4.88  4.50 - 11.00 K/uL Final    RBC 04/24/2024 4.47  4.20 - 5.40 M/uL Final    Hemoglobin 04/24/2024 13.1  12.0 - 16.0 g/dL Final    Hematocrit 04/24/2024 40.2  38.0 - 47.0 % Final    MCV 04/24/2024 89.9  80.0 - 96.0 fL Final    MCH 04/24/2024 29.3  27.0 - 31.0 pg Final    MCHC 04/24/2024 32.6  32.0 - 36.0 g/dL Final    RDW 04/24/2024 12.3  11.5 - 14.5  % Final    Platelet Count 04/24/2024 227  150 - 400 K/uL Final    MPV 04/24/2024 11.5  9.4 - 12.4 fL Final    Neutrophils % 04/24/2024 45.9 (L)  53.0 - 65.0 % Final    Lymphocytes % 04/24/2024 41.2 (H)  27.0 - 41.0 % Final    Monocytes % 04/24/2024 8.6 (H)  2.0 - 6.0 % Final    Eosinophils % 04/24/2024 3.3  1.0 - 4.0 % Final    Basophils % 04/24/2024 0.8  0.0 - 1.0 % Final    Immature Granulocytes % 04/24/2024 0.2  0.0 - 0.4 % Final    nRBC, Auto 04/24/2024 0.0  <=0.0 % Final    Neutrophils, Abs 04/24/2024 2.24  1.80 - 7.70 K/uL Final    Lymphocytes, Absolute 04/24/2024 2.01  1.00 - 4.80 K/uL Final    Monocytes, Absolute 04/24/2024 0.42  0.00 - 0.80 K/uL Final    Eosinophils, Absolute 04/24/2024 0.16  0.00 - 0.50 K/uL Final    Basophils, Absolute 04/24/2024 0.04  0.00 - 0.20 K/uL Final    Immature Granulocytes, Absolute 04/24/2024 0.01  0.00 - 0.04 K/uL Final    nRBC, Absolute 04/24/2024 0.00  <=0.00 x10e3/uL Final    Diff Type 04/24/2024 Auto   Final   Admission on 04/23/2024, Discharged on 04/23/2024   Component Date Value Ref Range Status    Sodium 04/23/2024 143  136 - 145 mmol/L Final    Potassium 04/23/2024 3.8  3.5 - 5.1 mmol/L Final    Chloride 04/23/2024 108 (H)  98 - 107 mmol/L Final    CO2 04/23/2024 31  21 - 32 mmol/L Final    Anion Gap 04/23/2024 8  7 - 16 mmol/L Final    Glucose 04/23/2024 94  74 - 106 mg/dL Final    BUN 04/23/2024 16  7 - 18 mg/dL Final    Creatinine 04/23/2024 0.68  0.55 - 1.02 mg/dL Final    BUN/Creatinine Ratio 04/23/2024 24 (H)  6 - 20 Final    Calcium 04/23/2024 8.5  8.5 - 10.1 mg/dL Final    Total Protein 04/23/2024 6.0 (L)  6.4 - 8.2 g/dL Final    Albumin 04/23/2024 3.1 (L)  3.5 - 5.0 g/dL Final    Globulin 04/23/2024 2.9  2.0 - 4.0 g/dL Final    A/G Ratio 04/23/2024 1.1   Final    Bilirubin, Total 04/23/2024 0.3  >0.0 - 1.2 mg/dL Final    Alk Phos 04/23/2024 74  41 - 108 U/L Final    ALT 04/23/2024 30  13 - 56 U/L Final    AST 04/23/2024 18  15 - 37 U/L Final    eGFR  04/23/2024 105  >=60 mL/min/1.73m2 Final    Lipase 04/23/2024 16 (L)  73 - 393 U/L Final    Color, UA 04/23/2024 Light-Yellow  Colorless, Straw, Yellow, Light Yellow, Dark Yellow Final    Clarity, UA 04/23/2024 Clear  Clear Final    pH, UA 04/23/2024 5.5  5.0 to 8.0 pH Units Final    Leukocytes, UA 04/23/2024 Small (A)  Negative Final    Nitrites, UA 04/23/2024 Negative  Negative Final    Protein, UA 04/23/2024 Negative  Negative Final    Glucose, UA 04/23/2024 Normal  Normal mg/dL Final    Ketones, UA 04/23/2024 Negative  Negative mg/dL Final    Urobilinogen, UA 04/23/2024 Normal  0.2, 1.0, Normal mg/dL Final    Bilirubin, UA 04/23/2024 Negative  Negative Final    Blood, UA 04/23/2024 Negative  Negative Final    Specific Gravity, UA 04/23/2024 1.020  <=1.030 Final    WBC 04/23/2024 4.28 (L)  4.50 - 11.00 K/uL Final    RBC 04/23/2024 4.33  4.20 - 5.40 M/uL Final    Hemoglobin 04/23/2024 12.7  12.0 - 16.0 g/dL Final    Hematocrit 04/23/2024 38.3  38.0 - 47.0 % Final    MCV 04/23/2024 88.5  80.0 - 96.0 fL Final    MCH 04/23/2024 29.3  27.0 - 31.0 pg Final    MCHC 04/23/2024 33.2  32.0 - 36.0 g/dL Final    RDW 04/23/2024 12.3  11.5 - 14.5 % Final    Platelet Count 04/23/2024 225  150 - 400 K/uL Final    MPV 04/23/2024 11.0  9.4 - 12.4 fL Final    Neutrophils % 04/23/2024 48.8 (L)  53.0 - 65.0 % Final    Lymphocytes % 04/23/2024 38.6  27.0 - 41.0 % Final    Monocytes % 04/23/2024 8.2 (H)  2.0 - 6.0 % Final    Eosinophils % 04/23/2024 3.7  1.0 - 4.0 % Final    Basophils % 04/23/2024 0.7  0.0 - 1.0 % Final    Immature Granulocytes % 04/23/2024 0.0  0.0 - 0.4 % Final    nRBC, Auto 04/23/2024 0.0  <=0.0 % Final    Neutrophils, Abs 04/23/2024 2.09  1.80 - 7.70 K/uL Final    Lymphocytes, Absolute 04/23/2024 1.65  1.00 - 4.80 K/uL Final    Monocytes, Absolute 04/23/2024 0.35  0.00 - 0.80 K/uL Final    Eosinophils, Absolute 04/23/2024 0.16  0.00 - 0.50 K/uL Final    Basophils, Absolute 04/23/2024 0.03  0.00 - 0.20 K/uL Final     Immature Granulocytes, Absolute 04/23/2024 0.00  0.00 - 0.04 K/uL Final    nRBC, Absolute 04/23/2024 0.00  <=0.00 x10e3/uL Final    Diff Type 04/23/2024 Auto   Final    WBC, UA 04/23/2024 2  <=5 /hpf Final    RBC, UA 04/23/2024 1  <=3 /hpf Final    Squamous Epithelial Cells, UA 04/23/2024 Occasional (A)  None Seen /HPF Final    Mucous 04/23/2024 Occasional (A)  None Seen /LPF Final   Office Visit on 04/15/2024   Component Date Value Ref Range Status    pH, UA 04/16/2024 6.0  5.0 to 8.0 pH Units Final    Creatinine, Urine 04/16/2024 136  28 - 219 mg/dL Final    6-Acetylmorphine 04/16/2024 Negative  10 ng/mL Final    7-Aminoclonazepam 04/16/2024 89.9 (H)  <25.0 25 ng/mL Final    a-Hydroxyalprazolam 04/16/2024 Negative  Negative 25 ng/mL Final    Benzoylecgonine 04/16/2024 Negative  100 ng/mL Final    Buprenorphine 04/16/2024 Negative  25 ng/mL Final    Codeine 04/16/2024 Negative  25 ng/mL Final    EDDP 04/16/2024 Negative  25 ng/mL Final    Fentanyl 04/16/2024 Negative  2.5 ng/mL Final    Hydrocodone 04/16/2024 Negative  25 ng/mL Final    Hydromorphone 04/16/2024 Negative  25 ng/mL Final    Morphine 04/16/2024 Negative  25 ng/mL Final    Norbuprenorphine 04/16/2024 Negative  25 ng/mL Final    Nordiazepam 04/16/2024 Negative  25 ng/mL Final    Norfentanyl Oxalate 04/16/2024 Negative  5 ng/mL Final    Norhydrocodone 04/16/2024 Negative  50 ng/mL Final    Noroxycodone HCL 04/16/2024 Negative  50 ng/mL Final    Oxymorphone 04/16/2024 Negative  25 ng/mL Final    Tapentadol 04/16/2024 Negative  25 ng/mL Final    Temazepam 04/16/2024 Negative  25 ng/mL Final    THC-COOH 04/16/2024 Negative  25 ng/mL Final    Tramadol 04/16/2024 Negative  100 ng/mL Final    Amphetamine, Urine 04/16/2024 Negative  Negative Final    Methamphetamines, Urine 04/16/2024 Negative  Negative Final    Methadone, Urine 04/16/2024 Negative  Negative 25 ng/mL Final    Oxycodone, Urine 04/16/2024 Negative  Negative 25 ng/mL Final    Specific Gravity, UA  04/16/2024 1.017  <=1.030 Final   Office Visit on 03/12/2024   Component Date Value Ref Range Status    Color, UA 03/12/2024 Yellow   Final    Spec Grav UA 03/12/2024 1.020   Final    pH, UA 03/12/2024 5.5   Final    WBC, UA 03/12/2024 negative   Final    Nitrite, UA 03/12/2024 negative   Final    Protein, POC 03/12/2024 negative   Final    Glucose, UA 03/12/2024 negative   Final    Ketones, UA 03/12/2024 negative   Final    Bilirubin, POC 03/12/2024 negative   Final    Urobilinogen, UA 03/12/2024 0.2   Final    Blood, UA 03/12/2024 Trace-lysed*   Final    Culture, Urine 03/12/2024 Skin/Urogenital [place]oret[place] Isolated, no further workup.   Final   Lab Visit on 02/20/2024   Component Date Value Ref Range Status    TSH 02/20/2024 2.030  0.358 - 3.740 uIU/mL Final    Free T4 02/20/2024 1.09  0.76 - 1.46 ng/dL Final   There may be more visits with results that are not included.         No orders of the defined types were placed in this encounter.      Requested Prescriptions      No prescriptions requested or ordered in this encounter       Assessment:     1. Bilateral sacroiliitis    2. Lumbosacral radiculopathy               Plan:    Not using narcotics from our office    Follows Neurology [facility] neuropathy    Follows spine surgery [facility]     Follows orthopedics [facility], left hip replacement left knee replacement    Robotic cholecystectomy May 9, 2024 [facility]      Follow-up after bilateral sacroiliac injection # 1 May 7, 2024  She states she had 50% relief after procedure, the procedure did help improve her level function    Procedure notes/fluoro images reviewed    X-ray sacroiliac joint [facility] April 15, 2024  No sacroiliac joint abnormality demonstrated    X-ray hips [facility] March 18, 2024  Right hip arthroplasty  No fracture noted  Left knee arthroplasty  Mild degenerative changes left hip      Was seen in emergency room yesterday currently being treated with antibiotics for urinary  tract infection     Will complete antibiotics for urinary tract infection     Consider repeating sacroiliac procedure 2. If indicated     Follow-up 2 weeks discuss options    Dr. Lang    Bring original prescription medication bottles/container/box with labels to each visit

## 2024-05-29 NOTE — LETTER
May 29, 2024      [facility]  [address] [place] [address] [place] [address]  Phone: [phone]       Patient: Stuart Cloud   YOB: 1970  Date of Visit: 05/29/2024    To Whom It May Concern:    Bismark Cloud  was at [facility] on 05/29/2024 per pre approved ML. The patient may return to work/school on 5/29/24 with no restrictions. If you have any questions or concerns, or if I can be of further assistance, please do not hesitate to contact me.    Sincerely,    Marsha Brooks LPN

## 2024-05-29 NOTE — TELEPHONE ENCOUNTER
----- Message from Tacos Cullen sent at 5/28/2024  1:50 PM CDT -----  Regarding: The Patient is wanting to know which surgeon to contact.  The Patient is wanting to know which surgeon to contact.  The patient has had 2 surgeries recently and she does not know which one.    [phone]

## 2024-05-29 NOTE — LETTER
May 29, 2024      [facility]  [address] [place] [address] [place] [address]  Phone: [phone]       Patient: Stuart Cloud   YOB: 1970  Date of Visit: 05/29/2024    To Whom It May Concern:    Bismark Cloud  was at [facility] on 05/29/2024. Per pre approved ML. The patient may return to work/school on 05/30/24 with no restrictions. If you have any questions or concerns, or if I can be of further assistance, please do not hesitate to contact me.    Sincerely,    Cony Espinoza RN

## 2024-05-30 ENCOUNTER — OFFICE VISIT (OUTPATIENT)
Dept: FAMILY MEDICINE | Facility: CLINIC | Age: 54
End: 2024-05-30
Payer: COMMERCIAL

## 2024-05-30 VITALS
DIASTOLIC BLOOD PRESSURE: 82 MMHG | HEIGHT: 66 IN | BODY MASS INDEX: 42.94 KG/M2 | OXYGEN SATURATION: 100 % | TEMPERATURE: 99 F | SYSTOLIC BLOOD PRESSURE: 131 MMHG | WEIGHT: 267.19 LBS | HEART RATE: 90 BPM

## 2024-05-30 DIAGNOSIS — Z76.5 MALINGERING: Primary | ICD-10-CM

## 2024-05-30 DIAGNOSIS — M62.838 MUSCLE SPASM: ICD-10-CM

## 2024-05-30 LAB
MAGNESIUM SERPL-MCNC: 2.7 MG/DL (ref 1.7–2.3)
POTASSIUM SERPL-SCNC: 4.2 MMOL/L (ref 3.5–5.1)

## 2024-05-30 PROCEDURE — 99213 OFFICE O/P EST LOW 20 MIN: CPT | Mod: ,,,

## 2024-05-30 PROCEDURE — 3079F DIAST BP 80-89 MM HG: CPT | Mod: ,,,

## 2024-05-30 PROCEDURE — 3008F BODY MASS INDEX DOCD: CPT | Mod: ,,,

## 2024-05-30 PROCEDURE — 84132 ASSAY OF SERUM POTASSIUM: CPT | Mod: ,,, | Performed by: CLINICAL MEDICAL LABORATORY

## 2024-05-30 PROCEDURE — 83735 ASSAY OF MAGNESIUM: CPT | Mod: ,,, | Performed by: CLINICAL MEDICAL LABORATORY

## 2024-05-30 PROCEDURE — 3075F SYST BP GE 130 - 139MM HG: CPT | Mod: ,,,

## 2024-05-30 NOTE — LETTER
May 30, 2024      [facility]  [address] [place] [address]  Phone: [phone]  Fax: [phone]       Patient: Stuart Cloud   YOB: 1970  Date of Visit: 05/30/2024    To Whom It May Concern:    Bismark Cloud  was at [facility] on 05/30/2024 not a part of pre approved ML. The patient may return to work/school on 5/30/2024 with no restrictions. If you have any questions or concerns, or if I can be of further assistance, please do not hesitate to contact me.    Sincerely,    DAKOTA Lee

## 2024-05-30 NOTE — PROGRESS NOTES
PATIENT NAME: Stuart Cloud  : 1970  DATE: 24  MRN: [id number]      Reason for Visit / Chief Complaint: Follow-up (Exam A)       Update PCP  Update Chief Complaint         History of Present Illness / Problem Focused Workflow     Stuart Cloud presents to the clinic with Follow-up (Exam A)     Presents to clinic for evaluation after ER visit. Patient initially called clinic 2 days ago for abdominal muscle spasms and hoarseness. She is s/p gallbladder removal and was advised to contact the surgeon. She never contacted the surgeon and went to ER for evaluation. Upon visit to clinic today she has modified complaint to muscle spasm in the lower extremities and lower abdomen to the nurse who advised her she should have called the surgeon d/t nature of robotic procedures. She amended her complaint to only extremity spasms x 4 when I went in for evaluation. TERESA Harris LPN present during exam. Patient minimally discussed spasms, requesting potassium level to be check and then asked for a work excuse before attempting to discuss prior paperwork that is being filled out. She is having some diarrhea after taking mag citrate for constipation dx'd in ER.       Review of Systems     @Review of Systems   Constitutional:  Negative for activity change, fatigue, fever and unexpected weight change.   HENT:  Negative for facial swelling, hearing loss, rhinorrhea, trouble swallowing, voice change and goiter.    Eyes:  Negative for discharge and visual disturbance.   Respiratory:  Negative for chest tightness, shortness of breath and wheezing.    Cardiovascular:  Negative for chest pain, palpitations and leg swelling.   Gastrointestinal:  Negative for abdominal distention, abdominal pain, blood in stool, constipation, diarrhea, nausea and vomiting.   Endocrine: Negative for cold intolerance, heat intolerance, polydipsia, polyphagia and polyuria.   Genitourinary:  Negative for decreased urine volume, difficulty urinating,  dysuria, flank pain, frequency, hematuria, menstrual problem and pelvic pain.   Musculoskeletal:  Positive for myalgias. Negative for arthralgias, neck pain and neck stiffness.   Integumentary:  Negative for rash, wound, mole/lesion, breast mass, breast discharge and breast tenderness.   Allergic/Immunologic: Negative for environmental allergies, food allergies and immunocompromised state.   Neurological:  Negative for dizziness, weakness and headaches.   Hematological:  Negative for adenopathy. Does not bruise/bleed easily.   Psychiatric/Behavioral:  Negative for sleep disturbance. The patient is not nervous/anxious.    Breast: Negative for mass and tenderness      Medical / Social / Family History     Past Medical History:   Diagnosis Date    Arthritis     Class 3 severe obesity due to excess calories without serious comorbidity with body mass index (BMI) of 40.0 to 44.9 in adult 03/20/2023    Essential hypertension 03/20/2023    History of gastric ulcer 05/01/2023    Hypothyroidism     Vestibular dizziness 10/23/2023       Past Surgical History:   Procedure Laterality Date    ARTHROSCOPY OF KNEE Right 05/25/2023    Procedure: ARTHROSCOPY, KNEE;  Surgeon: Tacos Aburto MD;  Location: [facility] OR;  Service: Orthopedics;  Laterality: Right;    CHOLECYSTECTOMY  05/09/2024    ENDOSCOPY  05/13/2024    HYSTERECTOMY      INJECTION OF ANESTHETIC AGENT INTO SACROILIAC JOINT Bilateral 05/07/2024    Procedure: BLOCK, SACROILIAC JOINT;  Surgeon: Leny Lang MD;  [facility];  Service: Pain Management;  Laterality: Bilateral;    JOINT REPLACEMENT Right     hip replacement    KNEE ARTHROPLASTY Left     KNEE ARTHROSCOPY W/ MENISCECTOMY Right 05/25/2023    Procedure: ARTHROSCOPY, KNEE, WITH MENISCECTOMY;  Surgeon: Tacos Aburto MD;  Location: [facility] OR;  Service: Orthopedics;  Laterality: Right;    LUMPECTOMY, BREAST      ROBOT-ASSISTED CHOLECYSTECTOMY USING DA ANG XI  N/A 05/09/2024    Procedure: XI ROBOTIC CHOLECYSTECTOMY;  Surgeon: Mao Dempsey DO;  Location: [facility];  Service: General;  Laterality: N/A;       Social History  Ms.  reports that she has never smoked. She has never been exposed to tobacco smoke. She has never used smokeless tobacco. She reports that she does not drink alcohol and does not use drugs.    Family History  Ms.'s family history includes Diabetes in her mother; Hypertension in her maternal grandmother and mother; Stroke in her maternal grandfather, maternal grandmother, and mother.    Medications and Allergies     Medications  Outpatient Medications Marked as Taking for the 5/30/24 encounter (Office Visit) with Maria L Phillips FNP-C   Medication Sig Dispense Refill    clonazePAM (KLONOPIN) 0.5 MG tablet Take 1 tablet (0.5 mg total) by mouth 2 (two) times daily as needed for Anxiety. 30 tablet 2    cyclobenzaprine (FLEXERIL) 10 MG tablet Take 1 tablet (10 mg total) by mouth 3 (three) times daily as needed for Muscle spasms. 30 tablet 2    docusate sodium (COLACE) 100 MG capsule Take 1 capsule (100 mg total) by mouth 2 (two) times daily. 28 capsule 0    gabapentin (NEURONTIN) 300 MG capsule Take 1 capsule (300 mg total) by mouth 3 (three) times daily as needed (nuropathy). 90 capsule 5    levothyroxine (SYNTHROID) 25 MCG tablet Take 1 tablet (25 mcg total) by mouth before breakfast. 30 tablet 2    losartan-hydrochlorothiazide 50-12.5 mg (HYZAAR) 50-12.5 mg per tablet Take 1 tablet by mouth once daily. 90 tablet 3    nitrofurantoin, macrocrystal-monohydrate, (MACROBID) 100 MG capsule Take 1 capsule (100 mg total) by mouth 2 (two) times daily. for 10 days 20 capsule 0    ondansetron (ZOFRAN-ODT) 4 MG TbDL Take 1 tablet (4 mg total) by mouth every 6 (six) hours as needed (nausea and diarrhea). 15 tablet 0    traMADoL (ULTRAM) 50 mg tablet Take 1 tablet (50 mg total) by mouth every 6 (six) hours as needed for Pain. 20 tablet 0        Allergies  Review of patient's allergies indicates:   Allergen Reactions    Tylox [oxycodone-acetaminophen] Itching    Latex     Opioids - morphine analogues Hives    Sulfa (sulfonamide antibiotics) Rash    Zithromax z-casimiro [azithromycin] Rash       Physical Examination     Vitals:    05/30/24 1331   BP: 131/82   Pulse: 90   Temp: 98.5 °F (36.9 °C)     Physical Exam  Vitals and nursing note reviewed.   Constitutional:       General: She is not in acute distress.     Appearance: Normal appearance. She is obese. She is not ill-appearing, toxic-appearing or diaphoretic.   HENT:      Head: Normocephalic.      Right Ear: External ear normal.      Left Ear: External ear normal.      Nose: Nose normal.      Mouth/Throat:      Mouth: Mucous membranes are moist.      Pharynx: Oropharynx is clear.   Eyes:      Extraocular Movements: Extraocular movements intact.      Conjunctiva/sclera: Conjunctivae normal.      Pupils: Pupils are equal, round, and reactive to light.   Cardiovascular:      Rate and Rhythm: Normal rate and regular rhythm.      Pulses: Normal pulses.      Heart sounds: Normal heart sounds.   Pulmonary:      Effort: Pulmonary effort is normal.      Breath sounds: Normal breath sounds.   Abdominal:      General: Abdomen is flat. Bowel sounds are normal.      Palpations: Abdomen is soft.      Tenderness: There is no right CVA tenderness or left CVA tenderness.   Musculoskeletal:         General: Normal range of motion.      Cervical back: Normal range of motion and neck supple.   Skin:     General: Skin is warm and dry.      Capillary Refill: Capillary refill takes less than 2 seconds.   Neurological:      General: No focal deficit present.      Mental Status: She is alert and oriented to person, place, and time.   Psychiatric:         Mood and Affect: Mood normal.         Behavior: Behavior normal.         Thought Content: Thought content normal.         Judgment: Judgment normal.             Lab  Results   Component Value Date    WBC 5.34 05/02/2024    HGB 12.6 05/02/2024    HCT 36.6 (L) 05/02/2024    MCV 85.9 05/02/2024     05/02/2024          Sodium   Date Value Ref Range Status   05/02/2024 142 136 - 145 mmol/L Final     Potassium   Date Value Ref Range Status   05/30/2024 4.2 3.5 - 5.1 mmol/L Final     Chloride   Date Value Ref Range Status   05/02/2024 110 (H) 98 - 107 mmol/L Final     CO2   Date Value Ref Range Status   05/02/2024 28 21 - 32 mmol/L Final     Glucose   Date Value Ref Range Status   05/02/2024 103 74 - 106 mg/dL Final     BUN   Date Value Ref Range Status   05/02/2024 11 7 - 18 mg/dL Final     Creatinine   Date Value Ref Range Status   05/02/2024 0.66 0.55 - 1.02 mg/dL Final     Calcium   Date Value Ref Range Status   05/02/2024 9.1 8.5 - 10.1 mg/dL Final     Total Protein   Date Value Ref Range Status   05/02/2024 5.9 (L) 6.4 - 8.2 g/dL Final     Albumin   Date Value Ref Range Status   05/02/2024 2.9 (L) 3.5 - 5.0 g/dL Final     Bilirubin, Total   Date Value Ref Range Status   05/02/2024 0.4 >0.0 - 1.2 mg/dL Final     Alk Phos   Date Value Ref Range Status   05/02/2024 62 41 - 108 U/L Final     AST   Date Value Ref Range Status   05/02/2024 26 15 - 37 U/L Final     ALT   Date Value Ref Range Status   05/02/2024 35 13 - 56 U/L Final     Anion Gap   Date Value Ref Range Status   05/02/2024 8 7 - 16 mmol/L Final      Procedures   Assessment and Plan (including Health Maintenance)      Problem List  Smart Sets  Document Outside HM   :    Plan:   Problem List Items Addressed This Visit          Psychiatric    Malingering - Primary    Current Assessment & Plan     Referred to psych for evaluation d/t excessive visits between multiple clinics staggered with bringing child in to clinic and ER for visits r/t vague complaints on days she does not go.          Relevant Orders    Ambulatory referral/consult to Psychiatry       Orthopedic    Muscle spasm    Current Assessment & Plan      Potassium and magnesium ordered d/t patient concern of low levels.            Relevant Orders    Potassium (Completed)    Magnesium (Completed)       Health Maintenance Topics with due status: Not Due       Topic Last Completion Date    Influenza Vaccine 11/03/2022    Hemoglobin A1c (Diabetic Prevention Screening) 03/29/2023    Lipid Panel 03/29/2023    Colorectal Cancer Screening 06/03/2024           Future Appointments   Date Time Provider Department Center   6/11/2024  2:00 PM Tacos Lopez MD [facility]   6/19/2024  1:00 PM Adrien Deluna PA [facility]   6/20/2024  9:00 AM [facility] CT1 [facility] CTIC [facility]   6/24/2024  1:40 PM [facility] MAMMO2 [facility] MMIC [facility]   6/25/2024  9:00 AM Raymon Ross FNP,PMHNP R[facility]   7/22/2024  4:00 PM Matthew Adams MD OB SPINE [facility]   8/14/2024  1:15 PM Sage Ordonez MD OB NEURO [facility]   10/30/2024 10:20 AM Elayne Santo FNP RAS[facility] GASTR [facility]   12/19/2024 11:45 AM [facility] FN GI ROOM 01 [facility]O [facility]        Health Maintenance Due   Topic Date Due    Hepatitis C Screening  Never done    HIV Screening  Never done    TETANUS VACCINE  Never done    Mammogram  Never done    Shingles Vaccine (1 of 2) Never done    COVID-19 Vaccine (1 - 2023-24 season) Never done        No follow-ups on file.     Signature: LEONEL LeeC        Date of encounter: 5/30/24

## 2024-05-31 ENCOUNTER — TELEPHONE (OUTPATIENT)
Dept: FAMILY MEDICINE | Facility: CLINIC | Age: 54
End: 2024-05-31
Payer: COMMERCIAL

## 2024-06-03 ENCOUNTER — ANESTHESIA (OUTPATIENT)
Dept: GASTROENTEROLOGY | Facility: HOSPITAL | Age: 54
End: 2024-06-03
Payer: COMMERCIAL

## 2024-06-03 ENCOUNTER — ANESTHESIA EVENT (OUTPATIENT)
Dept: GASTROENTEROLOGY | Facility: HOSPITAL | Age: 54
End: 2024-06-03
Payer: COMMERCIAL

## 2024-06-03 ENCOUNTER — OFFICE VISIT (OUTPATIENT)
Dept: SURGERY | Facility: CLINIC | Age: 54
End: 2024-06-03
Payer: COMMERCIAL

## 2024-06-03 ENCOUNTER — HOSPITAL ENCOUNTER (OUTPATIENT)
Dept: GASTROENTEROLOGY | Facility: HOSPITAL | Age: 54
Discharge: HOME | End: 2024-06-03
Admitting: INTERNAL MEDICINE
Payer: COMMERCIAL

## 2024-06-03 VITALS
TEMPERATURE: 98 F | RESPIRATION RATE: 21 BRPM | HEART RATE: 66 BPM | WEIGHT: 261 LBS | DIASTOLIC BLOOD PRESSURE: 65 MMHG | SYSTOLIC BLOOD PRESSURE: 122 MMHG | HEIGHT: 66 IN | BODY MASS INDEX: 41.95 KG/M2 | OXYGEN SATURATION: 97 %

## 2024-06-03 DIAGNOSIS — Z90.49 S/P LAPAROSCOPIC CHOLECYSTECTOMY: Primary | ICD-10-CM

## 2024-06-03 DIAGNOSIS — Z12.11 SCREENING FOR COLON CANCER: Primary | ICD-10-CM

## 2024-06-03 DIAGNOSIS — R10.9 ABDOMINAL PAIN, UNSPECIFIED ABDOMINAL LOCATION: ICD-10-CM

## 2024-06-03 PROCEDURE — 63600175 PHARM REV CODE 636 W HCPCS: Performed by: ANESTHESIOLOGY

## 2024-06-03 PROCEDURE — 27201423 OPTIME MED/SURG SUP & DEVICES STERILE SUPPLY

## 2024-06-03 PROCEDURE — 37000009 HC ANESTHESIA EA ADD 15 MINS

## 2024-06-03 PROCEDURE — G0121 COLON CA SCRN NOT HI RSK IND: HCPCS | Performed by: INTERNAL MEDICINE

## 2024-06-03 PROCEDURE — G0121 COLON CA SCRN NOT HI RSK IND: HCPCS | Mod: ,,, | Performed by: INTERNAL MEDICINE

## 2024-06-03 PROCEDURE — 25000003 PHARM REV CODE 250: Performed by: PAIN MEDICINE

## 2024-06-03 PROCEDURE — 37000008 HC ANESTHESIA 1ST 15 MINUTES

## 2024-06-03 PROCEDURE — 25000003 PHARM REV CODE 250: Performed by: ANESTHESIOLOGY

## 2024-06-03 PROCEDURE — 99213 OFFICE O/P EST LOW 20 MIN: CPT | Mod: PBBFAC | Performed by: STUDENT IN AN ORGANIZED HEALTH CARE EDUCATION/TRAINING PROGRAM

## 2024-06-03 PROCEDURE — D9220A PRA ANESTHESIA: Mod: ,,, | Performed by: ANESTHESIOLOGY

## 2024-06-03 PROCEDURE — 99024 POSTOP FOLLOW-UP VISIT: CPT | Mod: ,,, | Performed by: STUDENT IN AN ORGANIZED HEALTH CARE EDUCATION/TRAINING PROGRAM

## 2024-06-03 RX ORDER — PROPOFOL 10 MG/ML
VIAL (ML) INTRAVENOUS
Status: DISCONTINUED | OUTPATIENT
Start: 2024-06-03 | End: 2024-06-03

## 2024-06-03 RX ORDER — LIDOCAINE HYDROCHLORIDE 20 MG/ML
INJECTION, SOLUTION EPIDURAL; INFILTRATION; INTRACAUDAL; PERINEURAL
Status: DISCONTINUED | OUTPATIENT
Start: 2024-06-03 | End: 2024-06-03

## 2024-06-03 RX ORDER — SODIUM CHLORIDE 0.9 % (FLUSH) 0.9 %
10 SYRINGE (ML) INJECTION
Status: DISCONTINUED | OUTPATIENT
Start: 2024-06-03 | End: 2024-06-04 | Stop reason: HOSPADM

## 2024-06-03 RX ADMIN — PROPOFOL 100 MG: 10 INJECTION, EMULSION INTRAVENOUS at 02:06

## 2024-06-03 RX ADMIN — PROPOFOL 50 MG: 10 INJECTION, EMULSION INTRAVENOUS at 02:06

## 2024-06-03 RX ADMIN — SODIUM CHLORIDE: 9 INJECTION, SOLUTION INTRAVENOUS at 02:06

## 2024-06-03 RX ADMIN — LIDOCAINE HYDROCHLORIDE 50 MG: 20 INJECTION, SOLUTION INTRAVENOUS at 02:06

## 2024-06-03 NOTE — LETTER
Rosey 3, 2024      [facility]  [address] [place] [address]  Phone: [phone]  Fax: [phone]       Patient: Stuart Cloud   YOB: 1970  Date of Visit: 06/03/2024    To Whom It May Concern:    Bismark Cloud  was at [facility] on 06/03/2024. The patient may return to work/school on 06/04/2024  with no restrictions. If you have any questions or concerns, or if I can be of further assistance, please do not hesitate to contact me.    Sincerely,    Dr. Madi Winslow/Radha Bonner RN

## 2024-06-03 NOTE — ANESTHESIA POSTPROCEDURE EVALUATION
Anesthesia Post Evaluation    Patient: Stuart Cloud    Procedure(s) Performed: *colonoscopy *    Final Anesthesia Type: MAC      Patient location during evaluation: GI PACU  Patient participation: Yes- Able to Participate  Level of consciousness: awake and alert, oriented and awake  Post-procedure vital signs: reviewed and stable  Pain management: adequate  Airway patency: patent    PONV status at discharge: No PONV  Anesthetic complications: no      Cardiovascular status: blood pressure returned to baseline, hemodynamically stable and stable  Respiratory status: unassisted and spontaneous ventilation  Hydration status: euvolemic  Follow-up not needed.              Vitals Value Taken Time   /65 06/03/24 1534   Temp 36.7 °C (98 °F) 06/03/24 1503   Pulse 66 06/03/24 1534   Resp 19 06/03/24 1534   SpO2 95 % 06/03/24 1534   Vitals shown include unfiled device data.      No case tracking events are documented in the log.      Pain/Fernando Score: Fernando Score: 9 (6/3/2024  3:02 PM)

## 2024-06-03 NOTE — H&P
Gastroenterology Pre-procedure H&P    History of Present Illness    Stuart Cloud is a 54 y.o. female that  has a past medical history of Arthritis, Class 3 severe obesity due to excess calories without serious comorbidity with body mass index (BMI) of 40.0 to 44.9 in adult (03/20/2023), Essential hypertension (03/20/2023), History of gastric ulcer (05/01/2023), Hypothyroidism, and Vestibular dizziness (10/23/2023).     Patient here for routine surveillance    Patient denies wt loss, abdominal pain, diarrhea, melena/hematochezia, change in stool caliber, no anticoagulants, FMHx of GI related malignancies.      Past Medical History:   Diagnosis Date    Arthritis     Class 3 severe obesity due to excess calories without serious comorbidity with body mass index (BMI) of 40.0 to 44.9 in adult 03/20/2023    Essential hypertension 03/20/2023    History of gastric ulcer 05/01/2023    Hypothyroidism     Vestibular dizziness 10/23/2023       Past Surgical History:   Procedure Laterality Date    ARTHROSCOPY OF KNEE Right 05/25/2023    Procedure: ARTHROSCOPY, KNEE;  Surgeon: Tacos Aburto MD;  Location: [facility] OR;  Service: Orthopedics;  Laterality: Right;    CHOLECYSTECTOMY  05/09/2024    ENDOSCOPY  05/13/2024    HYSTERECTOMY      INJECTION OF ANESTHETIC AGENT INTO SACROILIAC JOINT Bilateral 05/07/2024    Procedure: BLOCK, SACROILIAC JOINT;  Surgeon: Leny Lang MD;  Location: [facility] PAIN [facility];  Service: Pain Management;  Laterality: Bilateral;    JOINT REPLACEMENT Right     hip replacement    KNEE ARTHROPLASTY Left     KNEE ARTHROSCOPY W/ MENISCECTOMY Right 05/25/2023    Procedure: ARTHROSCOPY, KNEE, WITH MENISCECTOMY;  Surgeon: Tacos Aburto MD;  Location: [facility] ORTHO OR;  Service: Orthopedics;  Laterality: Right;    LUMPECTOMY, BREAST      ROBOT-ASSISTED CHOLECYSTECTOMY USING DA ANG XI N/A 05/09/2024    Procedure: XI ROBOTIC CHOLECYSTECTOMY;  Surgeon: Mao Dempsey DO;  Location: [facility]  OR;  Service: General;  Laterality: N/A;       Family History   Problem Relation Name Age of Onset    Hypertension Maternal Grandmother      Stroke Maternal Grandmother      Stroke Maternal Grandfather      Hypertension Mother      Stroke Mother      Diabetes Mother         Social History     Socioeconomic History    Marital status: Single   Tobacco Use    Smoking status: Never     Passive exposure: Never    Smokeless tobacco: Never   Substance and Sexual Activity    Alcohol use: Never    Drug use: Never    Sexual activity: Not Currently       Current Outpatient Medications   Medication Sig Dispense Refill    clonazePAM (KLONOPIN) 0.5 MG tablet Take 1 tablet (0.5 mg total) by mouth 2 (two) times daily as needed for Anxiety. 30 tablet 2    cyclobenzaprine (FLEXERIL) 10 MG tablet Take 1 tablet (10 mg total) by mouth 3 (three) times daily as needed for Muscle spasms. 30 tablet 2    docusate sodium (COLACE) 100 MG capsule Take 1 capsule (100 mg total) by mouth 2 (two) times daily. 28 capsule 0    gabapentin (NEURONTIN) 300 MG capsule Take 1 capsule (300 mg total) by mouth 3 (three) times daily as needed (nuropathy). 90 capsule 5    ibuprofen (ADVIL,MOTRIN) 800 MG tablet Take 1 tablet (800 mg total) by mouth 3 (three) times daily as needed for Pain. (Patient not taking: Reported on 5/30/2024) 21 tablet 3    levothyroxine (SYNTHROID) 25 MCG tablet Take 1 tablet (25 mcg total) by mouth before breakfast. 30 tablet 2    losartan-hydrochlorothiazide 50-12.5 mg (HYZAAR) 50-12.5 mg per tablet Take 1 tablet by mouth once daily. 90 tablet 3    naproxen (NAPROSYN) 500 MG tablet Take 1 tablet (500 mg total) by mouth daily as needed. (Patient not taking: Reported on 5/30/2024) 30 tablet 0    nitrofurantoin, macrocrystal-monohydrate, (MACROBID) 100 MG capsule Take 1 capsule (100 mg total) by mouth 2 (two) times daily. for 10 days 20 capsule 0    ondansetron (ZOFRAN) 4 MG tablet Take 1 tablet (4 mg total) by mouth every 6 (six) hours.  "(Patient not taking: Reported on 5/30/2024) 12 tablet 0    ondansetron (ZOFRAN-ODT) 4 MG TbDL Take 1 tablet (4 mg total) by mouth every 6 (six) hours as needed (nausea and diarrhea). 15 tablet 0    pantoprazole (PROTONIX) 40 MG tablet Take 1 tablet (40 mg total) by mouth once daily. (Patient not taking: Reported on 5/30/2024) 90 tablet 3    traMADoL (ULTRAM) 50 mg tablet Take 1 tablet (50 mg total) by mouth every 6 (six) hours as needed for Pain. 20 tablet 0     No current facility-administered medications for this encounter.     Facility-Administered Medications Ordered in Other Encounters   Medication Dose Route Frequency Provider Last Rate Last Admin    0.9%  NaCl infusion   Intravenous Continuous Tacos Aburto MD 75 mL/hr at 05/25/23 1709 New Bag at 05/25/23 1709    0.9%  NaCl infusion   Intravenous Continuous Leny Lang  mL/hr at 06/03/24 1439 New Bag at 06/03/24 1439    LIDOcaine (PF) 20 mg/mL (2%) injection   Intravenous PRN Hannah Kimball CRNA   50 mg at 06/03/24 1439    propofol (DIPRIVAN) 10 mg/mL infusion   Intravenous PRN Hannah Kimball CRNA   50 mg at 06/03/24 1439       Review of patient's allergies indicates:   Allergen Reactions    Tylox [oxycodone-acetaminophen] Itching    Latex     Opioids - morphine analogues Hives    Sulfa (sulfonamide antibiotics) Rash    Zithromax z-casimiro [azithromycin] Rash       Objective:  Vitals:    06/03/24 1348   BP: 135/67   Pulse: 68   Resp: 16   Temp: 97.9 °F (36.6 °C)   SpO2: 98%   Weight: 118.4 kg (261 lb)   Height: 5' 6" (1.676 m)        GEN: normal appearing, NAD, AAO x3  HENT: NCAT, anicteric, OP benign  CV: normal rate, regular rhythm  RESP: NABS, symmetric rise, unlabored  ABD: soft, ND, no guarding or TTP  SKIN: warm and dry  NEURO: grossly afocal    Assessment and Plan:    Proceed with:    Colonoscopy for previous adenomatous polyp, screening for colon cancer    Madi Winslow MD  Gastroenterology  "

## 2024-06-03 NOTE — TRANSFER OF CARE
"Anesthesia Transfer of Care Note    Patient: Stuart Cloud    Procedure(s) Performed: *colonoscopy *    Patient location: GI    Anesthesia Type: MAC    Transport from OR: Transported from OR on room air with adequate spontaneous ventilation. Continuous SpO2 monitoring in transport. Continuous ECG monitoring in transport    Post pain: adequate analgesia    Post assessment: no apparent anesthetic complications    Post vital signs: stable    Level of consciousness: awake and responds to stimulation    Nausea/Vomiting: no nausea/vomiting    Complications: none    Transfer of care protocol was followed      Last vitals: Visit Vitals  /68   Pulse 73   Temp 36.7 °C (98 °F)   Resp 18   Ht 5' 6" (1.676 m)   Wt 118.4 kg (261 lb)   SpO2 97%   Breastfeeding No   BMI 42.13 kg/m²     "

## 2024-06-03 NOTE — DISCHARGE INSTRUCTIONS
Procedure Date  6/3/24     Impression  Overall Impression:   Scattered diverticulosis of mild severity  The ileocecal valve, cecum, ascending colon, transverse colon, descending colon and sigmoid colon appeared normal.  (grade 1) hemorrhoids        Recommendation  Await pathology results     Repeat colonoscopy in 6-12 months with a 2-day prep (2 days of clear liquids followed by routine golytely split prep); I recommend miralax 17-grams twice daily for 1 week prior to your next colonoscopy    COLONOSCOPY - DECEMBER 19, 2024 @ 11:45 AM     Start a daily fiber supplement with Citrucel or Fibercon (can purchase at your local pharmacy)  Use Miralax 17 grams daily-to-twice daily as needed to have a regular, soft BM without straining  Drink at least 60-80 ounces of water daily unless you have a medical condition that requires fluid restriction      Outcome of procedure: successful Colonoscopy  Disposition: patient to recovery following procedure; discharge to home when appropriate parameters met  Provisions for follow up: please call my office for any unexpected symptoms like chest or abdominal pain or bleeding following your procedure.    No driving today, no operating heavy machinery, no signing any legal documents until tomorrow.    Drink lots of fluids, resume regular diet.  Take your normal medications.     Please call our office for any nausea, vomiting or abdominal pain.

## 2024-06-03 NOTE — ANESTHESIA PREPROCEDURE EVALUATION
06/03/2024  Stuart Cloud is a 54 y.o., female.      Pre-op Assessment    I have reviewed the Patient Summary Reports.    I have reviewed the NPO Status.   I have reviewed the Medications.     Review of Systems  Anesthesia Hx:  No problems with previous Anesthesia             Denies Family Hx of Anesthesia complications.    Denies Personal Hx of Anesthesia complications.                    Social:  No Alcohol Use, Non-Smoker       Cardiovascular:     Hypertension                                  Hypertension         Endocrine:   Hypothyroidism       Hypothyroidism        Morbid Obesity / BMI > 40    Active Ambulatory Problems     Diagnosis Date Noted    Essential hypertension 03/20/2023    Hypothyroidism 03/20/2023    Class 3 severe obesity due to excess calories without serious comorbidity with body mass index (BMI) of 40.0 to 44.9 in adult 03/20/2023    History of gastric ulcer 05/01/2023    Inflammation of soft tissue 03/12/2024    Neuropathy 03/12/2024    Dysuria 03/12/2024    Swallowing problem 03/12/2024    Muscle spasm 04/01/2024    Viral URI 05/01/2024    Biliary dyskinesia 05/09/2024    RUQ abdominal pain 05/09/2024    Epigastric abdominal pain 05/13/2024    Dysphagia 05/13/2024     Resolved Ambulatory Problems     Diagnosis Date Noted    Pain 03/28/2024    Decreased strength 03/28/2024     Past Medical History:   Diagnosis Date    Arthritis     Vestibular dizziness 10/23/2023      Review of patient's allergies indicates:   Allergen Reactions    Tylox [oxycodone-acetaminophen] Itching    Latex     Opioids - morphine analogues Hives    Sulfa (sulfonamide antibiotics) Rash    Zithromax z-casimiro [azithromycin] Rash      Current Outpatient Medications on File Prior to Encounter   Medication Sig Dispense Refill    clonazePAM (KLONOPIN) 0.5 MG tablet Take 1 tablet (0.5 mg total) by mouth 2 (two) times daily  as needed for Anxiety. 30 tablet 2    cyclobenzaprine (FLEXERIL) 10 MG tablet Take 1 tablet (10 mg total) by mouth 3 (three) times daily as needed for Muscle spasms. 30 tablet 2    docusate sodium (COLACE) 100 MG capsule Take 1 capsule (100 mg total) by mouth 2 (two) times daily. 28 capsule 0    gabapentin (NEURONTIN) 300 MG capsule Take 1 capsule (300 mg total) by mouth 3 (three) times daily as needed (nuropathy). 90 capsule 5    ibuprofen (ADVIL,MOTRIN) 800 MG tablet Take 1 tablet (800 mg total) by mouth 3 (three) times daily as needed for Pain. (Patient not taking: Reported on 5/30/2024) 21 tablet 3    levothyroxine (SYNTHROID) 25 MCG tablet Take 1 tablet (25 mcg total) by mouth before breakfast. 30 tablet 2    losartan-hydrochlorothiazide 50-12.5 mg (HYZAAR) 50-12.5 mg per tablet Take 1 tablet by mouth once daily. 90 tablet 3    naproxen (NAPROSYN) 500 MG tablet Take 1 tablet (500 mg total) by mouth daily as needed. (Patient not taking: Reported on 5/30/2024) 30 tablet 0    nitrofurantoin, macrocrystal-monohydrate, (MACROBID) 100 MG capsule Take 1 capsule (100 mg total) by mouth 2 (two) times daily. for 10 days 20 capsule 0    ondansetron (ZOFRAN) 4 MG tablet Take 1 tablet (4 mg total) by mouth every 6 (six) hours. (Patient not taking: Reported on 5/30/2024) 12 tablet 0    ondansetron (ZOFRAN-ODT) 4 MG TbDL Take 1 tablet (4 mg total) by mouth every 6 (six) hours as needed (nausea and diarrhea). 15 tablet 0    pantoprazole (PROTONIX) 40 MG tablet Take 1 tablet (40 mg total) by mouth once daily. (Patient not taking: Reported on 5/30/2024) 90 tablet 3    traMADoL (ULTRAM) 50 mg tablet Take 1 tablet (50 mg total) by mouth every 6 (six) hours as needed for Pain. 20 tablet 0     Current Facility-Administered Medications on File Prior to Encounter   Medication Dose Route Frequency Provider Last Rate Last Admin    0.9%  NaCl infusion   Intravenous Continuous Tacos Aburto MD 75 mL/hr at 05/25/23 1709 New Bag at  05/25/23 1709    0.9%  NaCl infusion   Intravenous Continuous Leny Lang MD            Physical Exam  General: Well nourished, Cooperative, Alert and Oriented    Airway:  Mallampati: I   Mouth Opening: Normal        Anesthesia Plan  Type of Anesthesia, risks & benefits discussed:    Anesthesia Type: MAC  Intra-op Monitoring Plan: Standard ASA Monitors  Post Op Pain Control Plan: multimodal analgesia  Induction:  IV  Informed Consent: Informed consent signed with the Patient and all parties understand the risks and agree with anesthesia plan.  All questions answered. Patient consented to blood products? No  ASA Score: 3  Day of Surgery Review of History & Physical: H&P Update referred to the surgeon/provider.I have interviewed and examined the patient. I have reviewed the patient's H&P dated: There are no significant changes.     Ready For Surgery From Anesthesia Perspective.     .

## 2024-06-04 ENCOUNTER — TELEPHONE (OUTPATIENT)
Dept: SURGERY | Facility: CLINIC | Age: 54
End: 2024-06-04
Payer: COMMERCIAL

## 2024-06-04 NOTE — TELEPHONE ENCOUNTER
----- Message from Mary Adams sent at 6/4/2024  9:52 AM CDT -----  Who Called: Stuart Cloud    Caller is requesting assistance/information from provider's office.      Preferred Method of Contact: Phone Call  Patient's Preferred Phone Number on File: [phone]   Best Call Back Number, if different:  Additional Information: calling f/u with nurse regarding ct / study that was discussed at yesterdays visit - also needing an excuse for 6/3 and 6/4    Pt came into office, informed pt of CT that is scheduled for 6/20/24 at 0900 and work excuse was made for pt to return back to work tomorrow with no restrictions. Pt voiced understanding.

## 2024-06-04 NOTE — PROGRESS NOTES
54-year-old  female presents to the clinic status post robotic cholecystectomy.  Patient doing well.  Tolerating food.  Still having some soreness and requesting some pain medication refills.  Denies any chest pain/shortness of breath, nausea/vomiting/diarrhea, fever/chills.    Abdominal incisions clean dry and intact and healing well, no signs of infection or drainage.      Pathology showed chronic cholecystitis, benign lymph node.  No need for further follow-up.  We will refill her tramadol.  Return sooner for problems      6/3:   Patient returns to clinic for evaluation due to some continued right lower quadrant pain at her previous incision site.  Patient states she can not sleep on the right side.  Patient states the pain is not terrible but something just does not feel right; she had an x-ray done previously which showed constipation.  Her bowel movements have improved since then.  Denies any chest pain/shortness of breath, nausea/vomiting/diarrhea, fever/chills.      Abdominal incisions well healed.  Minimal tenderness to palpation of the right lower quadrant incision where we extracted the gallbladder.      We will get lab work including a CBC, BMP and LFTs; patient will need a CT scan the abdomen pelvis with IV contrast to make sure there was no abnormality.  We will contact the patient once we have the results.

## 2024-06-06 PROBLEM — Z76.5 MALINGERING: Status: ACTIVE | Noted: 2024-06-06

## 2024-06-25 ENCOUNTER — OFFICE VISIT (OUTPATIENT)
Dept: BEHAVIORAL HEALTH | Facility: CLINIC | Age: 54
End: 2024-06-25
Payer: COMMERCIAL

## 2024-06-25 VITALS
RESPIRATION RATE: 18 BRPM | BODY MASS INDEX: 42.11 KG/M2 | HEART RATE: 89 BPM | SYSTOLIC BLOOD PRESSURE: 130 MMHG | WEIGHT: 262 LBS | DIASTOLIC BLOOD PRESSURE: 78 MMHG | HEIGHT: 66 IN | TEMPERATURE: 98 F

## 2024-06-25 DIAGNOSIS — Z79.899 ENCOUNTER FOR LONG-TERM (CURRENT) USE OF OTHER MEDICATIONS: ICD-10-CM

## 2024-06-25 DIAGNOSIS — F32.A ANXIETY AND DEPRESSION: Primary | ICD-10-CM

## 2024-06-25 DIAGNOSIS — T76.22XD SUSPECTED VICTIM OF SEXUAL ABUSE IN CHILDHOOD, SUBSEQUENT ENCOUNTER: ICD-10-CM

## 2024-06-25 DIAGNOSIS — F41.9 ANXIETY AND DEPRESSION: Primary | ICD-10-CM

## 2024-06-25 PROBLEM — T76.22XA SUSPECTED VICTIM OF SEXUAL ABUSE IN CHILDHOOD: Status: ACTIVE | Noted: 2024-06-25

## 2024-06-25 PROCEDURE — 1160F RVW MEDS BY RX/DR IN RCRD: CPT | Mod: ,,, | Performed by: NURSE PRACTITIONER

## 2024-06-25 PROCEDURE — 3075F SYST BP GE 130 - 139MM HG: CPT | Mod: ,,, | Performed by: NURSE PRACTITIONER

## 2024-06-25 PROCEDURE — 3078F DIAST BP <80 MM HG: CPT | Mod: ,,, | Performed by: NURSE PRACTITIONER

## 2024-06-25 PROCEDURE — 1159F MED LIST DOCD IN RCRD: CPT | Mod: ,,, | Performed by: NURSE PRACTITIONER

## 2024-06-25 PROCEDURE — 99205 OFFICE O/P NEW HI 60 MIN: CPT | Mod: ,,, | Performed by: NURSE PRACTITIONER

## 2024-06-25 PROCEDURE — 3008F BODY MASS INDEX DOCD: CPT | Mod: ,,, | Performed by: NURSE PRACTITIONER

## 2024-06-25 RX ORDER — DULOXETIN HYDROCHLORIDE 30 MG/1
30 CAPSULE, DELAYED RELEASE ORAL DAILY
Qty: 30 CAPSULE | Refills: 1 | Status: SHIPPED | OUTPATIENT
Start: 2024-06-25 | End: 2024-08-24
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
## 2024-06-25 NOTE — LETTER
June 25, 2024      [facility]  [id number] [facility] [place] [id number]       Patient: Stuart Cloud   YOB: 1970  Date of Visit: 06/25/2024    To Whom It May Concern:    Bismark Cloud  was at [facility] on 06/25/2024. The patient may return to work/school on 06/26/2024 with no restrictions per pre-approved medical leave (scheduled appointment/referral from patient's primary care provider). If you have any questions or concerns, or if I can be of further assistance, please do not hesitate to contact me.    Sincerely,    SHANICE Pearce,PMTREVORP

## 2024-07-01 NOTE — H&P (VIEW-ONLY)
Subjective:         Patient ID: Stuart Cloud is a 54 y.o. female.    Chief Complaint: Low-back Pain and Pelvic Pain      Pain  This is a chronic problem. The current episode started more than 1 year ago. The problem occurs daily. The problem has been waxing and waning. Associated symptoms include arthralgias. Pertinent negatives include no anorexia, change in bowel habit, chest pain, chills, coughing, diaphoresis, fever, neck pain, rash, sore throat, swollen glands, urinary symptoms, vertigo or vomiting.     Review of Systems   Constitutional:  Negative for activity change, appetite change, chills, diaphoresis, fever and unexpected weight change.   HENT:  Negative for drooling, ear discharge, ear pain, facial swelling, nosebleeds, sore throat, trouble swallowing, voice change and goiter.    Eyes:  Negative for photophobia, pain, discharge, redness and visual disturbance.   Respiratory:  Negative for apnea, cough, choking, chest tightness, shortness of breath, wheezing and stridor.    Cardiovascular:  Negative for chest pain, palpitations and leg swelling.   Gastrointestinal:  Negative for abdominal distention, anorexia, change in bowel habit, diarrhea, rectal pain, vomiting and fecal incontinence.   Endocrine: Negative for cold intolerance, heat intolerance, polydipsia, polyphagia and polyuria.   Genitourinary:  Negative for bladder incontinence, dysuria, flank pain, frequency and hot flashes.   Musculoskeletal:  Positive for arthralgias, back pain and leg pain. Negative for neck pain.   Integumentary:  Negative for color change, pallor and rash.   Allergic/Immunologic: Negative for immunocompromised state.   Neurological:  Negative for dizziness, vertigo, seizures, syncope, facial asymmetry, speech difficulty, light-headedness, memory loss and coordination difficulties.   Hematological:  Negative for adenopathy. Does not bruise/bleed easily.   Psychiatric/Behavioral:  Negative for agitation, behavioral  problems, confusion, decreased concentration, dysphoric mood, hallucinations, self-injury and suicidal ideas. The patient is not nervous/anxious and is not hyperactive.            Past Medical History:   Diagnosis Date    Arthritis     Class 3 severe obesity due to excess calories without serious comorbidity with body mass index (BMI) of 40.0 to 44.9 in adult 03/20/2023    Essential hypertension 03/20/2023    History of gastric ulcer 05/01/2023    Hypothyroidism     Vestibular dizziness 10/23/2023     Past Surgical History:   Procedure Laterality Date    ARTHROSCOPY OF KNEE Right 05/25/2023    Procedure: ARTHROSCOPY, KNEE;  Surgeon: Tacos Aburto MD;  Location: [facility] OR;  Service: Orthopedics;  Laterality: Right;    CHOLECYSTECTOMY  05/09/2024    ENDOSCOPY  05/13/2024    HYSTERECTOMY      INJECTION OF ANESTHETIC AGENT INTO SACROILIAC JOINT Bilateral 05/07/2024    Procedure: BLOCK, SACROILIAC JOINT;  Surgeon: Leny Lang MD;  Location: [facility] PAIN MGMT;  Service: Pain Management;  Laterality: Bilateral;    JOINT REPLACEMENT Right     hip replacement    KNEE ARTHROPLASTY Left     KNEE ARTHROSCOPY W/ MENISCECTOMY Right 05/25/2023    Procedure: ARTHROSCOPY, KNEE, WITH MENISCECTOMY;  Surgeon: Tacos Aburto MD;  Location: [facility] OR;  Service: Orthopedics;  Laterality: Right;    LUMPECTOMY, BREAST      ROBOT-ASSISTED CHOLECYSTECTOMY USING DA ANG XI N/A 05/09/2024    Procedure: XI ROBOTIC CHOLECYSTECTOMY;  Surgeon: Mao Dempsey DO;  Location: [facility] OR;  Service: General;  Laterality: N/A;     Social History     Socioeconomic History    Marital status: Single   Tobacco Use    Smoking status: Never     Passive exposure: Never    Smokeless tobacco: Never   Substance and Sexual Activity    Alcohol use: Never    Drug use: Never    Sexual activity: Not Currently     Family History   Problem Relation Name Age of Onset    Hypertension Maternal Grandmother      Stroke  "Maternal Grandmother      Stroke Maternal Grandfather      Hypertension Mother      Stroke Mother      Diabetes Mother       Review of patient's allergies indicates:   Allergen Reactions    Tylox [oxycodone-acetaminophen] Itching    Latex     Opioids - morphine analogues Hives    Sulfa (sulfonamide antibiotics) Rash    Zithromax z-casimiro [azithromycin] Rash        Objective:  Vitals:    07/09/24 1400   BP: (!) 150/76   Pulse: 90   Resp: 18   Weight: 118.8 kg (262 lb)   Height: 5' 5" (1.651 m)   PainSc:   9           Physical Exam  Vitals and nursing note reviewed. Exam conducted with a chaperone present.   Constitutional:       General: She is awake. She is not in acute distress.     Appearance: Normal appearance. She is not ill-appearing, toxic-appearing or diaphoretic.   HENT:      Head: Normocephalic and atraumatic.      Nose: Nose normal.      Mouth/Throat:      Mouth: Mucous membranes are moist.      Pharynx: Oropharynx is clear.   Eyes:      Conjunctiva/sclera: Conjunctivae normal.      Pupils: Pupils are equal, round, and reactive to light.   Cardiovascular:      Rate and Rhythm: Normal rate.   Pulmonary:      Effort: Pulmonary effort is normal. No respiratory distress.   Abdominal:      Palpations: Abdomen is soft.      Tenderness: There is no guarding.   Musculoskeletal:         General: Normal range of motion.      Cervical back: Normal range of motion and neck supple. No rigidity.   Skin:     General: Skin is warm and dry.      Coloration: Skin is not jaundiced or pale.   Neurological:      General: No focal deficit present.      Mental Status: She is alert and oriented to person, place, and time. Mental status is at baseline.      Cranial Nerves: No cranial nerve deficit (II-XII).   Psychiatric:         Mood and Affect: Mood normal.         Behavior: Behavior normal. Behavior is cooperative.         Thought Content: Thought content normal.         Colonoscopy  Narrative: Table formatting from the " original result was not included.  Procedure Date  6/3/24    Impression  Overall   Impression:    Scattered diverticulosis of mild severity  The ileocecal valve, cecum, ascending colon, transverse colon, descending   colon and sigmoid colon appeared normal.  (grade 1) hemorrhoids    Recommendation  Await pathology results   Repeat colonoscopy in 6-12 months with a 2-day prep (2 days of clear   liquids followed by routine golytely split prep); I recommend miralax   17-grams twice daily for 1 week prior to your next colonoscopy    Start a daily fiber supplement with Citrucel or Fibercon (can purchase at   your local pharmacy)  Use Miralax 17 grams daily-to-twice daily as needed to have a regular,   soft BM without straining  Drink at least 60-80 ounces of water daily unless you have a medical   condition that requires fluid restriction     Outcome of procedure: successful Colonoscopy  Disposition: patient to recovery following procedure; discharge to home   when appropriate parameters met  Provisions for follow up: please call my office for any unexpected   symptoms like chest or abdominal pain or bleeding following your   procedure.  Final Diagnosis: CRC screening     Indication  Screening for colon cancer    Providers  Citlaly Kerns RN Registered Nurse   Maile Eduardo Technician   Hannah Kimball CRNA CRNA Parker, Adam, MD Proceduralist     Medications  Moderate sedation administered by anesthesia staff - See anesthesia   record.    Preprocedure  A history and physical has been performed, and patient medication   allergies have been reviewed. The patient's tolerance of previous   anesthesia has been reviewed. The risks and benefits of the procedure and   the sedation options and risks were discussed with the patient. All   questions were answered and informed consent obtained.    ASA Score: ASA 3 - Patient with moderate systemic disease with functional   limitations  Mallampati Airway Score: II (hard  and soft palate, upper portion of   tonsils anduvula visible)    Details of the Procedure  The patient underwent monitored anesthesia care, which was administered by   an anesthesia professional. The patient's heart rate, blood pressure,   level of consciousness, respirations, oxygen, ECG and ETCO2 were monitored   throughout the procedure. A digital rectal exam was performed. A perianal   exam was performed. The scope was introduced through the anus and advanced   to the cecum. Retroflexion was not performed due to endocuff. The quality   of bowel preparation was evaluated using the [place] Bowel Preparation   Scale with scores of: right colon = 1, transverse colon = 1, left colon =   1. The total BBPS score was 3. Bowel prep was not adequate. The patient's   estimated blood loss was minimal (<5 mL). The procedure was not difficult.   The patient tolerated the procedure well. There were no apparent adverse   events.     Scope: Colonoscope  Scope Serial: [id number]    Events    Procedure Events   Event Event Time     Procedure Events   Event Event Time   ENDO SCOPE IN TIME 6/3/2024  2:45 PM   ENDO CECUM REACHED 6/3/2024  2:50 PM   ENDO SCOPE OUT TIME 6/3/2024  2:56 PM     CECAL WITHDRAWAL TIME: 6m 05s    Findings  Multiple small, scattered pancolonic diverticula of mild severity; no   bleeding was identified  The ileocecal valve, cecum, ascending colon, transverse colon, descending   colon and sigmoid colon appeared normal. There was solid and semi-solid   stool throughout the colon which limited visualization of the mucosa.   There was no obstructing mass, but flat lesions and polyps could have been   missed due to inadequate prep.  Internal (grade 1) hemorrhoids; no bleeding was identified       Admission on 07/02/2024, Discharged on 07/02/2024   Component Date Value Ref Range Status    Color, UA 07/02/2024 Light-Yellow  Colorless, Straw, Yellow, Light Yellow, Dark Yellow Final    Clarity, UA 07/02/2024 Clear  Clear  Final    pH, UA 07/02/2024 6.0  5.0 to 8.0 pH Units Final    Leukocytes, UA 07/02/2024 Negative  Negative Final    Nitrites, UA 07/02/2024 Negative  Negative Final    Protein, UA 07/02/2024 Negative  Negative Final    Glucose, UA 07/02/2024 Normal  Normal mg/dL Final    Ketones, UA 07/02/2024 Negative  Negative mg/dL Final    Urobilinogen, UA 07/02/2024 2 (A)  0.2, 1.0, Normal mg/dL Final    Bilirubin, UA 07/02/2024 Negative  Negative Final    Blood, UA 07/02/2024 Trace (A)  Negative Final    Specific Gravity, UA 07/02/2024 1.025  <=1.030 Final    Sodium 07/02/2024 141  136 - 145 mmol/L Final    Potassium 07/02/2024 3.5  3.5 - 5.1 mmol/L Final    Chloride 07/02/2024 111 (H)  98 - 107 mmol/L Final    CO2 07/02/2024 25  21 - 32 mmol/L Final    Anion Gap 07/02/2024 9  7 - 16 mmol/L Final    Glucose 07/02/2024 120 (H)  74 - 106 mg/dL Final    BUN 07/02/2024 16  7 - 18 mg/dL Final    Creatinine 07/02/2024 0.65  0.55 - 1.02 mg/dL Final    BUN/Creatinine Ratio 07/02/2024 25 (H)  6 - 20 Final    Calcium 07/02/2024 8.5  8.5 - 10.1 mg/dL Final    Total Protein 07/02/2024 6.5  6.4 - 8.2 g/dL Final    Albumin 07/02/2024 3.4 (L)  3.5 - 5.0 g/dL Final    Globulin 07/02/2024 3.1  2.0 - 4.0 g/dL Final    A/G Ratio 07/02/2024 1.1   Final    Bilirubin, Total 07/02/2024 0.4  >0.0 - 1.2 mg/dL Final    Alk Phos 07/02/2024 75  41 - 108 U/L Final    ALT 07/02/2024 33  13 - 56 U/L Final    AST 07/02/2024 21  15 - 37 U/L Final    eGFR 07/02/2024 106  >=60 mL/min/1.73m2 Final    WBC 07/02/2024 6.06  4.50 - 11.00 K/uL Final    RBC 07/02/2024 4.36  4.20 - 5.40 M/uL Final    Hemoglobin 07/02/2024 13.0  12.0 - 16.0 g/dL Final    Hematocrit 07/02/2024 39.3  38.0 - 47.0 % Final    MCV 07/02/2024 90.1  80.0 - 96.0 fL Final    MCH 07/02/2024 29.8  27.0 - 31.0 pg Final    MCHC 07/02/2024 33.1  32.0 - 36.0 g/dL Final    RDW 07/02/2024 13.0  11.5 - 14.5 % Final    Platelet Count 07/02/2024 276  150 - 400 K/uL Final     MPV 07/02/2024 10.6  9.4 - 12.4 fL Final    Neutrophils % 07/02/2024 54.6  53.0 - 65.0 % Final    Lymphocytes % 07/02/2024 33.7  27.0 - 41.0 % Final    Monocytes % 07/02/2024 7.3 (H)  2.0 - 6.0 % Final    Eosinophils % 07/02/2024 3.5  1.0 - 4.0 % Final    Basophils % 07/02/2024 0.7  0.0 - 1.0 % Final    Immature Granulocytes % 07/02/2024 0.2  0.0 - 0.4 % Final    nRBC, Auto 07/02/2024 0.0  <=0.0 % Final    Neutrophils, Abs 07/02/2024 3.32  1.80 - 7.70 K/uL Final    Lymphocytes, Absolute 07/02/2024 2.04  1.00 - 4.80 K/uL Final    Monocytes, Absolute 07/02/2024 0.44  0.00 - 0.80 K/uL Final    Eosinophils, Absolute 07/02/2024 0.21  0.00 - 0.50 K/uL Final    Basophils, Absolute 07/02/2024 0.04  0.00 - 0.20 K/uL Final    Immature Granulocytes, Absolute 07/02/2024 0.01  0.00 - 0.04 K/uL Final    nRBC, Absolute 07/02/2024 0.00  <=0.00 x10e3/uL Final    Diff Type 07/02/2024 Auto   Final    WBC, UA 07/02/2024 2  <=5 /hpf Final    RBC, UA 07/02/2024 2  <=3 /hpf Final    Squamous Epithelial Cells, UA 07/02/2024 Occasional (A)  None Seen /HPF Final    Mucous 07/02/2024 Occasional (A)  None Seen /LPF Final   Office Visit on 05/30/2024   Component Date Value Ref Range Status    Potassium 05/30/2024 4.2  3.5 - 5.1 mmol/L Final    Magnesium 05/30/2024 2.7 (H)  1.7 - 2.3 mg/dL Final   Admission on 05/28/2024, Discharged on 05/28/2024   Component Date Value Ref Range Status    Color, UA 05/28/2024 Yellow  Colorless, Straw, Yellow, Light Yellow, Dark Yellow Final    Clarity, UA 05/28/2024 Clear  Clear Final    pH, UA 05/28/2024 5.0  5.0 to 8.0 pH Units Final    Leukocytes, UA 05/28/2024 Small (A)  Negative Final    Nitrites, UA 05/28/2024 Negative  Negative Final    Protein, UA 05/28/2024 Negative  Negative Final    Glucose, UA 05/28/2024 Normal  Normal mg/dL Final    Ketones, UA 05/28/2024 10 (A)  Negative mg/dL Final    Urobilinogen, UA 05/28/2024 Normal  0.2, 1.0, Normal mg/dL Final    Bilirubin,  UA 05/28/2024 Negative  Negative Final    Blood, UA 05/28/2024 Negative  Negative Final    Specific [place], UA 05/28/2024 1.026  <=1.030 Final    WBC, UA 05/28/2024 1  <=5 /hpf Final    RBC, UA 05/28/2024 2  <=3 /hpf Final    Squamous Epithelial Cells, UA 05/28/2024 Occasional (A)  None Seen /HPF Final    Mucous 05/28/2024 Occasional (A)  None Seen /LPF Final   Hospital Outpatient Visit on 05/13/2024   Component Date Value Ref Range Status    Case Report 05/13/2024    Final                    Value:Surgical Pathology                                Case: [id number]                                   Authorizing Provider:  Madi Winslow MD           Collected:           05/13/2024 02:38 PM          Ordering Location:     [facility] -         Received:            05/13/2024 03:39 PM                                 Endoscopy                                                                    Pathologist:           Jonn Hardy MD                                                           Specimens:   A) - Stomach, a.  gastric bx                                                                        B) - Esophagus, b. esophagus bx                                                            Final Diagnosis 05/13/2024    Final                    Value:This result contains rich text formatting which cannot be displayed here.    Gross Description 05/13/2024    Final                    Value:This result contains rich text formatting which cannot be displayed here.    Microscopic Description 05/13/2024    Final                    Value:This result contains rich text formatting which cannot be displayed here.    Laboratory Notes 05/13/2024    Final                    Value:This result contains rich text formatting which cannot be displayed here.   Admission on 05/09/2024, Discharged on 05/09/2024   Component Date Value Ref Range Status    Case Report 05/09/2024    Final                    Value:Surgical  Pathology                                Case: [id number]                                   Authorizing Provider:  Mao Dempsey DO       Collected:           05/09/2024 08:58 AM          Ordering Location:     [facility] -     Received:            05/09/2024 10:01 AM                                 Periop Services                                                              Pathologist:           Jnon Hardy MD                                                           Specimen:    Gallbladder                                                                                Final Diagnosis 05/09/2024    Final                    Value:This result contains rich text formatting which cannot be displayed here.    Gross Description 05/09/2024    Final                    Value:This result contains rich text formatting which cannot be displayed here.    Microscopic Description 05/09/2024    Final                    Value:This result contains rich text formatting which cannot be displayed here.    Laboratory Notes 05/09/2024    Final                    Value:This result contains rich text formatting which cannot be displayed here.   Admission on 05/02/2024, Discharged on 05/02/2024   Component Date Value Ref Range Status    Sodium 05/02/2024 142  136 - 145 mmol/L Final    Potassium 05/02/2024 3.8  3.5 - 5.1 mmol/L Final    Chloride 05/02/2024 110 (H)  98 - 107 mmol/L Final    CO2 05/02/2024 28  21 - 32 mmol/L Final    Anion Gap 05/02/2024 8  7 - 16 mmol/L Final    Glucose 05/02/2024 103  74 - 106 mg/dL Final    BUN 05/02/2024 11  7 - 18 mg/dL Final    Creatinine 05/02/2024 0.66  0.55 - 1.02 mg/dL Final    BUN/Creatinine Ratio 05/02/2024 17  6 - 20 Final    Calcium 05/02/2024 9.1  8.5 - 10.1 mg/dL Final    Total Protein 05/02/2024 5.9 (L)  6.4 - 8.2 g/dL Final    Albumin 05/02/2024 2.9 (L)  3.5 - 5.0 g/dL Final    Globulin 05/02/2024 3.0  2.0 - 4.0 g/dL Final    A/G Ratio 05/02/2024 1.0    Final    Bilirubin, Total 05/02/2024 0.4  >0.0 - 1.2 mg/dL Final    Alk Phos 05/02/2024 62  41 - 108 U/L Final    ALT 05/02/2024 35  13 - 56 U/L Final    AST 05/02/2024 26  15 - 37 U/L Final    eGFR 05/02/2024 106  >=60 mL/min/1.73m2 Final    Color, UA 05/02/2024 Light-Yellow  Colorless, Straw, Yellow, Light Yellow, Dark Yellow Final    Clarity, UA 05/02/2024 Turbid  Clear Final    pH, UA 05/02/2024 8.5 (A)  5.0 to 8.0 pH Units Final    Leukocytes, UA 05/02/2024 Small (A)  Negative Final    Nitrites, UA 05/02/2024 Negative  Negative Final    Protein, UA 05/02/2024 10 (A)  Negative Final    Glucose, UA 05/02/2024 Normal  Normal mg/dL Final    Ketones, UA 05/02/2024 Negative  Negative mg/dL Final    Urobilinogen, UA 05/02/2024 Normal  0.2, 1.0, Normal mg/dL Final    Bilirubin, UA 05/02/2024 Negative  Negative Final    Blood, UA 05/02/2024 Negative  Negative Final    Specific [place], UA 05/02/2024 1.019  <=1.030 Final    Amylase 05/02/2024 38  25 - 115 U/L Final    Lipase 05/02/2024 15 (L)  73 - 393 U/L Final    WBC 05/02/2024 5.34  4.50 - 11.00 K/uL Final    RBC 05/02/2024 4.26  4.20 - 5.40 M/uL Final    Hemoglobin 05/02/2024 12.6  12.0 - 16.0 g/dL Final    Hematocrit 05/02/2024 36.6 (L)  38.0 - 47.0 % Final    MCV 05/02/2024 85.9  80.0 - 96.0 fL Final    MCH 05/02/2024 29.6  27.0 - 31.0 pg Final    MCHC 05/02/2024 34.4  32.0 - 36.0 g/dL Final    RDW 05/02/2024 12.6  11.5 - 14.5 % Final    Platelet Count 05/02/2024 226  150 - 400 K/uL Final    MPV 05/02/2024 10.9  9.4 - 12.4 fL Final    Neutrophils % 05/02/2024 63.8  53.0 - 65.0 % Final    Lymphocytes % 05/02/2024 25.3 (L)  27.0 - 41.0 % Final    Monocytes % 05/02/2024 7.1 (H)  2.0 - 6.0 % Final    Eosinophils % 05/02/2024 2.8  1.0 - 4.0 % Final    Basophils % 05/02/2024 0.6  0.0 - 1.0 % Final    Immature Granulocytes % 05/02/2024 0.4  0.0 - 0.4 % Final    nRBC, Auto 05/02/2024 0.0  <=0.0 % Final    Neutrophils, Abs 05/02/2024  3.41  1.80 - 7.70 K/uL Final    Lymphocytes, Absolute 05/02/2024 1.35  1.00 - 4.80 K/uL Final    Monocytes, Absolute 05/02/2024 0.38  0.00 - 0.80 K/uL Final    Eosinophils, Absolute 05/02/2024 0.15  0.00 - 0.50 K/uL Final    Basophils, Absolute 05/02/2024 0.03  0.00 - 0.20 K/uL Final    Immature Granulocytes, Absolute 05/02/2024 0.02  0.00 - 0.04 K/uL Final    nRBC, Absolute 05/02/2024 0.00  <=0.00 x10e3/uL Final    Diff Type 05/02/2024 Auto   Final    Troponin I High Sensitivity 05/02/2024 <4.0  <=60.4 pg/mL Final    QRS Duration 05/02/2024 74  ms Final    [facility] QTC Calculation 05/02/2024 426  ms Final    WBC, UA 05/02/2024 6 (H)  <=5 /hpf Final    RBC, UA 05/02/2024 4 (H)  <=3 /hpf Final    Bacteria, UA 05/02/2024 Occasional (A)  None Seen /hpf Final    Squamous Epithelial Cells, UA 05/02/2024 Few (A)  None Seen /HPF Final    Mucous 05/02/2024 Occasional (A)  None Seen /LPF Final   Office Visit on 05/01/2024   Component Date Value Ref Range Status    POC Rapid COVID 05/01/2024 Negative  Negative Final     Acceptable 05/01/2024 Yes   Final   Admission on 04/24/2024, Discharged on 04/24/2024   Component Date Value Ref Range Status    Amylase 04/24/2024 54  25 - 115 U/L Final    Lipase 04/24/2024 20  16 - 77 U/L Final    Sodium 04/24/2024 144  136 - 145 mmol/L Final    Potassium 04/24/2024 3.9  3.5 - 5.1 mmol/L Final    Chloride 04/24/2024 113 (H)  98 - 107 mmol/L Final    CO2 04/24/2024 25  21 - 32 mmol/L Final    Anion Gap 04/24/2024 10  7 - 16 mmol/L Final    Glucose 04/24/2024 100  74 - 106 mg/dL Final    BUN 04/24/2024 17  7 - 18 mg/dL Final    Creatinine 04/24/2024 0.85  0.55 - 1.02 mg/dL Final    BUN/Creatinine Ratio 04/24/2024 20  6 - 20 Final    Calcium 04/24/2024 9.0  8.5 - 10.1 mg/dL Final    Total Protein 04/24/2024 6.0 (L)  6.4 - 8.2 g/dL Final    Albumin 04/24/2024 3.3 (L)  3.5 - 5.0 g/dL Final    Globulin 04/24/2024 2.7  2.0 - 4.0 g/dL Final    A/G Ratio  04/24/2024 1.2   Final    Bilirubin, Total 04/24/2024 0.3  >0.0 - 1.2 mg/dL Final    Alk Phos 04/24/2024 77  41 - 108 U/L Final    ALT 04/24/2024 26  13 - 56 U/L Final    AST 04/24/2024 32  15 - 37 U/L Final    eGFR 04/24/2024 83  >=60 mL/min/1.73m2 Final    WBC 04/24/2024 4.88  4.50 - 11.00 K/uL Final    RBC 04/24/2024 4.47  4.20 - 5.40 M/uL Final    Hemoglobin 04/24/2024 13.1  12.0 - 16.0 g/dL Final    Hematocrit 04/24/2024 40.2  38.0 - 47.0 % Final    MCV 04/24/2024 89.9  80.0 - 96.0 fL Final    MCH 04/24/2024 29.3  27.0 - 31.0 pg Final    MCHC 04/24/2024 32.6  32.0 - 36.0 g/dL Final    RDW 04/24/2024 12.3  11.5 - 14.5 % Final    Platelet Count 04/24/2024 227  150 - 400 K/uL Final    MPV 04/24/2024 11.5  9.4 - 12.4 fL Final    Neutrophils % 04/24/2024 45.9 (L)  53.0 - 65.0 % Final    Lymphocytes % 04/24/2024 41.2 (H)  27.0 - 41.0 % Final    Monocytes % 04/24/2024 8.6 (H)  2.0 - 6.0 % Final    Eosinophils % 04/24/2024 3.3  1.0 - 4.0 % Final    Basophils % 04/24/2024 0.8  0.0 - 1.0 % Final    Immature Granulocytes % 04/24/2024 0.2  0.0 - 0.4 % Final    nRBC, Auto 04/24/2024 0.0  <=0.0 % Final    Neutrophils, Abs 04/24/2024 2.24  1.80 - 7.70 K/uL Final    Lymphocytes, Absolute 04/24/2024 2.01  1.00 - 4.80 K/uL Final    Monocytes, Absolute 04/24/2024 0.42  0.00 - 0.80 K/uL Final    Eosinophils, Absolute 04/24/2024 0.16  0.00 - 0.50 K/uL Final    Basophils, Absolute 04/24/2024 0.04  0.00 - 0.20 K/uL Final    Immature Granulocytes, Absolute 04/24/2024 0.01  0.00 - 0.04 K/uL Final    nRBC, Absolute 04/24/2024 0.00  <=0.00 x10e3/uL Final    Diff Type 04/24/2024 Auto   Final   Admission on 04/23/2024, Discharged on 04/23/2024   Component Date Value Ref Range Status    Sodium 04/23/2024 143  136 - 145 mmol/L Final    Potassium 04/23/2024 3.8  3.5 - 5.1 mmol/L Final    Chloride 04/23/2024 108 (H)  98 - 107 mmol/L Final    CO2 04/23/2024 31  21 - 32 mmol/L Final    Anion Gap 04/23/2024 8  7  - 16 mmol/L Final    Glucose 04/23/2024 94  74 - 106 mg/dL Final    BUN 04/23/2024 16  7 - 18 mg/dL Final    Creatinine 04/23/2024 0.68  0.55 - 1.02 mg/dL Final    BUN/Creatinine Ratio 04/23/2024 24 (H)  6 - 20 Final    Calcium 04/23/2024 8.5  8.5 - 10.1 mg/dL Final    Total Protein 04/23/2024 6.0 (L)  6.4 - 8.2 g/dL Final    Albumin 04/23/2024 3.1 (L)  3.5 - 5.0 g/dL Final    Globulin 04/23/2024 2.9  2.0 - 4.0 g/dL Final    A/G Ratio 04/23/2024 1.1   Final    Bilirubin, Total 04/23/2024 0.3  >0.0 - 1.2 mg/dL Final    Alk Phos 04/23/2024 74  41 - 108 U/L Final    ALT 04/23/2024 30  13 - 56 U/L Final    AST 04/23/2024 18  15 - 37 U/L Final    eGFR 04/23/2024 105  >=60 mL/min/1.73m2 Final    Lipase 04/23/2024 16 (L)  73 - 393 U/L Final    Color, UA 04/23/2024 Light-Yellow  Colorless, Straw, Yellow, Light Yellow, Dark Yellow Final    Clarity, UA 04/23/2024 Clear  Clear Final    pH, UA 04/23/2024 5.5  5.0 to 8.0 pH Units Final    Leukocytes, UA 04/23/2024 Small (A)  Negative Final    Nitrites, UA 04/23/2024 Negative  Negative Final    Protein, UA 04/23/2024 Negative  Negative Final    Glucose, UA 04/23/2024 Normal  Normal mg/dL Final    Ketones, UA 04/23/2024 Negative  Negative mg/dL Final    Urobilinogen, UA 04/23/2024 Normal  0.2, 1.0, Normal mg/dL Final    Bilirubin, UA 04/23/2024 Negative  Negative Final    Blood, UA 04/23/2024 Negative  Negative Final    Specific Gravity, UA 04/23/2024 1.020  <=1.030 Final    WBC 04/23/2024 4.28 (L)  4.50 - 11.00 K/uL Final    RBC 04/23/2024 4.33  4.20 - 5.40 M/uL Final    Hemoglobin 04/23/2024 12.7  12.0 - 16.0 g/dL Final    Hematocrit 04/23/2024 38.3  38.0 - 47.0 % Final    MCV 04/23/2024 88.5  80.0 - 96.0 fL Final    MCH 04/23/2024 29.3  27.0 - 31.0 pg Final    MCHC 04/23/2024 33.2  32.0 - 36.0 g/dL Final    RDW 04/23/2024 12.3  11.5 - 14.5 % Final    Platelet Count 04/23/2024 225  150 - 400 K/uL Final    MPV 04/23/2024 11.0  9.4 - 12.4 fL  Final    Neutrophils % 04/23/2024 48.8 (L)  53.0 - 65.0 % Final    Lymphocytes % 04/23/2024 38.6  27.0 - 41.0 % Final    Monocytes % 04/23/2024 8.2 (H)  2.0 - 6.0 % Final    Eosinophils % 04/23/2024 3.7  1.0 - 4.0 % Final    Basophils % 04/23/2024 0.7  0.0 - 1.0 % Final    Immature Granulocytes % 04/23/2024 0.0  0.0 - 0.4 % Final    nRBC, Auto 04/23/2024 0.0  <=0.0 % Final    Neutrophils, Abs 04/23/2024 2.09  1.80 - 7.70 K/uL Final    Lymphocytes, Absolute 04/23/2024 1.65  1.00 - 4.80 K/uL Final    Monocytes, Absolute 04/23/2024 0.35  0.00 - 0.80 K/uL Final    Eosinophils, Absolute 04/23/2024 0.16  0.00 - 0.50 K/uL Final    Basophils, Absolute 04/23/2024 0.03  0.00 - 0.20 K/uL Final    Immature Granulocytes, Absolute 04/23/2024 0.00  0.00 - 0.04 K/uL Final    nRBC, Absolute 04/23/2024 0.00  <=0.00 x10e3/uL Final    Diff Type 04/23/2024 Auto   Final    WBC, UA 04/23/2024 2  <=5 /hpf Final    RBC, UA 04/23/2024 1  <=3 /hpf Final    Squamous Epithelial Cells, UA 04/23/2024 Occasional (A)  None Seen /HPF Final    Mucous 04/23/2024 Occasional (A)  None Seen /LPF Final   Office Visit on 04/15/2024   Component Date Value Ref Range Status    pH, UA 04/16/2024 6.0  5.0 to 8.0 pH Units Final    Creatinine, Urine 04/16/2024 136  28 - 219 mg/dL Final    6-Acetylmorphine 04/16/2024 Negative  10 ng/mL Final    7-Aminoclonazepam 04/16/2024 89.9 (H)  <25.0 25 ng/mL Final    a-Hydroxyalprazolam 04/16/2024 Negative  Negative 25 ng/mL Final    Benzoylecgonine 04/16/2024 Negative  100 ng/mL Final    Buprenorphine 04/16/2024 Negative  25 ng/mL Final    Codeine 04/16/2024 Negative  25 ng/mL Final    EDDP 04/16/2024 Negative  25 ng/mL Final    Fentanyl 04/16/2024 Negative  2.5 ng/mL Final    Hydrocodone 04/16/2024 Negative  25 ng/mL Final    Hydromorphone 04/16/2024 Negative  25 ng/mL Final    Morphine 04/16/2024 Negative  25 ng/mL Final    Norbuprenorphine 04/16/2024 Negative  25 ng/mL Final     Nordiazepam 04/16/2024 Negative  25 ng/mL Final    Norfentanyl Oxalate 04/16/2024 Negative  5 ng/mL Final    Norhydrocodone 04/16/2024 Negative  50 ng/mL Final    Noroxycodone HCL 04/16/2024 Negative  50 ng/mL Final    Oxymorphone 04/16/2024 Negative  25 ng/mL Final    Tapentadol 04/16/2024 Negative  25 ng/mL Final    Temazepam 04/16/2024 Negative  25 ng/mL Final    THC-COOH 04/16/2024 Negative  25 ng/mL Final    Tramadol 04/16/2024 Negative  100 ng/mL Final    Amphetamine, Urine 04/16/2024 Negative  Negative Final    Methamphetamines, Urine 04/16/2024 Negative  Negative Final    Methadone, Urine 04/16/2024 Negative  Negative 25 ng/mL Final    Oxycodone, Urine 04/16/2024 Negative  Negative 25 ng/mL Final    Specific Gravity, UA 04/16/2024 1.017  <=1.030 Final   There may be more visits with results that are not included.         Orders Placed This Encounter   Procedures    Case Request Operating Room: Injection, Joint, Hip, intra-articular hip injection     Order Specific Question:   Medical Necessity:     Answer:   Medically Non-Urgent [100]     Order Specific Question:   CPT Code:     Answer:   SC DRAIN/INJECT LARGE JOINT/BURSA [20610]     Order Specific Question:   Case classification     Answer:   E - Elective [90]     Order Specific Question:   Is an on-site pathologist required for this procedure?     Answer:   N/A       Requested Prescriptions      No prescriptions requested or ordered in this encounter       Assessment:     1. Hip pain, left    2. Bilateral sacroiliitis                 Plan:    Not using narcotics/medication from our office      Follows Neurology [facility] neuropathy    Follows spine surgery [facility]     Follows orthopedics [facility], right hip replacement left knee replacement    Robotic cholecystectomy May 9, 2024 [facility]      She had bilateral sacroiliac injection # 1 May 7, 2024  She states she had 50% relief after procedure, the procedure did help  improve her level function    Today she is complaining of left anterior groin pain worse with standing walking better with short periods resting    She is requesting procedure for left hip pain     Considering repeating sacroiliac procedure if sacroiliac discomfort persist    X-ray sacroiliac joint [facility] April 15, 2024  No sacroiliac joint abnormality demonstrated    X-ray hips [facility] March 18, 2024  Right hip arthroplasty  No fracture noted  Left knee arthroplasty  Mild degenerative changes left hip    Requesting Toradol injection   Reviewed epic notes patient had Toradol injection 10 days ago     She verbalized understanding need to try to limit    Monitor anesthesia request is medically indicated for the scheduled nerve block procedure due to:  1- needle phobia and anxiety, placing  the patient at risk during the provided service.  2-patient has an ASA class greater than 3 and requires constant presence of an anesthesiologist during the procedure,   3-patient has severe problems hard to lie still  4-patient suffers from chronic pain and is unable to function due to  diminished ADLs    Schedule left intra-articular hip injection # 1, left hip pain    Dr. Lang    Bring original prescription medication bottles/container/box with labels to each visit

## 2024-07-01 NOTE — PROGRESS NOTES
Subjective:         Patient ID: Stuart Cloud is a 54 y.o. female.    Chief Complaint: Low-back Pain and Pelvic Pain      Pain  This is a chronic problem. The current episode started more than 1 year ago. The problem occurs daily. The problem has been waxing and waning. Associated symptoms include arthralgias. Pertinent negatives include no anorexia, change in bowel habit, chest pain, chills, coughing, diaphoresis, fever, neck pain, rash, sore throat, swollen glands, urinary symptoms, vertigo or vomiting.     Review of Systems   Constitutional:  Negative for activity change, appetite change, chills, diaphoresis, fever and unexpected weight change.   HENT:  Negative for drooling, ear discharge, ear pain, facial swelling, nosebleeds, sore throat, trouble swallowing, voice change and goiter.    Eyes:  Negative for photophobia, pain, discharge, redness and visual disturbance.   Respiratory:  Negative for apnea, cough, choking, chest tightness, shortness of breath, wheezing and stridor.    Cardiovascular:  Negative for chest pain, palpitations and leg swelling.   Gastrointestinal:  Negative for abdominal distention, anorexia, change in bowel habit, diarrhea, rectal pain, vomiting and fecal incontinence.   Endocrine: Negative for cold intolerance, heat intolerance, polydipsia, polyphagia and polyuria.   Genitourinary:  Negative for bladder incontinence, dysuria, flank pain, frequency and hot flashes.   Musculoskeletal:  Positive for arthralgias, back pain and leg pain. Negative for neck pain.   Integumentary:  Negative for color change, pallor and rash.   Allergic/Immunologic: Negative for immunocompromised state.   Neurological:  Negative for dizziness, vertigo, seizures, syncope, facial asymmetry, speech difficulty, light-headedness, memory loss and coordination difficulties.   Hematological:  Negative for adenopathy. Does not bruise/bleed easily.   Psychiatric/Behavioral:  Negative for agitation, behavioral  problems, confusion, decreased concentration, dysphoric mood, hallucinations, self-injury and suicidal ideas. The patient is not nervous/anxious and is not hyperactive.            Past Medical History:   Diagnosis Date    Arthritis     Class 3 severe obesity due to excess calories without serious comorbidity with body mass index (BMI) of 40.0 to 44.9 in adult 03/20/2023    Essential hypertension 03/20/2023    History of gastric ulcer 05/01/2023    Hypothyroidism     Vestibular dizziness 10/23/2023     Past Surgical History:   Procedure Laterality Date    ARTHROSCOPY OF KNEE Right 05/25/2023    Procedure: ARTHROSCOPY, KNEE;  Surgeon: Tacos Aburto MD;  Location: [facility] OR;  Service: Orthopedics;  Laterality: Right;    CHOLECYSTECTOMY  05/09/2024    ENDOSCOPY  05/13/2024    HYSTERECTOMY      INJECTION OF ANESTHETIC AGENT INTO SACROILIAC JOINT Bilateral 05/07/2024    Procedure: BLOCK, SACROILIAC JOINT;  Surgeon: Leny Lagn MD;  Location: [facility];  Service: Pain Management;  Laterality: Bilateral;    JOINT REPLACEMENT Right     hip replacement    KNEE ARTHROPLASTY Left     KNEE ARTHROSCOPY W/ MENISCECTOMY Right 05/25/2023    Procedure: ARTHROSCOPY, KNEE, WITH MENISCECTOMY;  Surgeon: Tacos Aburto MD;  Location: [facility] OR;  Service: Orthopedics;  Laterality: Right;    LUMPECTOMY, BREAST      ROBOT-ASSISTED CHOLECYSTECTOMY USING DA ANG XI N/A 05/09/2024    Procedure: XI ROBOTIC CHOLECYSTECTOMY;  Surgeon: Mao Dempsey DO;  Location: [facility] OR;  Service: General;  Laterality: N/A;     Social History     Socioeconomic History    Marital status: Single   Tobacco Use    Smoking status: Never     Passive exposure: Never    Smokeless tobacco: Never   Substance and Sexual Activity    Alcohol use: Never    Drug use: Never    Sexual activity: Not Currently     Family History   Problem Relation Name Age of Onset    Hypertension Maternal Grandmother      Stroke  "Maternal Grandmother      Stroke Maternal Grandfather      Hypertension Mother      Stroke Mother      Diabetes Mother       Review of patient's allergies indicates:   Allergen Reactions    Tylox [oxycodone-acetaminophen] Itching    Latex     Opioids - morphine analogues Hives    Sulfa (sulfonamide antibiotics) Rash    Zithromax z-casimiro [azithromycin] Rash        Objective:  Vitals:    07/09/24 1400   BP: (!) 150/76   Pulse: 90   Resp: 18   Weight: 118.8 kg (262 lb)   Height: 5' 5" (1.651 m)   PainSc:   9           Physical Exam  Vitals and nursing note reviewed. Exam conducted with a chaperone present.   Constitutional:       General: She is awake. She is not in acute distress.     Appearance: Normal appearance. She is not ill-appearing, toxic-appearing or diaphoretic.   HENT:      Head: Normocephalic and atraumatic.      Nose: Nose normal.      Mouth/Throat:      Mouth: Mucous membranes are moist.      Pharynx: Oropharynx is clear.   Eyes:      Conjunctiva/sclera: Conjunctivae normal.      Pupils: Pupils are equal, round, and reactive to light.   Cardiovascular:      Rate and Rhythm: Normal rate.   Pulmonary:      Effort: Pulmonary effort is normal. No respiratory distress.   Abdominal:      Palpations: Abdomen is soft.      Tenderness: There is no guarding.   Musculoskeletal:         General: Normal range of motion.      Cervical back: Normal range of motion and neck supple. No rigidity.   Skin:     General: Skin is warm and dry.      Coloration: Skin is not jaundiced or pale.   Neurological:      General: No focal deficit present.      Mental Status: She is alert and oriented to person, place, and time. Mental status is at baseline.      Cranial Nerves: No cranial nerve deficit (II-XII).   Psychiatric:         Mood and Affect: Mood normal.         Behavior: Behavior normal. Behavior is cooperative.         Thought Content: Thought content normal.         Colonoscopy  Narrative: Table formatting from the " original result was not included.  Procedure Date  6/3/24    Impression  Overall   Impression:    Scattered diverticulosis of mild severity  The ileocecal valve, cecum, ascending colon, transverse colon, descending   colon and sigmoid colon appeared normal.  (grade 1) hemorrhoids    Recommendation  Await pathology results   Repeat colonoscopy in 6-12 months with a 2-day prep (2 days of clear   liquids followed by routine golytely split prep); I recommend miralax   17-grams twice daily for 1 week prior to your next colonoscopy    Start a daily fiber supplement with Citrucel or Fibercon (can purchase at   your local pharmacy)  Use Miralax 17 grams daily-to-twice daily as needed to have a regular,   soft BM without straining  Drink at least 60-80 ounces of water daily unless you have a medical   condition that requires fluid restriction     Outcome of procedure: successful Colonoscopy  Disposition: patient to recovery following procedure; discharge to home   when appropriate parameters met  Provisions for follow up: please call my office for any unexpected   symptoms like chest or abdominal pain or bleeding following your   procedure.  Final Diagnosis: CRC screening     Indication  Screening for colon cancer    Providers  Citlaly Kerns RN Registered Nurse   Maile Eduardo Technician   Hannah Kimball CRNA CRNA Parker, Adam, MD Proceduralist     Medications  Moderate sedation administered by anesthesia staff - See anesthesia   record.    Preprocedure  A history and physical has been performed, and patient medication   allergies have been reviewed. The patient's tolerance of previous   anesthesia has been reviewed. The risks and benefits of the procedure and   the sedation options and risks were discussed with the patient. All   questions were answered and informed consent obtained.    ASA Score: ASA 3 - Patient with moderate systemic disease with functional   limitations  Mallampati Airway Score: II (hard  and soft palate, upper portion of   tonsils anduvula visible)    Details of the Procedure  The patient underwent monitored anesthesia care, which was administered by   an anesthesia professional. The patient's heart rate, blood pressure,   level of consciousness, respirations, oxygen, ECG and ETCO2 were monitored   throughout the procedure. A digital rectal exam was performed. A perianal   exam was performed. The scope was introduced through the anus and advanced   to the cecum. Retroflexion was not performed due to endocuff. The quality   of bowel preparation was evaluated using the Merrill Bowel Preparation   Scale with scores of: right colon = 1, transverse colon = 1, left colon =   1. The total BBPS score was 3. Bowel prep was not adequate. The patient's   estimated blood loss was minimal (<5 mL). The procedure was not difficult.   The patient tolerated the procedure well. There were no apparent adverse   events.     Scope: Colonoscope  Scope Serial: [id number]    Events    Procedure Events   Event Event Time     Procedure Events   Event Event Time   ENDO SCOPE IN TIME 6/3/2024  2:45 PM   ENDO CECUM REACHED 6/3/2024  2:50 PM   ENDO SCOPE OUT TIME 6/3/2024  2:56 PM     CECAL WITHDRAWAL TIME: 6m 05s    Findings  Multiple small, scattered pancolonic diverticula of mild severity; no   bleeding was identified  The ileocecal valve, cecum, ascending colon, transverse colon, descending   colon and sigmoid colon appeared normal. There was solid and semi-solid   stool throughout the colon which limited visualization of the mucosa.   There was no obstructing mass, but flat lesions and polyps could have been   missed due to inadequate prep.  Internal (grade 1) hemorrhoids; no bleeding was identified       Admission on 07/02/2024, Discharged on 07/02/2024   Component Date Value Ref Range Status    Color, UA 07/02/2024 Light-Yellow  Colorless, Straw, Yellow, Light Yellow, Dark Yellow Final    Clarity, UA 07/02/2024 Clear  Clear  Final    pH, UA 07/02/2024 6.0  5.0 to 8.0 pH Units Final    Leukocytes, UA 07/02/2024 Negative  Negative Final    Nitrites, UA 07/02/2024 Negative  Negative Final    Protein, UA 07/02/2024 Negative  Negative Final    Glucose, UA 07/02/2024 Normal  Normal mg/dL Final    Ketones, UA 07/02/2024 Negative  Negative mg/dL Final    Urobilinogen, UA 07/02/2024 2 (A)  0.2, 1.0, Normal mg/dL Final    Bilirubin, UA 07/02/2024 Negative  Negative Final    Blood, UA 07/02/2024 Trace (A)  Negative Final    Specific Gravity, UA 07/02/2024 1.025  <=1.030 Final    Sodium 07/02/2024 141  136 - 145 mmol/L Final    Potassium 07/02/2024 3.5  3.5 - 5.1 mmol/L Final    Chloride 07/02/2024 111 (H)  98 - 107 mmol/L Final    CO2 07/02/2024 25  21 - 32 mmol/L Final    Anion Gap 07/02/2024 9  7 - 16 mmol/L Final    Glucose 07/02/2024 120 (H)  74 - 106 mg/dL Final    BUN 07/02/2024 16  7 - 18 mg/dL Final    Creatinine 07/02/2024 0.65  0.55 - 1.02 mg/dL Final    BUN/Creatinine Ratio 07/02/2024 25 (H)  6 - 20 Final    Calcium 07/02/2024 8.5  8.5 - 10.1 mg/dL Final    Total Protein 07/02/2024 6.5  6.4 - 8.2 g/dL Final    Albumin 07/02/2024 3.4 (L)  3.5 - 5.0 g/dL Final    Globulin 07/02/2024 3.1  2.0 - 4.0 g/dL Final    A/G Ratio 07/02/2024 1.1   Final    Bilirubin, Total 07/02/2024 0.4  >0.0 - 1.2 mg/dL Final    Alk Phos 07/02/2024 75  41 - 108 U/L Final    ALT 07/02/2024 33  13 - 56 U/L Final    AST 07/02/2024 21  15 - 37 U/L Final    eGFR 07/02/2024 106  >=60 mL/min/1.73m2 Final    WBC 07/02/2024 6.06  4.50 - 11.00 K/uL Final    RBC 07/02/2024 4.36  4.20 - 5.40 M/uL Final    Hemoglobin 07/02/2024 13.0  12.0 - 16.0 g/dL Final    Hematocrit 07/02/2024 39.3  38.0 - 47.0 % Final    MCV 07/02/2024 90.1  80.0 - 96.0 fL Final    MCH 07/02/2024 29.8  27.0 - 31.0 pg Final    MCHC 07/02/2024 33.1  32.0 - 36.0 g/dL Final    RDW 07/02/2024 13.0  11.5 - 14.5 % Final    Platelet Count 07/02/2024 276  150 - 400 K/uL Final     MPV 07/02/2024 10.6  9.4 - 12.4 fL Final    Neutrophils % 07/02/2024 54.6  53.0 - 65.0 % Final    Lymphocytes % 07/02/2024 33.7  27.0 - 41.0 % Final    Monocytes % 07/02/2024 7.3 (H)  2.0 - 6.0 % Final    Eosinophils % 07/02/2024 3.5  1.0 - 4.0 % Final    Basophils % 07/02/2024 0.7  0.0 - 1.0 % Final    Immature Granulocytes % 07/02/2024 0.2  0.0 - 0.4 % Final    nRBC, Auto 07/02/2024 0.0  <=0.0 % Final    Neutrophils, Abs 07/02/2024 3.32  1.80 - 7.70 K/uL Final    Lymphocytes, Absolute 07/02/2024 2.04  1.00 - 4.80 K/uL Final    Monocytes, Absolute 07/02/2024 0.44  0.00 - 0.80 K/uL Final    Eosinophils, Absolute 07/02/2024 0.21  0.00 - 0.50 K/uL Final    Basophils, Absolute 07/02/2024 0.04  0.00 - 0.20 K/uL Final    Immature Granulocytes, Absolute 07/02/2024 0.01  0.00 - 0.04 K/uL Final    nRBC, Absolute 07/02/2024 0.00  <=0.00 x10e3/uL Final    Diff Type 07/02/2024 Auto   Final    WBC, UA 07/02/2024 2  <=5 /hpf Final    RBC, UA 07/02/2024 2  <=3 /hpf Final    Squamous Epithelial Cells, UA 07/02/2024 Occasional (A)  None Seen /HPF Final    Mucous 07/02/2024 Occasional (A)  None Seen /LPF Final   Office Visit on 05/30/2024   Component Date Value Ref Range Status    Potassium 05/30/2024 4.2  3.5 - 5.1 mmol/L Final    Magnesium 05/30/2024 2.7 (H)  1.7 - 2.3 mg/dL Final   Admission on 05/28/2024, Discharged on 05/28/2024   Component Date Value Ref Range Status    Color, UA 05/28/2024 Yellow  Colorless, Straw, Yellow, Light Yellow, Dark Yellow Final    Clarity, UA 05/28/2024 Clear  Clear Final    pH, UA 05/28/2024 5.0  5.0 to 8.0 pH Units Final    Leukocytes, UA 05/28/2024 Small (A)  Negative Final    Nitrites, UA 05/28/2024 Negative  Negative Final    Protein, UA 05/28/2024 Negative  Negative Final    Glucose, UA 05/28/2024 Normal  Normal mg/dL Final    Ketones, UA 05/28/2024 10 (A)  Negative mg/dL Final    Urobilinogen, UA 05/28/2024 Normal  0.2, 1.0, Normal mg/dL Final    Bilirubin,  UA 05/28/2024 Negative  Negative Final    Blood, UA 05/28/2024 Negative  Negative Final    Specific [place], UA 05/28/2024 1.026  <=1.030 Final    WBC, UA 05/28/2024 1  <=5 /hpf Final    RBC, UA 05/28/2024 2  <=3 /hpf Final    Squamous Epithelial Cells, UA 05/28/2024 Occasional (A)  None Seen /HPF Final    Mucous 05/28/2024 Occasional (A)  None Seen /LPF Final   Hospital Outpatient Visit on 05/13/2024   Component Date Value Ref Range Status    Case Report 05/13/2024    Final                    Value:Surgical Pathology                                Case: [id number]                                   Authorizing Provider:  Madi Winslow MD           Collected:           05/13/2024 02:38 PM          Ordering Location:     [facility] -         Received:            05/13/2024 03:39 PM                                 Endoscopy                                                                    Pathologist:           Jonn Hardy MD                                                           Specimens:   A) - Stomach, a.  gastric bx                                                                        B) - Esophagus, b. esophagus bx                                                            Final Diagnosis 05/13/2024    Final                    Value:This result contains rich text formatting which cannot be displayed here.    Gross Description 05/13/2024    Final                    Value:This result contains rich text formatting which cannot be displayed here.    Microscopic Description 05/13/2024    Final                    Value:This result contains rich text formatting which cannot be displayed here.    Laboratory Notes 05/13/2024    Final                    Value:This result contains rich text formatting which cannot be displayed here.   Admission on 05/09/2024, Discharged on 05/09/2024   Component Date Value Ref Range Status    Case Report 05/09/2024    Final                    Value:Surgical  Pathology                                Case: [id number]                                   Authorizing Provider:  Mao Dempsey DO       Collected:           05/09/2024 08:58 AM          Ordering Location:     [facility] -     Received:            05/09/2024 10:01 AM                                 Periop Services                                                              Pathologist:           Jonn Hardy MD                                                           Specimen:    Gallbladder                                                                                Final Diagnosis 05/09/2024    Final                    Value:This result contains rich text formatting which cannot be displayed here.    Gross Description 05/09/2024    Final                    Value:This result contains rich text formatting which cannot be displayed here.    Microscopic Description 05/09/2024    Final                    Value:This result contains rich text formatting which cannot be displayed here.    Laboratory Notes 05/09/2024    Final                    Value:This result contains rich text formatting which cannot be displayed here.   Admission on 05/02/2024, Discharged on 05/02/2024   Component Date Value Ref Range Status    Sodium 05/02/2024 142  136 - 145 mmol/L Final    Potassium 05/02/2024 3.8  3.5 - 5.1 mmol/L Final    Chloride 05/02/2024 110 (H)  98 - 107 mmol/L Final    CO2 05/02/2024 28  21 - 32 mmol/L Final    Anion Gap 05/02/2024 8  7 - 16 mmol/L Final    Glucose 05/02/2024 103  74 - 106 mg/dL Final    BUN 05/02/2024 11  7 - 18 mg/dL Final    Creatinine 05/02/2024 0.66  0.55 - 1.02 mg/dL Final    BUN/Creatinine Ratio 05/02/2024 17  6 - 20 Final    Calcium 05/02/2024 9.1  8.5 - 10.1 mg/dL Final    Total Protein 05/02/2024 5.9 (L)  6.4 - 8.2 g/dL Final    Albumin 05/02/2024 2.9 (L)  3.5 - 5.0 g/dL Final    Globulin 05/02/2024 3.0  2.0 - 4.0 g/dL Final    A/G Ratio 05/02/2024 1.0    Final    Bilirubin, Total 05/02/2024 0.4  >0.0 - 1.2 mg/dL Final    Alk Phos 05/02/2024 62  41 - 108 U/L Final    ALT 05/02/2024 35  13 - 56 U/L Final    AST 05/02/2024 26  15 - 37 U/L Final    eGFR 05/02/2024 106  >=60 mL/min/1.73m2 Final    Color, UA 05/02/2024 Light-Yellow  Colorless, Straw, Yellow, Light Yellow, Dark Yellow Final    Clarity, UA 05/02/2024 Turbid  Clear Final    pH, UA 05/02/2024 8.5 (A)  5.0 to 8.0 pH Units Final    Leukocytes, UA 05/02/2024 Small (A)  Negative Final    Nitrites, UA 05/02/2024 Negative  Negative Final    Protein, UA 05/02/2024 10 (A)  Negative Final    Glucose, UA 05/02/2024 Normal  Normal mg/dL Final    Ketones, UA 05/02/2024 Negative  Negative mg/dL Final    Urobilinogen, UA 05/02/2024 Normal  0.2, 1.0, Normal mg/dL Final    Bilirubin, UA 05/02/2024 Negative  Negative Final    Blood, UA 05/02/2024 Negative  Negative Final    Specific [place], UA 05/02/2024 1.019  <=1.030 Final    Amylase 05/02/2024 38  25 - 115 U/L Final    Lipase 05/02/2024 15 (L)  73 - 393 U/L Final    WBC 05/02/2024 5.34  4.50 - 11.00 K/uL Final    RBC 05/02/2024 4.26  4.20 - 5.40 M/uL Final    Hemoglobin 05/02/2024 12.6  12.0 - 16.0 g/dL Final    Hematocrit 05/02/2024 36.6 (L)  38.0 - 47.0 % Final    MCV 05/02/2024 85.9  80.0 - 96.0 fL Final    MCH 05/02/2024 29.6  27.0 - 31.0 pg Final    MCHC 05/02/2024 34.4  32.0 - 36.0 g/dL Final    RDW 05/02/2024 12.6  11.5 - 14.5 % Final    Platelet Count 05/02/2024 226  150 - 400 K/uL Final    MPV 05/02/2024 10.9  9.4 - 12.4 fL Final    Neutrophils % 05/02/2024 63.8  53.0 - 65.0 % Final    Lymphocytes % 05/02/2024 25.3 (L)  27.0 - 41.0 % Final    Monocytes % 05/02/2024 7.1 (H)  2.0 - 6.0 % Final    Eosinophils % 05/02/2024 2.8  1.0 - 4.0 % Final    Basophils % 05/02/2024 0.6  0.0 - 1.0 % Final    Immature Granulocytes % 05/02/2024 0.4  0.0 - 0.4 % Final    nRBC, Auto 05/02/2024 0.0  <=0.0 % Final    Neutrophils, Abs 05/02/2024  3.41  1.80 - 7.70 K/uL Final    Lymphocytes, Absolute 05/02/2024 1.35  1.00 - 4.80 K/uL Final    Monocytes, Absolute 05/02/2024 0.38  0.00 - 0.80 K/uL Final    Eosinophils, Absolute 05/02/2024 0.15  0.00 - 0.50 K/uL Final    Basophils, Absolute 05/02/2024 0.03  0.00 - 0.20 K/uL Final    Immature Granulocytes, Absolute 05/02/2024 0.02  0.00 - 0.04 K/uL Final    nRBC, Absolute 05/02/2024 0.00  <=0.00 x10e3/uL Final    Diff Type 05/02/2024 Auto   Final    Troponin I High Sensitivity 05/02/2024 <4.0  <=60.4 pg/mL Final    QRS Duration 05/02/2024 74  ms Final    [facility] QTC Calculation 05/02/2024 426  ms Final    WBC, UA 05/02/2024 6 (H)  <=5 /hpf Final    RBC, UA 05/02/2024 4 (H)  <=3 /hpf Final    Bacteria, UA 05/02/2024 Occasional (A)  None Seen /hpf Final    Squamous Epithelial Cells, UA 05/02/2024 Few (A)  None Seen /HPF Final    Mucous 05/02/2024 Occasional (A)  None Seen /LPF Final   Office Visit on 05/01/2024   Component Date Value Ref Range Status    POC Rapid COVID 05/01/2024 Negative  Negative Final     Acceptable 05/01/2024 Yes   Final   Admission on 04/24/2024, Discharged on 04/24/2024   Component Date Value Ref Range Status    Amylase 04/24/2024 54  25 - 115 U/L Final    Lipase 04/24/2024 20  16 - 77 U/L Final    Sodium 04/24/2024 144  136 - 145 mmol/L Final    Potassium 04/24/2024 3.9  3.5 - 5.1 mmol/L Final    Chloride 04/24/2024 113 (H)  98 - 107 mmol/L Final    CO2 04/24/2024 25  21 - 32 mmol/L Final    Anion Gap 04/24/2024 10  7 - 16 mmol/L Final    Glucose 04/24/2024 100  74 - 106 mg/dL Final    BUN 04/24/2024 17  7 - 18 mg/dL Final    Creatinine 04/24/2024 0.85  0.55 - 1.02 mg/dL Final    BUN/Creatinine Ratio 04/24/2024 20  6 - 20 Final    Calcium 04/24/2024 9.0  8.5 - 10.1 mg/dL Final    Total Protein 04/24/2024 6.0 (L)  6.4 - 8.2 g/dL Final    Albumin 04/24/2024 3.3 (L)  3.5 - 5.0 g/dL Final    Globulin 04/24/2024 2.7  2.0 - 4.0 g/dL Final    A/G Ratio  04/24/2024 1.2   Final    Bilirubin, Total 04/24/2024 0.3  >0.0 - 1.2 mg/dL Final    Alk Phos 04/24/2024 77  41 - 108 U/L Final    ALT 04/24/2024 26  13 - 56 U/L Final    AST 04/24/2024 32  15 - 37 U/L Final    eGFR 04/24/2024 83  >=60 mL/min/1.73m2 Final    WBC 04/24/2024 4.88  4.50 - 11.00 K/uL Final    RBC 04/24/2024 4.47  4.20 - 5.40 M/uL Final    Hemoglobin 04/24/2024 13.1  12.0 - 16.0 g/dL Final    Hematocrit 04/24/2024 40.2  38.0 - 47.0 % Final    MCV 04/24/2024 89.9  80.0 - 96.0 fL Final    MCH 04/24/2024 29.3  27.0 - 31.0 pg Final    MCHC 04/24/2024 32.6  32.0 - 36.0 g/dL Final    RDW 04/24/2024 12.3  11.5 - 14.5 % Final    Platelet Count 04/24/2024 227  150 - 400 K/uL Final    MPV 04/24/2024 11.5  9.4 - 12.4 fL Final    Neutrophils % 04/24/2024 45.9 (L)  53.0 - 65.0 % Final    Lymphocytes % 04/24/2024 41.2 (H)  27.0 - 41.0 % Final    Monocytes % 04/24/2024 8.6 (H)  2.0 - 6.0 % Final    Eosinophils % 04/24/2024 3.3  1.0 - 4.0 % Final    Basophils % 04/24/2024 0.8  0.0 - 1.0 % Final    Immature Granulocytes % 04/24/2024 0.2  0.0 - 0.4 % Final    nRBC, Auto 04/24/2024 0.0  <=0.0 % Final    Neutrophils, Abs 04/24/2024 2.24  1.80 - 7.70 K/uL Final    Lymphocytes, Absolute 04/24/2024 2.01  1.00 - 4.80 K/uL Final    Monocytes, Absolute 04/24/2024 0.42  0.00 - 0.80 K/uL Final    Eosinophils, Absolute 04/24/2024 0.16  0.00 - 0.50 K/uL Final    Basophils, Absolute 04/24/2024 0.04  0.00 - 0.20 K/uL Final    Immature Granulocytes, Absolute 04/24/2024 0.01  0.00 - 0.04 K/uL Final    nRBC, Absolute 04/24/2024 0.00  <=0.00 x10e3/uL Final    Diff Type 04/24/2024 Auto   Final   Admission on 04/23/2024, Discharged on 04/23/2024   Component Date Value Ref Range Status    Sodium 04/23/2024 143  136 - 145 mmol/L Final    Potassium 04/23/2024 3.8  3.5 - 5.1 mmol/L Final    Chloride 04/23/2024 108 (H)  98 - 107 mmol/L Final    CO2 04/23/2024 31  21 - 32 mmol/L Final    Anion Gap 04/23/2024 8  7  - 16 mmol/L Final    Glucose 04/23/2024 94  74 - 106 mg/dL Final    BUN 04/23/2024 16  7 - 18 mg/dL Final    Creatinine 04/23/2024 0.68  0.55 - 1.02 mg/dL Final    BUN/Creatinine Ratio 04/23/2024 24 (H)  6 - 20 Final    Calcium 04/23/2024 8.5  8.5 - 10.1 mg/dL Final    Total Protein 04/23/2024 6.0 (L)  6.4 - 8.2 g/dL Final    Albumin 04/23/2024 3.1 (L)  3.5 - 5.0 g/dL Final    Globulin 04/23/2024 2.9  2.0 - 4.0 g/dL Final    A/G Ratio 04/23/2024 1.1   Final    Bilirubin, Total 04/23/2024 0.3  >0.0 - 1.2 mg/dL Final    Alk Phos 04/23/2024 74  41 - 108 U/L Final    ALT 04/23/2024 30  13 - 56 U/L Final    AST 04/23/2024 18  15 - 37 U/L Final    eGFR 04/23/2024 105  >=60 mL/min/1.73m2 Final    Lipase 04/23/2024 16 (L)  73 - 393 U/L Final    Color, UA 04/23/2024 Light-Yellow  Colorless, Straw, Yellow, Light Yellow, Dark Yellow Final    Clarity, UA 04/23/2024 Clear  Clear Final    pH, UA 04/23/2024 5.5  5.0 to 8.0 pH Units Final    Leukocytes, UA 04/23/2024 Small (A)  Negative Final    Nitrites, UA 04/23/2024 Negative  Negative Final    Protein, UA 04/23/2024 Negative  Negative Final    Glucose, UA 04/23/2024 Normal  Normal mg/dL Final    Ketones, UA 04/23/2024 Negative  Negative mg/dL Final    Urobilinogen, UA 04/23/2024 Normal  0.2, 1.0, Normal mg/dL Final    Bilirubin, UA 04/23/2024 Negative  Negative Final    Blood, UA 04/23/2024 Negative  Negative Final    Specific Gravity, UA 04/23/2024 1.020  <=1.030 Final    WBC 04/23/2024 4.28 (L)  4.50 - 11.00 K/uL Final    RBC 04/23/2024 4.33  4.20 - 5.40 M/uL Final    Hemoglobin 04/23/2024 12.7  12.0 - 16.0 g/dL Final    Hematocrit 04/23/2024 38.3  38.0 - 47.0 % Final    MCV 04/23/2024 88.5  80.0 - 96.0 fL Final    MCH 04/23/2024 29.3  27.0 - 31.0 pg Final    MCHC 04/23/2024 33.2  32.0 - 36.0 g/dL Final    RDW 04/23/2024 12.3  11.5 - 14.5 % Final    Platelet Count 04/23/2024 225  150 - 400 K/uL Final    MPV 04/23/2024 11.0  9.4 - 12.4 fL  Final    Neutrophils % 04/23/2024 48.8 (L)  53.0 - 65.0 % Final    Lymphocytes % 04/23/2024 38.6  27.0 - 41.0 % Final    Monocytes % 04/23/2024 8.2 (H)  2.0 - 6.0 % Final    Eosinophils % 04/23/2024 3.7  1.0 - 4.0 % Final    Basophils % 04/23/2024 0.7  0.0 - 1.0 % Final    Immature Granulocytes % 04/23/2024 0.0  0.0 - 0.4 % Final    nRBC, Auto 04/23/2024 0.0  <=0.0 % Final    Neutrophils, Abs 04/23/2024 2.09  1.80 - 7.70 K/uL Final    Lymphocytes, Absolute 04/23/2024 1.65  1.00 - 4.80 K/uL Final    Monocytes, Absolute 04/23/2024 0.35  0.00 - 0.80 K/uL Final    Eosinophils, Absolute 04/23/2024 0.16  0.00 - 0.50 K/uL Final    Basophils, Absolute 04/23/2024 0.03  0.00 - 0.20 K/uL Final    Immature Granulocytes, Absolute 04/23/2024 0.00  0.00 - 0.04 K/uL Final    nRBC, Absolute 04/23/2024 0.00  <=0.00 x10e3/uL Final    Diff Type 04/23/2024 Auto   Final    WBC, UA 04/23/2024 2  <=5 /hpf Final    RBC, UA 04/23/2024 1  <=3 /hpf Final    Squamous Epithelial Cells, UA 04/23/2024 Occasional (A)  None Seen /HPF Final    Mucous 04/23/2024 Occasional (A)  None Seen /LPF Final   Office Visit on 04/15/2024   Component Date Value Ref Range Status    pH, UA 04/16/2024 6.0  5.0 to 8.0 pH Units Final    Creatinine, Urine 04/16/2024 136  28 - 219 mg/dL Final    6-Acetylmorphine 04/16/2024 Negative  10 ng/mL Final    7-Aminoclonazepam 04/16/2024 89.9 (H)  <25.0 25 ng/mL Final    a-Hydroxyalprazolam 04/16/2024 Negative  Negative 25 ng/mL Final    Benzoylecgonine 04/16/2024 Negative  100 ng/mL Final    Buprenorphine 04/16/2024 Negative  25 ng/mL Final    Codeine 04/16/2024 Negative  25 ng/mL Final    EDDP 04/16/2024 Negative  25 ng/mL Final    Fentanyl 04/16/2024 Negative  2.5 ng/mL Final    Hydrocodone 04/16/2024 Negative  25 ng/mL Final    Hydromorphone 04/16/2024 Negative  25 ng/mL Final    Morphine 04/16/2024 Negative  25 ng/mL Final    Norbuprenorphine 04/16/2024 Negative  25 ng/mL Final     Nordiazepam 04/16/2024 Negative  25 ng/mL Final    Norfentanyl Oxalate 04/16/2024 Negative  5 ng/mL Final    Norhydrocodone 04/16/2024 Negative  50 ng/mL Final    Noroxycodone HCL 04/16/2024 Negative  50 ng/mL Final    Oxymorphone 04/16/2024 Negative  25 ng/mL Final    Tapentadol 04/16/2024 Negative  25 ng/mL Final    Temazepam 04/16/2024 Negative  25 ng/mL Final    THC-COOH 04/16/2024 Negative  25 ng/mL Final    Tramadol 04/16/2024 Negative  100 ng/mL Final    Amphetamine, Urine 04/16/2024 Negative  Negative Final    Methamphetamines, Urine 04/16/2024 Negative  Negative Final    Methadone, Urine 04/16/2024 Negative  Negative 25 ng/mL Final    Oxycodone, Urine 04/16/2024 Negative  Negative 25 ng/mL Final    Specific Gravity, UA 04/16/2024 1.017  <=1.030 Final   There may be more visits with results that are not included.         Orders Placed This Encounter   Procedures    Case Request Operating Room: Injection, Joint, Hip, intra-articular hip injection     Order Specific Question:   Medical Necessity:     Answer:   Medically Non-Urgent [100]     Order Specific Question:   CPT Code:     Answer:   MS DRAIN/INJECT LARGE JOINT/BURSA [20610]     Order Specific Question:   Case classification     Answer:   E - Elective [90]     Order Specific Question:   Is an on-site pathologist required for this procedure?     Answer:   N/A       Requested Prescriptions      No prescriptions requested or ordered in this encounter       Assessment:     1. Hip pain, left    2. Bilateral sacroiliitis                 Plan:    Not using narcotics/medication from our office      Follows Neurology [facility] neuropathy    Follows spine surgery [facility]     Follows orthopedics [facility], right hip replacement left knee replacement    Robotic cholecystectomy May 9, 2024 [facility]      She had bilateral sacroiliac injection # 1 May 7, 2024  She states she had 50% relief after procedure, the procedure did help  improve her level function    Today she is complaining of left anterior groin pain worse with standing walking better with short periods resting    She is requesting procedure for left hip pain     Considering repeating sacroiliac procedure if sacroiliac discomfort persist    X-ray sacroiliac joint [facility] April 15, 2024  No sacroiliac joint abnormality demonstrated    X-ray hips [facility] March 18, 2024  Right hip arthroplasty  No fracture noted  Left knee arthroplasty  Mild degenerative changes left hip    Requesting Toradol injection   Reviewed epic notes patient had Toradol injection 10 days ago     She verbalized understanding need to try to limit    Monitor anesthesia request is medically indicated for the scheduled nerve block procedure due to:  1- needle phobia and anxiety, placing  the patient at risk during the provided service.  2-patient has an ASA class greater than 3 and requires constant presence of an anesthesiologist during the procedure,   3-patient has severe problems hard to lie still  4-patient suffers from chronic pain and is unable to function due to  diminished ADLs    Schedule left intra-articular hip injection # 1, left hip pain    Dr. Lang    Bring original prescription medication bottles/container/box with labels to each visit

## 2024-07-02 ENCOUNTER — HOSPITAL ENCOUNTER (EMERGENCY)
Facility: HOSPITAL | Age: 54
Discharge: HOME | End: 2024-07-02
Attending: EMERGENCY MEDICINE
Payer: COMMERCIAL

## 2024-07-02 ENCOUNTER — TELEPHONE (OUTPATIENT)
Dept: SURGERY | Facility: CLINIC | Age: 54
End: 2024-07-02
Payer: COMMERCIAL

## 2024-07-02 VITALS
HEART RATE: 53 BPM | OXYGEN SATURATION: 94 % | WEIGHT: 256 LBS | DIASTOLIC BLOOD PRESSURE: 84 MMHG | RESPIRATION RATE: 20 BRPM | HEIGHT: 66 IN | BODY MASS INDEX: 41.14 KG/M2 | TEMPERATURE: 98 F | SYSTOLIC BLOOD PRESSURE: 152 MMHG

## 2024-07-02 DIAGNOSIS — M46.1 SACROILIITIS: ICD-10-CM

## 2024-07-02 DIAGNOSIS — R10.2 PELVIC PAIN: Primary | ICD-10-CM

## 2024-07-02 LAB
ALBUMIN SERPL BCP-MCNC: 3.4 G/DL (ref 3.5–5)
ALBUMIN/GLOB SERPL: 1.1 {RATIO}
ALP SERPL-CCNC: 75 U/L (ref 41–108)
ALT SERPL W P-5'-P-CCNC: 33 U/L (ref 13–56)
ANION GAP SERPL CALCULATED.3IONS-SCNC: 9 MMOL/L (ref 7–16)
AST SERPL W P-5'-P-CCNC: 21 U/L (ref 15–37)
BASOPHILS # BLD AUTO: 0.04 K/UL (ref 0–0.2)
BASOPHILS NFR BLD AUTO: 0.7 % (ref 0–1)
BILIRUB SERPL-MCNC: 0.4 MG/DL (ref ?–1.2)
BILIRUB UR QL STRIP: NEGATIVE
BUN SERPL-MCNC: 16 MG/DL (ref 7–18)
BUN/CREAT SERPL: 25 (ref 6–20)
CALCIUM SERPL-MCNC: 8.5 MG/DL (ref 8.5–10.1)
CHLORIDE SERPL-SCNC: 111 MMOL/L (ref 98–107)
CLARITY UR: CLEAR
CO2 SERPL-SCNC: 25 MMOL/L (ref 21–32)
COLOR UR: ABNORMAL
CREAT SERPL-MCNC: 0.65 MG/DL (ref 0.55–1.02)
DIFFERENTIAL METHOD BLD: ABNORMAL
EGFR (NO RACE VARIABLE) (RUSH/TITUS): 106 ML/MIN/1.73M2
EOSINOPHIL # BLD AUTO: 0.21 K/UL (ref 0–0.5)
EOSINOPHIL NFR BLD AUTO: 3.5 % (ref 1–4)
ERYTHROCYTE [DISTWIDTH] IN BLOOD BY AUTOMATED COUNT: 13 % (ref 11.5–14.5)
GLOBULIN SER-MCNC: 3.1 G/DL (ref 2–4)
GLUCOSE SERPL-MCNC: 120 MG/DL (ref 74–106)
GLUCOSE UR STRIP-MCNC: NORMAL MG/DL
HCT VFR BLD AUTO: 39.3 % (ref 38–47)
HGB BLD-MCNC: 13 G/DL (ref 12–16)
IMM GRANULOCYTES # BLD AUTO: 0.01 K/UL (ref 0–0.04)
IMM GRANULOCYTES NFR BLD: 0.2 % (ref 0–0.4)
KETONES UR STRIP-SCNC: NEGATIVE MG/DL
LEUKOCYTE ESTERASE UR QL STRIP: NEGATIVE
LYMPHOCYTES # BLD AUTO: 2.04 K/UL (ref 1–4.8)
LYMPHOCYTES NFR BLD AUTO: 33.7 % (ref 27–41)
MCH RBC QN AUTO: 29.8 PG (ref 27–31)
MCHC RBC AUTO-ENTMCNC: 33.1 G/DL (ref 32–36)
MCV RBC AUTO: 90.1 FL (ref 80–96)
MONOCYTES # BLD AUTO: 0.44 K/UL (ref 0–0.8)
MONOCYTES NFR BLD AUTO: 7.3 % (ref 2–6)
MPC BLD CALC-MCNC: 10.6 FL (ref 9.4–12.4)
MUCOUS, UA: ABNORMAL /LPF
NEUTROPHILS # BLD AUTO: 3.32 K/UL (ref 1.8–7.7)
NEUTROPHILS NFR BLD AUTO: 54.6 % (ref 53–65)
NITRITE UR QL STRIP: NEGATIVE
NRBC # BLD AUTO: 0 X10E3/UL
NRBC, AUTO (.00): 0 %
PH UR STRIP: 6 PH UNITS
PLATELET # BLD AUTO: 276 K/UL (ref 150–400)
POTASSIUM SERPL-SCNC: 3.5 MMOL/L (ref 3.5–5.1)
PROT SERPL-MCNC: 6.5 G/DL (ref 6.4–8.2)
PROT UR QL STRIP: NEGATIVE
RBC # BLD AUTO: 4.36 M/UL (ref 4.2–5.4)
RBC # UR STRIP: ABNORMAL /UL
RBC #/AREA URNS HPF: 2 /HPF
SODIUM SERPL-SCNC: 141 MMOL/L (ref 136–145)
SP GR UR STRIP: 1.02
SQUAMOUS #/AREA URNS LPF: ABNORMAL /HPF
UROBILINOGEN UR STRIP-ACNC: 2 MG/DL
WBC # BLD AUTO: 6.06 K/UL (ref 4.5–11)
WBC #/AREA URNS HPF: 2 /HPF

## 2024-07-02 PROCEDURE — 99284 EMERGENCY DEPT VISIT MOD MDM: CPT | Mod: 25

## 2024-07-02 PROCEDURE — 96376 TX/PRO/DX INJ SAME DRUG ADON: CPT

## 2024-07-02 PROCEDURE — 63600175 PHARM REV CODE 636 W HCPCS: Performed by: EMERGENCY MEDICINE

## 2024-07-02 PROCEDURE — 96374 THER/PROPH/DIAG INJ IV PUSH: CPT

## 2024-07-02 PROCEDURE — 80053 COMPREHEN METABOLIC PANEL: CPT | Performed by: EMERGENCY MEDICINE

## 2024-07-02 PROCEDURE — 81003 URINALYSIS AUTO W/O SCOPE: CPT | Performed by: EMERGENCY MEDICINE

## 2024-07-02 PROCEDURE — 36415 COLL VENOUS BLD VENIPUNCTURE: CPT | Performed by: EMERGENCY MEDICINE

## 2024-07-02 PROCEDURE — 96375 TX/PRO/DX INJ NEW DRUG ADDON: CPT

## 2024-07-02 PROCEDURE — 85025 COMPLETE CBC W/AUTO DIFF WBC: CPT | Performed by: EMERGENCY MEDICINE

## 2024-07-02 RX ORDER — HYDROMORPHONE HYDROCHLORIDE 2 MG/ML
1 INJECTION, SOLUTION INTRAMUSCULAR; INTRAVENOUS; SUBCUTANEOUS
Status: COMPLETED | OUTPATIENT
Start: 2024-07-02 | End: 2024-07-02

## 2024-07-02 RX ORDER — DEXLANSOPRAZOLE 60 MG/1
60 CAPSULE, DELAYED RELEASE ORAL DAILY
Qty: 30 CAPSULE | Refills: 11 | Status: SHIPPED | OUTPATIENT
Start: 2024-07-02 | End: 2025-07-02

## 2024-07-02 RX ORDER — HYDROCODONE BITARTRATE AND ACETAMINOPHEN 5; 325 MG/1; MG/1
1 TABLET ORAL EVERY 6 HOURS PRN
Qty: 12 TABLET | Refills: 0 | Status: SHIPPED | OUTPATIENT
Start: 2024-07-02

## 2024-07-02 RX ORDER — KETOROLAC TROMETHAMINE 15 MG/ML
15 INJECTION, SOLUTION INTRAMUSCULAR; INTRAVENOUS
Status: COMPLETED | OUTPATIENT
Start: 2024-07-02 | End: 2024-07-02

## 2024-07-02 RX ADMIN — HYDROMORPHONE HYDROCHLORIDE 1 MG: 2 INJECTION INTRAMUSCULAR; INTRAVENOUS; SUBCUTANEOUS at 04:07

## 2024-07-02 RX ADMIN — KETOROLAC TROMETHAMINE 15 MG: 15 INJECTION, SOLUTION INTRAMUSCULAR; INTRAVENOUS at 04:07

## 2024-07-02 RX ADMIN — HYDROMORPHONE HYDROCHLORIDE 1 MG: 2 INJECTION INTRAMUSCULAR; INTRAVENOUS; SUBCUTANEOUS at 06:07

## 2024-07-02 NOTE — ED NOTES
Pt lying in bed and states she is still a 6/10 on pain scale but she would also like some peanut butter and crackers.  Nurse instructed pt that she would notify MD regarding her pain and to see if he wanted her to have a diet at this time since she was still complaining of such a high pain score.  Pt verbalized understanding and call light left in reach with bed in low position.

## 2024-07-02 NOTE — ED TRIAGE NOTES
Pt presents to ed with cc of pelvic/vaginal pain and n/v. Pain started yesterday at 10am. Pt took 4 tylenol at work and said it eased the pain a little. N/V started yesterday evening after pt ate dinner.

## 2024-07-02 NOTE — ED PROVIDER NOTES
Encounter Date: 7/2/2024       History     Chief Complaint   Patient presents with    Groin Pain    Nausea     Pt having pain in pelvic/vaginal area     Patient was had an exacerbation of pelvic pain over the past few days.  No associated fever vaginal discharge.  Not sexually active with a past several years.  He had a partial hysterectomy.  Also has had a cholecystectomy in the past few months.  Had a recent colonoscopy an an EGD.  Was said to have some gastritis and GI told her to stop taking NSAIDs.  Follows with Pain Treatment here.  Was told she has sacroiliitis and had an injection.  Documented in May she had has a pelvic pain at that time also.  But patient today was unaware that sick her urologist could be causing some pelvic pain.  Has been treated for a urinary tract infection in the past several weeks.  Patient was not taking her PPI.  Says that her doctor [place] had prescribe Dexilant and she had no confidence in omeprazole so she is not taking it.      Review of patient's allergies indicates:   Allergen Reactions    Tylox [oxycodone-acetaminophen] Itching    Latex     Opioids - morphine analogues Hives    Sulfa (sulfonamide antibiotics) Rash    Zithromax z-casimiro [azithromycin] Rash     Past Medical History:   Diagnosis Date    Arthritis     Class 3 severe obesity due to excess calories without serious comorbidity with body mass index (BMI) of 40.0 to 44.9 in adult 03/20/2023    Essential hypertension 03/20/2023    History of gastric ulcer 05/01/2023    Hypothyroidism     Vestibular dizziness 10/23/2023     Past Surgical History:   Procedure Laterality Date    ARTHROSCOPY OF KNEE Right 05/25/2023    Procedure: ARTHROSCOPY, KNEE;  Surgeon: Tacos Aburto MD;  Location: [facility];  Service: Orthopedics;  Laterality: Right;    CHOLECYSTECTOMY  05/09/2024    ENDOSCOPY  05/13/2024    HYSTERECTOMY      INJECTION OF ANESTHETIC AGENT INTO SACROILIAC JOINT Bilateral 05/07/2024    Procedure: BLOCK,  SACROILIAC JOINT;  Surgeon: Leny Lang MD;  Location: [facility];  Service: Pain Management;  Laterality: Bilateral;    JOINT REPLACEMENT Right     hip replacement    KNEE ARTHROPLASTY Left     KNEE ARTHROSCOPY W/ MENISCECTOMY Right 05/25/2023    Procedure: ARTHROSCOPY, KNEE, WITH MENISCECTOMY;  Surgeon: Tacos Aburto MD;  Location: [facility];  Service: Orthopedics;  Laterality: Right;    LUMPECTOMY, BREAST      ROBOT-ASSISTED CHOLECYSTECTOMY USING DA ANG XI N/A 05/09/2024    Procedure: XI ROBOTIC CHOLECYSTECTOMY;  Surgeon: Mao Dempsey DO;  Location: [facility] OR;  Service: General;  Laterality: N/A;     Family History   Problem Relation Name Age of Onset    Hypertension Maternal Grandmother      Stroke Maternal Grandmother      Stroke Maternal Grandfather      Hypertension Mother      Stroke Mother      Diabetes Mother       Social History     Tobacco Use    Smoking status: Never     Passive exposure: Never    Smokeless tobacco: Never   Substance Use Topics    Alcohol use: Never    Drug use: Never     Review of Systems    Physical Exam     Initial Vitals [07/02/24 0245]   BP Pulse Resp Temp SpO2   (!) 146/67 74 18 98.2 °F (36.8 °C) 95 %      MAP       --         Physical Exam    Medical Screening Exam   See Full Note    ED Course   Procedures  Labs Reviewed   URINALYSIS, REFLEX TO URINE CULTURE - Abnormal; Notable for the following components:       Result Value    Urobilinogen, UA 2 (*)     Blood, UA Trace (*)     All other components within normal limits   COMPREHENSIVE METABOLIC PANEL - Abnormal; Notable for the following components:    Chloride 111 (*)     Glucose 120 (*)     BUN/Creatinine Ratio 25 (*)     Albumin 3.4 (*)     All other components within normal limits   CBC WITH DIFFERENTIAL - Abnormal; Notable for the following components:    Monocytes % 7.3 (*)     All other components within normal limits   URINALYSIS, MICROSCOPIC - Abnormal; Notable for the following  components:    Squamous Epithelial Cells, UA Occasional (*)     Mucous Occasional (*)     All other components within normal limits   CBC W/ AUTO DIFFERENTIAL    Narrative:     The following orders were created for panel order CBC auto differential.  Procedure                               Abnormality         Status                     ---------                               -----------         ------                     CBC with Differential[[id number]]       Abnormal            Final result                 Please view results for these tests on the individual orders.          Imaging Results    None          Medications   HYDROmorphone (PF) injection 1 mg (1 mg Intravenous Given 7/2/24 1185)   ketorolac injection 15 mg (15 mg Intravenous Given 7/2/24 7868)   HYDROmorphone (PF) injection 1 mg (1 mg Intravenous Given 7/2/24 0319)     Medical Decision Making  MDM    Patient presents for emergent evaluation of acute back pain has suprapubic pain that poses a threat to life and/or bodily function.    Review of outside records/prior clinic notes patient has had some pelvic pain previously diagnosed with sacroiliitis.  It has been treated at pain treatment with injections.  That seemed to help.  However she was with a recurrent pain.  Has been abstinent for number of years no suspected sexually transmitted disease.  Also has had a partial hysterectomy for fibroids.  CT abdomen was recently done reviewed with no masses no adnexal masses from a couple of months ago.  Physical exam was unremarkable no rebound or guarding no tenderness of the abdomen.  Likely the pain is coming from sacroiliitis.  Advised her to follow up with the primary care and with [facility].  Patient given a short course of Norco prescription.  Advised to return if symptoms are worsening or new symptoms develop.  Also GI told her that she had had some mild gastritis.  Discussed treatment options with her in decided to use some Toradol.  She  wants to continue use some NSAIDs but was told to do that only with talking with her GI doctor.  Put her back on Dexilant where she had been doing well on in [place] to she moved from there a year ago.  She was not been taking her PPI because she only believes that Dexilant helps  In the ED patient found to have acute pelvic pain sacroiliitis.    I ordered labs and personally reviewed them.  Labs significant for white count normal.  Electrolytes unremarkable.  Urinalysis unremarkable    Discharge MDM  Patient was managed in the ED with IV Dilaudid.    The response to treatment was improved.    Patient was discharged in stable condition.  Detailed return precautions discussed.     Amount and/or Complexity of Data Reviewed  Labs: ordered.    Risk  Prescription drug management.                                      Clinical Impression:   Final diagnoses:  [R10.2] Pelvic pain (Primary)  [M46.1] Sacroiliitis        ED Disposition Condition    Discharge Stable          ED Prescriptions       Medication Sig Dispense Start Date End Date Auth. Provider    dexlansoprazole (DEXILANT) 60 mg capsule Take 1 capsule (60 mg total) by mouth once daily. 30 capsule 7/2/2024 7/2/2025 Contreras Rosario MD    HYDROcodone-acetaminophen (NORCO) 5-325 mg per tablet Take 1 tablet by mouth every 6 (six) hours as needed for Pain. 12 tablet 7/2/2024 -- Contreras Rosario MD          Follow-up Information       Follow up With Specialties Details Why Contact Info    [facility] - Emergency Department Emergency Medicine Go to  As needed, If symptoms worsen [address] [place] [id number] [phone]    Leny Lang MD Interventional Pain Medicine, Pain Medicine Schedule an appointment as soon as possible for a visit   [address] [place] [id number]  [phone]               Contreras Rosario MD  07/02/24 5587

## 2024-07-02 NOTE — DISCHARGE INSTRUCTIONS
Follow up with Pain Treatment Clinic    Use Tylenol as needed    Your GI doctor advises you not to use NSAIDs    Use hydrocodone as needed.  Use caution with hydrocodone since it is a narcotic can cause sedation constipation and addiction

## 2024-07-02 NOTE — Clinical Note
"Stuart"Stuart" Pedro Luis was seen and treated in our emergency department on 7/2/2024.  She may return to work on 07/03/2024.       If you have any questions or concerns, please don't hesitate to call.      Contreras Rosario MD"

## 2024-07-02 NOTE — TELEPHONE ENCOUNTER
----- Message from Mary Adams sent at 7/2/2024  9:11 AM CDT -----  Who Called: Stuart Cloud    Caller is requesting assistance/information from provider's office.        Preferred Method of Contact: Phone Call  Patient's Preferred Phone Number on File: [phone]   Best Call Back Number, if different:  Additional Information: pt calling to reschedule ct - requesting appt as late as possible in the afternoon

## 2024-07-09 ENCOUNTER — OFFICE VISIT (OUTPATIENT)
Dept: PAIN MEDICINE | Facility: CLINIC | Age: 54
End: 2024-07-09
Payer: COMMERCIAL

## 2024-07-09 ENCOUNTER — TELEPHONE (OUTPATIENT)
Dept: SURGERY | Facility: CLINIC | Age: 54
End: 2024-07-09

## 2024-07-09 VITALS
BODY MASS INDEX: 43.65 KG/M2 | DIASTOLIC BLOOD PRESSURE: 76 MMHG | RESPIRATION RATE: 18 BRPM | HEART RATE: 90 BPM | SYSTOLIC BLOOD PRESSURE: 150 MMHG | WEIGHT: 262 LBS | HEIGHT: 65 IN

## 2024-07-09 DIAGNOSIS — M46.1 BILATERAL SACROILIITIS: Chronic | ICD-10-CM

## 2024-07-09 DIAGNOSIS — M25.552 HIP PAIN, LEFT: Primary | Chronic | ICD-10-CM

## 2024-07-09 PROCEDURE — 99215 OFFICE O/P EST HI 40 MIN: CPT | Mod: PBBFAC | Performed by: PHYSICIAN ASSISTANT

## 2024-07-09 PROCEDURE — 1159F MED LIST DOCD IN RCRD: CPT | Mod: ,,, | Performed by: PHYSICIAN ASSISTANT

## 2024-07-09 PROCEDURE — 3078F DIAST BP <80 MM HG: CPT | Mod: ,,, | Performed by: PHYSICIAN ASSISTANT

## 2024-07-09 PROCEDURE — 99999 PR PBB SHADOW E&M-EST. PATIENT-LVL V: CPT | Mod: PBBFAC,,, | Performed by: PHYSICIAN ASSISTANT

## 2024-07-09 PROCEDURE — 3008F BODY MASS INDEX DOCD: CPT | Mod: ,,, | Performed by: PHYSICIAN ASSISTANT

## 2024-07-09 PROCEDURE — 99213 OFFICE O/P EST LOW 20 MIN: CPT | Mod: S$PBB,,, | Performed by: PHYSICIAN ASSISTANT

## 2024-07-09 PROCEDURE — 3077F SYST BP >= 140 MM HG: CPT | Mod: ,,, | Performed by: PHYSICIAN ASSISTANT

## 2024-07-09 RX ORDER — KETOROLAC TROMETHAMINE 30 MG/ML
60 INJECTION, SOLUTION INTRAMUSCULAR; INTRAVENOUS
Status: DISCONTINUED | OUTPATIENT
Start: 2024-07-09 | End: 2024-07-09

## 2024-07-09 NOTE — PATIENT INSTRUCTIONS

## 2024-07-09 NOTE — LETTER
July 9, 2024      [facility]  [address] [place] [id number]  Phone: [phone]       Patient: Stuart Cloud   YOB: 1970  Date of Visit: 07/09/2024    To Whom It May Concern:    Bismark Cloud  was at [facility] on 07/09/2024. The patient may return to work/school on 7/10/24 with no restrictions. If you have any questions or concerns, or if I can be of further assistance, please do not hesitate to contact me.    Sincerely,    Marsha Brooks LPN

## 2024-07-09 NOTE — LETTER
July 9, 2024      [facility]  [address] [place] [id number]  Phone: [phone]       Patient: Stuart Cloud   YOB: 1970  Date of Visit: 07/09/2024    To Whom It May Concern:    Bismark Cloud  was at [facility] on 07/09/2024. The patient may return to work/school on 7/10/24 with no restrictions. If you have any questions or concerns, or if I can be of further assistance, please do not hesitate to contact me.    Appt  approved medical leave.   Sincerely,    Marsha Brooks LPN

## 2024-07-09 NOTE — TELEPHONE ENCOUNTER
----- Message from Evette Sanders sent at 7/9/2024  2:34 PM CDT -----  Who Called: Stuart Cloud    Caller is requesting assistance/information from provider's office.      Preferred Method of Contact: Phone Call  Patient's Preferred Phone Number on File: [phone]   Best Call Back Number, if different:  Additional Information: Pt is calling to have CT scan rescheduled, missed appt on 06/20    Attempted calling pt back, no answer.     Attempted calling pt again, no answer, v/m not available.

## 2024-07-19 ENCOUNTER — HOSPITAL ENCOUNTER (EMERGENCY)
Facility: HOSPITAL | Age: 54
Discharge: HOME | End: 2024-07-20
Attending: EMERGENCY MEDICINE
Payer: COMMERCIAL

## 2024-07-19 DIAGNOSIS — R07.9 CHEST PAIN: Primary | ICD-10-CM

## 2024-07-19 DIAGNOSIS — R05.9 COUGH: ICD-10-CM

## 2024-07-19 DIAGNOSIS — E87.6 HYPOKALEMIA: ICD-10-CM

## 2024-07-19 LAB
ALBUMIN SERPL BCP-MCNC: 3.2 G/DL (ref 3.5–5)
ALBUMIN/GLOB SERPL: 1 {RATIO}
ALP SERPL-CCNC: 83 U/L (ref 41–108)
ALT SERPL W P-5'-P-CCNC: 31 U/L (ref 13–56)
ANION GAP SERPL CALCULATED.3IONS-SCNC: 7 MMOL/L (ref 7–16)
AST SERPL W P-5'-P-CCNC: 19 U/L (ref 15–37)
BASOPHILS # BLD AUTO: 0.04 K/UL (ref 0–0.2)
BASOPHILS NFR BLD AUTO: 0.6 % (ref 0–1)
BILIRUB SERPL-MCNC: 0.3 MG/DL (ref ?–1.2)
BUN SERPL-MCNC: 18 MG/DL (ref 7–18)
BUN/CREAT SERPL: 26 (ref 6–20)
CALCIUM SERPL-MCNC: 8.6 MG/DL (ref 8.5–10.1)
CHLORIDE SERPL-SCNC: 109 MMOL/L (ref 98–107)
CO2 SERPL-SCNC: 29 MMOL/L (ref 21–32)
CREAT SERPL-MCNC: 0.7 MG/DL (ref 0.55–1.02)
DIFFERENTIAL METHOD BLD: ABNORMAL
EGFR (NO RACE VARIABLE) (RUSH/TITUS): 104 ML/MIN/1.73M2
EOSINOPHIL # BLD AUTO: 0.23 K/UL (ref 0–0.5)
EOSINOPHIL NFR BLD AUTO: 3.7 % (ref 1–4)
ERYTHROCYTE [DISTWIDTH] IN BLOOD BY AUTOMATED COUNT: 12.5 % (ref 11.5–14.5)
GLOBULIN SER-MCNC: 3.1 G/DL (ref 2–4)
GLUCOSE SERPL-MCNC: 103 MG/DL (ref 74–106)
HCT VFR BLD AUTO: 36.9 % (ref 38–47)
HGB BLD-MCNC: 12.3 G/DL (ref 12–16)
IMM GRANULOCYTES # BLD AUTO: 0.01 K/UL (ref 0–0.04)
IMM GRANULOCYTES NFR BLD: 0.2 % (ref 0–0.4)
LYMPHOCYTES # BLD AUTO: 2.04 K/UL (ref 1–4.8)
LYMPHOCYTES NFR BLD AUTO: 32.4 % (ref 27–41)
MCH RBC QN AUTO: 29.8 PG (ref 27–31)
MCHC RBC AUTO-ENTMCNC: 33.3 G/DL (ref 32–36)
MCV RBC AUTO: 89.3 FL (ref 80–96)
MONOCYTES # BLD AUTO: 0.63 K/UL (ref 0–0.8)
MONOCYTES NFR BLD AUTO: 10 % (ref 2–6)
MPC BLD CALC-MCNC: 11.1 FL (ref 9.4–12.4)
NEUTROPHILS # BLD AUTO: 3.35 K/UL (ref 1.8–7.7)
NEUTROPHILS NFR BLD AUTO: 53.1 % (ref 53–65)
NRBC # BLD AUTO: 0 X10E3/UL
NRBC, AUTO (.00): 0 %
NT-PROBNP SERPL-MCNC: 50 PG/ML (ref 1–125)
PLATELET # BLD AUTO: 277 K/UL (ref 150–400)
POTASSIUM SERPL-SCNC: 3.1 MMOL/L (ref 3.5–5.1)
PROT SERPL-MCNC: 6.3 G/DL (ref 6.4–8.2)
RBC # BLD AUTO: 4.13 M/UL (ref 4.2–5.4)
SODIUM SERPL-SCNC: 142 MMOL/L (ref 136–145)
TROPONIN I SERPL DL<=0.01 NG/ML-MCNC: 4.7 PG/ML
WBC # BLD AUTO: 6.3 K/UL (ref 4.5–11)

## 2024-07-19 PROCEDURE — 83880 ASSAY OF NATRIURETIC PEPTIDE: CPT | Performed by: NURSE PRACTITIONER

## 2024-07-19 PROCEDURE — 36415 COLL VENOUS BLD VENIPUNCTURE: CPT | Performed by: NURSE PRACTITIONER

## 2024-07-19 PROCEDURE — 87502 INFLUENZA DNA AMP PROBE: CPT | Performed by: NURSE PRACTITIONER

## 2024-07-19 PROCEDURE — 93005 ELECTROCARDIOGRAM TRACING: CPT

## 2024-07-19 PROCEDURE — 84484 ASSAY OF TROPONIN QUANT: CPT | Performed by: NURSE PRACTITIONER

## 2024-07-19 PROCEDURE — 83735 ASSAY OF MAGNESIUM: CPT | Performed by: NURSE PRACTITIONER

## 2024-07-19 PROCEDURE — 93010 ELECTROCARDIOGRAM REPORT: CPT | Mod: ,,, | Performed by: HOSPITALIST

## 2024-07-19 PROCEDURE — 80053 COMPREHEN METABOLIC PANEL: CPT | Performed by: NURSE PRACTITIONER

## 2024-07-19 PROCEDURE — 85025 COMPLETE CBC W/AUTO DIFF WBC: CPT | Performed by: NURSE PRACTITIONER

## 2024-07-19 PROCEDURE — 87635 SARS-COV-2 COVID-19 AMP PRB: CPT | Performed by: NURSE PRACTITIONER

## 2024-07-19 PROCEDURE — 85379 FIBRIN DEGRADATION QUANT: CPT | Performed by: NURSE PRACTITIONER

## 2024-07-19 PROCEDURE — 99285 EMERGENCY DEPT VISIT HI MDM: CPT | Mod: 25

## 2024-07-19 PROCEDURE — 25000003 PHARM REV CODE 250: Performed by: NURSE PRACTITIONER

## 2024-07-19 RX ORDER — ACETAMINOPHEN 500 MG
1000 TABLET ORAL
Status: COMPLETED | OUTPATIENT
Start: 2024-07-19 | End: 2024-07-19

## 2024-07-19 RX ORDER — POTASSIUM CHLORIDE 20 MEQ/1
40 TABLET, EXTENDED RELEASE ORAL
Status: COMPLETED | OUTPATIENT
Start: 2024-07-20 | End: 2024-07-20

## 2024-07-19 RX ORDER — POTASSIUM CHLORIDE 20 MEQ/1
40 TABLET, EXTENDED RELEASE ORAL
Status: COMPLETED | OUTPATIENT
Start: 2024-07-19 | End: 2024-07-19

## 2024-07-19 RX ADMIN — ACETAMINOPHEN 1000 MG: 500 TABLET ORAL at 11:07

## 2024-07-19 RX ADMIN — POTASSIUM CHLORIDE 40 MEQ: 1500 TABLET, EXTENDED RELEASE ORAL at 11:07

## 2024-07-20 VITALS
TEMPERATURE: 98 F | RESPIRATION RATE: 13 BRPM | WEIGHT: 262 LBS | HEIGHT: 65 IN | OXYGEN SATURATION: 97 % | DIASTOLIC BLOOD PRESSURE: 77 MMHG | BODY MASS INDEX: 43.65 KG/M2 | SYSTOLIC BLOOD PRESSURE: 129 MMHG | HEART RATE: 80 BPM

## 2024-07-20 LAB
D DIMER PPP FEU-MCNC: 0.37 ΜG/ML (ref 0–0.47)
INFLUENZA A MOLECULAR (OHS): NEGATIVE
INFLUENZA B MOLECULAR (OHS): NEGATIVE
MAGNESIUM SERPL-MCNC: 2.1 MG/DL (ref 1.7–2.3)
SARS-COV-2 RDRP RESP QL NAA+PROBE: NEGATIVE
TROPONIN I SERPL DL<=0.01 NG/ML-MCNC: 5.9 PG/ML

## 2024-07-20 PROCEDURE — 25000003 PHARM REV CODE 250: Performed by: EMERGENCY MEDICINE

## 2024-07-20 RX ADMIN — POTASSIUM CHLORIDE 40 MEQ: 1500 TABLET, EXTENDED RELEASE ORAL at 12:07

## 2024-07-20 NOTE — FIRST PROVIDER EVALUATION
" Emergency Department TeleTriage Encounter Note      CHIEF COMPLAINT    Chief Complaint   Patient presents with    Chest Pain     Pt c/o left sided sharp cp and left arm numbness. Pt states this started this morning and has been "off and on" pain. Pt rates pain "10".        VITAL SIGNS   Initial Vitals [07/19/24 2102]   BP Pulse Resp Temp SpO2   132/73 83 14 98.1 °F (36.7 °C) 97 %      MAP       --            ALLERGIES    Review of patient's allergies indicates:   Allergen Reactions    Tylox [oxycodone-acetaminophen] Itching    Latex     Opioids - morphine analogues Hives    Sulfa (sulfonamide antibiotics) Rash    Zithromax z-casimiro [azithromycin] Rash       PROVIDER TRIAGE NOTE  This is a teletriage evaluation of a 54 y.o. female presenting to the ED complaining of CP starting this morning intermittently. States pain radiates to LUE. No weakness. Is feeling SOB.     Alert, no distress.     Initial orders will be placed and care will be transferred to an alternate provider when patient is roomed for a full evaluation. Any additional orders and the final disposition will be determined by that provider.         ORDERS  Labs Reviewed   CBC W/ AUTO DIFFERENTIAL    Narrative:     The following orders were created for panel order CBC auto differential.  Procedure                               Abnormality         Status                     ---------                               -----------         ------                     CBC with Differential[[id number]]                                                        Please view results for these tests on the individual orders.   COMPREHENSIVE METABOLIC PANEL   TROPONIN I   NT-PRO NATRIURETIC PEPTIDE   CBC WITH DIFFERENTIAL       ED Orders (720h ago, onward)      Start Ordered     Status Ordering Provider    07/20/24 0028 07/19/24 2128  Troponin I #2  Once Timed         Ordered YEIMY GAMEZ    07/19/24 2128 07/19/24 2128  Vital signs  Every 15 min         Ordered " YEIMY GAMEZ N.    07/19/24 2128 07/19/24 2128  Cardiac Monitoring - Adult  Continuous        Comments: Notify Physician If:    Ordered YEIMY GAMEZ N.    07/19/24 2128 07/19/24 2128  Pulse Oximetry Continuous  Continuous         Ordered YEIMY GAMEZ N.    07/19/24 2128 07/19/24 2128  Diet NPO  Diet effective now         Ordered YEIMY GAMEZ N.    07/19/24 2128 07/19/24 2128  Saline lock IV  Once         Ordered YEIMY GAMEZ N.    07/19/24 2128 07/19/24 2128  EKG 12-lead  Once        Comments: Do not perform if previously done during this visit/ triage    Ordered YEIMY GAMEZ N.    07/19/24 2128 07/19/24 2128  CBC auto differential  STAT         Ordered YEIMY GAMEZ N.    07/19/24 2128 07/19/24 2128  Comprehensive metabolic panel  STAT         Ordered YEIMY GAMZE N.    07/19/24 2128 07/19/24 2128  Troponin I #1  STAT         Ordered YEIMY GAMEZ N.    07/19/24 2128 07/19/24 2128  NT-Pro Natriuretic Peptide  STAT         Ordered YEIMY GAMEZ N.    07/19/24 2128 07/19/24 2128  CBC with Differential  PROCEDURE ONCE         Ordered YEIMY GAMEZ              Virtual Visit Note: The provider triage portion of this emergency department evaluation and documentation was performed via [employer], a HIPAA-compliant telemedicine application, in concert with a tele-presenter in the room. A face to face patient evaluation with one of my colleagues will occur once the patient is placed in an emergency department room.      DISCLAIMER: This note was prepared with [employer] voice recognition transcription software. Garbled syntax, mangled pronouns, and other bizarre constructions may be attributed to that software system.

## 2024-07-20 NOTE — ED PROVIDER NOTES
"Encounter Date: 7/19/2024       History     Chief Complaint   Patient presents with    Chest Pain     Pt c/o left sided sharp cp and left arm numbness. Pt states this started this morning and has been "off and on" pain. Pt rates pain "10".      54 year old female presents to ED with complaint of chest pain. Patient states pain to chest started initially this morning with pain to center/left of chest that was sharp shooting and radiated to her back. She states she took 2 Advil without relief of symptoms. She reports she took her afternoon dose of her Norco at 1pm and pain subsided. She states while out driving this evening, she experienced a sharp pain behind her left eye and started having pain/numbness to left arm and dull pressure to chest. She reports associated symptoms of nausea without vomiting. Denies shortness of breath. Patient does endorse cough for the last 4-5 days, diarrhea that started on today, and dizziness. Patient states family history of heart disease.     The history is provided by the patient.     Review of patient's allergies indicates:   Allergen Reactions    Tylox [oxycodone-acetaminophen] Itching    Latex     Opioids - morphine analogues Hives    Sulfa (sulfonamide antibiotics) Rash    Zithromax z-casimiro [azithromycin] Rash     Past Medical History:   Diagnosis Date    Arthritis     Class 3 severe obesity due to excess calories without serious comorbidity with body mass index (BMI) of 40.0 to 44.9 in adult 03/20/2023    Essential hypertension 03/20/2023    History of gastric ulcer 05/01/2023    Hypothyroidism     Vestibular dizziness 10/23/2023     Past Surgical History:   Procedure Laterality Date    ARTHROSCOPY OF KNEE Right 05/25/2023    Procedure: ARTHROSCOPY, KNEE;  Surgeon: Tacos Aburto MD;  Location: [facility];  Service: Orthopedics;  Laterality: Right;    CHOLECYSTECTOMY  05/09/2024    ENDOSCOPY  05/13/2024    HYSTERECTOMY      INJECTION OF ANESTHETIC AGENT INTO SACROILIAC " JOINT Bilateral 05/07/2024    Procedure: BLOCK, SACROILIAC JOINT;  Surgeon: Leny Lang MD;  Location: [facility];  Service: Pain Management;  Laterality: Bilateral;    JOINT REPLACEMENT Right     hip replacement    KNEE ARTHROPLASTY Left     KNEE ARTHROSCOPY W/ MENISCECTOMY Right 05/25/2023    Procedure: ARTHROSCOPY, KNEE, WITH MENISCECTOMY;  Surgeon: Tacos Aburto MD;  Location: [facility] ORTHO OR;  Service: Orthopedics;  Laterality: Right;    LUMPECTOMY, BREAST      ROBOT-ASSISTED CHOLECYSTECTOMY USING DA ANG XI N/A 05/09/2024    Procedure: XI ROBOTIC CHOLECYSTECTOMY;  Surgeon: Mao Dempsey DO;  Location: [facility] OR;  Service: General;  Laterality: N/A;     Family History   Problem Relation Name Age of Onset    Hypertension Maternal Grandmother      Stroke Maternal Grandmother      Stroke Maternal Grandfather      Hypertension Mother      Stroke Mother      Diabetes Mother       Social History     Tobacco Use    Smoking status: Never     Passive exposure: Never    Smokeless tobacco: Never   Substance Use Topics    Alcohol use: Never    Drug use: Never     Review of Systems   Constitutional:  Negative for chills and fever.   Eyes:  Positive for pain. Negative for photophobia and visual disturbance.   Respiratory:  Positive for cough.    Cardiovascular:  Positive for chest pain. Negative for palpitations.   Gastrointestinal:  Positive for diarrhea and nausea. Negative for vomiting.   Musculoskeletal:  Negative for arthralgias and gait problem.   Skin:  Negative for color change and wound.       Physical Exam     Initial Vitals [07/19/24 2102]   BP Pulse Resp Temp SpO2   132/73 83 14 98.1 °F (36.7 °C) 97 %      MAP       --         Physical Exam    Nursing note and vitals reviewed.  Constitutional: She appears well-developed and well-nourished.   HENT:   Head: Normocephalic and atraumatic.   Eyes: EOM are normal. Pupils are equal, round, and reactive to light.   Neck: Neck supple.   Normal  range of motion.  Cardiovascular:  Normal rate and regular rhythm.           No murmur heard.  Pulmonary/Chest: She has no wheezes. She has no rhonchi.   Abdominal: Abdomen is soft. She exhibits no distension. There is no abdominal tenderness.   Musculoskeletal:         General: No tenderness or edema.      Cervical back: Normal range of motion and neck supple.     Lymphadenopathy:     She has no cervical adenopathy.   Neurological: She is alert and oriented to person, place, and time. No cranial nerve deficit or sensory deficit.   Skin: Skin is warm and dry. Capillary refill takes less than 2 seconds.   Psychiatric: She has a normal mood and affect. Thought content normal.         Medical Screening Exam   See Full Note    ED Course   Procedures  Labs Reviewed   COMPREHENSIVE METABOLIC PANEL - Abnormal       Result Value    Sodium 142      Potassium 3.1 (*)     Chloride 109 (*)     CO2 29      Anion Gap 7      Glucose 103      BUN 18      Creatinine 0.70      BUN/Creatinine Ratio 26 (*)     Calcium 8.6      Total Protein 6.3 (*)     Albumin 3.2 (*)     Globulin 3.1      A/G Ratio 1.0      Bilirubin, Total 0.3      Alk Phos 83      ALT 31      AST 19      eGFR 104     CBC WITH DIFFERENTIAL - Abnormal    WBC 6.30      RBC 4.13 (*)     Hemoglobin 12.3      Hematocrit 36.9 (*)     MCV 89.3      MCH 29.8      MCHC 33.3      RDW 12.5      Platelet Count 277      MPV 11.1      Neutrophils % 53.1      Lymphocytes % 32.4      Monocytes % 10.0 (*)     Eosinophils % 3.7      Basophils % 0.6      Immature Granulocytes % 0.2      nRBC, Auto 0.0      Neutrophils, Abs 3.35      Lymphocytes, Absolute 2.04      Monocytes, Absolute 0.63      Eosinophils, Absolute 0.23      Basophils, Absolute 0.04      Immature Granulocytes, Absolute 0.01      nRBC, Absolute 0.00      Diff Type Auto     INFLUENZA A & B BY MOLECULAR - Normal    INFLUENZA A MOLECULAR Negative      INFLUENZA B MOLECULAR  Negative     TROPONIN I - Normal    Troponin I  High Sensitivity 4.7     NT-PRO NATRIURETIC PEPTIDE - Normal    ProBNP 50     TROPONIN I - Normal    Troponin I High Sensitivity 5.9     SARS-COV-2 RNA AMPLIFICATION, QUAL - Normal    SARS COV-2 Molecular Negative      Narrative:     Negative SARS-CoV results should not be used as the sole basis for treatment or patient management decisions; negative results should be considered in the context of a patient's recent exposures, history and the presene of clinical signs and symptoms consistent with COVID-19.  Negative results should be treated as presumptive and confirmed by molecular assay, if necessary for patient management.   D DIMER, QUANTITATIVE - Normal    D-Dimer 0.37     MAGNESIUM - Normal    Magnesium 2.1     CBC W/ AUTO DIFFERENTIAL    Narrative:     The following orders were created for panel order CBC auto differential.  Procedure                               Abnormality         Status                     ---------                               -----------         ------                     CBC with Differential[[id number]]       Abnormal            Final result                 Please view results for these tests on the individual orders.          Imaging Results              X-Ray Chest AP Portable (Final result)  Result time 07/20/24 07:22:18      Final result by Desmond Mackenzie MD (07/20/24 07:22:18)                   Impression:      No acute findings.      Electronically signed by: Desmond Mackenzie  Date:    07/20/2024  Time:    07:22               Narrative:    EXAMINATION:  XR CHEST AP PORTABLE    CLINICAL HISTORY:  Cough, unspecified    TECHNIQUE:  Single frontal view of the chest was performed.    COMPARISON:  01/22/2023    FINDINGS:  Heart size normal. Lungs clear. No pneumothorax or pleural effusion.                                       Medications   potassium chloride SA CR tablet 40 mEq (40 mEq Oral Given 7/19/24 2306)   acetaminophen tablet 1,000 mg (1,000 mg Oral Given 7/19/24 2312)   potassium  chloride SA CR tablet 40 mEq (40 mEq Oral Given 7/20/24 0002)     Medical Decision Making  54 year old female presents to ED with complaint of chest pain. Patient states pain to chest started initially this morning with pain to center/left of chest that was sharp shooting and radiated to her back. She states she took 2 Advil without relief of symptoms. She reports she took her afternoon dose of her Norco at 1pm and pain subsided. She states while out driving this evening, she experienced a sharp pain behind her left eye and started having pain/numbness to left arm and dull pressure to chest. She reports associated symptoms of nausea without vomiting. Denies shortness of breath. Patient does endorse cough for the last 4-5 days, diarrhea that started on today, and dizziness. Patient states family history of heart disease.     Labs, diagnostics obtained/reviewed PO Potassium, Tylenol administered.     Amount and/or Complexity of Data Reviewed  Radiology: ordered.    Risk  OTC drugs.  Prescription drug management.              Attending Attestation:     Physician Attestation Statement for NP/PA:   I personally made/approved the management plan and take responsibility for the patient management.                                     Clinical Impression:   Final diagnoses:  [R07.9] Chest pain (Primary)  [R05.9] Cough  [E87.6] Hypokalemia        ED Disposition Condition    Discharge Stable          ED Prescriptions    None       Follow-up Information    None          Edy Ariza MD  07/20/24 9593

## 2024-07-22 ENCOUNTER — HOSPITAL ENCOUNTER (EMERGENCY)
Facility: HOSPITAL | Age: 54
Discharge: HOME | End: 2024-07-22
Payer: COMMERCIAL

## 2024-07-22 VITALS
BODY MASS INDEX: 40.5 KG/M2 | HEART RATE: 78 BPM | HEIGHT: 66 IN | WEIGHT: 252 LBS | TEMPERATURE: 98 F | SYSTOLIC BLOOD PRESSURE: 128 MMHG | DIASTOLIC BLOOD PRESSURE: 71 MMHG | RESPIRATION RATE: 16 BRPM | OXYGEN SATURATION: 98 %

## 2024-07-22 DIAGNOSIS — M46.1 SACROILIITIS: Primary | ICD-10-CM

## 2024-07-22 DIAGNOSIS — R10.2 PELVIC PAIN: ICD-10-CM

## 2024-07-22 LAB
BILIRUB UR QL STRIP: NEGATIVE
CLARITY UR: CLEAR
COLOR UR: ABNORMAL
GLUCOSE UR STRIP-MCNC: NORMAL MG/DL
KETONES UR STRIP-SCNC: NEGATIVE MG/DL
LEUKOCYTE ESTERASE UR QL STRIP: NEGATIVE
MUCOUS, UA: ABNORMAL /LPF
NITRITE UR QL STRIP: NEGATIVE
PH UR STRIP: 5 PH UNITS
PROT UR QL STRIP: NEGATIVE
RBC # UR STRIP: ABNORMAL /UL
RBC #/AREA URNS HPF: 1 /HPF
SP GR UR STRIP: 1.02
SQUAMOUS #/AREA URNS LPF: ABNORMAL /HPF
UROBILINOGEN UR STRIP-ACNC: NORMAL MG/DL
WBC #/AREA URNS HPF: 1 /HPF

## 2024-07-22 PROCEDURE — 99284 EMERGENCY DEPT VISIT MOD MDM: CPT | Mod: 25

## 2024-07-22 PROCEDURE — 81003 URINALYSIS AUTO W/O SCOPE: CPT | Performed by: NURSE PRACTITIONER

## 2024-07-22 PROCEDURE — 63600175 PHARM REV CODE 636 W HCPCS: Performed by: NURSE PRACTITIONER

## 2024-07-22 PROCEDURE — 81001 URINALYSIS AUTO W/SCOPE: CPT | Performed by: NURSE PRACTITIONER

## 2024-07-22 PROCEDURE — 96375 TX/PRO/DX INJ NEW DRUG ADDON: CPT

## 2024-07-22 PROCEDURE — 96374 THER/PROPH/DIAG INJ IV PUSH: CPT

## 2024-07-22 RX ORDER — HYDROMORPHONE HYDROCHLORIDE 2 MG/ML
1 INJECTION, SOLUTION INTRAMUSCULAR; INTRAVENOUS; SUBCUTANEOUS
Status: COMPLETED | OUTPATIENT
Start: 2024-07-22 | End: 2024-07-22

## 2024-07-22 RX ORDER — HYDROCODONE BITARTRATE AND ACETAMINOPHEN 5; 325 MG/1; MG/1
1 TABLET ORAL EVERY 6 HOURS PRN
Qty: 12 TABLET | Refills: 0 | Status: SHIPPED | OUTPATIENT
Start: 2024-07-22

## 2024-07-22 RX ORDER — ONDANSETRON HYDROCHLORIDE 2 MG/ML
4 INJECTION, SOLUTION INTRAVENOUS
Status: COMPLETED | OUTPATIENT
Start: 2024-07-22 | End: 2024-07-22

## 2024-07-22 RX ORDER — KETOROLAC TROMETHAMINE 15 MG/ML
15 INJECTION, SOLUTION INTRAMUSCULAR; INTRAVENOUS
Status: COMPLETED | OUTPATIENT
Start: 2024-07-22 | End: 2024-07-22

## 2024-07-22 RX ADMIN — KETOROLAC TROMETHAMINE 15 MG: 15 INJECTION, SOLUTION INTRAMUSCULAR; INTRAVENOUS at 08:07

## 2024-07-22 RX ADMIN — HYDROMORPHONE HYDROCHLORIDE 1 MG: 2 INJECTION INTRAMUSCULAR; INTRAVENOUS; SUBCUTANEOUS at 08:07

## 2024-07-22 RX ADMIN — ONDANSETRON 4 MG: 2 INJECTION INTRAMUSCULAR; INTRAVENOUS at 08:07

## 2024-07-22 NOTE — ED TRIAGE NOTES
Presents to ED for complaints of lower back pain that radiates around to pelvis.  Patient is scheduled for pain treatment procedure on 7/30/24 but pain is getting worse.

## 2024-07-22 NOTE — Clinical Note
"Stuart Recio" Pedro Luis was seen and treated in our emergency department on 7/22/2024.  She may return to work on 07/23/2024.  Please excuse pt. Patient was Seen in the ER per pre approved medical leave condition      If you have any questions or concerns, please don't hesitate to call.      arjun ng RN    "

## 2024-07-23 NOTE — DISCHARGE INSTRUCTIONS
Use medications as prescribed. Keep your appointment as previously scheduled. Follow up with primary care provider as needed. Return to the ED for worsening signs and symptoms or otherwise as needed.

## 2024-07-23 NOTE — ED PROVIDER NOTES
Encounter Date: 7/22/2024       History     Chief Complaint   Patient presents with    Back Pain    Pelvic Pain     53 y/o AAF presents to the emergency department with c/o lower back pain radiating down to her pelvis. Patient states this is chronic but pain has gotten worse. States she is scheduled for injection on 07/30 but needs some pain relief as she has not been able to sleep. She denies fever, chills, n/v. No injury or trauma. Hx of sacroiliitis. Denies dysuria, hematuria. Having normal BM. States tried OTC medication with no relief.     The history is provided by the patient and medical records.     Review of patient's allergies indicates:   Allergen Reactions    Tylox [oxycodone-acetaminophen] Itching    Latex     Opioids - morphine analogues Hives    Sulfa (sulfonamide antibiotics) Rash    Zithromax z-casimiro [azithromycin] Rash     Past Medical History:   Diagnosis Date    Arthritis     Class 3 severe obesity due to excess calories without serious comorbidity with body mass index (BMI) of 40.0 to 44.9 in adult 03/20/2023    Essential hypertension 03/20/2023    History of gastric ulcer 05/01/2023    Hypothyroidism     Vestibular dizziness 10/23/2023     Past Surgical History:   Procedure Laterality Date    ARTHROSCOPY OF KNEE Right 05/25/2023    Procedure: ARTHROSCOPY, KNEE;  Surgeon: Tacos Aburto MD;  Location: [facility];  Service: Orthopedics;  Laterality: Right;    CHOLECYSTECTOMY  05/09/2024    ENDOSCOPY  05/13/2024    HYSTERECTOMY      INJECTION OF ANESTHETIC AGENT INTO SACROILIAC JOINT Bilateral 05/07/2024    Procedure: BLOCK, SACROILIAC JOINT;  Surgeon: Leny Lang MD;  Location: [facility];  Service: Pain Management;  Laterality: Bilateral;    JOINT REPLACEMENT Right     hip replacement    KNEE ARTHROPLASTY Left     KNEE ARTHROSCOPY W/ MENISCECTOMY Right 05/25/2023    Procedure: ARTHROSCOPY, KNEE, WITH MENISCECTOMY;  Surgeon: Tacos Aburto MD;  Location: [facility] OR;   Service: Orthopedics;  Laterality: Right;    LUMPECTOMY, BREAST      ROBOT-ASSISTED CHOLECYSTECTOMY USING DA ANG XI N/A 05/09/2024    Procedure: XI ROBOTIC CHOLECYSTECTOMY;  Surgeon: Mao Dempsey DO;  Location: [facility] OR;  Service: General;  Laterality: N/A;     Family History   Problem Relation Name Age of Onset    Hypertension Maternal Grandmother      Stroke Maternal Grandmother      Stroke Maternal Grandfather      Hypertension Mother      Stroke Mother      Diabetes Mother       Social History     Tobacco Use    Smoking status: Never     Passive exposure: Never    Smokeless tobacco: Never   Substance Use Topics    Alcohol use: Never    Drug use: Never     Review of Systems   All other systems reviewed and are negative.      Physical Exam     Initial Vitals [07/22/24 1745]   BP Pulse Resp Temp SpO2   132/68 81 20 98.5 °F (36.9 °C) 97 %      MAP       --         Physical Exam    Constitutional: She appears well-developed and well-nourished. She is cooperative.  Non-toxic appearance.   BMI >30   Cardiovascular:  Normal rate, regular rhythm, normal heart sounds and normal pulses.           Pulmonary/Chest: Effort normal and breath sounds normal.   Abdominal: Abdomen is soft. Bowel sounds are normal. There is no abdominal tenderness.   Musculoskeletal:      Lumbar back: No swelling, edema or signs of trauma. Decreased range of motion.      Comments: Tenderness over SI joint  NVI     Neurological: She is alert and oriented to person, place, and time.   Skin: Skin is warm, dry and intact. Capillary refill takes less than 2 seconds.         Medical Screening Exam   See Full Note    ED Course   Procedures  Labs Reviewed   URINALYSIS, REFLEX TO URINE CULTURE - Abnormal       Result Value    Color, UA Light-Yellow      Clarity, UA Clear      pH, UA 5.0      Leukocytes, UA Negative      Nitrites, UA Negative      Protein, UA Negative      Glucose, UA Normal      Ketones, UA Negative      Urobilinogen, UA Normal       Bilirubin, UA Negative      Blood, UA Trace (*)     Specific [place], UA 1.020     URINALYSIS, MICROSCOPIC - Abnormal    WBC, UA 1      RBC, UA 1      Squamous Epithelial Cells, UA Occasional (*)     Mucous Occasional (*)           Imaging Results    None          Medications   ketorolac injection 15 mg (15 mg Intravenous Given 7/22/24 2000)   HYDROmorphone (PF) injection 1 mg (1 mg Intravenous Given 7/22/24 2000)   ondansetron injection 4 mg (4 mg Intravenous Given 7/22/24 2000)     Medical Decision Making  55 y/o AAF presents to the emergency department with c/o lower back pain radiating down to her pelvis. Patient states this is chronic but pain has gotten worse. States she is scheduled for injection on 07/30 but needs some pain relief as she has not been able to sleep. She denies fever, chills, n/v. No injury or trauma. Hx of sacroiliitis. Denies dysuria, hematuria. Having normal BM. States tried OTC medication with no relief.     Problems Addressed:  Sacroiliitis:     Details: Chronic problem, scheduled with procedure with pain management on 07/30. States just need some relief until then. Afebrile. Analgesics given.  checked. Analgesics provided for short course, acutely worsening pain. Counseled on use and supportive measures. Follow up instructions given.Warning s/s discussed and return precautions given; the patient has v/u.    Risk  OTC drugs.  Prescription drug management.                                      Clinical Impression:   Final diagnoses:  [M46.1] Sacroiliitis (Primary)        ED Disposition Condition    Discharge Stable          ED Prescriptions       Medication Sig Dispense Start Date End Date Auth. Provider    HYDROcodone-acetaminophen (NORCO) 5-325 mg per tablet Take 1 tablet by mouth every 6 (six) hours as needed for Pain. 12 tablet 7/22/2024 -- Evelyne Huizar FNP          Follow-up Information       Follow up With Specialties Details Why Contact Info    Pain management   as  previously scheduled              Evelyne Huizar, MATI  07/23/24 1128

## 2024-07-24 ENCOUNTER — HOSPITAL ENCOUNTER (EMERGENCY)
Facility: HOSPITAL | Age: 54
Discharge: HOME | End: 2024-07-24
Attending: FAMILY MEDICINE
Payer: COMMERCIAL

## 2024-07-24 VITALS
HEIGHT: 66 IN | HEART RATE: 76 BPM | TEMPERATURE: 98 F | OXYGEN SATURATION: 99 % | SYSTOLIC BLOOD PRESSURE: 123 MMHG | BODY MASS INDEX: 40.5 KG/M2 | WEIGHT: 252 LBS | DIASTOLIC BLOOD PRESSURE: 77 MMHG | RESPIRATION RATE: 17 BRPM

## 2024-07-24 DIAGNOSIS — M65.4 TENOSYNOVITIS, DE QUERVAIN: Primary | ICD-10-CM

## 2024-07-24 PROCEDURE — 99283 EMERGENCY DEPT VISIT LOW MDM: CPT

## 2024-07-24 RX ORDER — NAPROXEN 500 MG/1
500 TABLET ORAL 2 TIMES DAILY WITH MEALS
Qty: 60 TABLET | Refills: 0 | Status: SHIPPED | OUTPATIENT
Start: 2024-07-24

## 2024-07-24 NOTE — Clinical Note
"Stuart "Stuart" Pedro Luis was seen and treated in our emergency department on 7/24/2024.  She may return to work on 07/27/2024.       If you have any questions or concerns, please don't hesitate to call.      Sarbjit Townsend, DO"

## 2024-07-25 NOTE — ED PROVIDER NOTES
Encounter Date: 7/24/2024       History     Chief Complaint   Patient presents with    Hand Pain     Patient presents to ED with c/o bilateral pain to arms and hands that feels like burning, shooting pain and tingling that started this morning.       Patient comes in with shooting pain in her hands she works at the [employer] and does not repetitive motion with her hands all day long        Review of patient's allergies indicates:   Allergen Reactions    Tylox [oxycodone-acetaminophen] Itching    Latex     Opioids - morphine analogues Hives    Sulfa (sulfonamide antibiotics) Rash    Zithromax z-casimiro [azithromycin] Rash     Past Medical History:   Diagnosis Date    Arthritis     Class 3 severe obesity due to excess calories without serious comorbidity with body mass index (BMI) of 40.0 to 44.9 in adult 03/20/2023    Essential hypertension 03/20/2023    History of gastric ulcer 05/01/2023    Hypothyroidism     Vestibular dizziness 10/23/2023     Past Surgical History:   Procedure Laterality Date    ARTHROSCOPY OF KNEE Right 05/25/2023    Procedure: ARTHROSCOPY, KNEE;  Surgeon: Tacos Aburto MD;  Location: [facility];  Service: Orthopedics;  Laterality: Right;    CHOLECYSTECTOMY  05/09/2024    ENDOSCOPY  05/13/2024    HYSTERECTOMY      INJECTION OF ANESTHETIC AGENT INTO SACROILIAC JOINT Bilateral 05/07/2024    Procedure: BLOCK, SACROILIAC JOINT;  Surgeon: Leny Lang MD;  Location: [facility];  Service: Pain Management;  Laterality: Bilateral;    JOINT REPLACEMENT Right     hip replacement    KNEE ARTHROPLASTY Left     KNEE ARTHROSCOPY W/ MENISCECTOMY Right 05/25/2023    Procedure: ARTHROSCOPY, KNEE, WITH MENISCECTOMY;  Surgeon: Tacos Aburto MD;  Location: [facility] OR;  Service: Orthopedics;  Laterality: Right;    LUMPECTOMY, BREAST      ROBOT-ASSISTED CHOLECYSTECTOMY USING DA ANG XI N/A 05/09/2024    Procedure: XI ROBOTIC CHOLECYSTECTOMY;  Surgeon: Mao Dempsey DO;  Location:  [facility] OR;  Service: General;  Laterality: N/A;     Family History   Problem Relation Name Age of Onset    Hypertension Maternal Grandmother      Stroke Maternal Grandmother      Stroke Maternal Grandfather      Hypertension Mother      Stroke Mother      Diabetes Mother       Social History     Tobacco Use    Smoking status: Never     Passive exposure: Never    Smokeless tobacco: Never   Substance Use Topics    Alcohol use: Never    Drug use: Never     Review of Systems   Constitutional: Negative.  Negative for fever.   HENT: Negative.  Negative for sore throat.    Eyes: Negative.    Respiratory: Negative.  Negative for shortness of breath.    Cardiovascular: Negative.  Negative for chest pain.   Gastrointestinal: Negative.  Negative for nausea.   Endocrine: Negative.    Genitourinary: Negative.  Negative for dysuria.   Musculoskeletal: Negative.  Negative for back pain.   Skin: Negative.  Negative for rash.   Allergic/Immunologic: Negative.    Neurological: Negative.  Negative for weakness.   Hematological: Negative.  Does not bruise/bleed easily.   Psychiatric/Behavioral: Negative.     All other systems reviewed and are negative.      Physical Exam     Initial Vitals [07/24/24 0733]   BP Pulse Resp Temp SpO2   123/77 76 17 98 °F (36.7 °C) 99 %      MAP       --         Physical Exam    Constitutional: She appears well-developed and well-nourished.   HENT:   Head: Normocephalic and atraumatic.   Right Ear: External ear normal.   Left Ear: External ear normal.   Nose: Nose normal.   Mouth/Throat: Oropharynx is clear and moist.   Eyes: Conjunctivae and EOM are normal. Pupils are equal, round, and reactive to light.   Neck: Neck supple.   Normal range of motion.  Cardiovascular:  Normal rate, regular rhythm, normal heart sounds and intact distal pulses.           Pulmonary/Chest: Breath sounds normal.   Abdominal: Abdomen is soft. Bowel sounds are normal.   Genitourinary:    Vagina and uterus normal.      Musculoskeletal:         General: Normal range of motion.      Cervical back: Normal range of motion and neck supple.      Comments: Finkelstein study positive on both her thumbs.     Neurological: She is alert and oriented to person, place, and time. She has normal strength and normal reflexes.   Skin: Skin is warm. Capillary refill takes less than 2 seconds.   Psychiatric: She has a normal mood and affect. Her behavior is normal. Judgment and thought content normal.         Medical Screening Exam   See Full Note    ED Course   Procedures  Labs Reviewed - No data to display       Imaging Results    None          Medications - No data to display  Medical Decision Making  Risk  Prescription drug management.                          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 Provider    naproxen (NAPROSYN) 500 MG tablet Take 1 tablet (500 mg total) by mouth 2 (two) times daily with meals. 60 tablet 7/24/2024 -- Sarbjit Townsend, DO          Follow-up Information    None          Sarbjit Townsend,   07/25/24 7903

## 2024-07-28 LAB
OHS QRS DURATION: 90 MS
OHS QTC CALCULATION: 426 MS

## 2024-07-30 ENCOUNTER — ANESTHESIA (OUTPATIENT)
Dept: PAIN MEDICINE | Facility: HOSPITAL | Age: 54
End: 2024-07-30
Payer: COMMERCIAL

## 2024-07-30 ENCOUNTER — HOSPITAL ENCOUNTER (OUTPATIENT)
Facility: HOSPITAL | Age: 54
Discharge: HOME | End: 2024-07-30
Attending: PAIN MEDICINE | Admitting: PAIN MEDICINE
Payer: COMMERCIAL

## 2024-07-30 ENCOUNTER — ANESTHESIA EVENT (OUTPATIENT)
Dept: PAIN MEDICINE | Facility: HOSPITAL | Age: 54
End: 2024-07-30
Payer: COMMERCIAL

## 2024-07-30 VITALS
TEMPERATURE: 98 F | SYSTOLIC BLOOD PRESSURE: 139 MMHG | WEIGHT: 263.19 LBS | HEART RATE: 57 BPM | DIASTOLIC BLOOD PRESSURE: 75 MMHG | RESPIRATION RATE: 17 BRPM | OXYGEN SATURATION: 100 % | BODY MASS INDEX: 42.3 KG/M2 | HEIGHT: 66 IN

## 2024-07-30 DIAGNOSIS — M25.559 HIP PAIN: ICD-10-CM

## 2024-07-30 PROCEDURE — 77002 NEEDLE LOCALIZATION BY XRAY: CPT | Mod: 26,,, | Performed by: PAIN MEDICINE

## 2024-07-30 PROCEDURE — 25000003 PHARM REV CODE 250: Performed by: PAIN MEDICINE

## 2024-07-30 PROCEDURE — 63600175 PHARM REV CODE 636 W HCPCS: Mod: JG | Performed by: PAIN MEDICINE

## 2024-07-30 PROCEDURE — 25000003 PHARM REV CODE 250: Performed by: NURSE ANESTHETIST, CERTIFIED REGISTERED

## 2024-07-30 PROCEDURE — 63600175 PHARM REV CODE 636 W HCPCS: Performed by: NURSE ANESTHETIST, CERTIFIED REGISTERED

## 2024-07-30 PROCEDURE — 20610 DRAIN/INJ JOINT/BURSA W/O US: CPT | Mod: LT | Performed by: PAIN MEDICINE

## 2024-07-30 PROCEDURE — 27000284 HC CANNULA NASAL: Performed by: NURSE ANESTHETIST, CERTIFIED REGISTERED

## 2024-07-30 PROCEDURE — 20610 DRAIN/INJ JOINT/BURSA W/O US: CPT | Mod: LT,,, | Performed by: PAIN MEDICINE

## 2024-07-30 PROCEDURE — 37000008 HC ANESTHESIA 1ST 15 MINUTES: Performed by: PAIN MEDICINE

## 2024-07-30 RX ORDER — LIDOCAINE HYDROCHLORIDE 20 MG/ML
INJECTION, SOLUTION EPIDURAL; INFILTRATION; INTRACAUDAL; PERINEURAL
Status: DISCONTINUED | OUTPATIENT
Start: 2024-07-30 | End: 2024-07-30

## 2024-07-30 RX ORDER — TRIAMCINOLONE ACETONIDE 40 MG/ML
INJECTION, SUSPENSION INTRA-ARTICULAR; INTRAMUSCULAR CODE/TRAUMA/SEDATION MEDICATION
Status: DISCONTINUED | OUTPATIENT
Start: 2024-07-30 | End: 2024-07-30 | Stop reason: HOSPADM

## 2024-07-30 RX ORDER — ONDANSETRON HYDROCHLORIDE 2 MG/ML
INJECTION, SOLUTION INTRAVENOUS
Status: DISCONTINUED | OUTPATIENT
Start: 2024-07-30 | End: 2024-07-30

## 2024-07-30 RX ORDER — BUPIVACAINE HYDROCHLORIDE 2.5 MG/ML
INJECTION, SOLUTION INFILTRATION; PERINEURAL CODE/TRAUMA/SEDATION MEDICATION
Status: DISCONTINUED | OUTPATIENT
Start: 2024-07-30 | End: 2024-07-30 | Stop reason: HOSPADM

## 2024-07-30 RX ORDER — SODIUM CHLORIDE 9 MG/ML
INJECTION, SOLUTION INTRAVENOUS CONTINUOUS
Status: DISCONTINUED | OUTPATIENT
Start: 2024-07-30 | End: 2024-07-30 | Stop reason: HOSPADM

## 2024-07-30 RX ORDER — PROPOFOL 10 MG/ML
VIAL (ML) INTRAVENOUS
Status: DISCONTINUED | OUTPATIENT
Start: 2024-07-30 | End: 2024-07-30

## 2024-07-30 RX ADMIN — LIDOCAINE HYDROCHLORIDE 50 MG: 20 INJECTION, SOLUTION EPIDURAL; INFILTRATION; INTRACAUDAL; PERINEURAL at 10:07

## 2024-07-30 RX ADMIN — SODIUM CHLORIDE: 9 INJECTION, SOLUTION INTRAVENOUS at 10:07

## 2024-07-30 RX ADMIN — ONDANSETRON 4 MG: 2 INJECTION INTRAMUSCULAR; INTRAVENOUS at 10:07

## 2024-07-30 RX ADMIN — PROPOFOL 100 MG: 10 INJECTION, EMULSION INTRAVENOUS at 10:07

## 2024-07-30 NOTE — OP NOTE
PROCEDURE DATE: 7/30/2024    PROCEDURE:  Left   Hip joint injection under fluoroscopy.      Diagnosis: Left  hip pain  Post Op diagnosis: Same       PHYSICIAN: SHARON Lang MD                                                                               Local anesthetic:  Lidocaine 1%, 3 ml total                                                                                                              MEDICATIONS INJECTED:  Kenalog 40mg, 0.25% Marcaine 5cc                                                                             ESTIMATED BLOOD LOSS: None                                                                                                                            COMPLICATIONS: None                                                                                                                                   TECHNIQUE: A time-out taken to identify patient and procedure side prior to starting the procedure.  With the patient lying in the supine position, the left  greater trochanter, femoral neck and femoral head were visualized under fluoroscopy. The area was prepped and draped in the usual sterile fashion.  Local anesthetic was used, given by raising a wheal and going down to the hub of the 25-gauge needle 1.5inch needle.  A 4 inch 22-gauge needle was introduced under fluoroscopy, 1 cm lateral to the femoral artery , to the lateral portion of the femoral neck and then walked medially to enter the hip joint capsule .  When the tip of the needle was presumed to be in appropriate position and after negative aspiration for blood, the medication as noted above was then injected slowly.  The patient tolerated the procedure well.                                                                                                                                                                                                The patient was monitored after the procedure and was given post procedure and discharge  instructions to follow at home. The patient was discharged in a stable condition.

## 2024-07-30 NOTE — TRANSFER OF CARE
"Anesthesia Transfer of Care Note    Patient: Stuart Cloud    Procedure(s) Performed: Procedure(s) (LRB):  Injection, Joint, Hip, intra-articular hip injection (Left)    Patient location: Other:    Anesthesia Type: MAC    Transport from OR: Transported from OR on room air with adequate spontaneous ventilation    Post pain: adequate analgesia    Post assessment: no apparent anesthetic complications    Post vital signs: stable    Level of consciousness: responds to stimulation    Nausea/Vomiting: no nausea/vomiting    Complications: none    Transfer of care protocol was followed      Last vitals: Visit Vitals  /76   Pulse 74   Temp 36.8 °C (98.2 °F) (Oral)   Resp 20   Ht 5' 6" (1.676 m)   Wt 119.4 kg (263 lb 3.2 oz)   SpO2 100%   BMI 42.48 kg/m²     "

## 2024-07-30 NOTE — PLAN OF CARE
REFER TO WRITTEN DOCUMENT AND RECOVERY INFORMATION.    D/CD PATIENT VIAA WHEELCHAIR AT 1143.    INFORMED PATIENT IF UNABLE TO VOID IN 8 HOURS, GO TO ER. NOTIFY MD OF REDNESS OR DRAINAGE FROM INJECTION SITE OR FEVER OVER 3-4 DAY. REST AND DRINK PLENTY OF FLUIDS FOR THE REMAINDER OF THE DAY. NO LIFTING OVER 5 LBS FOR THE REMAINDER OF THE DAY. CONTINUE REGULAR MEDICATIONS AS PRESCRIBED. MAY TAKE PAIN MEDICATION AS PRESCRIBED.     PAIN IMPROVED 100%  PRE OP PAIN 8.  POSTOP  PAIN 0.

## 2024-07-30 NOTE — DISCHARGE SUMMARY
[facility] - Pain Management  Discharge Note  Short Stay    Procedure(s) (LRB):  Injection, Joint, Hip, intra-articular hip injection (Left)      OUTCOME: Patient tolerated treatment/procedure well without complication and is now ready for discharge.    DISPOSITION: Home or Self Care    FINAL DIAGNOSIS:  Left hip pain and osteoarthritis    FOLLOWUP: In clinic    DISCHARGE INSTRUCTIONS:  See nurse's notes     TIME SPENT ON DISCHARGE: 5 minutes

## 2024-07-30 NOTE — BRIEF OP NOTE
The  Discharge Note  Short Stay    Admit Date: 7/30/2024    Discharge Date: 7/30/2024    Attending Physician: Leny Lang     Discharge Provider: Leny Lang    Diagnosis:  Left hip pain and osteoarthritis    Procedure performed:  Left intra-articular hip injection under fluoroscopy    Findings: Procedure tolerated well and without complications. Consistent with diagnosis.    EBL: 0cc    Specimens: None    Discharged Condition: Good    Final Diagnoses: Hip pain, left [M25.552]    Disposition: Home or Self Care    Hospital Course: No complications, uneventful    Outcome of Hospitalization, Treatment, Procedure, or Surgery:  Patient was admitted for outpatient interventional pain management procedure. The patient tolerated the procedure well with no complications.    Follow up/Patient Instructions:  Follow up as scheduled in Pain Management office in 3-4 weeks.  Patient has received instructions and follow up date and time.    Medications:  Continue previous medications
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
## 2024-07-31 NOTE — ANESTHESIA POSTPROCEDURE EVALUATION
Anesthesia Post Evaluation    Patient: Stuart Cloud    Procedure(s) Performed: Procedure(s) (LRB):  Injection, Joint, Hip, intra-articular hip injection (Left)    Final Anesthesia Type: MAC      Patient participation: Yes- Able to Participate  Level of consciousness: awake and alert  Post-procedure vital signs: reviewed and stable  Pain management: adequate  Airway patency: patent    PONV status at discharge: No PONV  Anesthetic complications: no      Cardiovascular status: blood pressure returned to baseline, hemodynamically stable and stable  Respiratory status: unassisted  Hydration status: euvolemic  Follow-up not needed.  Comments: See nursing note for post op pain/fernando score.              Vitals Value Taken Time   /75 07/30/24 1121   Temp 36.8 °C (98.2 °F) 07/30/24 1051   Pulse 58 07/30/24 1123   Resp 15 07/30/24 1123   SpO2 100 % 07/30/24 1123   Vitals shown include unfiled device data.      Event Time   Out of Recovery 11:22:00         Pain/Fernando Score: Fernando Score: 10 (7/30/2024 11:20 AM)

## 2024-08-08 ENCOUNTER — HOSPITAL ENCOUNTER (EMERGENCY)
Facility: HOSPITAL | Age: 54
Discharge: HOME | End: 2024-08-08
Payer: COMMERCIAL

## 2024-08-08 VITALS
DIASTOLIC BLOOD PRESSURE: 78 MMHG | SYSTOLIC BLOOD PRESSURE: 138 MMHG | BODY MASS INDEX: 40.5 KG/M2 | HEART RATE: 84 BPM | OXYGEN SATURATION: 98 % | WEIGHT: 252 LBS | HEIGHT: 66 IN | RESPIRATION RATE: 17 BRPM | TEMPERATURE: 98 F

## 2024-08-08 DIAGNOSIS — N39.0 URINARY TRACT INFECTION WITHOUT HEMATURIA, SITE UNSPECIFIED: ICD-10-CM

## 2024-08-08 DIAGNOSIS — G89.29 CHRONIC BILATERAL LOW BACK PAIN WITHOUT SCIATICA: Primary | ICD-10-CM

## 2024-08-08 DIAGNOSIS — M54.50 CHRONIC BILATERAL LOW BACK PAIN WITHOUT SCIATICA: Primary | ICD-10-CM

## 2024-08-08 LAB
BACTERIA #/AREA URNS HPF: ABNORMAL /HPF
BILIRUB UR QL STRIP: NEGATIVE
CLARITY UR: ABNORMAL
COLOR UR: ABNORMAL
GLUCOSE UR STRIP-MCNC: 50 MG/DL
HYALINE CASTS #/AREA URNS LPF: ABNORMAL /LPF
KETONES UR STRIP-SCNC: ABNORMAL MG/DL
LEUKOCYTE ESTERASE UR QL STRIP: ABNORMAL
MUCOUS, UA: ABNORMAL /LPF
NITRITE UR QL STRIP: NEGATIVE
PH UR STRIP: 5.5 PH UNITS
PROT UR QL STRIP: 10
RBC # UR STRIP: ABNORMAL /UL
RBC #/AREA URNS HPF: 2 /HPF
SP GR UR STRIP: 1.01
SQUAMOUS #/AREA URNS LPF: ABNORMAL /HPF
UROBILINOGEN UR STRIP-ACNC: NORMAL MG/DL
WBC #/AREA URNS HPF: 9 /HPF

## 2024-08-08 PROCEDURE — 81003 URINALYSIS AUTO W/O SCOPE: CPT

## 2024-08-08 PROCEDURE — 63600175 PHARM REV CODE 636 W HCPCS

## 2024-08-08 PROCEDURE — 96372 THER/PROPH/DIAG INJ SC/IM: CPT

## 2024-08-08 PROCEDURE — 99284 EMERGENCY DEPT VISIT MOD MDM: CPT | Mod: 25

## 2024-08-08 RX ORDER — ONDANSETRON HYDROCHLORIDE 2 MG/ML
4 INJECTION, SOLUTION INTRAVENOUS
Status: COMPLETED | OUTPATIENT
Start: 2024-08-08 | End: 2024-08-08

## 2024-08-08 RX ORDER — NITROFURANTOIN 25; 75 MG/1; MG/1
100 CAPSULE ORAL 2 TIMES DAILY
Qty: 20 CAPSULE | Refills: 0 | Status: SHIPPED | OUTPATIENT
Start: 2024-08-08 | End: 2024-08-18

## 2024-08-08 RX ORDER — HYDROMORPHONE HYDROCHLORIDE 2 MG/ML
1 INJECTION, SOLUTION INTRAMUSCULAR; INTRAVENOUS; SUBCUTANEOUS
Status: COMPLETED | OUTPATIENT
Start: 2024-08-08 | End: 2024-08-08

## 2024-08-08 RX ORDER — HYDROCODONE BITARTRATE AND ACETAMINOPHEN 7.5; 325 MG/1; MG/1
1 TABLET ORAL EVERY 6 HOURS PRN
Qty: 12 TABLET | Refills: 0 | Status: SHIPPED | OUTPATIENT
Start: 2024-08-08

## 2024-08-08 RX ORDER — KETOROLAC TROMETHAMINE 30 MG/ML
30 INJECTION, SOLUTION INTRAMUSCULAR; INTRAVENOUS
Status: COMPLETED | OUTPATIENT
Start: 2024-08-08 | End: 2024-08-08

## 2024-08-08 RX ADMIN — HYDROMORPHONE HYDROCHLORIDE 1 MG: 2 INJECTION, SOLUTION INTRAMUSCULAR; INTRAVENOUS; SUBCUTANEOUS at 06:08

## 2024-08-08 RX ADMIN — KETOROLAC TROMETHAMINE 30 MG: 30 INJECTION, SOLUTION INTRAMUSCULAR at 06:08

## 2024-08-08 RX ADMIN — ONDANSETRON 4 MG: 2 INJECTION INTRAMUSCULAR; INTRAVENOUS at 06:08

## 2024-08-08 RX ADMIN — HYDROMORPHONE HYDROCHLORIDE 1 MG: 2 INJECTION, SOLUTION INTRAMUSCULAR; INTRAVENOUS; SUBCUTANEOUS at 07:08

## 2024-08-08 NOTE — ED PROVIDER NOTES
"Encounter Date: 8/8/2024       History     Chief Complaint   Patient presents with    Groin Pain     Pt states she has pain in her groin that goes to her back and her "who ha"    Back Pain     54-year old female presents to the emergency department for evaluation of right hip and low back pain. Reports a history of chronic low back pain and states that she is pending an appointment with pain management. Denies fall, recent injury, heavy lifting/straining, dysuria. Denies fever, chills, chest pain, shortness of breath.    The history is provided by the patient. No  was used.   Back Pain   This is a new problem. The current episode started yesterday. The problem occurs constantly. The problem has been unchanged. The pain is associated with no known injury. The pain is present in the lumbar spine. The quality of the pain is described as aching and burning. Radiates to: right hip. The symptoms are aggravated by certain positions. Pertinent negatives include no chest pain, no fever, no weight loss, no headaches, no bowel incontinence, no perianal numbness, no bladder incontinence, no pelvic pain, no tingling and no weakness. She has tried analgesics for the symptoms. The treatment provided mild relief. Risk factors include a history of steroid use.     Review of patient's allergies indicates:   Allergen Reactions    Tylox [oxycodone-acetaminophen] Itching    Latex     Opioids - morphine analogues Hives    Sulfa (sulfonamide antibiotics) Rash    Zithromax z-casimiro [azithromycin] Rash     Past Medical History:   Diagnosis Date    Arthritis     Class 3 severe obesity due to excess calories without serious comorbidity with body mass index (BMI) of 40.0 to 44.9 in adult 03/20/2023    Essential hypertension 03/20/2023    History of gastric ulcer 05/01/2023    Hypothyroidism     Vestibular dizziness 10/23/2023     Past Surgical History:   Procedure Laterality Date    ARTHROSCOPY OF KNEE Right 05/25/2023    " Procedure: ARTHROSCOPY, KNEE;  Surgeon: Tacos Aburto MD;  Location: [facility] OR;  Service: Orthopedics;  Laterality: Right;    CHOLECYSTECTOMY  05/09/2024    ENDOSCOPY  05/13/2024    HYSTERECTOMY      INJECTION OF ANESTHETIC AGENT INTO SACROILIAC JOINT Bilateral 05/07/2024    Procedure: BLOCK, SACROILIAC JOINT;  Surgeon: Leny Lang MD;  Location: [facility] PAIN [facility];  Service: Pain Management;  Laterality: Bilateral;    INJECTION OF JOINT Left 7/30/2024    Procedure: Injection, Joint, Hip, intra-articular hip injection;  Surgeon: Leny Lang MD;  Location: [facility] PAIN [facility];  Service: Pain Management;  Laterality: Left;    JOINT REPLACEMENT Right     hip replacement    KNEE ARTHROPLASTY Left     KNEE ARTHROSCOPY W/ MENISCECTOMY Right 05/25/2023    Procedure: ARTHROSCOPY, KNEE, WITH MENISCECTOMY;  Surgeon: Tacos Aburto MD;  Location: [facility] OR;  Service: Orthopedics;  Laterality: Right;    LUMPECTOMY, BREAST      ROBOT-ASSISTED CHOLECYSTECTOMY USING DA ANG XI N/A 05/09/2024    Procedure: XI ROBOTIC CHOLECYSTECTOMY;  Surgeon: Mao Dempsey DO;  Location: [facility] OR;  Service: General;  Laterality: N/A;     Family History   Problem Relation Name Age of Onset    Hypertension Maternal Grandmother      Stroke Maternal Grandmother      Stroke Maternal Grandfather      Hypertension Mother      Stroke Mother      Diabetes Mother       Social History     Tobacco Use    Smoking status: Never     Passive exposure: Never    Smokeless tobacco: Never   Substance Use Topics    Alcohol use: Never    Drug use: Never     Review of Systems   Constitutional:  Negative for chills, fever and weight loss.   Eyes:  Negative for photophobia, pain and visual disturbance.   Respiratory:  Negative for shortness of breath, wheezing and stridor.    Cardiovascular:  Negative for chest pain, palpitations and leg swelling.   Gastrointestinal:  Negative for bowel incontinence, diarrhea, nausea and vomiting.    Genitourinary:  Negative for bladder incontinence, decreased urine volume, difficulty urinating, flank pain and pelvic pain.   Musculoskeletal:  Positive for back pain. Negative for neck pain and neck stiffness.   Neurological:  Negative for tingling, tremors, weakness and headaches.   Psychiatric/Behavioral:  Negative for confusion.    All other systems reviewed and are negative.      Physical Exam     Initial Vitals   BP Pulse Resp Temp SpO2   08/08/24 1719 08/08/24 1719 08/08/24 1719 08/08/24 1719 08/08/24 1719   (!) 142/82 93 20 97.7 °F (36.5 °C) 98 %      MAP       --                Physical Exam    Vitals reviewed.  Constitutional: She appears well-nourished. No distress.   HENT:   Head: Normocephalic and atraumatic.   Eyes: Conjunctivae and EOM are normal. Pupils are equal, round, and reactive to light. Right eye exhibits no discharge. Left eye exhibits no discharge.   Neck: Neck supple.   Normal range of motion.  Cardiovascular:  Normal rate, regular rhythm and normal heart sounds.           Pulmonary/Chest: Breath sounds normal. No respiratory distress. She has no wheezes. She has no rales. She exhibits no tenderness.   Abdominal: Abdomen is soft. Bowel sounds are normal. She exhibits no distension. There is no abdominal tenderness. There is no guarding.   Musculoskeletal:         General: Normal range of motion.      Cervical back: Normal, normal range of motion and neck supple.      Thoracic back: Normal.      Lumbar back: Normal. No swelling, deformity, spasms, tenderness or bony tenderness.      Right hip: No tenderness, bony tenderness or crepitus. Normal range of motion.      Left hip: Normal.     Lymphadenopathy:     She has no cervical adenopathy.   Neurological: She is alert and oriented to person, place, and time. GCS score is 15. GCS eye subscore is 4. GCS verbal subscore is 5. GCS motor subscore is 6.   Skin: Skin is warm and dry. Capillary refill takes less than 2 seconds.   Psychiatric: She  has a normal mood and affect. Her behavior is normal.         Medical Screening Exam   See Full Note    ED Course   Procedures  Labs Reviewed   URINALYSIS, REFLEX TO URINE CULTURE - Abnormal       Result Value    Color, UA Light-Yellow      Clarity, UA Turbid      pH, UA 5.5      Leukocytes, UA Moderate (*)     Nitrites, UA Negative      Protein, UA 10 (*)     Glucose, UA 50 (*)     Ketones, UA Trace      Urobilinogen, UA Normal      Bilirubin, UA Negative      Blood, UA Trace (*)     Specific Parrott, UA 1.014     URINALYSIS, MICROSCOPIC - Abnormal    WBC, UA 9 (*)     RBC, UA 2      Bacteria, UA Occasional (*)     Squamous Epithelial Cells, UA Occasional (*)     Hyaline Casts, UA 0-2 (*)     Mucous Occasional (*)           Imaging Results              X-Ray Lumbar Spine Ap And Lateral (Final result)  Result time 08/08/24 18:27:53      Final result by Desmond Mackenzie MD (08/08/24 18:27:53)                   Impression:      Multilevel degenerative changes similar prior.      Electronically signed by: Desmond Mackenzie  Date:    08/08/2024  Time:    18:27               Narrative:    EXAMINATION:  XR LUMBAR SPINE AP AND LATERAL    CLINICAL HISTORY:  back pain;    TECHNIQUE:  AP, lateral and spot images were performed of the lumbar spine.    COMPARISON:  05/28/2024    FINDINGS:  The vertebral body heights are maintained.  The anterolisthesis of L3 on L4 and L4 on L5 there is grade 1 is similar to prior exam.  Moderate degenerative endplate changes throughout with osteophyte formation and disc height loss is similar.  Vacuum disc phenomena L4-5 and L5-S1 as before.  Moderate diffuse facet degeneration with sclerosis and osteophytes.                                       Medications   ondansetron injection 4 mg (4 mg Intramuscular Given 8/8/24 1824)   ketorolac injection 30 mg (30 mg Intramuscular Given 8/8/24 1823)   HYDROmorphone (PF) injection 1 mg (1 mg Intramuscular Given 8/8/24 1822)     Medical Decision Making  54-year  old female presents to the emergency department for evaluation of right hip and low back pain. Reports a history of chronic low back pain and states that she is pending an appointment with pain management. Denies fall, recent injury, heavy lifting/straining, dysuria. Denies fever, chills, chest pain, shortness of breath.  Ordered and reviewed urinalysis with results significant for urinary tract infection  Ordered and reviewed xray lumbar spine as well as radiologist's interpretation with results significant for multilevel degenerative changes similar prior.  Ordered Toradol, Zofran, Dilaudid IM in ED with relief of symptoms   Prescribed Macrobid and Norco for discharge   Diagnosis:  Chronic low back pain, urinary tract infection    Amount and/or Complexity of Data Reviewed  Radiology: ordered.    Risk  Prescription drug management.                                      Clinical Impression:   Final diagnoses:  [M54.50, G89.29] Chronic bilateral low back pain without sciatica (Primary)  [N39.0] Urinary tract infection without hematuria, site unspecified        ED Disposition Condition    Discharge Stable          ED Prescriptions       Medication Sig Dispense Start Date End Date Auth. Provider    nitrofurantoin, macrocrystal-monohydrate, (MACROBID) 100 MG capsule Take 1 capsule (100 mg total) by mouth 2 (two) times daily. for 10 days 20 capsule 8/8/2024 8/18/2024 Matti Lubin NP    HYDROcodone-acetaminophen (NORCO) 7.5-325 mg per tablet Take 1 tablet by mouth every 6 (six) hours as needed for Pain. 12 tablet 8/8/2024 -- Matti Lubin NP          Follow-up Information    None          Matti Lubin NP  08/08/24 6362

## 2024-08-09 NOTE — DISCHARGE INSTRUCTIONS
Take antibiotic as ordered, take with food and drink plenty of water. Follow up with primary care provider in two days for re-evaluation. Return to the emergency department for new or worsening symptoms.

## 2024-08-11 ENCOUNTER — HOSPITAL ENCOUNTER (EMERGENCY)
Facility: HOSPITAL | Age: 54
Discharge: HOME | End: 2024-08-11
Attending: FAMILY MEDICINE
Payer: COMMERCIAL

## 2024-08-11 VITALS
WEIGHT: 252 LBS | RESPIRATION RATE: 18 BRPM | TEMPERATURE: 98 F | HEART RATE: 59 BPM | OXYGEN SATURATION: 99 % | SYSTOLIC BLOOD PRESSURE: 157 MMHG | DIASTOLIC BLOOD PRESSURE: 89 MMHG | HEIGHT: 66 IN | BODY MASS INDEX: 40.5 KG/M2

## 2024-08-11 DIAGNOSIS — K52.9 COLITIS: Primary | ICD-10-CM

## 2024-08-11 PROCEDURE — 99284 EMERGENCY DEPT VISIT MOD MDM: CPT | Mod: 25

## 2024-08-11 PROCEDURE — 96372 THER/PROPH/DIAG INJ SC/IM: CPT | Performed by: FAMILY MEDICINE

## 2024-08-11 PROCEDURE — 63600175 PHARM REV CODE 636 W HCPCS: Performed by: FAMILY MEDICINE

## 2024-08-11 RX ORDER — DICYCLOMINE HYDROCHLORIDE 10 MG/ML
20 INJECTION INTRAMUSCULAR
Status: COMPLETED | OUTPATIENT
Start: 2024-08-11 | End: 2024-08-11

## 2024-08-11 RX ORDER — DICYCLOMINE HYDROCHLORIDE 20 MG/1
20 TABLET ORAL 2 TIMES DAILY
Qty: 20 TABLET | Refills: 1 | Status: SHIPPED | OUTPATIENT
Start: 2024-08-11 | End: 2024-09-10

## 2024-08-11 RX ADMIN — DICYCLOMINE HYDROCHLORIDE 20 MG: 20 INJECTION, SOLUTION INTRAMUSCULAR at 10:08

## 2024-08-11 NOTE — ED PROVIDER NOTES
Encounter Date: 8/11/2024       History     Chief Complaint   Patient presents with    Diarrhea    Generalized Body Aches    Abdominal Pain     HPI  Review of patient's allergies indicates:   Allergen Reactions    Tylox [oxycodone-acetaminophen] Itching    Latex     Opioids - morphine analogues Hives    Sulfa (sulfonamide antibiotics) Rash    Zithromax z-casimiro [azithromycin] Rash     Past Medical History:   Diagnosis Date    Arthritis     Class 3 severe obesity due to excess calories without serious comorbidity with body mass index (BMI) of 40.0 to 44.9 in adult 03/20/2023    Essential hypertension 03/20/2023    History of gastric ulcer 05/01/2023    Hypothyroidism     Vestibular dizziness 10/23/2023     Past Surgical History:   Procedure Laterality Date    ARTHROSCOPY OF KNEE Right 05/25/2023    Procedure: ARTHROSCOPY, KNEE;  Surgeon: Tacos Aburto MD;  Location: [facility];  Service: Orthopedics;  Laterality: Right;    CHOLECYSTECTOMY  05/09/2024    ENDOSCOPY  05/13/2024    HYSTERECTOMY      INJECTION OF ANESTHETIC AGENT INTO SACROILIAC JOINT Bilateral 05/07/2024    Procedure: BLOCK, SACROILIAC JOINT;  Surgeon: Leny Lang MD;  Location: [facility] PAIN [facility];  Service: Pain Management;  Laterality: Bilateral;    INJECTION OF JOINT Left 7/30/2024    Procedure: Injection, Joint, Hip, intra-articular hip injection;  Surgeon: Leny Lang MD;  Location: [facility] PAIN [facility];  Service: Pain Management;  Laterality: Left;    JOINT REPLACEMENT Right     hip replacement    KNEE ARTHROPLASTY Left     KNEE ARTHROSCOPY W/ MENISCECTOMY Right 05/25/2023    Procedure: ARTHROSCOPY, KNEE, WITH MENISCECTOMY;  Surgeon: Tacos Aburto MD;  Location: [facility] OR;  Service: Orthopedics;  Laterality: Right;    LUMPECTOMY, BREAST      ROBOT-ASSISTED CHOLECYSTECTOMY USING DA ANG XI N/A 05/09/2024    Procedure: XI ROBOTIC CHOLECYSTECTOMY;  Surgeon: Mao Dempsey DO;  Location: [facility] OR;  Service: General;   Laterality: N/A;     Family History   Problem Relation Name Age of Onset    Hypertension Maternal Grandmother      Stroke Maternal Grandmother      Stroke Maternal Grandfather      Hypertension Mother      Stroke Mother      Diabetes Mother       Social History     Tobacco Use    Smoking status: Never     Passive exposure: Never    Smokeless tobacco: Never   Substance Use Topics    Alcohol use: Never    Drug use: Never     Review of Systems    Physical Exam     Initial Vitals [08/11/24 0957]   BP Pulse Resp Temp SpO2   (!) 142/80 70 18 97.9 °F (36.6 °C) 98 %      MAP       --         Physical Exam    Medical Screening Exam   See Full Note    ED Course   Procedures  Labs Reviewed - No data to display       Imaging Results    None          Medications   dicyclomine injection 20 mg (20 mg Intramuscular Given 8/11/24 1046)     Medical Decision Making                                    Clinical Impression:   Final diagnoses:  [K52.9] Colitis (Primary)        ED Disposition Condition    Discharge Stable          ED Prescriptions       Medication Sig Dispense Start Date End Date Auth. Provider    dicyclomine (BENTYL) 20 mg tablet Take 1 tablet (20 mg total) by mouth 2 (two) times daily. 20 tablet 8/11/2024 9/10/2024 Sarbjit Townsend DO          Follow-up Information    None          Sarbjit Townsend,   08/11/24 1125

## 2024-08-11 NOTE — ED TRIAGE NOTES
Pt presents to the ED with c/o stomach pains, diarrhea, and body aches that started this morning. Pt started on antibiotics Thursday for a UTI.

## 2024-08-11 NOTE — Clinical Note
"Stuart "Stuart" Pedro Luis was seen and treated in our emergency department on 8/11/2024.  She may return to work on 08/13/2024.       If you have any questions or concerns, please don't hesitate to call.      Sarbjit Townsend, DO"

## 2024-08-11 NOTE — ED PROVIDER NOTES
Encounter Date: 8/11/2024    SCRIBE #1 NOTE: I, Tiffany Dooley, am scribing for, and in the presence of,  Sarbjit Townsend DO. I have scribed the entire note.       History     Chief Complaint   Patient presents with    Diarrhea    Generalized Body Aches    Abdominal Pain     This is a 55 y/o female,who presents to the ED for evaluation. She states she started having body aches and diarrhea yesterday morning. There is no Hx of blood in the stools. She notes she is currently taking ABX for a UTI. There is no hx of a fever, nausea or vomiting. She also reports abdominal cramping. There are no other complaints/pain in the ED at this time. She has a known hx of HTN. There is no smoking hx on file.     The history is provided by the patient. No  was used.     Review of patient's allergies indicates:   Allergen Reactions    Tylox [oxycodone-acetaminophen] Itching    Latex     Opioids - morphine analogues Hives    Sulfa (sulfonamide antibiotics) Rash    Zithromax z-casimiro [azithromycin] Rash     Past Medical History:   Diagnosis Date    Arthritis     Class 3 severe obesity due to excess calories without serious comorbidity with body mass index (BMI) of 40.0 to 44.9 in adult 03/20/2023    Essential hypertension 03/20/2023    History of gastric ulcer 05/01/2023    Hypothyroidism     Vestibular dizziness 10/23/2023     Past Surgical History:   Procedure Laterality Date    ARTHROSCOPY OF KNEE Right 05/25/2023    Procedure: ARTHROSCOPY, KNEE;  Surgeon: Tacos Aburto MD;  Location: [facility] OR;  Service: Orthopedics;  Laterality: Right;    CHOLECYSTECTOMY  05/09/2024    ENDOSCOPY  05/13/2024    HYSTERECTOMY      INJECTION OF ANESTHETIC AGENT INTO SACROILIAC JOINT Bilateral 05/07/2024    Procedure: BLOCK, SACROILIAC JOINT;  Surgeon: Leny Lang MD;  Location: [facility] PAIN [facility];  Service: Pain Management;  Laterality: Bilateral;    INJECTION OF JOINT Left 7/30/2024    Procedure: Injection,  Joint, Hip, intra-articular hip injection;  Surgeon: Leny Lang MD;  Location: [facility] PAIN MGMT;  Service: Pain Management;  Laterality: Left;    JOINT REPLACEMENT Right     hip replacement    KNEE ARTHROPLASTY Left     KNEE ARTHROSCOPY W/ MENISCECTOMY Right 05/25/2023    Procedure: ARTHROSCOPY, KNEE, WITH MENISCECTOMY;  Surgeon: Tacos Aburto MD;  Location: [facility] ORTHO OR;  Service: Orthopedics;  Laterality: Right;    LUMPECTOMY, BREAST      ROBOT-ASSISTED CHOLECYSTECTOMY USING DA ANG XI N/A 05/09/2024    Procedure: XI ROBOTIC CHOLECYSTECTOMY;  Surgeon: Mao Dempsey DO;  Location: [facility] OR;  Service: General;  Laterality: N/A;     Family History   Problem Relation Name Age of Onset    Hypertension Maternal Grandmother      Stroke Maternal Grandmother      Stroke Maternal Grandfather      Hypertension Mother      Stroke Mother      Diabetes Mother       Social History     Tobacco Use    Smoking status: Never     Passive exposure: Never    Smokeless tobacco: Never   Substance Use Topics    Alcohol use: Never    Drug use: Never     Review of Systems   Constitutional:  Positive for fever.   Gastrointestinal:  Positive for abdominal pain and diarrhea. Negative for blood in stool, nausea and vomiting.   All other systems reviewed and are negative.      Physical Exam     Initial Vitals [08/11/24 0957]   BP Pulse Resp Temp SpO2   (!) 142/80 70 18 97.9 °F (36.6 °C) 98 %      MAP       --         Physical Exam    Nursing note and vitals reviewed.  Constitutional: She appears well-developed and well-nourished.   HENT:   Head: Normocephalic and atraumatic.   Eyes: Conjunctivae and EOM are normal. Pupils are equal, round, and reactive to light.   Neck: Neck supple.   Normal range of motion.  Cardiovascular:  Normal rate, regular rhythm, normal heart sounds and intact distal pulses.           Pulmonary/Chest: Breath sounds normal.   Abdominal: Abdomen is soft. Bowel sounds are normal.   Musculoskeletal:          General: Normal range of motion.      Cervical back: Normal range of motion and neck supple.     Neurological: She is alert and oriented to person, place, and time. She has normal strength.   Skin: Skin is warm and dry. Capillary refill takes less than 2 seconds.   Psychiatric: She has a normal mood and affect. Thought content normal.         ED Course   Procedures  Labs Reviewed - No data to display       Imaging Results    None          Medications   dicyclomine injection 20 mg (20 mg Intramuscular Given 8/11/24 1046)     Medical Decision Making  Risk  Prescription drug management.              Attending Attestation:           Physician Attestation for Scribe:  Physician Attestation Statement for Scribe #1: I, Sarbjit Townsend, DO, reviewed documentation, as scribed by Tiffany Dooley in my presence, and it is both accurate and complete.                        Medical Decision Making:   Initial Assessment:   This is a 55 y/o female,who presents to the ED for evaluation. She states she started having body aches and diarrhea yesterday morning. There is no Hx of blood in the stools. She notes she is currently taking ABX for a UTI. There is no hx of a fever, nausea or vomiting. She also reports abdominal cramping. There are no other complaints/pain in the ED at this time. She has a known hx of HTN. There is no smoking hx on file.     The history is provided by the patient. No  was used.     Differential Diagnosis:   Colitis with stomach cramps diarrhea body aches  ED Management:  Follow-up primary care provider  follow-up with your primary care provider             Clinical Impression:  Final diagnoses:  [K52.9] Colitis (Primary)          ED Disposition Condition    Discharge Stable          ED Prescriptions       Medication Sig Dispense Start Date End Date Auth. Provider    dicyclomine (BENTYL) 20 mg tablet Take 1 tablet (20 mg total) by mouth 2 (two) times daily. 20 tablet 8/11/2024  9/10/2024 Sarbjit Townsend,           Follow-up Information    None          Sarbjit Townsend,   08/11/24 1121

## 2024-08-12 ENCOUNTER — TELEPHONE (OUTPATIENT)
Dept: EMERGENCY MEDICINE | Facility: HOSPITAL | Age: 54
End: 2024-08-12
Payer: COMMERCIAL

## 2024-09-03 ENCOUNTER — HOSPITAL ENCOUNTER (EMERGENCY)
Facility: HOSPITAL | Age: 54
Discharge: LEFT BEFORE DISCHARGE | End: 2024-09-03
Payer: COMMERCIAL

## 2024-09-03 VITALS
HEIGHT: 66 IN | SYSTOLIC BLOOD PRESSURE: 120 MMHG | RESPIRATION RATE: 18 BRPM | DIASTOLIC BLOOD PRESSURE: 78 MMHG | WEIGHT: 257 LBS | TEMPERATURE: 98 F | OXYGEN SATURATION: 98 % | HEART RATE: 101 BPM | BODY MASS INDEX: 41.3 KG/M2

## 2024-09-03 DIAGNOSIS — M62.838 MUSCLE SPASM: Primary | ICD-10-CM

## 2024-09-03 DIAGNOSIS — R07.9 CHEST PAIN: ICD-10-CM

## 2024-09-03 DIAGNOSIS — E86.0 MILD DEHYDRATION: ICD-10-CM

## 2024-09-03 LAB
ALBUMIN SERPL BCP-MCNC: 3.2 G/DL (ref 3.5–5)
ALBUMIN/GLOB SERPL: 1.2 {RATIO}
ALP SERPL-CCNC: 64 U/L (ref 41–108)
ALT SERPL W P-5'-P-CCNC: 28 U/L (ref 13–56)
ANION GAP SERPL CALCULATED.3IONS-SCNC: 9 MMOL/L (ref 7–16)
AST SERPL W P-5'-P-CCNC: 15 U/L (ref 15–37)
BASOPHILS # BLD AUTO: 0.03 K/UL (ref 0–0.2)
BASOPHILS NFR BLD AUTO: 0.3 % (ref 0–1)
BILIRUB SERPL-MCNC: 0.9 MG/DL (ref ?–1.2)
BILIRUB UR QL STRIP: NEGATIVE
BUN SERPL-MCNC: 15 MG/DL (ref 7–18)
BUN/CREAT SERPL: 18 (ref 6–20)
CALCIUM SERPL-MCNC: 8.8 MG/DL (ref 8.5–10.1)
CHLORIDE SERPL-SCNC: 105 MMOL/L (ref 98–107)
CLARITY UR: CLEAR
CO2 SERPL-SCNC: 28 MMOL/L (ref 21–32)
COLOR UR: YELLOW
CREAT SERPL-MCNC: 0.85 MG/DL (ref 0.55–1.02)
D DIMER PPP FEU-MCNC: 0.36 ΜG/ML (ref 0–0.47)
DIFFERENTIAL METHOD BLD: ABNORMAL
EGFR (NO RACE VARIABLE) (RUSH/TITUS): 83 ML/MIN/1.73M2
EOSINOPHIL # BLD AUTO: 0.08 K/UL (ref 0–0.5)
EOSINOPHIL NFR BLD AUTO: 0.9 % (ref 1–4)
ERYTHROCYTE [DISTWIDTH] IN BLOOD BY AUTOMATED COUNT: 12.7 % (ref 11.5–14.5)
GLOBULIN SER-MCNC: 2.6 G/DL (ref 2–4)
GLUCOSE SERPL-MCNC: 100 MG/DL (ref 74–106)
GLUCOSE UR STRIP-MCNC: NORMAL MG/DL
HCT VFR BLD AUTO: 45.3 % (ref 38–47)
HGB BLD-MCNC: 15 G/DL (ref 12–16)
IMM GRANULOCYTES # BLD AUTO: 0.02 K/UL (ref 0–0.04)
IMM GRANULOCYTES NFR BLD: 0.2 % (ref 0–0.4)
KETONES UR STRIP-SCNC: 40 MG/DL
LEUKOCYTE ESTERASE UR QL STRIP: NEGATIVE
LYMPHOCYTES # BLD AUTO: 2 K/UL (ref 1–4.8)
LYMPHOCYTES NFR BLD AUTO: 23.3 % (ref 27–41)
MAGNESIUM SERPL-MCNC: 2.2 MG/DL (ref 1.7–2.3)
MCH RBC QN AUTO: 29.7 PG (ref 27–31)
MCHC RBC AUTO-ENTMCNC: 33.1 G/DL (ref 32–36)
MCV RBC AUTO: 89.7 FL (ref 80–96)
MONOCYTES # BLD AUTO: 0.54 K/UL (ref 0–0.8)
MONOCYTES NFR BLD AUTO: 6.3 % (ref 2–6)
MPC BLD CALC-MCNC: 10.5 FL (ref 9.4–12.4)
NEUTROPHILS # BLD AUTO: 5.93 K/UL (ref 1.8–7.7)
NEUTROPHILS NFR BLD AUTO: 69 % (ref 53–65)
NITRITE UR QL STRIP: NEGATIVE
NRBC # BLD AUTO: 0 X10E3/UL
NRBC, AUTO (.00): 0 %
NT-PROBNP SERPL-MCNC: 15 PG/ML (ref 1–125)
PH UR STRIP: 5.5 PH UNITS
PLATELET # BLD AUTO: 292 K/UL (ref 150–400)
POTASSIUM SERPL-SCNC: 3.6 MMOL/L (ref 3.5–5.1)
PROT SERPL-MCNC: 5.8 G/DL (ref 6.4–8.2)
PROT UR QL STRIP: 20
RBC # BLD AUTO: 5.05 M/UL (ref 4.2–5.4)
RBC # UR STRIP: NEGATIVE /UL
SARS-COV-2 RDRP RESP QL NAA+PROBE: NEGATIVE
SODIUM SERPL-SCNC: 138 MMOL/L (ref 136–145)
SP GR UR STRIP: 1.03
TROPONIN I SERPL DL<=0.01 NG/ML-MCNC: 4.7 PG/ML
UROBILINOGEN UR STRIP-ACNC: 2 MG/DL
WBC # BLD AUTO: 8.6 K/UL (ref 4.5–11)

## 2024-09-03 PROCEDURE — 83880 ASSAY OF NATRIURETIC PEPTIDE: CPT | Performed by: NURSE PRACTITIONER

## 2024-09-03 PROCEDURE — 36415 COLL VENOUS BLD VENIPUNCTURE: CPT | Performed by: NURSE PRACTITIONER

## 2024-09-03 PROCEDURE — 99285 EMERGENCY DEPT VISIT HI MDM: CPT | Mod: 25

## 2024-09-03 PROCEDURE — 83735 ASSAY OF MAGNESIUM: CPT | Performed by: NURSE PRACTITIONER

## 2024-09-03 PROCEDURE — 63600175 PHARM REV CODE 636 W HCPCS: Performed by: NURSE PRACTITIONER

## 2024-09-03 PROCEDURE — 96372 THER/PROPH/DIAG INJ SC/IM: CPT | Performed by: NURSE PRACTITIONER

## 2024-09-03 PROCEDURE — 84484 ASSAY OF TROPONIN QUANT: CPT | Performed by: NURSE PRACTITIONER

## 2024-09-03 PROCEDURE — 80053 COMPREHEN METABOLIC PANEL: CPT | Performed by: NURSE PRACTITIONER

## 2024-09-03 PROCEDURE — 85379 FIBRIN DEGRADATION QUANT: CPT | Performed by: NURSE PRACTITIONER

## 2024-09-03 PROCEDURE — 25000003 PHARM REV CODE 250: Performed by: NURSE PRACTITIONER

## 2024-09-03 PROCEDURE — 85025 COMPLETE CBC W/AUTO DIFF WBC: CPT | Performed by: NURSE PRACTITIONER

## 2024-09-03 PROCEDURE — 81003 URINALYSIS AUTO W/O SCOPE: CPT | Performed by: NURSE PRACTITIONER

## 2024-09-03 PROCEDURE — 93010 ELECTROCARDIOGRAM REPORT: CPT | Mod: ,,, | Performed by: INTERNAL MEDICINE

## 2024-09-03 PROCEDURE — 93005 ELECTROCARDIOGRAM TRACING: CPT

## 2024-09-03 PROCEDURE — 87635 SARS-COV-2 COVID-19 AMP PRB: CPT | Performed by: NURSE PRACTITIONER

## 2024-09-03 RX ORDER — TIZANIDINE 4 MG/1
4 TABLET ORAL EVERY 6 HOURS PRN
Qty: 20 TABLET | Refills: 0 | Status: SHIPPED | OUTPATIENT
Start: 2024-09-03 | End: 2024-09-13

## 2024-09-03 RX ORDER — METHOCARBAMOL 500 MG/1
TABLET, FILM COATED ORAL
Status: DISCONTINUED
Start: 2024-09-03 | End: 2024-09-03 | Stop reason: HOSPADM

## 2024-09-03 RX ORDER — KETOROLAC TROMETHAMINE 30 MG/ML
INJECTION, SOLUTION INTRAMUSCULAR; INTRAVENOUS
Status: DISCONTINUED
Start: 2024-09-03 | End: 2024-09-03 | Stop reason: HOSPADM

## 2024-09-03 RX ORDER — METHOCARBAMOL 500 MG/1
500 TABLET, FILM COATED ORAL
Status: DISCONTINUED | OUTPATIENT
Start: 2024-09-03 | End: 2024-09-03 | Stop reason: HOSPADM

## 2024-09-03 RX ORDER — KETOROLAC TROMETHAMINE 30 MG/ML
60 INJECTION, SOLUTION INTRAMUSCULAR; INTRAVENOUS
Status: DISCONTINUED | OUTPATIENT
Start: 2024-09-03 | End: 2024-09-03 | Stop reason: HOSPADM

## 2024-09-03 RX ORDER — IBUPROFEN 800 MG/1
800 TABLET ORAL 3 TIMES DAILY
Qty: 20 TABLET | Refills: 0 | Status: SHIPPED | OUTPATIENT
Start: 2024-09-03

## 2024-09-03 NOTE — ED TRIAGE NOTES
Presents to ED for complaints of chest pain and shortness of breath that has been going on since last week.  Patient reports that it has been worse today.  Reports that last time it did this her potassium was low.

## 2024-09-03 NOTE — Clinical Note
"Stuart Cortesloni" Pedro Luis was seen and treated in our emergency department on 9/3/2024.  She may return to work on 09/04/2024.       If you have any questions or concerns, please don't hesitate to call.      Tran Ortega, FNP"

## 2024-09-04 ENCOUNTER — HOSPITAL ENCOUNTER (EMERGENCY)
Facility: HOSPITAL | Age: 54
Discharge: HOME | End: 2024-09-04
Payer: MEDICAID

## 2024-09-04 VITALS
WEIGHT: 257 LBS | BODY MASS INDEX: 41.3 KG/M2 | HEART RATE: 66 BPM | TEMPERATURE: 97 F | SYSTOLIC BLOOD PRESSURE: 132 MMHG | HEIGHT: 66 IN | RESPIRATION RATE: 16 BRPM | OXYGEN SATURATION: 97 % | DIASTOLIC BLOOD PRESSURE: 88 MMHG

## 2024-09-04 DIAGNOSIS — R07.89 CHEST WALL PAIN: Primary | ICD-10-CM

## 2024-09-04 DIAGNOSIS — J06.9 VIRAL URI WITH COUGH: ICD-10-CM

## 2024-09-04 LAB
OHS QRS DURATION: 64 MS
OHS QTC CALCULATION: 442 MS

## 2024-09-04 PROCEDURE — 96372 THER/PROPH/DIAG INJ SC/IM: CPT | Performed by: NURSE PRACTITIONER

## 2024-09-04 PROCEDURE — 63600175 PHARM REV CODE 636 W HCPCS: Performed by: NURSE PRACTITIONER

## 2024-09-04 PROCEDURE — 99284 EMERGENCY DEPT VISIT MOD MDM: CPT | Mod: 25

## 2024-09-04 RX ORDER — KETOROLAC TROMETHAMINE 30 MG/ML
60 INJECTION, SOLUTION INTRAMUSCULAR; INTRAVENOUS
Status: COMPLETED | OUTPATIENT
Start: 2024-09-04 | End: 2024-09-04

## 2024-09-04 RX ADMIN — KETOROLAC TROMETHAMINE 60 MG: 30 INJECTION, SOLUTION INTRAMUSCULAR at 08:09

## 2024-09-04 NOTE — ED PROVIDER NOTES
Encounter Date: 9/4/2024       History     Chief Complaint   Patient presents with    Cough    Pleurisy     54-year-old female presents to the emergency department to be evaluated for cough, runny nose and pain in her chest when she coughs, bends, twists or takes a deep breath.  Denies any exertional pain or shortness of breath.  She was evaluated in the emergency department last night and eloped prior to discharge.    The history is provided by the patient.   Cough  This is a new problem. The current episode started several days ago. Associated symptoms include chest pain. Pertinent negatives include no chills, no sweats, no weight loss, no ear congestion, no ear pain, no headaches, no rhinorrhea, no sore throat, no myalgias, no shortness of breath, no wheezing and no eye redness.     Review of patient's allergies indicates:   Allergen Reactions    Tylox [oxycodone-acetaminophen] Itching    Latex     Opioids - morphine analogues Hives    Sulfa (sulfonamide antibiotics) Rash    Zithromax z-casimiro [azithromycin] Rash     Past Medical History:   Diagnosis Date    Arthritis     Class 3 severe obesity due to excess calories without serious comorbidity with body mass index (BMI) of 40.0 to 44.9 in adult 03/20/2023    Essential hypertension 03/20/2023    History of gastric ulcer 05/01/2023    Hypothyroidism     Vestibular dizziness 10/23/2023     Past Surgical History:   Procedure Laterality Date    ARTHROSCOPY OF KNEE Right 05/25/2023    Procedure: ARTHROSCOPY, KNEE;  Surgeon: Tacos Aburto MD;  Location: [facility];  Service: Orthopedics;  Laterality: Right;    CHOLECYSTECTOMY  05/09/2024    ENDOSCOPY  05/13/2024    HYSTERECTOMY      INJECTION OF ANESTHETIC AGENT INTO SACROILIAC JOINT Bilateral 05/07/2024    Procedure: BLOCK, SACROILIAC JOINT;  Surgeon: Leny Lang MD;  Location: [facility] MGMT;  Service: Pain Management;  Laterality: Bilateral;    INJECTION OF JOINT Left 7/30/2024    Procedure:  Injection, Joint, Hip, intra-articular hip injection;  Surgeon: Leny Lang MD;  Location: [facility] PAIN MGMT;  Service: Pain Management;  Laterality: Left;    JOINT REPLACEMENT Right     hip replacement    KNEE ARTHROPLASTY Left     KNEE ARTHROSCOPY W/ MENISCECTOMY Right 05/25/2023    Procedure: ARTHROSCOPY, KNEE, WITH MENISCECTOMY;  Surgeon: Tacos Aburto MD;  Location: [facility] ORTHO OR;  Service: Orthopedics;  Laterality: Right;    LUMPECTOMY, BREAST      ROBOT-ASSISTED CHOLECYSTECTOMY USING DA ANG XI N/A 05/09/2024    Procedure: XI ROBOTIC CHOLECYSTECTOMY;  Surgeon: Mao Dempsey DO;  Location: [facility] OR;  Service: General;  Laterality: N/A;     Family History   Problem Relation Name Age of Onset    Hypertension Maternal Grandmother      Stroke Maternal Grandmother      Stroke Maternal Grandfather      Hypertension Mother      Stroke Mother      Diabetes Mother       Social History     Tobacco Use    Smoking status: Never     Passive exposure: Never    Smokeless tobacco: Never   Substance Use Topics    Alcohol use: Never    Drug use: Never     Review of Systems   Constitutional:  Negative for chills, fever and weight loss.   HENT:  Negative for ear pain, rhinorrhea and sore throat.    Eyes:  Negative for redness.   Respiratory:  Positive for cough. Negative for shortness of breath and wheezing.    Cardiovascular:  Positive for chest pain.   Gastrointestinal:  Negative for constipation, diarrhea, nausea and vomiting.   Musculoskeletal:  Negative for myalgias.   Neurological:  Negative for headaches.   All other systems reviewed and are negative.      Physical Exam     Initial Vitals [09/04/24 0835]   BP Pulse Resp Temp SpO2   132/88 66 16 97.3 °F (36.3 °C) 97 %      MAP       --         Physical Exam    Vitals reviewed.  Constitutional: She appears well-developed and well-nourished.   Neck: Neck supple.   Cardiovascular:  Normal rate and regular rhythm.           Pulmonary/Chest: Breath sounds  normal. She exhibits tenderness.   Abdominal: Abdomen is soft. Bowel sounds are normal. She exhibits no distension and no mass. There is no abdominal tenderness. There is no rebound and no guarding.   Musculoskeletal:         General: Normal range of motion.      Cervical back: Neck supple.     Neurological: She is alert and oriented to person, place, and time. She has normal strength. GCS score is 15. GCS eye subscore is 4. GCS verbal subscore is 5. GCS motor subscore is 6.   Skin: Skin is warm and dry. Capillary refill takes less than 2 seconds.   Psychiatric: She has a normal mood and affect.         Medical Screening Exam   See Full Note    ED Course   Procedures  Labs Reviewed - No data to display       Imaging Results    None          Medications   ketorolac injection 60 mg (60 mg Intramuscular Given 9/4/24 0848)     Medical Decision Making  54-year-old female presents to the emergency department to be evaluated for cough, runny nose and pain in her chest when she coughs, bends, twists or takes a deep breath.  Denies any exertional pain or shortness of breath.  She was evaluated in the emergency department last night and eloped prior to discharge.  X-rays and labs reviewed last night  Patient was prescribed Motrin and tizanidine last night, but she was not aware that these prescriptions were sent in for her  Diagnosis: Viral URI with cough, chest wall pain  I instructed her to take the tizanidine and Motrin as prescribed  Toradol administered    Risk  Prescription drug management.                                      Clinical Impression:   Final diagnoses:  [R07.89] Chest wall pain (Primary)  [J06.9] Viral URI with cough        ED Disposition Condition    Discharge Stable          ED Prescriptions    None       Follow-up Information    None          Maria L Palafox FNP  09/04/24 0910       Maria L Palafox FNP  09/04/24 0910

## 2024-09-04 NOTE — ED PROVIDER NOTES
Encounter Date: 9/3/2024       History     Chief Complaint   Patient presents with    Shortness of Breath    Chest Pain     Patient presents to ER with complaint of chest pain that radiates through to her back.  She describes spasm like pain that comes and goes.  Denies known injury or accident.  She reports her pain is worse when she lays flat.  She denies fever.  Reports chills and body aches.  Reports she felt this way when she had covid previously.  She denies shortness a breath.  Reports her symptoms started 1 week ago.  She states she was taken over-the-counter medications without relief.  She also reports previous history of low potassium.    The history is provided by the patient. No  was used.     Review of patient's allergies indicates:   Allergen Reactions    Tylox [oxycodone-acetaminophen] Itching    Latex     Opioids - morphine analogues Hives    Sulfa (sulfonamide antibiotics) Rash    Zithromax z-casimiro [azithromycin] Rash     Past Medical History:   Diagnosis Date    Arthritis     Class 3 severe obesity due to excess calories without serious comorbidity with body mass index (BMI) of 40.0 to 44.9 in adult 03/20/2023    Essential hypertension 03/20/2023    History of gastric ulcer 05/01/2023    Hypothyroidism     Vestibular dizziness 10/23/2023     Past Surgical History:   Procedure Laterality Date    ARTHROSCOPY OF KNEE Right 05/25/2023    Procedure: ARTHROSCOPY, KNEE;  Surgeon: Tacos Aburto MD;  Location: [facility];  Service: Orthopedics;  Laterality: Right;    CHOLECYSTECTOMY  05/09/2024    ENDOSCOPY  05/13/2024    HYSTERECTOMY      INJECTION OF ANESTHETIC AGENT INTO SACROILIAC JOINT Bilateral 05/07/2024    Procedure: BLOCK, SACROILIAC JOINT;  Surgeon: Leny Lang MD;  [facility];  Service: Pain Management;  Laterality: Bilateral;    INJECTION OF JOINT Left 7/30/2024    Procedure: Injection, Joint, Hip, intra-articular hip injection;  Surgeon:  Leny Lang MD;  Location: [facility] PAIN MGMT;  Service: Pain Management;  Laterality: Left;    JOINT REPLACEMENT Right     hip replacement    KNEE ARTHROPLASTY Left     KNEE ARTHROSCOPY W/ MENISCECTOMY Right 05/25/2023    Procedure: ARTHROSCOPY, KNEE, WITH MENISCECTOMY;  Surgeon: Tacos Aburto MD;  Location: [facility] OR;  Service: Orthopedics;  Laterality: Right;    LUMPECTOMY, BREAST      ROBOT-ASSISTED CHOLECYSTECTOMY USING DA ANG XI N/A 05/09/2024    Procedure: XI ROBOTIC CHOLECYSTECTOMY;  Surgeon: Mao Dempsey DO;  Location: [facility] OR;  Service: General;  Laterality: N/A;     Family History   Problem Relation Name Age of Onset    Hypertension Maternal Grandmother      Stroke Maternal Grandmother      Stroke Maternal Grandfather      Hypertension Mother      Stroke Mother      Diabetes Mother       Social History     Tobacco Use    Smoking status: Never     Passive exposure: Never    Smokeless tobacco: Never   Substance Use Topics    Alcohol use: Never    Drug use: Never     Review of Systems   Constitutional:  Positive for activity change, appetite change, chills and fatigue.   HENT:  Positive for congestion and postnasal drip.    Cardiovascular:  Positive for chest pain.   Gastrointestinal:  Positive for abdominal pain (Epigastric).   Musculoskeletal:  Positive for back pain.   All other systems reviewed and are negative.      Physical Exam     Initial Vitals [09/03/24 1727]   BP Pulse Resp Temp SpO2   120/78 101 18 98.2 °F (36.8 °C) 98 %      MAP       --         Physical Exam    Nursing note and vitals reviewed.  Constitutional: She appears well-developed and well-nourished.   HENT:   Head: Normocephalic.   Nose: Nose normal.   Mouth/Throat: Oropharynx is clear and moist.   Eyes: Conjunctivae and EOM are normal.   Neck: Neck supple.   Normal range of motion.  Cardiovascular:  Normal rate, normal heart sounds and intact distal pulses.           Pulmonary/Chest: Breath sounds normal.    Abdominal: Abdomen is soft and protuberant. Bowel sounds are normal. There is abdominal tenderness (Epigastric) in the epigastric area.       Musculoskeletal:         General: Normal range of motion.      Cervical back: Normal range of motion and neck supple.     Neurological: She is alert and oriented to person, place, and time. She has normal strength. GCS score is 15. GCS eye subscore is 4. GCS verbal subscore is 5. GCS motor subscore is 6.   Skin: Skin is warm and dry. Capillary refill takes less than 2 seconds.   Psychiatric: She has a normal mood and affect. Her behavior is normal. Judgment and thought content normal.         Medical Screening Exam   See Full Note    ED Course   Procedures  Labs Reviewed   COMPREHENSIVE METABOLIC PANEL - Abnormal       Result Value    Sodium 138      Potassium 3.6      Chloride 105      CO2 28      Anion Gap 9      Glucose 100      BUN 15      Creatinine 0.85      BUN/Creatinine Ratio 18      Calcium 8.8      Total Protein 5.8 (*)     Albumin 3.2 (*)     Globulin 2.6      A/G Ratio 1.2      Bilirubin, Total 0.9      Alk Phos 64      ALT 28      AST 15      eGFR 83     URINALYSIS, REFLEX TO URINE CULTURE - Abnormal    Color, UA Yellow      Clarity, UA Clear      pH, UA 5.5      Leukocytes, UA Negative      Nitrites, UA Negative      Protein, UA 20 (*)     Glucose, UA Normal      Ketones, UA 40 (*)     Urobilinogen, UA 2 (*)     Bilirubin, UA Negative      Blood, UA Negative      Specific Gravity, UA 1.029     CBC WITH DIFFERENTIAL - Abnormal    WBC 8.60      RBC 5.05      Hemoglobin 15.0      Hematocrit 45.3      MCV 89.7      MCH 29.7      MCHC 33.1      RDW 12.7      Platelet Count 292      MPV 10.5      Neutrophils % 69.0 (*)     Lymphocytes % 23.3 (*)     Monocytes % 6.3 (*)     Eosinophils % 0.9 (*)     Basophils % 0.3      Immature Granulocytes % 0.2      nRBC, Auto 0.0      Neutrophils, Abs 5.93      Lymphocytes, Absolute 2.00      Monocytes, Absolute 0.54       Eosinophils, Absolute 0.08      Basophils, Absolute 0.03      Immature Granulocytes, Absolute 0.02      nRBC, Absolute 0.00      Diff Type Auto     SARS-COV-2 RNA AMPLIFICATION, QUAL - Normal    SARS COV-2 Molecular Negative      Narrative:     Negative SARS-CoV results should not be used as the sole basis for treatment or patient management decisions; negative results should be considered in the context of a patient's recent exposures, history and the presene of clinical signs and symptoms consistent with COVID-19.  Negative results should be treated as presumptive and confirmed by molecular assay, if necessary for patient management.   TROPONIN I - Normal    Troponin I High Sensitivity 4.7     MAGNESIUM - Normal    Magnesium 2.2     D DIMER, QUANTITATIVE - Normal    D-Dimer 0.36     NT-PRO NATRIURETIC PEPTIDE - Normal    ProBNP 15     CBC W/ AUTO DIFFERENTIAL    Narrative:     The following orders were created for panel order CBC auto differential.  Procedure                               Abnormality         Status                     ---------                               -----------         ------                     CBC with Differential[[id number]]       Abnormal            Final result                 Please view results for these tests on the individual orders.          Imaging Results              X-Ray Chest PA And Lateral (Final result)  Result time 09/03/24 19:12:34      Final result by Linus Calixto MD (09/03/24 19:12:34)                   Impression:      No acute abnormality.      Electronically signed by: Linus Calixto  Date:    09/03/2024  Time:    19:12               Narrative:    EXAMINATION:  XR CHEST PA AND LATERAL    CLINICAL HISTORY:  Chest pain, unspecified    TECHNIQUE:  PA and lateral views of the chest were performed.    COMPARISON:  07/19/2024    FINDINGS:  The lungs are clear, with normal appearance of pulmonary vasculature and no pleural effusion or pneumothorax.    The cardiac  silhouette is normal in size. The hilar and mediastinal contours are unremarkable.    Bones are intact.                                       Medications   ketorolac injection 60 mg (60 mg Intramuscular Not Given 9/3/24 2122)   methocarbamoL tablet 500 mg (500 mg Oral Not Given 9/3/24 2122)     Medical Decision Making  Patient presents to ER with complaint of chest pain that radiates through to her back.  She describes spasm like pain that comes and goes.  Denies known injury or accident.  She reports her pain is worse when she lays flat.  She denies fever.  Reports chills and body aches.  Reports she felt this way when she had covid previously.  She denies shortness a breath.  Reports her symptoms started 1 week ago.  She states she was taken over-the-counter medications without relief.  She also reports previous history of low potassium.      Amount and/or Complexity of Data Reviewed  Labs: ordered. Decision-making details documented in ED Course.  Radiology: ordered. Decision-making details documented in ED Course.  ECG/medicine tests: ordered. Decision-making details documented in ED Course.     Details: Normal sinus rhythm rate of 91 beats per minute  Discussion of management or test interpretation with external provider(s): Toradol 60 mg IM  Robaxin 500 mg p.o.    Patient left ER prior to receiving her injection or muscle relaxer.  She did not received discharge instructions.    Risk  Prescription drug management.                                      Clinical Impression:   Final diagnoses:  [R07.9] Chest pain  [M62.838] Muscle spasm (Primary)  [E86.0] Mild dehydration        ED Disposition Condition    Eloped Stable                Tran Ortega, SHANICE  09/03/24 2078

## 2024-09-04 NOTE — ED TRIAGE NOTES
Patient arrives to ED with complaints of cough x 4 days and pain in her sternal area that hurts when she breathes, on palpation, or when she moves. Patient states being seen here last night but she left due to long wait time and system going down.

## 2024-09-04 NOTE — DISCHARGE INSTRUCTIONS
Take tizanidine and Motrin as directed.  Follow up with your primary care provider in 2 days. Return to the emergency department for any increase in symptoms or for any other new or worrisome symptoms.

## 2024-09-04 NOTE — DISCHARGE INSTRUCTIONS
Take medications as prescribed.  Increase fluid intake to keep hydrated.  Alternate heat and ice compresses for comfort.  Return to the ER with new or worsening symptoms.

## 2024-09-05 ENCOUNTER — OFFICE VISIT (OUTPATIENT)
Dept: SURGERY | Facility: CLINIC | Age: 54
End: 2024-09-05
Payer: COMMERCIAL

## 2024-09-05 ENCOUNTER — TELEPHONE (OUTPATIENT)
Dept: EMERGENCY MEDICINE | Facility: HOSPITAL | Age: 54
End: 2024-09-05
Payer: COMMERCIAL

## 2024-09-05 VITALS — BODY MASS INDEX: 41.48 KG/M2 | HEIGHT: 66 IN

## 2024-09-05 DIAGNOSIS — Z90.49 S/P LAPAROSCOPIC CHOLECYSTECTOMY: Primary | ICD-10-CM

## 2024-09-05 PROCEDURE — 99212 OFFICE O/P EST SF 10 MIN: CPT | Mod: S$PBB,,, | Performed by: NURSE PRACTITIONER

## 2024-09-05 PROCEDURE — 1160F RVW MEDS BY RX/DR IN RCRD: CPT | Mod: ,,, | Performed by: NURSE PRACTITIONER

## 2024-09-05 PROCEDURE — 1159F MED LIST DOCD IN RCRD: CPT | Mod: ,,, | Performed by: NURSE PRACTITIONER

## 2024-09-05 PROCEDURE — 99214 OFFICE O/P EST MOD 30 MIN: CPT | Mod: PBBFAC | Performed by: NURSE PRACTITIONER

## 2024-09-05 PROCEDURE — 99999 PR PBB SHADOW E&M-EST. PATIENT-LVL IV: CPT | Mod: PBBFAC,,, | Performed by: NURSE PRACTITIONER

## 2024-09-05 PROCEDURE — 3008F BODY MASS INDEX DOCD: CPT | Mod: ,,, | Performed by: NURSE PRACTITIONER

## 2024-09-05 NOTE — LETTER
September 5, 2024      [facility] - General Surgery  [address] [facility] [address]  Phone: [phone]  Fax: [phone]       Patient: Stuart Cloud   YOB: 1970  Date of Visit: 09/05/2024    To Whom It May Concern:    Bismark Cloud  was at [facility] on 09/05/2024 and was seen by SHANICE Nice. The patient was seen by Dr. Ke DO on 6/3/24. The patient has been released full duty to return to work on 9/6/24 with no restrictions. If you have any questions or concerns, or if I can be of further assistance, please do not hesitate to contact me.    Sincerely,    SHANICE Nice LPN

## 2024-09-05 NOTE — PROGRESS NOTES
Presents to clinic requesting return to work.  Robotic cholecystectomy in May.  No complications or complaints.  Returned to work without restrictions note provided.

## 2024-09-11 ENCOUNTER — HOSPITAL ENCOUNTER (EMERGENCY)
Facility: HOSPITAL | Age: 54
Discharge: HOME | End: 2024-09-12
Attending: EMERGENCY MEDICINE
Payer: COMMERCIAL

## 2024-09-11 DIAGNOSIS — R07.89 CHEST WALL PAIN: Primary | ICD-10-CM

## 2024-09-11 DIAGNOSIS — R06.00 DYSPNEA: ICD-10-CM

## 2024-09-11 DIAGNOSIS — N39.0 URINARY TRACT INFECTION WITHOUT HEMATURIA, SITE UNSPECIFIED: ICD-10-CM

## 2024-09-11 DIAGNOSIS — R55 SYNCOPE: ICD-10-CM

## 2024-09-11 DIAGNOSIS — E87.6 HYPOKALEMIA: ICD-10-CM

## 2024-09-11 LAB
ALBUMIN SERPL BCP-MCNC: 2.9 G/DL (ref 3.5–5)
ALBUMIN/GLOB SERPL: 1.1 {RATIO}
ALP SERPL-CCNC: 55 U/L (ref 41–108)
ALT SERPL W P-5'-P-CCNC: 28 U/L (ref 13–56)
AMPHET UR QL SCN: NEGATIVE
ANION GAP SERPL CALCULATED.3IONS-SCNC: 8 MMOL/L (ref 7–16)
APTT PPP: 26.9 SECONDS (ref 25.2–37.3)
AST SERPL W P-5'-P-CCNC: 16 U/L (ref 15–37)
BACTERIA #/AREA URNS HPF: ABNORMAL /HPF
BARBITURATES UR QL SCN: NEGATIVE
BASOPHILS # BLD AUTO: 0.06 K/UL (ref 0–0.2)
BASOPHILS NFR BLD AUTO: 0.5 % (ref 0–1)
BENZODIAZ METAB UR QL SCN: NEGATIVE
BILIRUB SERPL-MCNC: 0.3 MG/DL (ref ?–1.2)
BILIRUB UR QL STRIP: NEGATIVE
BUN SERPL-MCNC: 17 MG/DL (ref 7–18)
BUN/CREAT SERPL: 14 (ref 6–20)
CALCIUM SERPL-MCNC: 8.5 MG/DL (ref 8.5–10.1)
CANNABINOIDS UR QL SCN: NEGATIVE
CHLORIDE SERPL-SCNC: 102 MMOL/L (ref 98–107)
CLARITY UR: CLEAR
CO2 SERPL-SCNC: 31 MMOL/L (ref 21–32)
COCAINE UR QL SCN: NEGATIVE
COLOR UR: ABNORMAL
CREAT SERPL-MCNC: 1.18 MG/DL (ref 0.55–1.02)
D DIMER PPP FEU-MCNC: <0.27 ΜG/ML (ref 0–0.47)
DIFFERENTIAL METHOD BLD: ABNORMAL
EGFR (NO RACE VARIABLE) (RUSH/TITUS): 56 ML/MIN/1.73M2
EOSINOPHIL # BLD AUTO: 0.06 K/UL (ref 0–0.5)
EOSINOPHIL NFR BLD AUTO: 0.5 % (ref 1–4)
ERYTHROCYTE [DISTWIDTH] IN BLOOD BY AUTOMATED COUNT: 12.6 % (ref 11.5–14.5)
GLOBULIN SER-MCNC: 2.7 G/DL (ref 2–4)
GLUCOSE SERPL-MCNC: 130 MG/DL (ref 74–106)
GLUCOSE UR STRIP-MCNC: NORMAL MG/DL
HCT VFR BLD AUTO: 45.1 % (ref 38–47)
HGB BLD-MCNC: 15.2 G/DL (ref 12–16)
HYALINE CASTS #/AREA URNS LPF: ABNORMAL /LPF
IMM GRANULOCYTES # BLD AUTO: 0.06 K/UL (ref 0–0.04)
IMM GRANULOCYTES NFR BLD: 0.5 % (ref 0–0.4)
INR BLD: 1.05
KETONES UR STRIP-SCNC: NEGATIVE MG/DL
LEUKOCYTE ESTERASE UR QL STRIP: ABNORMAL
LYMPHOCYTES # BLD AUTO: 2.29 K/UL (ref 1–4.8)
LYMPHOCYTES NFR BLD AUTO: 19.8 % (ref 27–41)
MAGNESIUM SERPL-MCNC: 2.4 MG/DL (ref 1.7–2.3)
MCH RBC QN AUTO: 30 PG (ref 27–31)
MCHC RBC AUTO-ENTMCNC: 33.7 G/DL (ref 32–36)
MCV RBC AUTO: 89 FL (ref 80–96)
MONOCYTES # BLD AUTO: 0.67 K/UL (ref 0–0.8)
MONOCYTES NFR BLD AUTO: 5.8 % (ref 2–6)
MPC BLD CALC-MCNC: 11 FL (ref 9.4–12.4)
MUCOUS, UA: ABNORMAL /LPF
NEUTROPHILS # BLD AUTO: 8.42 K/UL (ref 1.8–7.7)
NEUTROPHILS NFR BLD AUTO: 72.9 % (ref 53–65)
NITRITE UR QL STRIP: NEGATIVE
NRBC # BLD AUTO: 0 X10E3/UL
NRBC, AUTO (.00): 0 %
NT-PROBNP SERPL-MCNC: 19 PG/ML (ref 1–125)
OPIATES UR QL SCN: NEGATIVE
PCP UR QL SCN: NEGATIVE
PH UR STRIP: 5.5 PH UNITS
PLATELET # BLD AUTO: 257 K/UL (ref 150–400)
POTASSIUM SERPL-SCNC: 2.9 MMOL/L (ref 3.5–5.1)
PROT SERPL-MCNC: 5.6 G/DL (ref 6.4–8.2)
PROT UR QL STRIP: NEGATIVE
PROTHROMBIN TIME: 13.6 SECONDS (ref 11.7–14.7)
RBC # BLD AUTO: 5.07 M/UL (ref 4.2–5.4)
RBC # UR STRIP: ABNORMAL /UL
RBC #/AREA URNS HPF: 3 /HPF
SODIUM SERPL-SCNC: 138 MMOL/L (ref 136–145)
SP GR UR STRIP: 1.01
SQUAMOUS #/AREA URNS LPF: ABNORMAL /HPF
TROPONIN I SERPL DL<=0.01 NG/ML-MCNC: 12.5 PG/ML
TSH SERPL DL<=0.005 MIU/L-ACNC: 3.69 UIU/ML (ref 0.36–3.74)
UROBILINOGEN UR STRIP-ACNC: NORMAL MG/DL
WBC # BLD AUTO: 11.56 K/UL (ref 4.5–11)
WBC #/AREA URNS HPF: 16 /HPF

## 2024-09-11 PROCEDURE — 25000003 PHARM REV CODE 250: Performed by: EMERGENCY MEDICINE

## 2024-09-11 PROCEDURE — 83880 ASSAY OF NATRIURETIC PEPTIDE: CPT | Performed by: EMERGENCY MEDICINE

## 2024-09-11 PROCEDURE — 84443 ASSAY THYROID STIM HORMONE: CPT | Performed by: EMERGENCY MEDICINE

## 2024-09-11 PROCEDURE — 87086 URINE CULTURE/COLONY COUNT: CPT | Performed by: EMERGENCY MEDICINE

## 2024-09-11 PROCEDURE — 63600175 PHARM REV CODE 636 W HCPCS: Performed by: EMERGENCY MEDICINE

## 2024-09-11 PROCEDURE — 81001 URINALYSIS AUTO W/SCOPE: CPT | Mod: XB | Performed by: EMERGENCY MEDICINE

## 2024-09-11 PROCEDURE — 85730 THROMBOPLASTIN TIME PARTIAL: CPT | Performed by: EMERGENCY MEDICINE

## 2024-09-11 PROCEDURE — 80053 COMPREHEN METABOLIC PANEL: CPT | Performed by: EMERGENCY MEDICINE

## 2024-09-11 PROCEDURE — 83735 ASSAY OF MAGNESIUM: CPT | Performed by: EMERGENCY MEDICINE

## 2024-09-11 PROCEDURE — 85025 COMPLETE CBC W/AUTO DIFF WBC: CPT | Performed by: EMERGENCY MEDICINE

## 2024-09-11 PROCEDURE — 93010 ELECTROCARDIOGRAM REPORT: CPT | Mod: ,,, | Performed by: INTERNAL MEDICINE

## 2024-09-11 PROCEDURE — 80307 DRUG TEST PRSMV CHEM ANLYZR: CPT | Performed by: EMERGENCY MEDICINE

## 2024-09-11 PROCEDURE — 81003 URINALYSIS AUTO W/O SCOPE: CPT | Performed by: EMERGENCY MEDICINE

## 2024-09-11 PROCEDURE — 99285 EMERGENCY DEPT VISIT HI MDM: CPT | Mod: 25

## 2024-09-11 PROCEDURE — 36415 COLL VENOUS BLD VENIPUNCTURE: CPT | Performed by: EMERGENCY MEDICINE

## 2024-09-11 PROCEDURE — 84484 ASSAY OF TROPONIN QUANT: CPT | Performed by: EMERGENCY MEDICINE

## 2024-09-11 PROCEDURE — 85610 PROTHROMBIN TIME: CPT | Performed by: EMERGENCY MEDICINE

## 2024-09-11 PROCEDURE — 93005 ELECTROCARDIOGRAM TRACING: CPT

## 2024-09-11 PROCEDURE — 96372 THER/PROPH/DIAG INJ SC/IM: CPT | Performed by: EMERGENCY MEDICINE

## 2024-09-11 PROCEDURE — 85379 FIBRIN DEGRADATION QUANT: CPT | Performed by: EMERGENCY MEDICINE

## 2024-09-11 RX ORDER — CEFTRIAXONE 1 G/1
1 INJECTION, POWDER, FOR SOLUTION INTRAMUSCULAR; INTRAVENOUS
Status: COMPLETED | OUTPATIENT
Start: 2024-09-11 | End: 2024-09-12

## 2024-09-11 RX ORDER — HYDROCODONE BITARTRATE AND ACETAMINOPHEN 10; 325 MG/1; MG/1
1 TABLET ORAL
Status: COMPLETED | OUTPATIENT
Start: 2024-09-11 | End: 2024-09-11

## 2024-09-11 RX ORDER — KETOROLAC TROMETHAMINE 30 MG/ML
30 INJECTION, SOLUTION INTRAMUSCULAR; INTRAVENOUS
Status: COMPLETED | OUTPATIENT
Start: 2024-09-11 | End: 2024-09-11

## 2024-09-11 RX ORDER — POTASSIUM CHLORIDE 20 MEQ/1
40 TABLET, EXTENDED RELEASE ORAL
Status: COMPLETED | OUTPATIENT
Start: 2024-09-11 | End: 2024-09-12

## 2024-09-11 RX ADMIN — HYDROCODONE BITARTRATE AND ACETAMINOPHEN 1 TABLET: 10; 325 TABLET ORAL at 10:09

## 2024-09-11 RX ADMIN — KETOROLAC TROMETHAMINE 30 MG: 30 INJECTION, SOLUTION INTRAMUSCULAR at 10:09

## 2024-09-11 NOTE — Clinical Note
"Stuart Recio" Pedro Luis was seen and treated in our emergency department on 9/11/2024.  She may return to work on 09/13/2024.       If you have any questions or concerns, please don't hesitate to call.       RN    "

## 2024-09-11 NOTE — Clinical Note
"Stuart Recio" Pedro Luis was seen and treated in our emergency department on 9/11/2024.  She may return to work on 09/13/2024.       If you have any questions or concerns, please don't hesitate to call.      Ketty WILKINSON    "

## 2024-09-11 NOTE — Clinical Note
________________________________ accompanied their spouse to the emergency department on 9/11/2024. They may return to work on 09/13/2024.      If you have any questions or concerns, please don't hesitate to call.       RN

## 2024-09-12 ENCOUNTER — HOSPITAL ENCOUNTER (EMERGENCY)
Facility: HOSPITAL | Age: 54
Discharge: HOME | End: 2024-09-12
Payer: MEDICAID

## 2024-09-12 VITALS
OXYGEN SATURATION: 98 % | DIASTOLIC BLOOD PRESSURE: 73 MMHG | HEART RATE: 78 BPM | HEIGHT: 66 IN | RESPIRATION RATE: 20 BRPM | WEIGHT: 247 LBS | BODY MASS INDEX: 39.7 KG/M2 | SYSTOLIC BLOOD PRESSURE: 119 MMHG | TEMPERATURE: 98 F

## 2024-09-12 VITALS
BODY MASS INDEX: 39.05 KG/M2 | WEIGHT: 243 LBS | HEIGHT: 66 IN | SYSTOLIC BLOOD PRESSURE: 109 MMHG | HEART RATE: 83 BPM | OXYGEN SATURATION: 95 % | DIASTOLIC BLOOD PRESSURE: 75 MMHG | TEMPERATURE: 98 F | RESPIRATION RATE: 17 BRPM

## 2024-09-12 DIAGNOSIS — R06.02 SHORTNESS OF BREATH: ICD-10-CM

## 2024-09-12 DIAGNOSIS — R10.13 EPIGASTRIC PAIN: ICD-10-CM

## 2024-09-12 DIAGNOSIS — R07.9 CHEST PAIN, UNSPECIFIED TYPE: Primary | ICD-10-CM

## 2024-09-12 PROCEDURE — 96375 TX/PRO/DX INJ NEW DRUG ADDON: CPT

## 2024-09-12 PROCEDURE — 96372 THER/PROPH/DIAG INJ SC/IM: CPT | Performed by: EMERGENCY MEDICINE

## 2024-09-12 PROCEDURE — 63600175 PHARM REV CODE 636 W HCPCS: Performed by: NURSE PRACTITIONER

## 2024-09-12 PROCEDURE — 63600175 PHARM REV CODE 636 W HCPCS: Performed by: EMERGENCY MEDICINE

## 2024-09-12 PROCEDURE — 99285 EMERGENCY DEPT VISIT HI MDM: CPT | Mod: 25

## 2024-09-12 PROCEDURE — 25000003 PHARM REV CODE 250: Performed by: EMERGENCY MEDICINE

## 2024-09-12 PROCEDURE — 96374 THER/PROPH/DIAG INJ IV PUSH: CPT

## 2024-09-12 PROCEDURE — 25500020 PHARM REV CODE 255: Performed by: NURSE PRACTITIONER

## 2024-09-12 RX ORDER — KETOROLAC TROMETHAMINE 30 MG/ML
30 INJECTION, SOLUTION INTRAMUSCULAR; INTRAVENOUS
Status: COMPLETED | OUTPATIENT
Start: 2024-09-12 | End: 2024-09-12

## 2024-09-12 RX ORDER — LEVOTHYROXINE SODIUM 25 UG/1
25 TABLET ORAL
Qty: 30 TABLET | Refills: 11 | Status: SHIPPED | OUTPATIENT
Start: 2024-09-12 | End: 2025-09-12

## 2024-09-12 RX ORDER — DEXAMETHASONE SODIUM PHOSPHATE 4 MG/ML
4 INJECTION, SOLUTION INTRA-ARTICULAR; INTRALESIONAL; INTRAMUSCULAR; INTRAVENOUS; SOFT TISSUE
Status: COMPLETED | OUTPATIENT
Start: 2024-09-12 | End: 2024-09-12

## 2024-09-12 RX ORDER — IOPAMIDOL 755 MG/ML
100 INJECTION, SOLUTION INTRAVASCULAR
Status: COMPLETED | OUTPATIENT
Start: 2024-09-12 | End: 2024-09-12

## 2024-09-12 RX ORDER — CEFDINIR 300 MG/1
300 CAPSULE ORAL 2 TIMES DAILY
Qty: 20 CAPSULE | Refills: 0 | Status: SHIPPED | OUTPATIENT
Start: 2024-09-12 | End: 2024-09-22

## 2024-09-12 RX ADMIN — CEFTRIAXONE SODIUM 1 G: 1 INJECTION, POWDER, FOR SOLUTION INTRAMUSCULAR; INTRAVENOUS at 12:09

## 2024-09-12 RX ADMIN — POTASSIUM CHLORIDE 40 MEQ: 1500 TABLET, EXTENDED RELEASE ORAL at 12:09

## 2024-09-12 RX ADMIN — IOPAMIDOL 100 ML: 755 INJECTION, SOLUTION INTRAVENOUS at 04:09

## 2024-09-12 RX ADMIN — KETOROLAC TROMETHAMINE 30 MG: 30 INJECTION, SOLUTION INTRAMUSCULAR at 04:09

## 2024-09-12 RX ADMIN — DEXAMETHASONE SODIUM PHOSPHATE 4 MG: 4 INJECTION, SOLUTION INTRA-ARTICULAR; INTRALESIONAL; INTRAMUSCULAR; INTRAVENOUS; SOFT TISSUE at 04:09

## 2024-09-12 NOTE — ED TRIAGE NOTES
Patient arrives to ED with multiple complaints. Patient states that she is having pleuritic like chest pain that is worse when moving. Patient also says that she has been coughing and having intermittent emesis episodes. Patient was seen here last night for same complaints and for a supposed syncopal episode.

## 2024-09-12 NOTE — DISCHARGE INSTRUCTIONS
Follow up with GI as scheduled for evaluation of epigastric pain. Scope may possibly be moved up if opening occurs. Return to ED if any new or worsening of symptoms occur.

## 2024-09-12 NOTE — ED PROVIDER NOTES
Encounter Date: 9/12/2024       History     Chief Complaint   Patient presents with    Pleurisy    Cough    Emesis     54-year-old female presents to ED with complaint of chest pain, epigastric pain, cough, and pain with inspiration.  Patient reports that she has had symptoms for approximately 1-2 weeks without relief of symptoms.  Patient states she was seen in ED on last night and was found to have a UTI after she had a syncopal episode with chest pain at work on last night.  Patient was also presumed on her Synthroid.  Patient reports taking both medicine regimens on today.  She reports pain to lower chest upper abdomen with tenderness to palpation.  Patient reports having some relief of symptoms when laying on her left side.  She reports that pain radiates into her back.  Reports nausea on today without vomiting but has vomiting for the last 3 days.  Denies diarrhea.    The history is provided by the patient. No  was used.     Review of patient's allergies indicates:   Allergen Reactions    Tylox [oxycodone-acetaminophen] Itching    Latex     Opioids - morphine analogues Hives    Sulfa (sulfonamide antibiotics) Rash    Zithromax z-casimiro [azithromycin] Rash     Past Medical History:   Diagnosis Date    Arthritis     Class 3 severe obesity due to excess calories without serious comorbidity with body mass index (BMI) of 40.0 to 44.9 in adult 03/20/2023    Essential hypertension 03/20/2023    History of gastric ulcer 05/01/2023    Hypothyroidism     Vestibular dizziness 10/23/2023     Past Surgical History:   Procedure Laterality Date    ARTHROSCOPY OF KNEE Right 05/25/2023    Procedure: ARTHROSCOPY, KNEE;  Surgeon: Tacos Aburto MD;  Location: [facility];  Service: Orthopedics;  Laterality: Right;    CHOLECYSTECTOMY  05/09/2024    ENDOSCOPY  05/13/2024    HYSTERECTOMY      INJECTION OF ANESTHETIC AGENT INTO SACROILIAC JOINT Bilateral 05/07/2024    Procedure: BLOCK, SACROILIAC JOINT;   Surgeon: Leny Lang MD;  Location: [facility];  Service: Pain Management;  Laterality: Bilateral;    INJECTION OF JOINT Left 7/30/2024    Procedure: Injection, Joint, Hip, intra-articular hip injection;  Surgeon: Leny Lang MD;  Location: [facility];  Service: Pain Management;  Laterality: Left;    JOINT REPLACEMENT Right     hip replacement    KNEE ARTHROPLASTY Left     KNEE ARTHROSCOPY W/ MENISCECTOMY Right 05/25/2023    Procedure: ARTHROSCOPY, KNEE, WITH MENISCECTOMY;  Surgeon: Tacos Aburto MD;  Location: [facility] OR;  Service: Orthopedics;  Laterality: Right;    LUMPECTOMY, BREAST      ROBOT-ASSISTED CHOLECYSTECTOMY USING DA ANG XI N/A 05/09/2024    Procedure: XI ROBOTIC CHOLECYSTECTOMY;  Surgeon: Mao Dempsey DO;  Location: [facility] OR;  Service: General;  Laterality: N/A;     Family History   Problem Relation Name Age of Onset    Hypertension Maternal Grandmother      Stroke Maternal Grandmother      Stroke Maternal Grandfather      Hypertension Mother      Stroke Mother      Diabetes Mother       Social History     Tobacco Use    Smoking status: Never     Passive exposure: Never    Smokeless tobacco: Never   Substance Use Topics    Alcohol use: Never    Drug use: Never     Review of Systems   Constitutional:  Negative for chills and fever.   HENT:  Negative for congestion, sinus pressure and sinus pain.    Eyes:  Negative for photophobia and visual disturbance.   Respiratory:  Positive for shortness of breath. Negative for cough.    Cardiovascular:  Positive for chest pain. Negative for palpitations.   Gastrointestinal:  Positive for abdominal pain and nausea.   Endocrine: Negative for cold intolerance and heat intolerance.   Genitourinary:  Negative for dysuria and urgency.   Musculoskeletal:  Negative for arthralgias and gait problem.   Skin:  Negative for color change and wound.   Neurological:  Negative for dizziness and weakness.   Hematological:  Negative  for adenopathy. Does not bruise/bleed easily.   Psychiatric/Behavioral:  Negative for agitation and confusion.    All other systems reviewed and are negative.      Physical Exam     Initial Vitals [09/12/24 1455]   BP Pulse Resp Temp SpO2   119/73 78 20 98 °F (36.7 °C) 98 %      MAP       --         Physical Exam    Nursing note and vitals reviewed.  Constitutional: She appears well-developed and well-nourished.   HENT:   Head: Normocephalic and atraumatic.   Eyes: EOM are normal. Pupils are equal, round, and reactive to light.   Neck: Neck supple.   Normal range of motion.  Cardiovascular:  Normal rate and regular rhythm.           No murmur heard.  Pulmonary/Chest: She has no wheezes. She has no rhonchi. She exhibits tenderness.   Abdominal: Abdomen is soft. She exhibits no distension. There is abdominal tenderness.   Musculoskeletal:         General: No tenderness or edema.      Cervical back: Normal range of motion and neck supple.     Lymphadenopathy:     She has no cervical adenopathy.   Neurological: She is alert and oriented to person, place, and time. No cranial nerve deficit or sensory deficit.   Skin: Skin is warm and dry. Capillary refill takes less than 2 seconds.   Psychiatric: She has a normal mood and affect. Thought content normal.         Medical Screening Exam   See Full Note    ED Course   Procedures  Labs Reviewed - No data to display       Imaging Results              CTA Chest Non-Coronary (PE Studies) (Final result)  Result time 09/12/24 17:35:49      Final result by Harrison Sigala MD (09/12/24 17:35:49)                   Impression:      No evidence of pulmonary embolism.    No acute intrathoracic process.      Electronically signed by: Harrison Sigala MD  Date:    09/12/2024  Time:    17:35               Narrative:    EXAMINATION:  CTA CHEST NON CORONARY (PE STUDIES)    CLINICAL HISTORY:  Pulmonary embolism (PE) suspected, neg D-dimer;    TECHNIQUE:  Low dose axial images, sagittal and coronal  reformations were obtained from the thoracic inlet to the lung bases following the IV administration of 100 mL of Isovue-370 .  Contrast timing was optimized to evaluate the pulmonary arteries.  MIP images were performed.    COMPARISON:  Chest x-ray dated 09/11/2024.    FINDINGS:  CT pulmonary embolism examination:    There are no filling defects within the pulmonary tree to suggest pulmonary embolism.    CT chest:    The base of the neck is within normal limits.  The thyroid gland is unremarkable.  The supraclavicular regions are within normal limits    The trachea is unremarkable.  The central airways are within normal limits.  There is no evidence of bronchiectasis.  No endobronchial lesions identified.    The heart is unremarkable.  There are no pericardial effusions.  There is no evidence of intracardiac thrombus.    The thoracic aorta is normal in caliber.  There is no intimal flap to suggest a dissection.  The great vessels arising from the aortic arch are within normal limits.    There is no evidence of lymphadenopathy within the mediastinal hilar regions.  The axillary regions are unremarkable.    There are no pleural effusions.  There is no evidence of a pneumothorax.  There is no evidence of pneumomediastinum.  No airspace opacity is present.  No discrete pulmonary nodule is identified.    The esophagus is unremarkable.  The visualized upper abdominal structures are within normal limits    The chest wall is unremarkable there are mild degenerative changes in the osseous structures.                                       CT Abdomen Pelvis With IV Contrast NO Oral Contrast (Final result)  Result time 09/12/24 17:46:21      Final result by Harrison Sigala MD (09/12/24 17:46:21)                   Impression:      No acute intra-abdominal or pelvic process.    Scattered colonic diverticula without evidence of acute diverticulitis.    Additional findings as above.      Electronically signed by: Harrison Sigala  MD  Date:    09/12/2024  Time:    17:46               Narrative:    EXAMINATION:  CT ABDOMEN PELVIS WITH IV CONTRAST    CLINICAL HISTORY:  Epigastric pain;Nausea/vomiting;    TECHNIQUE:  Low dose axial images, sagittal and coronal reformations were obtained from the lung bases to the pubic symphysis following the IV administration of 100 mL of Isovue-370 .  Oral contrast was not given.    COMPARISON:  CT scan of the abdomen pelvis dated 04/24/2024.    FINDINGS:  There are no pleural effusions.  No airspace opacity is present.  No pulmonary nodules identified.    The heart is unremarkable.  There is normal tapering of the abdominal aorta.  The celiac trunk, the SMA, and DEN are within normal limits.  The portal veins and mesenteric veins are within normal limits.  The IVC and the remainder of the venous structures are within normal limits.    There is no evidence of lymphadenopathy in the abdomen or pelvis.    The esophagus, stomach, and duodenum are within normal limits.  The small bowel loops are unremarkable.  The appendix is unremarkable.  There is colonic diverticula without evidence of acute diverticulitis.    The liver is unremarkable.  The gallbladder is within normal limits.  The biliary tree is unremarkable.  The spleen is unremarkable.  The pancreas is within normal limits.  The adrenal glands are unremarkable.    The kidneys are within normal limits.  The ureters and urinary bladder are unremarkable.  The patient is status post hysterectomy.    There is no evidence of free fluid in the abdomen or pelvis.  There is no evidence of free air.  There is nodes of pneumatosis.  No portal venous air is identified.    The psoas margins are unremarkable.  There is an umbilical hernia containing omental fat.  There is subcutaneous emphysema in the left gluteal region.  This is most likely from medication injection.  There are changes of total right hip arthroplasty.                                       Medications    ketorolac injection 30 mg (30 mg Intravenous Given 9/12/24 1624)   dexAMETHasone injection 4 mg (4 mg Intravenous Given 9/12/24 1624)   iopamidoL (ISOVUE-370) injection 100 mL (100 mLs Intravenous Given 9/12/24 1631)     Medical Decision Making  54-year-old female presents to ED with complaint of chest pain, epigastric pain, cough, and pain with inspiration.  Patient reports that she has had symptoms for approximately 1-2 weeks without relief of symptoms.  Patient states she was seen in ED on last night and was found to have a UTI after she had a syncopal episode with chest pain at work on last night.  Patient was also presumed on her Synthroid.  Patient reports taking both medicine regimens on today.  She reports pain to lower chest upper abdomen with tenderness to palpation.  Patient reports having some relief of symptoms when laying on her left side.  She reports that pain radiates into her back.  Reports nausea on today without vomiting but has vomiting for the last 3 days.  Denies diarrhea    Diagnostics obtained  Decadron, Toradol    Amount and/or Complexity of Data Reviewed  Radiology: ordered.     Details: No acute processes CT chest, abdomen, pelvis    Risk  Prescription drug management.                                      Clinical Impression:   Final diagnoses:  [R07.9] Chest pain, unspecified type (Primary)  [R06.02] Shortness of breath  [R10.13] Epigastric pain        ED Disposition Condition    Discharge Stable          ED Prescriptions    None       Follow-up Information    None          Nereida Carmichael, [facility]  09/12/24 [id number]

## 2024-09-12 NOTE — ED PROVIDER NOTES
Encounter Date: 9/11/2024    SCRIBE #1 NOTE: I, Leny Hendrickson, am scribing for, and in the presence of,  Trey Driver MD.       History     Chief Complaint   Patient presents with    Loss of Consciousness    Chest Pain     Patient is a 54 y.o. female that arrives to the ED with c/o LOC and Chest Pain. Patient reports being at work and she felt hot and got dizzy and LOC around 18:30 today. She said she has been having breathing problems where it is painful for her to breath. Patient reports vomiting, SOB, and back pain. Patient has History of gastric ulcer, HTN and Vestibular dizziness. Patient reports having mid abdominal pain, No stomach pain, and chest pain.            The history is provided by the patient. No  was used.     Review of patient's allergies indicates:   Allergen Reactions    Tylox [oxycodone-acetaminophen] Itching    Latex     Opioids - morphine analogues Hives    Sulfa (sulfonamide antibiotics) Rash    Zithromax z-casimiro [azithromycin] Rash     Past Medical History:   Diagnosis Date    Arthritis     Class 3 severe obesity due to excess calories without serious comorbidity with body mass index (BMI) of 40.0 to 44.9 in adult 03/20/2023    Essential hypertension 03/20/2023    History of gastric ulcer 05/01/2023    Hypothyroidism     Vestibular dizziness 10/23/2023     Past Surgical History:   Procedure Laterality Date    ARTHROSCOPY OF KNEE Right 05/25/2023    Procedure: ARTHROSCOPY, KNEE;  Surgeon: Tacos Aburto MD;  Location: [facility];  Service: Orthopedics;  Laterality: Right;    CHOLECYSTECTOMY  05/09/2024    ENDOSCOPY  05/13/2024    HYSTERECTOMY      INJECTION OF ANESTHETIC AGENT INTO SACROILIAC JOINT Bilateral 05/07/2024    Procedure: BLOCK, SACROILIAC JOINT;  Surgeon: Leny Lang MD;  Location: [facility];  Service: Pain Management;  Laterality: Bilateral;    INJECTION OF JOINT Left 7/30/2024    Procedure: Injection, Joint, Hip, intra-articular hip  injection;  Surgeon: Leny Lang MD;  Location: [facility]IN [facility];  Service: Pain Management;  Laterality: Left;    JOINT REPLACEMENT Right     hip replacement    KNEE ARTHROPLASTY Left     KNEE ARTHROSCOPY W/ MENISCECTOMY Right 05/25/2023    Procedure: ARTHROSCOPY, KNEE, WITH MENISCECTOMY;  Surgeon: Tacos Aburto MD;  Location: [facility] ORTHO OR;  Service: Orthopedics;  Laterality: Right;    LUMPECTOMY, BREAST      ROBOT-ASSISTED CHOLECYSTECTOMY USING DA ANG XI N/A 05/09/2024    Procedure: XI ROBOTIC CHOLECYSTECTOMY;  Surgeon: Mao Dempsey DO;  Location: [facility] OR;  Service: General;  Laterality: N/A;     Family History   Problem Relation Name Age of Onset    Hypertension Maternal Grandmother      Stroke Maternal Grandmother      Stroke Maternal Grandfather      Hypertension Mother      Stroke Mother      Diabetes Mother       Social History     Tobacco Use    Smoking status: Never     Passive exposure: Never    Smokeless tobacco: Never   Substance Use Topics    Alcohol use: Never    Drug use: Never     Review of Systems   Constitutional:  Negative for chills, fatigue and fever.   Eyes:  Negative for pain.   Respiratory:  Positive for shortness of breath.    Cardiovascular:  Positive for chest pain. Negative for leg swelling.   Gastrointestinal:  Positive for vomiting. Negative for constipation, diarrhea and nausea.   Endocrine: Negative for polyuria.   Genitourinary:  Negative for difficulty urinating.   Musculoskeletal:  Positive for back pain.   Allergic/Immunologic: Negative.    All other systems reviewed and are negative.      Physical Exam     Initial Vitals   BP Pulse Resp Temp SpO2   09/11/24 2130 09/11/24 2130 09/11/24 2129 09/11/24 2130 09/11/24 2130   123/85 85 16 97.9 °F (36.6 °C) 99 %      MAP       --                Physical Exam    Nursing note and vitals reviewed.  Constitutional: She appears well-developed and well-nourished.   HENT:   Head: Normocephalic and atraumatic.   Eyes:  Conjunctivae and EOM are normal. Pupils are equal, round, and reactive to light.   Neck: Neck supple.   Normal range of motion.  Cardiovascular:  Normal rate, regular rhythm, normal heart sounds and intact distal pulses.           Pulmonary/Chest: Breath sounds normal.   Abdominal: Abdomen is soft. Bowel sounds are normal.   Musculoskeletal:         General: Normal range of motion.      Cervical back: Normal range of motion and neck supple.     Neurological: She is alert and oriented to person, place, and time. She has normal strength.   Skin: Skin is warm and dry. Capillary refill takes less than 2 seconds.   Psychiatric: She has a normal mood and affect. Thought content normal.         ED Course   Procedures  Labs Reviewed   COMPREHENSIVE METABOLIC PANEL - Abnormal       Result Value    Sodium 138      Potassium 2.9 (*)     Chloride 102      CO2 31      Anion Gap 8      Glucose 130 (*)     BUN 17      Creatinine 1.18 (*)     BUN/Creatinine Ratio 14      Calcium 8.5      Total Protein 5.6 (*)     Albumin 2.9 (*)     Globulin 2.7      A/G Ratio 1.1      Bilirubin, Total 0.3      Alk Phos 55      ALT 28      AST 16      eGFR 56 (*)    MAGNESIUM - Abnormal    Magnesium 2.4 (*)    URINALYSIS, REFLEX TO URINE CULTURE - Abnormal    Color, UA Light-Yellow      Clarity, UA Clear      pH, UA 5.5      Leukocytes, UA Small (*)     Nitrites, UA Negative      Protein, UA Negative      Glucose, UA Normal      Ketones, UA Negative      Urobilinogen, UA Normal      Bilirubin, UA Negative      Blood, UA Trace (*)     Specific Gravity, UA 1.012     CBC WITH DIFFERENTIAL - Abnormal    WBC 11.56 (*)     RBC 5.07      Hemoglobin 15.2      Hematocrit 45.1      MCV 89.0      MCH 30.0      MCHC 33.7      RDW 12.6      Platelet Count 257      MPV 11.0      Neutrophils % 72.9 (*)     Lymphocytes % 19.8 (*)     Monocytes % 5.8      Eosinophils % 0.5 (*)     Basophils % 0.5      Immature Granulocytes % 0.5 (*)     nRBC, Auto 0.0       Neutrophils, Abs 8.42 (*)     Lymphocytes, Absolute 2.29      Monocytes, Absolute 0.67      Eosinophils, Absolute 0.06      Basophils, Absolute 0.06      Immature Granulocytes, Absolute 0.06 (*)     nRBC, Absolute 0.00      Diff Type Auto     URINALYSIS, MICROSCOPIC - Abnormal    WBC, UA 16 (*)     RBC, UA 3      Bacteria, UA Occasional (*)     Squamous Epithelial Cells, UA Occasional (*)     Hyaline Casts, UA 2-5 (*)     Mucous Occasional (*)    NT-PRO NATRIURETIC PEPTIDE - Normal    ProBNP 19     DRUG SCREEN, URINE (BEAKER) - Normal    Barbiturates, Urine Negative      Benzodiazepine, Urine Negative      Opiates, Urine Negative      Phencyclidine, Urine Negative      Amphetamine, Urine Negative      Cannabinoid, Urine Negative      Cocaine, Urine Negative      Narrative:     The results of screening tests should be considered presumptive. Confirmatory testing is available upon request.    Cutoff Points:  PCP:         25ng/mL  AMPH:        500ng/mL  ELIZABETH:        200ng/mL  JERI:        200ng/mL  THC:         50ng/mL  GERMAN:         300ng/mL  OPI:         2000ng/mL   TROPONIN I - Normal    Troponin I High Sensitivity 12.5     TSH - Normal    TSH 3.690     PROTIME-INR - Normal    PT 13.6      INR 1.05     APTT - Normal    PTT 26.9     D DIMER, QUANTITATIVE - Normal    D-Dimer <0.27     CULTURE, URINE    Culture, Urine Skin/Urogenital Coretta Isolated, no further workup.     CBC W/ AUTO DIFFERENTIAL    Narrative:     The following orders were created for panel order CBC auto differential.  Procedure                               Abnormality         Status                     ---------                               -----------         ------                     CBC with Differential[[id number]]       Abnormal            Final result                 Please view results for these tests on the individual orders.        ECG Results              EKG 12-lead (Final result)        Collection Time Result Time QRS Duration OHS QTC  Calculation    09/11/24 21:51:31 09/17/24 05:31:00 84 415                     Final result by Interface, Lab In [facility] (09/17/24 05:31:03)                   Narrative:    Test Reason : R55,    Vent. Rate : 080 BPM     Atrial Rate : 000 BPM     P-R Int : 156 ms          QRS Dur : 084 ms      QT Int : 380 ms       P-R-T Axes : 065 -03 027 degrees     QTc Int : 415 ms    Sinus rhythm  Normal ECG    Confirmed by Julianna ARCE, Adonay DE DIOS ([id number]) on 9/17/2024 5:30:59 AM    Referred By: [email or web address]   SELF           Confirmed By:Adonay Levine MD                                  Imaging Results              X-Ray Chest AP Portable (Final result)  Result time 09/11/24 22:16:38      Final result by Harrison Sigala MD (09/11/24 22:16:38)                   Impression:      No acute intrathoracic process.      Electronically signed by: Harrison Sigala MD  Date:    09/11/2024  Time:    22:16               Narrative:    EXAMINATION:  XR CHEST AP PORTABLE    CLINICAL HISTORY:  Dyspnea, unspecified    TECHNIQUE:  Single frontal view of the chest was performed.    COMPARISON:  09/03/2024.    FINDINGS:  Monitoring EKG leads are present.  The trachea is unremarkable.  The cardiomediastinal silhouette is within normal limits.  The hilar structures are unremarkable.  There is no evidence of free air beneath the hemidiaphragms.  There are no pleural effusions.  There is no evidence of a pneumothorax.  There is no evidence of pneumomediastinum.  No airspace opacity is present.  The osseous structures are unremarkable.                                    X-Rays:   Independently Interpreted Readings:   Other Readings:  EXAMINATION:  XR CHEST AP PORTABLE     CLINICAL HISTORY:  Dyspnea, unspecified     TECHNIQUE:  Single frontal view of the chest was performed.     COMPARISON:  09/03/2024.     FINDINGS:  Monitoring EKG leads are present.  The trachea is unremarkable.  The cardiomediastinal silhouette is within normal limits.  The hilar structures are unremarkable.   There is no evidence of free air beneath the hemidiaphragms.  There are no pleural effusions.  There is no evidence of a pneumothorax.  There is no evidence of pneumomediastinum.  No airspace opacity is present.  The osseous structures are unremarkable.     Impression:     No acute intrathoracic process.        Electronically signed by:Harrison Sigala MD  Date:                                            09/11/2024  Time:                                           22:16                    Medications   ketorolac injection 30 mg (30 mg Intramuscular Given 9/11/24 2258)   HYDROcodone-acetaminophen  mg per tablet 1 tablet (1 tablet Oral Given 9/11/24 2258)   potassium chloride SA CR tablet 40 mEq (40 mEq Oral Given 9/12/24 0003)   cefTRIAXone injection 1 g (1 g Intramuscular Given 9/12/24 0004)     Medical Decision Making  DYSPNEA,LOC, CHEST PAIN    DDX:  CARDIAC VS PULMONARY / INFECTIOUS VS GI VS OTHER    OUTPATIENT FOLLOW UP.  ABX    Amount and/or Complexity of Data Reviewed  Labs: ordered. Decision-making details documented in ED Course.  Radiology: ordered. Decision-making details documented in ED Course.  ECG/medicine tests: ordered. Decision-making details documented in ED Course.    Risk  Prescription drug management.              Attending Attestation:           Physician Attestation for Scribe:  Physician Attestation Statement for Scribe #1: I, Trey Driver MD, reviewed documentation, as scribed by Leny Hendrickson in my presence, and it is both accurate and complete.             ED Course as of 09/29/24 2132   Wed Sep 11, 2024   2220 09/11/24 2218  X-Ray Chest AP Portable  Performed: 09/11/24 2209  Final         Impression: No acute intrathoracic process. Electronically signed by: Harrison Sigala MD Date: 09/11/2024 Time: 22:16       [CM]      ED Course User Index  [CM] Leny Hendrickson                           Clinical Impression:  Final diagnoses:  [R55] Syncope  [R06.00] Dyspnea  [R07.89] Chest wall pain  (Primary)  [E87.6] Hypokalemia  [N39.0] Urinary tract infection without hematuria, site unspecified          ED Disposition Condition    Discharge Stable          ED Prescriptions       Medication Sig Dispense Start Date End Date Auth. Provider    levothyroxine (SYNTHROID) 25 MCG tablet Take 1 tablet (25 mcg total) by mouth before breakfast. 30 tablet 2024 Trey Driver MD    cefdinir (OMNICEF) 300 MG capsule () Take 1 capsule (300 mg total) by mouth 2 (two) times daily. for 10 days 20 capsule 2024 Trey Driver MD          Follow-up Information       Follow up With Specialties Details Why Contact Info    Maria L Phillips, [facility]  As needed [address] [place] [address] [place] [address]  [phone]               Trey Driver MD  24 [phone]

## 2024-09-13 ENCOUNTER — TELEPHONE (OUTPATIENT)
Dept: EMERGENCY MEDICINE | Facility: HOSPITAL | Age: 54
End: 2024-09-13
Payer: MEDICAID

## 2024-09-13 LAB — UA COMPLETE W REFLEX CULTURE PNL UR: NORMAL

## 2024-09-16 LAB
OHS QRS DURATION: 82 MS
OHS QTC CALCULATION: 442 MS

## 2024-09-17 LAB
OHS QRS DURATION: 84 MS
OHS QTC CALCULATION: 415 MS

## 2024-09-26 ENCOUNTER — HOSPITAL ENCOUNTER (EMERGENCY)
Facility: HOSPITAL | Age: 54
Discharge: HOME | End: 2024-09-26
Payer: COMMERCIAL

## 2024-09-26 VITALS
BODY MASS INDEX: 39.7 KG/M2 | HEART RATE: 79 BPM | TEMPERATURE: 98 F | SYSTOLIC BLOOD PRESSURE: 111 MMHG | RESPIRATION RATE: 16 BRPM | WEIGHT: 247 LBS | OXYGEN SATURATION: 98 % | DIASTOLIC BLOOD PRESSURE: 74 MMHG | HEIGHT: 66 IN

## 2024-09-26 DIAGNOSIS — R07.9 CHEST PAIN: ICD-10-CM

## 2024-09-26 LAB
ANION GAP SERPL CALCULATED.3IONS-SCNC: 7 MMOL/L (ref 7–16)
BASOPHILS # BLD AUTO: 0.04 K/UL (ref 0–0.2)
BASOPHILS NFR BLD AUTO: 0.6 % (ref 0–1)
BUN SERPL-MCNC: 14 MG/DL (ref 7–18)
BUN/CREAT SERPL: 18 (ref 6–20)
CALCIUM SERPL-MCNC: 8.6 MG/DL (ref 8.5–10.1)
CHLORIDE SERPL-SCNC: 111 MMOL/L (ref 98–107)
CO2 SERPL-SCNC: 28 MMOL/L (ref 21–32)
CREAT SERPL-MCNC: 0.77 MG/DL (ref 0.55–1.02)
DIFFERENTIAL METHOD BLD: ABNORMAL
EGFR (NO RACE VARIABLE) (RUSH/TITUS): 93 ML/MIN/1.73M2
EOSINOPHIL # BLD AUTO: 0.17 K/UL (ref 0–0.5)
EOSINOPHIL NFR BLD AUTO: 2.7 % (ref 1–4)
ERYTHROCYTE [DISTWIDTH] IN BLOOD BY AUTOMATED COUNT: 12.9 % (ref 11.5–14.5)
GLUCOSE SERPL-MCNC: 104 MG/DL (ref 74–106)
HCT VFR BLD AUTO: 39 % (ref 38–47)
HGB BLD-MCNC: 13.1 G/DL (ref 12–16)
IMM GRANULOCYTES # BLD AUTO: 0 K/UL (ref 0–0.04)
IMM GRANULOCYTES NFR BLD: 0 % (ref 0–0.4)
LYMPHOCYTES # BLD AUTO: 1.6 K/UL (ref 1–4.8)
LYMPHOCYTES NFR BLD AUTO: 25.5 % (ref 27–41)
MCH RBC QN AUTO: 30.3 PG (ref 27–31)
MCHC RBC AUTO-ENTMCNC: 33.6 G/DL (ref 32–36)
MCV RBC AUTO: 90.3 FL (ref 80–96)
MONOCYTES # BLD AUTO: 0.51 K/UL (ref 0–0.8)
MONOCYTES NFR BLD AUTO: 8.1 % (ref 2–6)
MPC BLD CALC-MCNC: 11.2 FL (ref 9.4–12.4)
NEUTROPHILS # BLD AUTO: 3.95 K/UL (ref 1.8–7.7)
NEUTROPHILS NFR BLD AUTO: 63.1 % (ref 53–65)
NRBC # BLD AUTO: 0 X10E3/UL
NRBC, AUTO (.00): 0 %
PLATELET # BLD AUTO: 267 K/UL (ref 150–400)
POTASSIUM SERPL-SCNC: 3.6 MMOL/L (ref 3.5–5.1)
RBC # BLD AUTO: 4.32 M/UL (ref 4.2–5.4)
SODIUM SERPL-SCNC: 142 MMOL/L (ref 136–145)
TROPONIN I SERPL DL<=0.01 NG/ML-MCNC: 4.3 PG/ML
TROPONIN I SERPL DL<=0.01 NG/ML-MCNC: 5.5 PG/ML
WBC # BLD AUTO: 6.27 K/UL (ref 4.5–11)

## 2024-09-26 PROCEDURE — 36415 COLL VENOUS BLD VENIPUNCTURE: CPT

## 2024-09-26 PROCEDURE — 85025 COMPLETE CBC W/AUTO DIFF WBC: CPT

## 2024-09-26 PROCEDURE — 93010 ELECTROCARDIOGRAM REPORT: CPT | Mod: ,,, | Performed by: INTERNAL MEDICINE

## 2024-09-26 PROCEDURE — 93005 ELECTROCARDIOGRAM TRACING: CPT

## 2024-09-26 PROCEDURE — 96372 THER/PROPH/DIAG INJ SC/IM: CPT

## 2024-09-26 PROCEDURE — 63600175 PHARM REV CODE 636 W HCPCS

## 2024-09-26 PROCEDURE — 80048 BASIC METABOLIC PNL TOTAL CA: CPT

## 2024-09-26 PROCEDURE — 84484 ASSAY OF TROPONIN QUANT: CPT

## 2024-09-26 PROCEDURE — 99285 EMERGENCY DEPT VISIT HI MDM: CPT | Mod: 25

## 2024-09-26 RX ORDER — METHYLPREDNISOLONE SOD SUCC 125 MG
125 VIAL (EA) INJECTION
Status: COMPLETED | OUTPATIENT
Start: 2024-09-26 | End: 2024-09-26

## 2024-09-26 RX ORDER — PREDNISONE 10 MG/1
10 TABLET ORAL DAILY
Qty: 10 TABLET | Refills: 0 | Status: SHIPPED | OUTPATIENT
Start: 2024-09-26 | End: 2024-10-06

## 2024-09-26 RX ORDER — KETOROLAC TROMETHAMINE 30 MG/ML
30 INJECTION, SOLUTION INTRAMUSCULAR; INTRAVENOUS
Status: COMPLETED | OUTPATIENT
Start: 2024-09-26 | End: 2024-09-26

## 2024-09-26 RX ORDER — METHOCARBAMOL 500 MG/1
1000 TABLET, FILM COATED ORAL 3 TIMES DAILY
Qty: 60 TABLET | Refills: 0 | Status: SHIPPED | OUTPATIENT
Start: 2024-09-26 | End: 2024-10-06

## 2024-09-26 RX ADMIN — KETOROLAC TROMETHAMINE 30 MG: 30 INJECTION, SOLUTION INTRAMUSCULAR at 08:09

## 2024-09-26 RX ADMIN — METHYLPREDNISOLONE SODIUM SUCCINATE 125 MG: 125 INJECTION, POWDER, FOR SOLUTION INTRAMUSCULAR; INTRAVENOUS at 08:09

## 2024-09-26 NOTE — Clinical Note
"Stuart Cortesloni" Pedro Luis was seen and treated in our emergency department on 9/26/2024.  She may return to work on 09/27/2024.       If you have any questions or concerns, please don't hesitate to call.      Matti Lubin, NP"

## 2024-09-27 ENCOUNTER — HOSPITAL ENCOUNTER (OUTPATIENT)
Dept: RADIOLOGY | Facility: HOSPITAL | Age: 54
Discharge: HOME | End: 2024-09-27
Attending: STUDENT IN AN ORGANIZED HEALTH CARE EDUCATION/TRAINING PROGRAM
Payer: COMMERCIAL

## 2024-09-27 ENCOUNTER — TELEPHONE (OUTPATIENT)
Dept: EMERGENCY MEDICINE | Facility: HOSPITAL | Age: 54
End: 2024-09-27
Payer: COMMERCIAL

## 2024-09-27 DIAGNOSIS — Z90.49 S/P LAPAROSCOPIC CHOLECYSTECTOMY: ICD-10-CM

## 2024-09-27 DIAGNOSIS — R10.9 ABDOMINAL PAIN, UNSPECIFIED ABDOMINAL LOCATION: ICD-10-CM

## 2024-09-27 PROCEDURE — 74177 CT ABD & PELVIS W/CONTRAST: CPT | Mod: 26,,, | Performed by: RADIOLOGY

## 2024-09-27 PROCEDURE — 74177 CT ABD & PELVIS W/CONTRAST: CPT | Mod: TC

## 2024-09-27 PROCEDURE — 25500020 PHARM REV CODE 255: Performed by: STUDENT IN AN ORGANIZED HEALTH CARE EDUCATION/TRAINING PROGRAM

## 2024-09-27 RX ORDER — IOPAMIDOL 755 MG/ML
100 INJECTION, SOLUTION INTRAVASCULAR
Status: COMPLETED | OUTPATIENT
Start: 2024-09-27 | End: 2024-09-27

## 2024-09-27 RX ADMIN — IOPAMIDOL 100 ML: 755 INJECTION, SOLUTION INTRAVENOUS at 08:09

## 2024-09-27 NOTE — DISCHARGE INSTRUCTIONS
Take medication as ordered. Follow up with cardiology, they will call you with an appointment date/time. Return to the emergency department for new or worsening symptoms.

## 2024-09-27 NOTE — ED PROVIDER NOTES
Encounter Date: 9/26/2024       History     Chief Complaint   Patient presents with    Chest Pain     54-year old female presents to the emergency department for evaluation of chest pain. Patient reports that she recently seen here two weeks ago and discharged, and has also followed up with pcp, who diagnosed her with costochondritis. Further reports intermittent episodes of chest pain that is relieved with toradol, solumedrol and norco. Denies following up with cardiology. Denies fever, chills, nausea, vomiting, back pain, abdominal pain, dysuria.    The history is provided by the patient. No  was used.   Chest Pain  The current episode started several weeks ago. Chest pain occurs intermittently. The chest pain is unchanged. The quality of the pain is described as aching. The pain does not radiate. Pertinent negatives for primary symptoms include no fever, no fatigue, no shortness of breath, no cough, no wheezing, no palpitations, no nausea, no vomiting, no dizziness and no altered mental status. She tried nothing for the symptoms. There are no known risk factors.     Review of patient's allergies indicates:   Allergen Reactions    Tylox [oxycodone-acetaminophen] Itching    Latex     Opioids - morphine analogues Hives    Sulfa (sulfonamide antibiotics) Rash    Zithromax z-casimiro [azithromycin] Rash     Past Medical History:   Diagnosis Date    Arthritis     Class 3 severe obesity due to excess calories without serious comorbidity with body mass index (BMI) of 40.0 to 44.9 in adult 03/20/2023    Essential hypertension 03/20/2023    History of gastric ulcer 05/01/2023    Hypothyroidism     Vestibular dizziness 10/23/2023     Past Surgical History:   Procedure Laterality Date    ARTHROSCOPY OF KNEE Right 05/25/2023    Procedure: ARTHROSCOPY, KNEE;  Surgeon: Tacos Aburto MD;  Location: [facility];  Service: Orthopedics;  Laterality: Right;    CHOLECYSTECTOMY  05/09/2024    ENDOSCOPY   05/13/2024    HYSTERECTOMY      INJECTION OF ANESTHETIC AGENT INTO SACROILIAC JOINT Bilateral 05/07/2024    Procedure: BLOCK, SACROILIAC JOINT;  Surgeon: Leny Lang MD;  Location: [facility];  Service: Pain Management;  Laterality: Bilateral;    INJECTION OF JOINT Left 7/30/2024    Procedure: Injection, Joint, Hip, intra-articular hip injection;  Surgeon: Leny Lang MD;  Location: [facility];  Service: Pain Management;  Laterality: Left;    JOINT REPLACEMENT Right     hip replacement    KNEE ARTHROPLASTY Left     KNEE ARTHROSCOPY W/ MENISCECTOMY Right 05/25/2023    Procedure: ARTHROSCOPY, KNEE, WITH MENISCECTOMY;  Surgeon: Tacos Aburto MD;  Location: [facility] OR;  Service: Orthopedics;  Laterality: Right;    LUMPECTOMY, BREAST      ROBOT-ASSISTED CHOLECYSTECTOMY USING DA ANG XI N/A 05/09/2024    Procedure: XI ROBOTIC CHOLECYSTECTOMY;  Surgeon: Mao Dempsey DO;  [facility] OR;  Service: General;  Laterality: N/A;     Family History   Problem Relation Name Age of Onset    Hypertension Maternal Grandmother      Stroke Maternal Grandmother      Stroke Maternal Grandfather      Hypertension Mother      Stroke Mother      Diabetes Mother       Social History     Tobacco Use    Smoking status: Never     Passive exposure: Never    Smokeless tobacco: Never   Substance Use Topics    Alcohol use: Never    Drug use: Never     Review of Systems   Constitutional:  Negative for chills, fatigue and fever.   Eyes:  Negative for photophobia.   Respiratory:  Negative for cough, chest tightness, shortness of breath and wheezing.    Cardiovascular:  Positive for chest pain. Negative for palpitations.   Gastrointestinal:  Negative for nausea and vomiting.   Genitourinary:  Negative for decreased urine volume and difficulty urinating.   Musculoskeletal:  Negative for back pain, neck pain and neck stiffness.   Neurological:  Negative for dizziness, tremors and headaches.    Psychiatric/Behavioral:  Negative for confusion.    All other systems reviewed and are negative.      Physical Exam     Initial Vitals   BP Pulse Resp Temp SpO2   09/26/24 1816 09/26/24 1816 09/26/24 1816 09/26/24 1816 09/26/24 1816   111/74 79 16 97.8 °F (36.6 °C) 98 %      MAP       --                Physical Exam    Vitals reviewed.  Constitutional: She appears well-nourished. No distress.   Eyes: Conjunctivae and EOM are normal. Pupils are equal, round, and reactive to light. Right eye exhibits no discharge. Left eye exhibits no discharge.   Neck: Neck supple.   Normal range of motion.  Cardiovascular:  Normal rate, regular rhythm and normal heart sounds.           Pulmonary/Chest: Breath sounds normal. No respiratory distress. She has no rhonchi. She exhibits no mass, no tenderness, no edema, no deformity and no swelling.   Abdominal: Abdomen is soft. Bowel sounds are normal. She exhibits no distension. There is no abdominal tenderness. There is no guarding.   Musculoskeletal:         General: Normal range of motion.      Cervical back: Normal range of motion and neck supple.     Lymphadenopathy:     She has no cervical adenopathy.   Neurological: She is alert and oriented to person, place, and time. She has normal strength. No sensory deficit. GCS score is 15. GCS eye subscore is 4. GCS verbal subscore is 5. GCS motor subscore is 6.   Skin: Skin is warm and dry. Capillary refill takes less than 2 seconds.   Psychiatric: She has a normal mood and affect. Her behavior is normal.         Medical Screening Exam   See Full Note    ED Course   Procedures  Labs Reviewed   BASIC METABOLIC PANEL - Abnormal       Result Value    Sodium 142      Potassium 3.6      Chloride 111 (*)     CO2 28      Anion Gap 7      Glucose 104      BUN 14      Creatinine 0.77      BUN/Creatinine Ratio 18      Calcium 8.6      eGFR 93     CBC WITH DIFFERENTIAL - Abnormal    WBC 6.27      RBC 4.32      Hemoglobin 13.1      Hematocrit 39.0       MCV 90.3      MCH 30.3      MCHC 33.6      RDW 12.9      Platelet Count 267      MPV 11.2      Neutrophils % 63.1      Lymphocytes % 25.5 (*)     Monocytes % 8.1 (*)     Eosinophils % 2.7      Basophils % 0.6      Immature Granulocytes % 0.0      nRBC, Auto 0.0      Neutrophils, Abs 3.95      Lymphocytes, Absolute 1.60      Monocytes, Absolute 0.51      Eosinophils, Absolute 0.17      Basophils, Absolute 0.04      Immature Granulocytes, Absolute 0.00      nRBC, Absolute 0.00      Diff Type Auto     TROPONIN I - Normal    Troponin I High Sensitivity 5.5     CBC W/ AUTO DIFFERENTIAL    Narrative:     The following orders were created for panel order CBC auto differential.  Procedure                               Abnormality         Status                     ---------                               -----------         ------                     CBC with Differential[[id number]]       Abnormal            Final result                 Please view results for these tests on the individual orders.   TROPONIN I          Imaging Results              X-Ray Chest PA And Lateral (Final result)  Result time 09/26/24 19:43:23      Final result by Mao Benton MD (09/26/24 19:43:23)                   Impression:      Coarse interstitial attenuation, nonspecific.  No large focal consolidation.      Electronically signed by: Mao Benton MD  Date:    09/26/2024  Time:    19:43               Narrative:    EXAMINATION:  XR CHEST PA AND LATERAL    CLINICAL HISTORY:  Chest pain, unspecified    TECHNIQUE:  PA and lateral views of the chest were performed.    COMPARISON:  09/11/2024    FINDINGS:  The cardiomediastinal silhouette is not enlarged.  There is no pleural effusion.  The trachea is midline.  The lungs are symmetrically expanded bilaterally with mildly coarse interstitial attenuation.  No large focal consolidation seen.  There is no pneumothorax.  The osseous structures are remarkable for degenerative change..                                        Medications   ketorolac injection 30 mg (30 mg Intramuscular Given 9/26/24 2001)   methylPREDNISolone sodium succinate injection 125 mg (125 mg Intramuscular Given 9/26/24 2002)     Medical Decision Making  54-year old female presents to the emergency department for evaluation of chest pain. Patient reports that she recently seen here two weeks ago and discharged, and has also followed up with pcp, who diagnosed her with costochondritis. Further reports intermittent episodes of chest pain that is relieved with toradol, solumedrol and norco. Denies following up with cardiology. Denies fever, chills, nausea, vomiting, back pain, abdominal pain, dysuria.  Ordered and reviewed EKG with results significant for normal sinus rhythm at 69 beats per minute  Ordered and reviewed labs   Ordered and reviewed chest x-ray as well as radiologist's interpretation with results significant for coarse interstitial attenuation, nonspecific.  No large focal consolidation.  Medications ordered   Prescriptions provided   Follow up referral given to cardiology  Return precautions and follow up discussed with patient, with patient verbalizing understanding   Diagnosis: Chest pain    Amount and/or Complexity of Data Reviewed  Labs: ordered.  Radiology: ordered.    Risk  Prescription drug management.                                      Clinical Impression:   Final diagnoses:  [R07.9] Chest pain        ED Disposition Condition    Discharge Stable          ED Prescriptions       Medication Sig Dispense Start Date End Date Auth. Provider    methocarbamoL (ROBAXIN) 500 MG Tab Take 2 tablets (1,000 mg total) by mouth 3 (three) times daily. for 10 days 60 tablet 9/26/2024 10/6/2024 Matti Lubin, NP    predniSONE (DELTASONE) 10 MG tablet Take 1 tablet (10 mg total) by mouth once daily. for 10 days 10 tablet 9/26/2024 10/6/2024 Matti Lubin, NP          Follow-up Information    None          Matti Lubin,  NP  09/26/24 2236

## 2024-09-30 NOTE — H&P (VIEW-ONLY)
Subjective:         Patient ID: Stuart Cloud is a 54 y.o. female.    Chief Complaint: Hip Pain and Back Pain      Pain  This is a chronic problem. The current episode started more than 1 year ago. The problem occurs daily. The problem has been gradually improving. Associated symptoms include arthralgias. Pertinent negatives include no anorexia, change in bowel habit, chest pain, chills, coughing, diaphoresis, fever, neck pain, rash, sore throat, swollen glands, urinary symptoms, vertigo or vomiting.     Review of Systems   Constitutional:  Negative for activity change, appetite change, chills, diaphoresis, fever and unexpected weight change.   HENT:  Negative for drooling, ear discharge, ear pain, facial swelling, nosebleeds, sore throat, trouble swallowing, voice change and goiter.    Eyes:  Negative for photophobia, pain, discharge, redness and visual disturbance.   Respiratory:  Negative for apnea, cough, choking, chest tightness, shortness of breath, wheezing and stridor.    Cardiovascular:  Negative for chest pain, palpitations and leg swelling.   Gastrointestinal:  Negative for abdominal distention, anorexia, change in bowel habit, diarrhea, rectal pain, vomiting and fecal incontinence.   Endocrine: Negative for cold intolerance, heat intolerance, polydipsia, polyphagia and polyuria.   Genitourinary:  Negative for bladder incontinence, dysuria, flank pain, frequency and hot flashes.   Musculoskeletal:  Positive for arthralgias, back pain and leg pain. Negative for neck pain.   Integumentary:  Negative for color change, pallor and rash.   Allergic/Immunologic: Negative for immunocompromised state.   Neurological:  Negative for dizziness, vertigo, seizures, syncope, facial asymmetry, speech difficulty, light-headedness, memory loss and coordination difficulties.   Hematological:  Negative for adenopathy. Does not bruise/bleed easily.   Psychiatric/Behavioral:  Negative for agitation, behavioral problems,  confusion, decreased concentration, dysphoric mood, hallucinations, self-injury and suicidal ideas. The patient is not nervous/anxious and is not hyperactive.            Past Medical History:   Diagnosis Date    Arthritis     Class 3 severe obesity due to excess calories without serious comorbidity with body mass index (BMI) of 40.0 to 44.9 in adult 03/20/2023    Essential hypertension 03/20/2023    History of gastric ulcer 05/01/2023    Hypothyroidism     Vestibular dizziness 10/23/2023     Past Surgical History:   Procedure Laterality Date    ARTHROSCOPY OF KNEE Right 05/25/2023    Procedure: ARTHROSCOPY, KNEE;  Surgeon: Tacos Aburto MD;  Location: [facility] OR;  Service: Orthopedics;  Laterality: Right;    CHOLECYSTECTOMY  05/09/2024    ENDOSCOPY  05/13/2024    HYSTERECTOMY      INJECTION OF ANESTHETIC AGENT INTO SACROILIAC JOINT Bilateral 05/07/2024    Procedure: BLOCK, SACROILIAC JOINT;  Surgeon: Leny Lang MD;  Location: [facility];  Service: Pain Management;  Laterality: Bilateral;    INJECTION OF JOINT Left 7/30/2024    Procedure: Injection, Joint, Hip, intra-articular hip injection;  Surgeon: Leny Lang MD;  Location: [facility];  Service: Pain Management;  Laterality: Left;    JOINT REPLACEMENT Right     hip replacement    KNEE ARTHROPLASTY Left     KNEE ARTHROSCOPY W/ MENISCECTOMY Right 05/25/2023    Procedure: ARTHROSCOPY, KNEE, WITH MENISCECTOMY;  Surgeon: Tacos Aburto MD;  Location: [facility] OR;  Service: Orthopedics;  Laterality: Right;    LUMPECTOMY, BREAST      ROBOT-ASSISTED CHOLECYSTECTOMY USING DA ANG XI N/A 05/09/2024    Procedure: XI ROBOTIC CHOLECYSTECTOMY;  Surgeon: Mao Dempsey DO;  [facility] OR;  Service: General;  Laterality: N/A;     Social History     Socioeconomic History    Marital status: Single   Tobacco Use    Smoking status: Never     Passive exposure: Never    Smokeless tobacco: Never   Substance  "and Sexual Activity    Alcohol use: Never    Drug use: Never    Sexual activity: Not Currently     Family History   Problem Relation Name Age of Onset    Hypertension Maternal Grandmother      Stroke Maternal Grandmother      Stroke Maternal Grandfather      Hypertension Mother      Stroke Mother      Diabetes Mother       Review of patient's allergies indicates:   Allergen Reactions    Tylox [oxycodone-acetaminophen] Itching    Latex     Opioids - morphine analogues Hives    Sulfa (sulfonamide antibiotics) Rash    Zithromax z-casimiro [azithromycin] Rash        Objective:  Vitals:    10/02/24 1112   BP: 125/78   Pulse: 93   Resp: 18   Weight: 117.5 kg (259 lb)   Height: 5' 6" (1.676 m)   PainSc:   6             Physical Exam  Vitals and nursing note reviewed. Exam conducted with a chaperone present.   Constitutional:       General: She is awake. She is not in acute distress.     Appearance: Normal appearance. She is not ill-appearing, toxic-appearing or diaphoretic.   HENT:      Head: Normocephalic and atraumatic.      Nose: Nose normal.      Mouth/Throat:      Mouth: Mucous membranes are moist.      Pharynx: Oropharynx is clear.   Eyes:      Conjunctiva/sclera: Conjunctivae normal.      Pupils: Pupils are equal, round, and reactive to light.   Cardiovascular:      Rate and Rhythm: Normal rate.   Pulmonary:      Effort: Pulmonary effort is normal. No respiratory distress.   Abdominal:      Palpations: Abdomen is soft.      Tenderness: There is no guarding.   Musculoskeletal:         General: Normal range of motion.      Cervical back: Normal range of motion and neck supple. No rigidity.   Skin:     General: Skin is warm and dry.      Coloration: Skin is not jaundiced or pale.   Neurological:      General: No focal deficit present.      Mental Status: She is alert and oriented to person, place, and time. Mental status is at baseline.      Cranial Nerves: No cranial nerve deficit (II-XII).   Psychiatric:    "      Mood and Affect: Mood normal.         Behavior: Behavior normal. Behavior is cooperative.         Thought Content: Thought content normal.         CT Abdomen Pelvis With IV Contrast NO Oral Contrast (IV contrast only)  Narrative: EXAMINATION:  CT ABDOMEN PELVIS WITH IV CONTRAST    CLINICAL HISTORY:  Abdominal pain, acute, nonlocalized; Acquired absence of other specified parts of digestive tract    TECHNIQUE:  Low dose axial images, sagittal and coronal reformations were obtained from the lung bases to the pubic symphysis following the IV administration of 100 mL of a so view 370 .  Oral contrast was not given.    COMPARISON:  09/12/2024    FINDINGS:  The liver, spleen, pancreas, kidneys and adrenal glands are unremarkable.  There has been a cholecystectomy.    The small bowel is normal caliber.  The appendix is normal.  The colon is unremarkable.    There has been hysterectomy.  The aorta is normal caliber.  There has been a right hip arthroplasty.  There is grade 1 anterolisthesis of L3 on L4 and L4 on L5 and mild retrolisthesis of L5 on S1.  Accompanying mild to moderate degenerative disc changes are present.  Impression: No acute abdominopelvic process.    Cholecystectomy, hysterectomy, and right hip arthroplasty.    Electronically signed by: Dennys Gomez MD  Date:    09/27/2024  Time:    13:06       Admission on 09/26/2024, Discharged on 09/26/2024   Component Date Value Ref Range Status    Sodium 09/26/2024 142  136 - 145 mmol/L Final    Potassium 09/26/2024 3.6  3.5 - 5.1 mmol/L Final    Chloride 09/26/2024 111 (H)  98 - 107 mmol/L Final    CO2 09/26/2024 28  21 - 32 mmol/L Final    Anion Gap 09/26/2024 7  7 - 16 mmol/L Final    Glucose 09/26/2024 104  74 - 106 mg/dL Final    BUN 09/26/2024 14  7 - 18 mg/dL Final    Creatinine 09/26/2024 0.77  0.55 - 1.02 mg/dL Final    BUN/Creatinine Ratio 09/26/2024 18  6 - 20 Final    Calcium 09/26/2024 8.6  8.5 - 10.1 mg/dL Final    eGFR 09/26/2024 93   >=60 mL/min/1.73m2 Final    Troponin I High Sensitivity 09/26/2024 5.5  <=60.4 pg/mL Final    QRS Duration 09/26/2024 78  ms Final    OHS QTC Calculation 09/26/2024 452  ms Final    WBC 09/26/2024 6.27  4.50 - 11.00 K/uL Final    RBC 09/26/2024 4.32  4.20 - 5.40 M/uL Final    Hemoglobin 09/26/2024 13.1  12.0 - 16.0 g/dL Final    Hematocrit 09/26/2024 39.0  38.0 - 47.0 % Final    MCV 09/26/2024 90.3  80.0 - 96.0 fL Final    MCH 09/26/2024 30.3  27.0 - 31.0 pg Final    MCHC 09/26/2024 33.6  32.0 - 36.0 g/dL Final    RDW 09/26/2024 12.9  11.5 - 14.5 % Final    Platelet Count 09/26/2024 267  150 - 400 K/uL Final    MPV 09/26/2024 11.2  9.4 - 12.4 fL Final    Neutrophils % 09/26/2024 63.1  53.0 - 65.0 % Final    Lymphocytes % 09/26/2024 25.5 (L)  27.0 - 41.0 % Final    Monocytes % 09/26/2024 8.1 (H)  2.0 - 6.0 % Final    Eosinophils % 09/26/2024 2.7  1.0 - 4.0 % Final    Basophils % 09/26/2024 0.6  0.0 - 1.0 % Final    Immature Granulocytes % 09/26/2024 0.0  0.0 - 0.4 % Final    nRBC, Auto 09/26/2024 0.0  <=0.0 % Final    Neutrophils, Abs 09/26/2024 3.95  1.80 - 7.70 K/uL Final    Lymphocytes, Absolute 09/26/2024 1.60  1.00 - 4.80 K/uL Final    Monocytes, Absolute 09/26/2024 0.51  0.00 - 0.80 K/uL Final    Eosinophils, Absolute 09/26/2024 0.17  0.00 - 0.50 K/uL Final    Basophils, Absolute 09/26/2024 0.04  0.00 - 0.20 K/uL Final    Immature Granulocytes, Absolute 09/26/2024 0.00  0.00 - 0.04 K/uL Final    nRBC, Absolute 09/26/2024 0.00  <=0.00 x10e3/uL Final    Diff Type 09/26/2024 Auto   Final    Troponin I High Sensitivity 09/26/2024 4.3  <=60.4 pg/mL Final   Orders Only on 09/12/2024   Component Date Value Ref Range Status    QRS Duration 09/12/2024 82  ms Final    OHS QTC Calculation 09/12/2024 442  ms Final   Admission on 09/11/2024, Discharged on 09/12/2024   Component Date Value Ref Range Status    ProBNP 09/11/2024 19  1 - 125 pg/mL Final    Sodium 09/11/2024 138  136 - 145  mmol/L Final    Potassium 09/11/2024 2.9 (L)  3.5 - 5.1 mmol/L Final    Chloride 09/11/2024 102  98 - 107 mmol/L Final    CO2 09/11/2024 31  21 - 32 mmol/L Final    Anion Gap 09/11/2024 8  7 - 16 mmol/L Final    Glucose 09/11/2024 130 (H)  74 - 106 mg/dL Final    BUN 09/11/2024 17  7 - 18 mg/dL Final    Creatinine 09/11/2024 1.18 (H)  0.55 - 1.02 mg/dL Final    BUN/Creatinine Ratio 09/11/2024 14  6 - 20 Final    Calcium 09/11/2024 8.5  8.5 - 10.1 mg/dL Final    Total Protein 09/11/2024 5.6 (L)  6.4 - 8.2 g/dL Final    Albumin 09/11/2024 2.9 (L)  3.5 - 5.0 g/dL Final    Globulin 09/11/2024 2.7  2.0 - 4.0 g/dL Final    A/G Ratio 09/11/2024 1.1   Final    Bilirubin, Total 09/11/2024 0.3  >0.0 - 1.2 mg/dL Final    Alk Phos 09/11/2024 55  41 - 108 U/L Final    ALT 09/11/2024 28  13 - 56 U/L Final    AST 09/11/2024 16  15 - 37 U/L Final    eGFR 09/11/2024 56 (L)  >=60 mL/min/1.73m2 Final    Barbiturates, Urine 09/11/2024 Negative  Negative Final    Benzodiazepine, Urine 09/11/2024 Negative  Negative Final    Opiates, Urine 09/11/2024 Negative  Negative Final    Phencyclidine, Urine 09/11/2024 Negative  Negative Final    Amphetamine, Urine 09/11/2024 Negative  Negative Final    Cannabinoid, Urine 09/11/2024 Negative  Negative Final    Cocaine, Urine 09/11/2024 Negative  Negative Final    Magnesium 09/11/2024 2.4 (H)  1.7 - 2.3 mg/dL Final    Troponin I High Sensitivity 09/11/2024 12.5  <=60.4 pg/mL Final    TSH 09/11/2024 3.690  0.358 - 3.740 uIU/mL Final    Color, UA 09/11/2024 Light-Yellow  Colorless, Straw, Yellow, Light Yellow, Dark Yellow Final    Clarity, UA 09/11/2024 Clear  Clear Final    pH, UA 09/11/2024 5.5  5.0 to 8.0 pH Units Final    Leukocytes, UA 09/11/2024 Small (A)  Negative Final    Nitrites, UA 09/11/2024 Negative  Negative Final    Protein, UA 09/11/2024 Negative  Negative Final    Glucose, UA 09/11/2024 Normal  Normal mg/dL Final    Ketones, UA 09/11/2024 Negative   Negative mg/dL Final    Urobilinogen, UA 09/11/2024 Normal  0.2, 1.0, Normal mg/dL Final    Bilirubin, UA 09/11/2024 Negative  Negative Final    Blood, UA 09/11/2024 Trace (A)  Negative Final    Specific [place], UA 09/11/2024 1.012  <=1.030 Final    PT 09/11/2024 13.6  11.7 - 14.7 seconds Final    INR 09/11/2024 1.05  <=3.30 Final    PTT 09/11/2024 26.9  25.2 - 37.3 seconds Final    D-Dimer 09/11/2024 <0.27  0.00 - 0.47 µg/mL Final    QRS Duration 09/11/2024 84  ms Final    OHS QTC Calculation 09/11/2024 415  ms Final    WBC 09/11/2024 11.56 (H)  4.50 - 11.00 K/uL Final    RBC 09/11/2024 5.07  4.20 - 5.40 M/uL Final    Hemoglobin 09/11/2024 15.2  12.0 - 16.0 g/dL Final    Hematocrit 09/11/2024 45.1  38.0 - 47.0 % Final    MCV 09/11/2024 89.0  80.0 - 96.0 fL Final    MCH 09/11/2024 30.0  27.0 - 31.0 pg Final    MCHC 09/11/2024 33.7  32.0 - 36.0 g/dL Final    RDW 09/11/2024 12.6  11.5 - 14.5 % Final    Platelet Count 09/11/2024 257  150 - 400 K/uL Final    MPV 09/11/2024 11.0  9.4 - 12.4 fL Final    Neutrophils % 09/11/2024 72.9 (H)  53.0 - 65.0 % Final    Lymphocytes % 09/11/2024 19.8 (L)  27.0 - 41.0 % Final    Monocytes % 09/11/2024 5.8  2.0 - 6.0 % Final    Eosinophils % 09/11/2024 0.5 (L)  1.0 - 4.0 % Final    Basophils % 09/11/2024 0.5  0.0 - 1.0 % Final    Immature Granulocytes % 09/11/2024 0.5 (H)  0.0 - 0.4 % Final    nRBC, Auto 09/11/2024 0.0  <=0.0 % Final    Neutrophils, Abs 09/11/2024 8.42 (H)  1.80 - 7.70 K/uL Final    Lymphocytes, Absolute 09/11/2024 2.29  1.00 - 4.80 K/uL Final    Monocytes, Absolute 09/11/2024 0.67  0.00 - 0.80 K/uL Final    Eosinophils, Absolute 09/11/2024 0.06  0.00 - 0.50 K/uL Final    Basophils, Absolute 09/11/2024 0.06  0.00 - 0.20 K/uL Final    Immature Granulocytes, Absolute 09/11/2024 0.06 (H)  0.00 - 0.04 K/uL Final    nRBC, Absolute 09/11/2024 0.00  <=0.00 x10e3/uL Final    Diff Type 09/11/2024 Auto   Final    WBC, UA 09/11/2024 16 (H)   <=5 /hpf Final    RBC, UA 09/11/2024 3  <=3 /hpf Final    Bacteria, UA 09/11/2024 Occasional (A)  None Seen /hpf Final    Squamous Epithelial Cells, UA 09/11/2024 Occasional (A)  None Seen /HPF Final    Hyaline Casts, UA 09/11/2024 2-5 (A)  None Seen /lpf Final    Mucous 09/11/2024 Occasional (A)  None Seen /LPF Final    Culture, Urine 09/11/2024 Skin/Urogenital [place] Isolated, no further workup.   Final   Admission on 09/03/2024, Discharged on 09/03/2024   Component Date Value Ref Range Status    SARS COV-2 Molecular 09/03/2024 Negative  Negative, Invalid Final    Sodium 09/03/2024 138  136 - 145 mmol/L Final    Potassium 09/03/2024 3.6  3.5 - 5.1 mmol/L Final    Chloride 09/03/2024 105  98 - 107 mmol/L Final    CO2 09/03/2024 28  21 - 32 mmol/L Final    Anion Gap 09/03/2024 9  7 - 16 mmol/L Final    Glucose 09/03/2024 100  74 - 106 mg/dL Final    BUN 09/03/2024 15  7 - 18 mg/dL Final    Creatinine 09/03/2024 0.85  0.55 - 1.02 mg/dL Final    BUN/Creatinine Ratio 09/03/2024 18  6 - 20 Final    Calcium 09/03/2024 8.8  8.5 - 10.1 mg/dL Final    Total Protein 09/03/2024 5.8 (L)  6.4 - 8.2 g/dL Final    Albumin 09/03/2024 3.2 (L)  3.5 - 5.0 g/dL Final    Globulin 09/03/2024 2.6  2.0 - 4.0 g/dL Final    A/G Ratio 09/03/2024 1.2   Final    Bilirubin, Total 09/03/2024 0.9  >0.0 - 1.2 mg/dL Final    Alk Phos 09/03/2024 64  41 - 108 U/L Final    ALT 09/03/2024 28  13 - 56 U/L Final    AST 09/03/2024 15  15 - 37 U/L Final    eGFR 09/03/2024 83  >=60 mL/min/1.73m2 Final    Color, UA 09/03/2024 Yellow  Colorless, Straw, Yellow, Light Yellow, Dark Yellow Final    Clarity, UA 09/03/2024 Clear  Clear Final    pH, UA 09/03/2024 5.5  5.0 to 8.0 pH Units Final    Leukocytes, UA 09/03/2024 Negative  Negative Final    Nitrites, UA 09/03/2024 Negative  Negative Final    Protein, UA 09/03/2024 20 (A)  Negative Final    Glucose, UA 09/03/2024 Normal  Normal mg/dL Final    Ketones, UA 09/03/2024 40 (A)   Negative mg/dL Final    Urobilinogen, UA 09/03/2024 2 (A)  0.2, 1.0, Normal mg/dL Final    Bilirubin, UA 09/03/2024 Negative  Negative Final    Blood, UA 09/03/2024 Negative  Negative Final    Specific [place], UA 09/03/2024 1.029  <=1.030 Final    Troponin I High Sensitivity 09/03/2024 4.7  <=60.4 pg/mL Final    QRS Duration 09/03/2024 64  ms Final    OHS QTC Calculation 09/03/2024 442  ms Final    Magnesium 09/03/2024 2.2  1.7 - 2.3 mg/dL Final    D-Dimer 09/03/2024 0.36  0.00 - 0.47 µg/mL Final    ProBNP 09/03/2024 15  1 - 125 pg/mL Final    WBC 09/03/2024 8.60  4.50 - 11.00 K/uL Final    RBC 09/03/2024 5.05  4.20 - 5.40 M/uL Final    Hemoglobin 09/03/2024 15.0  12.0 - 16.0 g/dL Final    Hematocrit 09/03/2024 45.3  38.0 - 47.0 % Final    MCV 09/03/2024 89.7  80.0 - 96.0 fL Final    MCH 09/03/2024 29.7  27.0 - 31.0 pg Final    MCHC 09/03/2024 33.1  32.0 - 36.0 g/dL Final    RDW 09/03/2024 12.7  11.5 - 14.5 % Final    Platelet Count 09/03/2024 292  150 - 400 K/uL Final    MPV 09/03/2024 10.5  9.4 - 12.4 fL Final    Neutrophils % 09/03/2024 69.0 (H)  53.0 - 65.0 % Final    Lymphocytes % 09/03/2024 23.3 (L)  27.0 - 41.0 % Final    Monocytes % 09/03/2024 6.3 (H)  2.0 - 6.0 % Final    Eosinophils % 09/03/2024 0.9 (L)  1.0 - 4.0 % Final    Basophils % 09/03/2024 0.3  0.0 - 1.0 % Final    Immature Granulocytes % 09/03/2024 0.2  0.0 - 0.4 % Final    nRBC, Auto 09/03/2024 0.0  <=0.0 % Final    Neutrophils, Abs 09/03/2024 5.93  1.80 - 7.70 K/uL Final    Lymphocytes, Absolute 09/03/2024 2.00  1.00 - 4.80 K/uL Final    Monocytes, Absolute 09/03/2024 0.54  0.00 - 0.80 K/uL Final    Eosinophils, Absolute 09/03/2024 0.08  0.00 - 0.50 K/uL Final    Basophils, Absolute 09/03/2024 0.03  0.00 - 0.20 K/uL Final    Immature Granulocytes, Absolute 09/03/2024 0.02  0.00 - 0.04 K/uL Final    nRBC, Absolute 09/03/2024 0.00  <=0.00 x10e3/uL Final    Diff Type 09/03/2024 Auto   Final   Admission on  08/08/2024, Discharged on 08/08/2024   Component Date Value Ref Range Status    Color, UA 08/08/2024 Light-Yellow  Colorless, Straw, Yellow, Light Yellow, Dark Yellow Final    Clarity, UA 08/08/2024 Turbid  Clear Final    pH, UA 08/08/2024 5.5  5.0 to 8.0 pH Units Final    Leukocytes, UA 08/08/2024 Moderate (A)  Negative Final    Nitrites, UA 08/08/2024 Negative  Negative Final    Protein, UA 08/08/2024 10 (A)  Negative Final    Glucose, UA 08/08/2024 50 (A)  Normal mg/dL Final    Ketones, UA 08/08/2024 Trace  Negative mg/dL Final    Urobilinogen, UA 08/08/2024 Normal  0.2, 1.0, Normal mg/dL Final    Bilirubin, UA 08/08/2024 Negative  Negative Final    Blood, UA 08/08/2024 Trace (A)  Negative Final    Specific [place], UA 08/08/2024 1.014  <=1.030 Final    WBC, UA 08/08/2024 9 (H)  <=5 /hpf Final    RBC, UA 08/08/2024 2  <=3 /hpf Final    Bacteria, UA 08/08/2024 Occasional (A)  None Seen /hpf Final    Squamous Epithelial Cells, UA 08/08/2024 Occasional (A)  None Seen /HPF Final    Hyaline Casts, UA 08/08/2024 0-2 (A)  None Seen /lpf Final    Mucous 08/08/2024 Occasional (A)  None Seen /LPF Final   Admission on 07/22/2024, Discharged on 07/22/2024   Component Date Value Ref Range Status    Color, UA 07/22/2024 Light-Yellow  Colorless, Straw, Yellow, Light Yellow, Dark Yellow Final    Clarity, UA 07/22/2024 Clear  Clear Final    pH, UA 07/22/2024 5.0  5.0 to 8.0 pH Units Final    Leukocytes, UA 07/22/2024 Negative  Negative Final    Nitrites, UA 07/22/2024 Negative  Negative Final    Protein, UA 07/22/2024 Negative  Negative Final    Glucose, UA 07/22/2024 Normal  Normal mg/dL Final    Ketones, UA 07/22/2024 Negative  Negative mg/dL Final    Urobilinogen, UA 07/22/2024 Normal  0.2, 1.0, Normal mg/dL Final    Bilirubin, UA 07/22/2024 Negative  Negative Final    Blood, UA 07/22/2024 Trace (A)  Negative Final    Specific [place]ity, UA 07/22/2024 1.020  <=1.030 Final    WBC, UA 07/22/2024 1   <=5 /hpf Final    RBC, UA 07/22/2024 1  <=3 /hpf Final    Squamous Epithelial Cells, UA 07/22/2024 Occasional (A)  None Seen /HPF Final    Mucous 07/22/2024 Occasional (A)  None Seen /LPF Final   Admission on 07/19/2024, Discharged on 07/20/2024   Component Date Value Ref Range Status    QRS Duration 07/19/2024 90  ms Final    OHS QTC Calculation 07/19/2024 426  ms Final    Sodium 07/19/2024 142  136 - 145 mmol/L Final    Potassium 07/19/2024 3.1 (L)  3.5 - 5.1 mmol/L Final    Chloride 07/19/2024 109 (H)  98 - 107 mmol/L Final    CO2 07/19/2024 29  21 - 32 mmol/L Final    Anion Gap 07/19/2024 7  7 - 16 mmol/L Final    Glucose 07/19/2024 103  74 - 106 mg/dL Final    BUN 07/19/2024 18  7 - 18 mg/dL Final    Creatinine 07/19/2024 0.70  0.55 - 1.02 mg/dL Final    BUN/Creatinine Ratio 07/19/2024 26 (H)  6 - 20 Final    Calcium 07/19/2024 8.6  8.5 - 10.1 mg/dL Final    Total Protein 07/19/2024 6.3 (L)  6.4 - 8.2 g/dL Final    Albumin 07/19/2024 3.2 (L)  3.5 - 5.0 g/dL Final    Globulin 07/19/2024 3.1  2.0 - 4.0 g/dL Final    A/G Ratio 07/19/2024 1.0   Final    Bilirubin, Total 07/19/2024 0.3  >0.0 - 1.2 mg/dL Final    Alk Phos 07/19/2024 83  41 - 108 U/L Final    ALT 07/19/2024 31  13 - 56 U/L Final    AST 07/19/2024 19  15 - 37 U/L Final    eGFR 07/19/2024 104  >=60 mL/min/1.73m2 Final    Troponin I High Sensitivity 07/19/2024 4.7  <=60.4 pg/mL Final    ProBNP 07/19/2024 50  1 - 125 pg/mL Final    WBC 07/19/2024 6.30  4.50 - 11.00 K/uL Final    RBC 07/19/2024 4.13 (L)  4.20 - 5.40 M/uL Final    Hemoglobin 07/19/2024 12.3  12.0 - 16.0 g/dL Final    Hematocrit 07/19/2024 36.9 (L)  38.0 - 47.0 % Final    MCV 07/19/2024 89.3  80.0 - 96.0 fL Final    MCH 07/19/2024 29.8  27.0 - 31.0 pg Final    MCHC 07/19/2024 33.3  32.0 - 36.0 g/dL Final    RDW 07/19/2024 12.5  11.5 - 14.5 % Final    Platelet Count 07/19/2024 277  150 - 400 K/uL Final    MPV 07/19/2024 11.1  9.4 - 12.4 fL Final     Neutrophils % 07/19/2024 53.1  53.0 - 65.0 % Final    Lymphocytes % 07/19/2024 32.4  27.0 - 41.0 % Final    Monocytes % 07/19/2024 10.0 (H)  2.0 - 6.0 % Final    Eosinophils % 07/19/2024 3.7  1.0 - 4.0 % Final    Basophils % 07/19/2024 0.6  0.0 - 1.0 % Final    Immature Granulocytes % 07/19/2024 0.2  0.0 - 0.4 % Final    nRBC, Auto 07/19/2024 0.0  <=0.0 % Final    Neutrophils, Abs 07/19/2024 3.35  1.80 - 7.70 K/uL Final    Lymphocytes, Absolute 07/19/2024 2.04  1.00 - 4.80 K/uL Final    Monocytes, Absolute 07/19/2024 0.63  0.00 - 0.80 K/uL Final    Eosinophils, Absolute 07/19/2024 0.23  0.00 - 0.50 K/uL Final    Basophils, Absolute 07/19/2024 0.04  0.00 - 0.20 K/uL Final    Immature Granulocytes, Absolute 07/19/2024 0.01  0.00 - 0.04 K/uL Final    nRBC, Absolute 07/19/2024 0.00  <=0.00 x10e3/uL Final    Diff Type 07/19/2024 Auto   Final    Troponin I High Sensitivity 07/19/2024 5.9  <=60.4 pg/mL Final    SARS COV-2 Molecular 07/19/2024 Negative  Negative, Invalid Final    INFLUENZA A MOLECULAR 07/19/2024 Negative  Negative Final    INFLUENZA B MOLECULAR  07/19/2024 Negative  Negative Final    D-Dimer 07/19/2024 0.37  0.00 - 0.47 µg/mL Final    Magnesium 07/19/2024 2.1  1.7 - 2.3 mg/dL Final   Admission on 07/02/2024, Discharged on 07/02/2024   Component Date Value Ref Range Status    Color, UA 07/02/2024 Light-Yellow  Colorless, Straw, Yellow, Light Yellow, Dark Yellow Final    Clarity, UA 07/02/2024 Clear  Clear Final    pH, UA 07/02/2024 6.0  5.0 to 8.0 pH Units Final    Leukocytes, UA 07/02/2024 Negative  Negative Final    Nitrites, UA 07/02/2024 Negative  Negative Final    Protein, UA 07/02/2024 Negative  Negative Final    Glucose, UA 07/02/2024 Normal  Normal mg/dL Final    Ketones, UA 07/02/2024 Negative  Negative mg/dL Final    Urobilinogen, UA 07/02/2024 2 (A)  0.2, 1.0, Normal mg/dL Final    Bilirubin, UA 07/02/2024 Negative  Negative Final    Blood, UA 07/02/2024 Trace (A)   Negative Final    Specific Gravity, UA 07/02/2024 1.025  <=1.030 Final    Sodium 07/02/2024 141  136 - 145 mmol/L Final    Potassium 07/02/2024 3.5  3.5 - 5.1 mmol/L Final    Chloride 07/02/2024 111 (H)  98 - 107 mmol/L Final    CO2 07/02/2024 25  21 - 32 mmol/L Final    Anion Gap 07/02/2024 9  7 - 16 mmol/L Final    Glucose 07/02/2024 120 (H)  74 - 106 mg/dL Final    BUN 07/02/2024 16  7 - 18 mg/dL Final    Creatinine 07/02/2024 0.65  0.55 - 1.02 mg/dL Final    BUN/Creatinine Ratio 07/02/2024 25 (H)  6 - 20 Final    Calcium 07/02/2024 8.5  8.5 - 10.1 mg/dL Final    Total Protein 07/02/2024 6.5  6.4 - 8.2 g/dL Final    Albumin 07/02/2024 3.4 (L)  3.5 - 5.0 g/dL Final    Globulin 07/02/2024 3.1  2.0 - 4.0 g/dL Final    A/G Ratio 07/02/2024 1.1   Final    Bilirubin, Total 07/02/2024 0.4  >0.0 - 1.2 mg/dL Final    Alk Phos 07/02/2024 75  41 - 108 U/L Final    ALT 07/02/2024 33  13 - 56 U/L Final    AST 07/02/2024 21  15 - 37 U/L Final    eGFR 07/02/2024 106  >=60 mL/min/1.73m2 Final    WBC 07/02/2024 6.06  4.50 - 11.00 K/uL Final    RBC 07/02/2024 4.36  4.20 - 5.40 M/uL Final    Hemoglobin 07/02/2024 13.0  12.0 - 16.0 g/dL Final    Hematocrit 07/02/2024 39.3  38.0 - 47.0 % Final    MCV 07/02/2024 90.1  80.0 - 96.0 fL Final    MCH 07/02/2024 29.8  27.0 - 31.0 pg Final    MCHC 07/02/2024 33.1  32.0 - 36.0 g/dL Final    RDW 07/02/2024 13.0  11.5 - 14.5 % Final    Platelet Count 07/02/2024 276  150 - 400 K/uL Final    MPV 07/02/2024 10.6  9.4 - 12.4 fL Final    Neutrophils % 07/02/2024 54.6  53.0 - 65.0 % Final    Lymphocytes % 07/02/2024 33.7  27.0 - 41.0 % Final    Monocytes % 07/02/2024 7.3 (H)  2.0 - 6.0 % Final    Eosinophils % 07/02/2024 3.5  1.0 - 4.0 % Final    Basophils % 07/02/2024 0.7  0.0 - 1.0 % Final    Immature Granulocytes % 07/02/2024 0.2  0.0 - 0.4 % Final    nRBC, Auto 07/02/2024 0.0  <=0.0 % Final    Neutrophils, Abs 07/02/2024 3.32  1.80 - 7.70 K/uL Final     Lymphocytes, Absolute 07/02/2024 2.04  1.00 - 4.80 K/uL Final    Monocytes, Absolute 07/02/2024 0.44  0.00 - 0.80 K/uL Final    Eosinophils, Absolute 07/02/2024 0.21  0.00 - 0.50 K/uL Final    Basophils, Absolute 07/02/2024 0.04  0.00 - 0.20 K/uL Final    Immature Granulocytes, Absolute 07/02/2024 0.01  0.00 - 0.04 K/uL Final    nRBC, Absolute 07/02/2024 0.00  <=0.00 x10e3/uL Final    Diff Type 07/02/2024 Auto   Final    WBC, UA 07/02/2024 2  <=5 /hpf Final    RBC, UA 07/02/2024 2  <=3 /hpf Final    Squamous Epithelial Cells, UA 07/02/2024 Occasional (A)  None Seen /HPF Final    Mucous 07/02/2024 Occasional (A)  None Seen /LPF Final   Office Visit on 05/30/2024   Component Date Value Ref Range Status    Potassium 05/30/2024 4.2  3.5 - 5.1 mmol/L Final    Magnesium 05/30/2024 2.7 (H)  1.7 - 2.3 mg/dL Final   Admission on 05/28/2024, Discharged on 05/28/2024   Component Date Value Ref Range Status    Color, UA 05/28/2024 Yellow  Colorless, Straw, Yellow, Light Yellow, Dark Yellow Final    Clarity, UA 05/28/2024 Clear  Clear Final    pH, UA 05/28/2024 5.0  5.0 to 8.0 pH Units Final    Leukocytes, UA 05/28/2024 Small (A)  Negative Final    Nitrites, UA 05/28/2024 Negative  Negative Final    Protein, UA 05/28/2024 Negative  Negative Final    Glucose, UA 05/28/2024 Normal  Normal mg/dL Final    Ketones, UA 05/28/2024 10 (A)  Negative mg/dL Final    Urobilinogen, UA 05/28/2024 Normal  0.2, 1.0, Normal mg/dL Final    Bilirubin, UA 05/28/2024 Negative  Negative Final    Blood, UA 05/28/2024 Negative  Negative Final    Specific [place], UA 05/28/2024 1.026  <=1.030 Final    WBC, UA 05/28/2024 1  <=5 /hpf Final    RBC, UA 05/28/2024 2  <=3 /hpf Final    Squamous Epithelial Cells, UA 05/28/2024 Occasional (A)  None Seen /HPF Final    Mucous 05/28/2024 Occasional (A)  None Seen /LPF Final   There may be more visits with results that are not included.         Orders Placed This Encounter   Procedures     Case Request Operating Room: Injection, Joint, Hip, intra-articular hip injection     Order Specific Question:   Medical Necessity:     Answer:   Medically Non-Urgent [100]     Order Specific Question:   Case classification     Answer:   E - Elective [90]     Order Specific Question:   Is an on-site pathologist required for this procedure?     Answer:   N/A       Requested Prescriptions      No prescriptions requested or ordered in this encounter       Assessment:     1. Hip pain, left    2. Bilateral sacroiliitis    3. Pelvic pain    4. Sacroiliitis                   Plan:    Not using narcotics/medication from our office    Can not tolerate Cymbalta      Follows Neurology [facility] neuropathy    Follows spine surgery [facility]     Follows orthopedics [facility], right hip replacement left knee replacement    Robotic cholecystectomy May 9, 2024 [facility]      She had bilateral sacroiliac injection # 1 May 7, 2024  She states she had 50% relief after procedure, the procedure did help improve her level function    Follow-up after left intra-articular hip injection # 1 July 30, 2024  She states she had 50% relief after procedure  Procedure did help improve her level function/activities daily living    Today she is complaining of  left anterior groin pain worse with standing walking better with short periods resting    She is requesting procedure for left hip pain     Considering right femoral/obturator nerve block      Considering repeating sacroiliac procedure if sacroiliac discomfort persist    X-ray sacroiliac joint [facility] April 15, 2024  No sacroiliac joint abnormality demonstrated    X-ray hips [facility] March 18, 2024  Right hip arthroplasty  No fracture noted  Left knee arthroplasty  Mild degenerative changes left hip    Requesting Toradol injection   Reviewed epic notes patient had Toradol injection 10 days ago     She verbalized understanding need to try to limit    Monitor  anesthesia request is medically indicated for the scheduled nerve block procedure due to:  1- needle phobia and anxiety, placing  the patient at risk during the provided service.  2-patient has an ASA class greater than 3 and requires constant presence of an anesthesiologist during the procedure,   3-patient has severe problems hard to lie still  4-patient suffers from chronic pain and is unable to function due to  diminished ADLs    Schedule left intra-articular hip injection # 2, left hip pain    Dr. Lang    Bring original prescription medication bottles/container/box with labels to each visit

## 2024-09-30 NOTE — PROGRESS NOTES
Subjective:         Patient ID: Stuart Cloud is a 54 y.o. female.    Chief Complaint: Hip Pain and Back Pain      Pain  This is a chronic problem. The current episode started more than 1 year ago. The problem occurs daily. The problem has been gradually improving. Associated symptoms include arthralgias. Pertinent negatives include no anorexia, change in bowel habit, chest pain, chills, coughing, diaphoresis, fever, neck pain, rash, sore throat, swollen glands, urinary symptoms, vertigo or vomiting.     Review of Systems   Constitutional:  Negative for activity change, appetite change, chills, diaphoresis, fever and unexpected weight change.   HENT:  Negative for drooling, ear discharge, ear pain, facial swelling, nosebleeds, sore throat, trouble swallowing, voice change and goiter.    Eyes:  Negative for photophobia, pain, discharge, redness and visual disturbance.   Respiratory:  Negative for apnea, cough, choking, chest tightness, shortness of breath, wheezing and stridor.    Cardiovascular:  Negative for chest pain, palpitations and leg swelling.   Gastrointestinal:  Negative for abdominal distention, anorexia, change in bowel habit, diarrhea, rectal pain, vomiting and fecal incontinence.   Endocrine: Negative for cold intolerance, heat intolerance, polydipsia, polyphagia and polyuria.   Genitourinary:  Negative for bladder incontinence, dysuria, flank pain, frequency and hot flashes.   Musculoskeletal:  Positive for arthralgias, back pain and leg pain. Negative for neck pain.   Integumentary:  Negative for color change, pallor and rash.   Allergic/Immunologic: Negative for immunocompromised state.   Neurological:  Negative for dizziness, vertigo, seizures, syncope, facial asymmetry, speech difficulty, light-headedness, memory loss and coordination difficulties.   Hematological:  Negative for adenopathy. Does not bruise/bleed easily.   Psychiatric/Behavioral:  Negative for agitation, behavioral problems,  confusion, decreased concentration, dysphoric mood, hallucinations, self-injury and suicidal ideas. The patient is not nervous/anxious and is not hyperactive.            Past Medical History:   Diagnosis Date    Arthritis     Class 3 severe obesity due to excess calories without serious comorbidity with body mass index (BMI) of 40.0 to 44.9 in adult 03/20/2023    Essential hypertension 03/20/2023    History of gastric ulcer 05/01/2023    Hypothyroidism     Vestibular dizziness 10/23/2023     Past Surgical History:   Procedure Laterality Date    ARTHROSCOPY OF KNEE Right 05/25/2023    Procedure: ARTHROSCOPY, KNEE;  Surgeon: Tacos Aburto MD;  Location: [facility];  Service: Orthopedics;  Laterality: Right;    CHOLECYSTECTOMY  05/09/2024    ENDOSCOPY  05/13/2024    HYSTERECTOMY      INJECTION OF ANESTHETIC AGENT INTO SACROILIAC JOINT Bilateral 05/07/2024    Procedure: BLOCK, SACROILIAC JOINT;  Surgeon: Leny Lang MD;  Location: [facility];  Service: Pain Management;  Laterality: Bilateral;    INJECTION OF JOINT Left 7/30/2024    Procedure: Injection, Joint, Hip, intra-articular hip injection;  Surgeon: Leny Lang MD;  Location: [facility];  Service: Pain Management;  Laterality: Left;    JOINT REPLACEMENT Right     hip replacement    KNEE ARTHROPLASTY Left     KNEE ARTHROSCOPY W/ MENISCECTOMY Right 05/25/2023    Procedure: ARTHROSCOPY, KNEE, WITH MENISCECTOMY;  Surgeon: Tacos Aburto MD;  Location: [facility];  Service: Orthopedics;  Laterality: Right;    LUMPECTOMY, BREAST      ROBOT-ASSISTED CHOLECYSTECTOMY USING DA ANG XI N/A 05/09/2024    Procedure: XI ROBOTIC CHOLECYSTECTOMY;  Surgeon: Mao Dempsey DO;  Location: [facility] OR;  Service: General;  Laterality: N/A;     Social History     Socioeconomic History    Marital status: Single   Tobacco Use    Smoking status: Never     Passive exposure: Never    Smokeless tobacco: Never   Substance  "and Sexual Activity    Alcohol use: Never    Drug use: Never    Sexual activity: Not Currently     Family History   Problem Relation Name Age of Onset    Hypertension Maternal Grandmother      Stroke Maternal Grandmother      Stroke Maternal Grandfather      Hypertension Mother      Stroke Mother      Diabetes Mother       Review of patient's allergies indicates:   Allergen Reactions    Tylox [oxycodone-acetaminophen] Itching    Latex     Opioids - morphine analogues Hives    Sulfa (sulfonamide antibiotics) Rash    Zithromax z-casimiro [azithromycin] Rash        Objective:  Vitals:    10/02/24 1112   BP: 125/78   Pulse: 93   Resp: 18   Weight: 117.5 kg (259 lb)   Height: 5' 6" (1.676 m)   PainSc:   6             Physical Exam  Vitals and nursing note reviewed. Exam conducted with a chaperone present.   Constitutional:       General: She is awake. She is not in acute distress.     Appearance: Normal appearance. She is not ill-appearing, toxic-appearing or diaphoretic.   HENT:      Head: Normocephalic and atraumatic.      Nose: Nose normal.      Mouth/Throat:      Mouth: Mucous membranes are moist.      Pharynx: Oropharynx is clear.   Eyes:      Conjunctiva/sclera: Conjunctivae normal.      Pupils: Pupils are equal, round, and reactive to light.   Cardiovascular:      Rate and Rhythm: Normal rate.   Pulmonary:      Effort: Pulmonary effort is normal. No respiratory distress.   Abdominal:      Palpations: Abdomen is soft.      Tenderness: There is no guarding.   Musculoskeletal:         General: Normal range of motion.      Cervical back: Normal range of motion and neck supple. No rigidity.   Skin:     General: Skin is warm and dry.      Coloration: Skin is not jaundiced or pale.   Neurological:      General: No focal deficit present.      Mental Status: She is alert and oriented to person, place, and time. Mental status is at baseline.      Cranial Nerves: No cranial nerve deficit (II-XII).   Psychiatric:    "      Mood and Affect: Mood normal.         Behavior: Behavior normal. Behavior is cooperative.         Thought Content: Thought content normal.         CT Abdomen Pelvis With IV Contrast NO Oral Contrast (IV contrast only)  Narrative: EXAMINATION:  CT ABDOMEN PELVIS WITH IV CONTRAST    CLINICAL HISTORY:  Abdominal pain, acute, nonlocalized; Acquired absence of other specified parts of digestive tract    TECHNIQUE:  Low dose axial images, sagittal and coronal reformations were obtained from the lung bases to the pubic symphysis following the IV administration of 100 mL of a so view 370 .  Oral contrast was not given.    COMPARISON:  09/12/2024    FINDINGS:  The liver, spleen, pancreas, kidneys and adrenal glands are unremarkable.  There has been a cholecystectomy.    The small bowel is normal caliber.  The appendix is normal.  The colon is unremarkable.    There has been hysterectomy.  The aorta is normal caliber.  There has been a right hip arthroplasty.  There is grade 1 anterolisthesis of L3 on L4 and L4 on L5 and mild retrolisthesis of L5 on S1.  Accompanying mild to moderate degenerative disc changes are present.  Impression: No acute abdominopelvic process.    Cholecystectomy, hysterectomy, and right hip arthroplasty.    Electronically signed by: Dennys Gomez MD  Date:    09/27/2024  Time:    13:06       Admission on 09/26/2024, Discharged on 09/26/2024   Component Date Value Ref Range Status    Sodium 09/26/2024 142  136 - 145 mmol/L Final    Potassium 09/26/2024 3.6  3.5 - 5.1 mmol/L Final    Chloride 09/26/2024 111 (H)  98 - 107 mmol/L Final    CO2 09/26/2024 28  21 - 32 mmol/L Final    Anion Gap 09/26/2024 7  7 - 16 mmol/L Final    Glucose 09/26/2024 104  74 - 106 mg/dL Final    BUN 09/26/2024 14  7 - 18 mg/dL Final    Creatinine 09/26/2024 0.77  0.55 - 1.02 mg/dL Final    BUN/Creatinine Ratio 09/26/2024 18  6 - 20 Final    Calcium 09/26/2024 8.6  8.5 - 10.1 mg/dL Final    eGFR 09/26/2024 93   >=60 mL/min/1.73m2 Final    Troponin I High Sensitivity 09/26/2024 5.5  <=60.4 pg/mL Final    QRS Duration 09/26/2024 78  ms Final    OHS QTC Calculation 09/26/2024 452  ms Final    WBC 09/26/2024 6.27  4.50 - 11.00 K/uL Final    RBC 09/26/2024 4.32  4.20 - 5.40 M/uL Final    Hemoglobin 09/26/2024 13.1  12.0 - 16.0 g/dL Final    Hematocrit 09/26/2024 39.0  38.0 - 47.0 % Final    MCV 09/26/2024 90.3  80.0 - 96.0 fL Final    MCH 09/26/2024 30.3  27.0 - 31.0 pg Final    MCHC 09/26/2024 33.6  32.0 - 36.0 g/dL Final    RDW 09/26/2024 12.9  11.5 - 14.5 % Final    Platelet Count 09/26/2024 267  150 - 400 K/uL Final    MPV 09/26/2024 11.2  9.4 - 12.4 fL Final    Neutrophils % 09/26/2024 63.1  53.0 - 65.0 % Final    Lymphocytes % 09/26/2024 25.5 (L)  27.0 - 41.0 % Final    Monocytes % 09/26/2024 8.1 (H)  2.0 - 6.0 % Final    Eosinophils % 09/26/2024 2.7  1.0 - 4.0 % Final    Basophils % 09/26/2024 0.6  0.0 - 1.0 % Final    Immature Granulocytes % 09/26/2024 0.0  0.0 - 0.4 % Final    nRBC, Auto 09/26/2024 0.0  <=0.0 % Final    Neutrophils, Abs 09/26/2024 3.95  1.80 - 7.70 K/uL Final    Lymphocytes, Absolute 09/26/2024 1.60  1.00 - 4.80 K/uL Final    Monocytes, Absolute 09/26/2024 0.51  0.00 - 0.80 K/uL Final    Eosinophils, Absolute 09/26/2024 0.17  0.00 - 0.50 K/uL Final    Basophils, Absolute 09/26/2024 0.04  0.00 - 0.20 K/uL Final    Immature Granulocytes, Absolute 09/26/2024 0.00  0.00 - 0.04 K/uL Final    nRBC, Absolute 09/26/2024 0.00  <=0.00 x10e3/uL Final    Diff Type 09/26/2024 Auto   Final    Troponin I High Sensitivity 09/26/2024 4.3  <=60.4 pg/mL Final   Orders Only on 09/12/2024   Component Date Value Ref Range Status    QRS Duration 09/12/2024 82  ms Final    OHS QTC Calculation 09/12/2024 442  ms Final   Admission on 09/11/2024, Discharged on 09/12/2024   Component Date Value Ref Range Status    ProBNP 09/11/2024 19  1 - 125 pg/mL Final    Sodium 09/11/2024 138  136 - 145  mmol/L Final    Potassium 09/11/2024 2.9 (L)  3.5 - 5.1 mmol/L Final    Chloride 09/11/2024 102  98 - 107 mmol/L Final    CO2 09/11/2024 31  21 - 32 mmol/L Final    Anion Gap 09/11/2024 8  7 - 16 mmol/L Final    Glucose 09/11/2024 130 (H)  74 - 106 mg/dL Final    BUN 09/11/2024 17  7 - 18 mg/dL Final    Creatinine 09/11/2024 1.18 (H)  0.55 - 1.02 mg/dL Final    BUN/Creatinine Ratio 09/11/2024 14  6 - 20 Final    Calcium 09/11/2024 8.5  8.5 - 10.1 mg/dL Final    Total Protein 09/11/2024 5.6 (L)  6.4 - 8.2 g/dL Final    Albumin 09/11/2024 2.9 (L)  3.5 - 5.0 g/dL Final    Globulin 09/11/2024 2.7  2.0 - 4.0 g/dL Final    A/G Ratio 09/11/2024 1.1   Final    Bilirubin, Total 09/11/2024 0.3  >0.0 - 1.2 mg/dL Final    Alk Phos 09/11/2024 55  41 - 108 U/L Final    ALT 09/11/2024 28  13 - 56 U/L Final    AST 09/11/2024 16  15 - 37 U/L Final    eGFR 09/11/2024 56 (L)  >=60 mL/min/1.73m2 Final    Barbiturates, Urine 09/11/2024 Negative  Negative Final    Benzodiazepine, Urine 09/11/2024 Negative  Negative Final    Opiates, Urine 09/11/2024 Negative  Negative Final    Phencyclidine, Urine 09/11/2024 Negative  Negative Final    Amphetamine, Urine 09/11/2024 Negative  Negative Final    Cannabinoid, Urine 09/11/2024 Negative  Negative Final    Cocaine, Urine 09/11/2024 Negative  Negative Final    Magnesium 09/11/2024 2.4 (H)  1.7 - 2.3 mg/dL Final    Troponin I High Sensitivity 09/11/2024 12.5  <=60.4 pg/mL Final    TSH 09/11/2024 3.690  0.358 - 3.740 uIU/mL Final    Color, UA 09/11/2024 Light-Yellow  Colorless, Straw, Yellow, Light Yellow, Dark Yellow Final    Clarity, UA 09/11/2024 Clear  Clear Final    pH, UA 09/11/2024 5.5  5.0 to 8.0 pH Units Final    Leukocytes, UA 09/11/2024 Small (A)  Negative Final    Nitrites, UA 09/11/2024 Negative  Negative Final    Protein, UA 09/11/2024 Negative  Negative Final    Glucose, UA 09/11/2024 Normal  Normal mg/dL Final    Ketones, UA 09/11/2024 Negative   Negative mg/dL Final    Urobilinogen, UA 09/11/2024 Normal  0.2, 1.0, Normal mg/dL Final    Bilirubin, UA 09/11/2024 Negative  Negative Final    Blood, UA 09/11/2024 Trace (A)  Negative Final    Specific [place], UA 09/11/2024 1.012  <=1.030 Final    PT 09/11/2024 13.6  11.7 - 14.7 seconds Final    INR 09/11/2024 1.05  <=3.30 Final    PTT 09/11/2024 26.9  25.2 - 37.3 seconds Final    D-Dimer 09/11/2024 <0.27  0.00 - 0.47 µg/mL Final    QRS Duration 09/11/2024 84  ms Final    OHS QTC Calculation 09/11/2024 415  ms Final    WBC 09/11/2024 11.56 (H)  4.50 - 11.00 K/uL Final    RBC 09/11/2024 5.07  4.20 - 5.40 M/uL Final    Hemoglobin 09/11/2024 15.2  12.0 - 16.0 g/dL Final    Hematocrit 09/11/2024 45.1  38.0 - 47.0 % Final    MCV 09/11/2024 89.0  80.0 - 96.0 fL Final    MCH 09/11/2024 30.0  27.0 - 31.0 pg Final    MCHC 09/11/2024 33.7  32.0 - 36.0 g/dL Final    RDW 09/11/2024 12.6  11.5 - 14.5 % Final    Platelet Count 09/11/2024 257  150 - 400 K/uL Final    MPV 09/11/2024 11.0  9.4 - 12.4 fL Final    Neutrophils % 09/11/2024 72.9 (H)  53.0 - 65.0 % Final    Lymphocytes % 09/11/2024 19.8 (L)  27.0 - 41.0 % Final    Monocytes % 09/11/2024 5.8  2.0 - 6.0 % Final    Eosinophils % 09/11/2024 0.5 (L)  1.0 - 4.0 % Final    Basophils % 09/11/2024 0.5  0.0 - 1.0 % Final    Immature Granulocytes % 09/11/2024 0.5 (H)  0.0 - 0.4 % Final    nRBC, Auto 09/11/2024 0.0  <=0.0 % Final    Neutrophils, Abs 09/11/2024 8.42 (H)  1.80 - 7.70 K/uL Final    Lymphocytes, Absolute 09/11/2024 2.29  1.00 - 4.80 K/uL Final    Monocytes, Absolute 09/11/2024 0.67  0.00 - 0.80 K/uL Final    Eosinophils, Absolute 09/11/2024 0.06  0.00 - 0.50 K/uL Final    Basophils, Absolute 09/11/2024 0.06  0.00 - 0.20 K/uL Final    Immature Granulocytes, Absolute 09/11/2024 0.06 (H)  0.00 - 0.04 K/uL Final    nRBC, Absolute 09/11/2024 0.00  <=0.00 x10e3/uL Final    Diff Type 09/11/2024 Auto   Final    WBC, UA 09/11/2024 16 (H)   <=5 /hpf Final    RBC, UA 09/11/2024 3  <=3 /hpf Final    Bacteria, UA 09/11/2024 Occasional (A)  None Seen /hpf Final    Squamous Epithelial Cells, UA 09/11/2024 Occasional (A)  None Seen /HPF Final    Hyaline Casts, UA 09/11/2024 2-5 (A)  None Seen /lpf Final    Mucous 09/11/2024 Occasional (A)  None Seen /LPF Final    Culture, Urine 09/11/2024 Skin/Urogenital [place] Isolated, no further workup.   Final   Admission on 09/03/2024, Discharged on 09/03/2024   Component Date Value Ref Range Status    SARS COV-2 Molecular 09/03/2024 Negative  Negative, Invalid Final    Sodium 09/03/2024 138  136 - 145 mmol/L Final    Potassium 09/03/2024 3.6  3.5 - 5.1 mmol/L Final    Chloride 09/03/2024 105  98 - 107 mmol/L Final    CO2 09/03/2024 28  21 - 32 mmol/L Final    Anion Gap 09/03/2024 9  7 - 16 mmol/L Final    Glucose 09/03/2024 100  74 - 106 mg/dL Final    BUN 09/03/2024 15  7 - 18 mg/dL Final    Creatinine 09/03/2024 0.85  0.55 - 1.02 mg/dL Final    BUN/Creatinine Ratio 09/03/2024 18  6 - 20 Final    Calcium 09/03/2024 8.8  8.5 - 10.1 mg/dL Final    Total Protein 09/03/2024 5.8 (L)  6.4 - 8.2 g/dL Final    Albumin 09/03/2024 3.2 (L)  3.5 - 5.0 g/dL Final    Globulin 09/03/2024 2.6  2.0 - 4.0 g/dL Final    A/G Ratio 09/03/2024 1.2   Final    Bilirubin, Total 09/03/2024 0.9  >0.0 - 1.2 mg/dL Final    Alk Phos 09/03/2024 64  41 - 108 U/L Final    ALT 09/03/2024 28  13 - 56 U/L Final    AST 09/03/2024 15  15 - 37 U/L Final    eGFR 09/03/2024 83  >=60 mL/min/1.73m2 Final    Color, UA 09/03/2024 Yellow  Colorless, Straw, Yellow, Light Yellow, Dark Yellow Final    Clarity, UA 09/03/2024 Clear  Clear Final    pH, UA 09/03/2024 5.5  5.0 to 8.0 pH Units Final    Leukocytes, UA 09/03/2024 Negative  Negative Final    Nitrites, UA 09/03/2024 Negative  Negative Final    Protein, UA 09/03/2024 20 (A)  Negative Final    Glucose, UA 09/03/2024 Normal  Normal mg/dL Final    Ketones, UA 09/03/2024 40 (A)   Negative mg/dL Final    Urobilinogen, UA 09/03/2024 2 (A)  0.2, 1.0, Normal mg/dL Final    Bilirubin, UA 09/03/2024 Negative  Negative Final    Blood, UA 09/03/2024 Negative  Negative Final    Specific [place], UA 09/03/2024 1.029  <=1.030 Final    Troponin I High Sensitivity 09/03/2024 4.7  <=60.4 pg/mL Final    QRS Duration 09/03/2024 64  ms Final    OHS QTC Calculation 09/03/2024 442  ms Final    Magnesium 09/03/2024 2.2  1.7 - 2.3 mg/dL Final    D-Dimer 09/03/2024 0.36  0.00 - 0.47 µg/mL Final    ProBNP 09/03/2024 15  1 - 125 pg/mL Final    WBC 09/03/2024 8.60  4.50 - 11.00 K/uL Final    RBC 09/03/2024 5.05  4.20 - 5.40 M/uL Final    Hemoglobin 09/03/2024 15.0  12.0 - 16.0 g/dL Final    Hematocrit 09/03/2024 45.3  38.0 - 47.0 % Final    MCV 09/03/2024 89.7  80.0 - 96.0 fL Final    MCH 09/03/2024 29.7  27.0 - 31.0 pg Final    MCHC 09/03/2024 33.1  32.0 - 36.0 g/dL Final    RDW 09/03/2024 12.7  11.5 - 14.5 % Final    Platelet Count 09/03/2024 292  150 - 400 K/uL Final    MPV 09/03/2024 10.5  9.4 - 12.4 fL Final    Neutrophils % 09/03/2024 69.0 (H)  53.0 - 65.0 % Final    Lymphocytes % 09/03/2024 23.3 (L)  27.0 - 41.0 % Final    Monocytes % 09/03/2024 6.3 (H)  2.0 - 6.0 % Final    Eosinophils % 09/03/2024 0.9 (L)  1.0 - 4.0 % Final    Basophils % 09/03/2024 0.3  0.0 - 1.0 % Final    Immature Granulocytes % 09/03/2024 0.2  0.0 - 0.4 % Final    nRBC, Auto 09/03/2024 0.0  <=0.0 % Final    Neutrophils, Abs 09/03/2024 5.93  1.80 - 7.70 K/uL Final    Lymphocytes, Absolute 09/03/2024 2.00  1.00 - 4.80 K/uL Final    Monocytes, Absolute 09/03/2024 0.54  0.00 - 0.80 K/uL Final    Eosinophils, Absolute 09/03/2024 0.08  0.00 - 0.50 K/uL Final    Basophils, Absolute 09/03/2024 0.03  0.00 - 0.20 K/uL Final    Immature Granulocytes, Absolute 09/03/2024 0.02  0.00 - 0.04 K/uL Final    nRBC, Absolute 09/03/2024 0.00  <=0.00 x10e3/uL Final    Diff Type 09/03/2024 Auto   Final   Admission on  08/08/2024, Discharged on 08/08/2024   Component Date Value Ref Range Status    Color, UA 08/08/2024 Light-Yellow  Colorless, Straw, Yellow, Light Yellow, Dark Yellow Final    Clarity, UA 08/08/2024 Turbid  Clear Final    pH, UA 08/08/2024 5.5  5.0 to 8.0 pH Units Final    Leukocytes, UA 08/08/2024 Moderate (A)  Negative Final    Nitrites, UA 08/08/2024 Negative  Negative Final    Protein, UA 08/08/2024 10 (A)  Negative Final    Glucose, UA 08/08/2024 50 (A)  Normal mg/dL Final    Ketones, UA 08/08/2024 Trace  Negative mg/dL Final    Urobilinogen, UA 08/08/2024 Normal  0.2, 1.0, Normal mg/dL Final    Bilirubin, UA 08/08/2024 Negative  Negative Final    Blood, UA 08/08/2024 Trace (A)  Negative Final    Specific [place], UA 08/08/2024 1.014  <=1.030 Final    WBC, UA 08/08/2024 9 (H)  <=5 /hpf Final    RBC, UA 08/08/2024 2  <=3 /hpf Final    Bacteria, UA 08/08/2024 Occasional (A)  None Seen /hpf Final    Squamous Epithelial Cells, UA 08/08/2024 Occasional (A)  None Seen /HPF Final    Hyaline Casts, UA 08/08/2024 0-2 (A)  None Seen /lpf Final    Mucous 08/08/2024 Occasional (A)  None Seen /LPF Final   Admission on 07/22/2024, Discharged on 07/22/2024   Component Date Value Ref Range Status    Color, UA 07/22/2024 Light-Yellow  Colorless, Straw, Yellow, Light Yellow, Dark Yellow Final    Clarity, UA 07/22/2024 Clear  Clear Final    pH, UA 07/22/2024 5.0  5.0 to 8.0 pH Units Final    Leukocytes, UA 07/22/2024 Negative  Negative Final    Nitrites, UA 07/22/2024 Negative  Negative Final    Protein, UA 07/22/2024 Negative  Negative Final    Glucose, UA 07/22/2024 Normal  Normal mg/dL Final    Ketones, UA 07/22/2024 Negative  Negative mg/dL Final    Urobilinogen, UA 07/22/2024 Normal  0.2, 1.0, Normal mg/dL Final    Bilirubin, UA 07/22/2024 Negative  Negative Final    Blood, UA 07/22/2024 Trace (A)  Negative Final    Specific Gravity, UA 07/22/2024 1.020  <=1.030 Final    WBC, UA 07/22/2024 1   <=5 /hpf Final    RBC, UA 07/22/2024 1  <=3 /hpf Final    Squamous Epithelial Cells, UA 07/22/2024 Occasional (A)  None Seen /HPF Final    Mucous 07/22/2024 Occasional (A)  None Seen /LPF Final   Admission on 07/19/2024, Discharged on 07/20/2024   Component Date Value Ref Range Status    QRS Duration 07/19/2024 90  ms Final    OHS QTC Calculation 07/19/2024 426  ms Final    Sodium 07/19/2024 142  136 - 145 mmol/L Final    Potassium 07/19/2024 3.1 (L)  3.5 - 5.1 mmol/L Final    Chloride 07/19/2024 109 (H)  98 - 107 mmol/L Final    CO2 07/19/2024 29  21 - 32 mmol/L Final    Anion Gap 07/19/2024 7  7 - 16 mmol/L Final    Glucose 07/19/2024 103  74 - 106 mg/dL Final    BUN 07/19/2024 18  7 - 18 mg/dL Final    Creatinine 07/19/2024 0.70  0.55 - 1.02 mg/dL Final    BUN/Creatinine Ratio 07/19/2024 26 (H)  6 - 20 Final    Calcium 07/19/2024 8.6  8.5 - 10.1 mg/dL Final    Total Protein 07/19/2024 6.3 (L)  6.4 - 8.2 g/dL Final    Albumin 07/19/2024 3.2 (L)  3.5 - 5.0 g/dL Final    Globulin 07/19/2024 3.1  2.0 - 4.0 g/dL Final    A/G Ratio 07/19/2024 1.0   Final    Bilirubin, Total 07/19/2024 0.3  >0.0 - 1.2 mg/dL Final    Alk Phos 07/19/2024 83  41 - 108 U/L Final    ALT 07/19/2024 31  13 - 56 U/L Final    AST 07/19/2024 19  15 - 37 U/L Final    eGFR 07/19/2024 104  >=60 mL/min/1.73m2 Final    Troponin I High Sensitivity 07/19/2024 4.7  <=60.4 pg/mL Final    ProBNP 07/19/2024 50  1 - 125 pg/mL Final    WBC 07/19/2024 6.30  4.50 - 11.00 K/uL Final    RBC 07/19/2024 4.13 (L)  4.20 - 5.40 M/uL Final    Hemoglobin 07/19/2024 12.3  12.0 - 16.0 g/dL Final    Hematocrit 07/19/2024 36.9 (L)  38.0 - 47.0 % Final    MCV 07/19/2024 89.3  80.0 - 96.0 fL Final    MCH 07/19/2024 29.8  27.0 - 31.0 pg Final    MCHC 07/19/2024 33.3  32.0 - 36.0 g/dL Final    RDW 07/19/2024 12.5  11.5 - 14.5 % Final    Platelet Count 07/19/2024 277  150 - 400 K/uL Final    MPV 07/19/2024 11.1  9.4 - 12.4 fL Final     Neutrophils % 07/19/2024 53.1  53.0 - 65.0 % Final    Lymphocytes % 07/19/2024 32.4  27.0 - 41.0 % Final    Monocytes % 07/19/2024 10.0 (H)  2.0 - 6.0 % Final    Eosinophils % 07/19/2024 3.7  1.0 - 4.0 % Final    Basophils % 07/19/2024 0.6  0.0 - 1.0 % Final    Immature Granulocytes % 07/19/2024 0.2  0.0 - 0.4 % Final    nRBC, Auto 07/19/2024 0.0  <=0.0 % Final    Neutrophils, Abs 07/19/2024 3.35  1.80 - 7.70 K/uL Final    Lymphocytes, Absolute 07/19/2024 2.04  1.00 - 4.80 K/uL Final    Monocytes, Absolute 07/19/2024 0.63  0.00 - 0.80 K/uL Final    Eosinophils, Absolute 07/19/2024 0.23  0.00 - 0.50 K/uL Final    Basophils, Absolute 07/19/2024 0.04  0.00 - 0.20 K/uL Final    Immature Granulocytes, Absolute 07/19/2024 0.01  0.00 - 0.04 K/uL Final    nRBC, Absolute 07/19/2024 0.00  <=0.00 x10e3/uL Final    Diff Type 07/19/2024 Auto   Final    Troponin I High Sensitivity 07/19/2024 5.9  <=60.4 pg/mL Final    SARS COV-2 Molecular 07/19/2024 Negative  Negative, Invalid Final    INFLUENZA A MOLECULAR 07/19/2024 Negative  Negative Final    INFLUENZA B MOLECULAR  07/19/2024 Negative  Negative Final    D-Dimer 07/19/2024 0.37  0.00 - 0.47 µg/mL Final    Magnesium 07/19/2024 2.1  1.7 - 2.3 mg/dL Final   Admission on 07/02/2024, Discharged on 07/02/2024   Component Date Value Ref Range Status    Color, UA 07/02/2024 Light-Yellow  Colorless, Straw, Yellow, Light Yellow, Dark Yellow Final    Clarity, UA 07/02/2024 Clear  Clear Final    pH, UA 07/02/2024 6.0  5.0 to 8.0 pH Units Final    Leukocytes, UA 07/02/2024 Negative  Negative Final    Nitrites, UA 07/02/2024 Negative  Negative Final    Protein, UA 07/02/2024 Negative  Negative Final    Glucose, UA 07/02/2024 Normal  Normal mg/dL Final    Ketones, UA 07/02/2024 Negative  Negative mg/dL Final    Urobilinogen, UA 07/02/2024 2 (A)  0.2, 1.0, Normal mg/dL Final    Bilirubin, UA 07/02/2024 Negative  Negative Final    Blood, UA 07/02/2024 Trace (A)   Negative Final    Specific Gravity, UA 07/02/2024 1.025  <=1.030 Final    Sodium 07/02/2024 141  136 - 145 mmol/L Final    Potassium 07/02/2024 3.5  3.5 - 5.1 mmol/L Final    Chloride 07/02/2024 111 (H)  98 - 107 mmol/L Final    CO2 07/02/2024 25  21 - 32 mmol/L Final    Anion Gap 07/02/2024 9  7 - 16 mmol/L Final    Glucose 07/02/2024 120 (H)  74 - 106 mg/dL Final    BUN 07/02/2024 16  7 - 18 mg/dL Final    Creatinine 07/02/2024 0.65  0.55 - 1.02 mg/dL Final    BUN/Creatinine Ratio 07/02/2024 25 (H)  6 - 20 Final    Calcium 07/02/2024 8.5  8.5 - 10.1 mg/dL Final    Total Protein 07/02/2024 6.5  6.4 - 8.2 g/dL Final    Albumin 07/02/2024 3.4 (L)  3.5 - 5.0 g/dL Final    Globulin 07/02/2024 3.1  2.0 - 4.0 g/dL Final    A/G Ratio 07/02/2024 1.1   Final    Bilirubin, Total 07/02/2024 0.4  >0.0 - 1.2 mg/dL Final    Alk Phos 07/02/2024 75  41 - 108 U/L Final    ALT 07/02/2024 33  13 - 56 U/L Final    AST 07/02/2024 21  15 - 37 U/L Final    eGFR 07/02/2024 106  >=60 mL/min/1.73m2 Final    WBC 07/02/2024 6.06  4.50 - 11.00 K/uL Final    RBC 07/02/2024 4.36  4.20 - 5.40 M/uL Final    Hemoglobin 07/02/2024 13.0  12.0 - 16.0 g/dL Final    Hematocrit 07/02/2024 39.3  38.0 - 47.0 % Final    MCV 07/02/2024 90.1  80.0 - 96.0 fL Final    MCH 07/02/2024 29.8  27.0 - 31.0 pg Final    MCHC 07/02/2024 33.1  32.0 - 36.0 g/dL Final    RDW 07/02/2024 13.0  11.5 - 14.5 % Final    Platelet Count 07/02/2024 276  150 - 400 K/uL Final    MPV 07/02/2024 10.6  9.4 - 12.4 fL Final    Neutrophils % 07/02/2024 54.6  53.0 - 65.0 % Final    Lymphocytes % 07/02/2024 33.7  27.0 - 41.0 % Final    Monocytes % 07/02/2024 7.3 (H)  2.0 - 6.0 % Final    Eosinophils % 07/02/2024 3.5  1.0 - 4.0 % Final    Basophils % 07/02/2024 0.7  0.0 - 1.0 % Final    Immature Granulocytes % 07/02/2024 0.2  0.0 - 0.4 % Final    nRBC, Auto 07/02/2024 0.0  <=0.0 % Final    Neutrophils, Abs 07/02/2024 3.32  1.80 - 7.70 K/uL Final     Lymphocytes, Absolute 07/02/2024 2.04  1.00 - 4.80 K/uL Final    Monocytes, Absolute 07/02/2024 0.44  0.00 - 0.80 K/uL Final    Eosinophils, Absolute 07/02/2024 0.21  0.00 - 0.50 K/uL Final    Basophils, Absolute 07/02/2024 0.04  0.00 - 0.20 K/uL Final    Immature Granulocytes, Absolute 07/02/2024 0.01  0.00 - 0.04 K/uL Final    nRBC, Absolute 07/02/2024 0.00  <=0.00 x10e3/uL Final    Diff Type 07/02/2024 Auto   Final    WBC, UA 07/02/2024 2  <=5 /hpf Final    RBC, UA 07/02/2024 2  <=3 /hpf Final    Squamous Epithelial Cells, UA 07/02/2024 Occasional (A)  None Seen /HPF Final    Mucous 07/02/2024 Occasional (A)  None Seen /LPF Final   Office Visit on 05/30/2024   Component Date Value Ref Range Status    Potassium 05/30/2024 4.2  3.5 - 5.1 mmol/L Final    Magnesium 05/30/2024 2.7 (H)  1.7 - 2.3 mg/dL Final   Admission on 05/28/2024, Discharged on 05/28/2024   Component Date Value Ref Range Status    Color, UA 05/28/2024 Yellow  Colorless, Straw, Yellow, Light Yellow, Dark Yellow Final    Clarity, UA 05/28/2024 Clear  Clear Final    pH, UA 05/28/2024 5.0  5.0 to 8.0 pH Units Final    Leukocytes, UA 05/28/2024 Small (A)  Negative Final    Nitrites, UA 05/28/2024 Negative  Negative Final    Protein, UA 05/28/2024 Negative  Negative Final    Glucose, UA 05/28/2024 Normal  Normal mg/dL Final    Ketones, UA 05/28/2024 10 (A)  Negative mg/dL Final    Urobilinogen, UA 05/28/2024 Normal  0.2, 1.0, Normal mg/dL Final    Bilirubin, UA 05/28/2024 Negative  Negative Final    Blood, UA 05/28/2024 Negative  Negative Final    Specific [place], UA 05/28/2024 1.026  <=1.030 Final    WBC, UA 05/28/2024 1  <=5 /hpf Final    RBC, UA 05/28/2024 2  <=3 /hpf Final    Squamous Epithelial Cells, UA 05/28/2024 Occasional (A)  None Seen /HPF Final    Mucous 05/28/2024 Occasional (A)  None Seen /LPF Final   There may be more visits with results that are not included.         Orders Placed This Encounter   Procedures     Case Request Operating Room: Injection, Joint, Hip, intra-articular hip injection     Order Specific Question:   Medical Necessity:     Answer:   Medically Non-Urgent [100]     Order Specific Question:   Case classification     Answer:   E - Elective [90]     Order Specific Question:   Is an on-site pathologist required for this procedure?     Answer:   N/A       Requested Prescriptions      No prescriptions requested or ordered in this encounter       Assessment:     1. Hip pain, left    2. Bilateral sacroiliitis    3. Pelvic pain    4. Sacroiliitis                   Plan:    Not using narcotics/medication from our office    Can not tolerate Cymbalta      Follows Neurology [facility] neuropathy    Follows spine surgery [facility]     Follows orthopedics [facility], right hip replacement left knee replacement    Robotic cholecystectomy May 9, 2024 [facility]      She had bilateral sacroiliac injection # 1 May 7, 2024  She states she had 50% relief after procedure, the procedure did help improve her level function    Follow-up after left intra-articular hip injection # 1 July 30, 2024  She states she had 50% relief after procedure  Procedure did help improve her level function/activities daily living    Today she is complaining of  left anterior groin pain worse with standing walking better with short periods resting    She is requesting procedure for left hip pain     Considering right femoral/obturator nerve block      Considering repeating sacroiliac procedure if sacroiliac discomfort persist    X-ray sacroiliac joint [facility] April 15, 2024  No sacroiliac joint abnormality demonstrated    X-ray hips [facility] March 18, 2024  Right hip arthroplasty  No fracture noted  Left knee arthroplasty  Mild degenerative changes left hip    Requesting Toradol injection   Reviewed epic notes patient had Toradol injection 10 days ago     She verbalized understanding need to try to limit    Monitor  anesthesia request is medically indicated for the scheduled nerve block procedure due to:  1- needle phobia and anxiety, placing  the patient at risk during the provided service.  2-patient has an ASA class greater than 3 and requires constant presence of an anesthesiologist during the procedure,   3-patient has severe problems hard to lie still  4-patient suffers from chronic pain and is unable to function due to  diminished ADLs    Schedule left intra-articular hip injection # 2, left hip pain    Dr. Lang    Bring original prescription medication bottles/container/box with labels to each visit

## 2024-10-01 LAB
OHS QRS DURATION: 78 MS
OHS QTC CALCULATION: 452 MS

## 2024-10-02 ENCOUNTER — OFFICE VISIT (OUTPATIENT)
Dept: PAIN MEDICINE | Facility: CLINIC | Age: 54
End: 2024-10-02
Payer: COMMERCIAL

## 2024-10-02 VITALS
WEIGHT: 259 LBS | SYSTOLIC BLOOD PRESSURE: 125 MMHG | BODY MASS INDEX: 41.62 KG/M2 | HEIGHT: 66 IN | RESPIRATION RATE: 18 BRPM | DIASTOLIC BLOOD PRESSURE: 78 MMHG | HEART RATE: 93 BPM

## 2024-10-02 DIAGNOSIS — R10.2 PELVIC PAIN: ICD-10-CM

## 2024-10-02 DIAGNOSIS — M46.1 SACROILIITIS: ICD-10-CM

## 2024-10-02 DIAGNOSIS — M25.552 HIP PAIN, LEFT: Primary | Chronic | ICD-10-CM

## 2024-10-02 DIAGNOSIS — M46.1 BILATERAL SACROILIITIS: Chronic | ICD-10-CM

## 2024-10-02 PROCEDURE — 99999 PR PBB SHADOW E&M-EST. PATIENT-LVL V: CPT | Mod: PBBFAC,,, | Performed by: PHYSICIAN ASSISTANT

## 2024-10-02 PROCEDURE — 99213 OFFICE O/P EST LOW 20 MIN: CPT | Mod: S$PBB,,, | Performed by: PHYSICIAN ASSISTANT

## 2024-10-02 PROCEDURE — 99215 OFFICE O/P EST HI 40 MIN: CPT | Mod: PBBFAC | Performed by: PHYSICIAN ASSISTANT

## 2024-10-02 PROCEDURE — 3008F BODY MASS INDEX DOCD: CPT | Mod: CPTII,,, | Performed by: PHYSICIAN ASSISTANT

## 2024-10-02 PROCEDURE — 1159F MED LIST DOCD IN RCRD: CPT | Mod: CPTII,,, | Performed by: PHYSICIAN ASSISTANT

## 2024-10-02 PROCEDURE — 3074F SYST BP LT 130 MM HG: CPT | Mod: CPTII,,, | Performed by: PHYSICIAN ASSISTANT

## 2024-10-02 PROCEDURE — 3078F DIAST BP <80 MM HG: CPT | Mod: CPTII,,, | Performed by: PHYSICIAN ASSISTANT

## 2024-10-02 NOTE — LETTER
October 2, 2024      [facility]  [address] [place] [id number]  Phone: [phone]       Patient: Stuart Cloud   YOB: 1970  Date of Visit: 10/02/2024    To Whom It May Concern:    Bismark Cloud  was at [facility] on 10/02/2024. The patient may return to work/school on 10/3/24 with no restrictions. If you have any questions or concerns, or if I can be of further assistance, please do not hesitate to contact me.    Sincerely,    Cony Espinoza RN

## 2024-10-02 NOTE — PATIENT INSTRUCTIONS

## 2024-10-07 ENCOUNTER — OFFICE VISIT (OUTPATIENT)
Dept: CARDIOLOGY | Facility: CLINIC | Age: 54
End: 2024-10-07
Payer: COMMERCIAL

## 2024-10-07 VITALS
SYSTOLIC BLOOD PRESSURE: 110 MMHG | HEIGHT: 66 IN | HEART RATE: 69 BPM | RESPIRATION RATE: 18 BRPM | DIASTOLIC BLOOD PRESSURE: 70 MMHG | WEIGHT: 255 LBS | BODY MASS INDEX: 40.98 KG/M2

## 2024-10-07 DIAGNOSIS — R55 SYNCOPE, UNSPECIFIED SYNCOPE TYPE: ICD-10-CM

## 2024-10-07 DIAGNOSIS — R07.89 CHEST WALL PAIN: ICD-10-CM

## 2024-10-07 DIAGNOSIS — R07.9 CHEST PAIN: ICD-10-CM

## 2024-10-07 DIAGNOSIS — R55 SYNCOPE AND COLLAPSE: Primary | ICD-10-CM

## 2024-10-07 PROCEDURE — 99999 PR PBB SHADOW E&M-EST. PATIENT-LVL IV: CPT | Mod: PBBFAC,,, | Performed by: STUDENT IN AN ORGANIZED HEALTH CARE EDUCATION/TRAINING PROGRAM

## 2024-10-07 PROCEDURE — 3074F SYST BP LT 130 MM HG: CPT | Mod: CPTII,,, | Performed by: STUDENT IN AN ORGANIZED HEALTH CARE EDUCATION/TRAINING PROGRAM

## 2024-10-07 PROCEDURE — 3008F BODY MASS INDEX DOCD: CPT | Mod: CPTII,,, | Performed by: STUDENT IN AN ORGANIZED HEALTH CARE EDUCATION/TRAINING PROGRAM

## 2024-10-07 PROCEDURE — 1159F MED LIST DOCD IN RCRD: CPT | Mod: CPTII,,, | Performed by: STUDENT IN AN ORGANIZED HEALTH CARE EDUCATION/TRAINING PROGRAM

## 2024-10-07 PROCEDURE — 3078F DIAST BP <80 MM HG: CPT | Mod: CPTII,,, | Performed by: STUDENT IN AN ORGANIZED HEALTH CARE EDUCATION/TRAINING PROGRAM

## 2024-10-07 PROCEDURE — 99204 OFFICE O/P NEW MOD 45 MIN: CPT | Mod: S$PBB,,, | Performed by: STUDENT IN AN ORGANIZED HEALTH CARE EDUCATION/TRAINING PROGRAM

## 2024-10-07 PROCEDURE — 99214 OFFICE O/P EST MOD 30 MIN: CPT | Mod: PBBFAC | Performed by: STUDENT IN AN ORGANIZED HEALTH CARE EDUCATION/TRAINING PROGRAM

## 2024-10-07 NOTE — PROGRESS NOTES
PCP: Maria L Phillips FNP-C    Referring Provider:     Subjective:   Stuart Cloud is a 54 y.o. female with hx of HTN, hypothyroidism, mood disorder who presents for evaluation of chest pain and syncope.     Patient works at the [employer]. Reports having one episode of dizziness with pre-syncope. Reports feeling cold and clammy and BP was 90/60s at the time. She also report having left lower chest pain that is exarcerbated by walking up stairs. Approximately has 1 episode/week.     Fhx: +Ve for MI and CHF  Shx: Denies smoking, etoh or drug use    EKG - 9/26/24 - NSR, normal ekg      Lab Results   Component Value Date     09/26/2024    K 3.6 09/26/2024     (H) 09/26/2024    CO2 28 09/26/2024    BUN 14 09/26/2024    CREATININE 0.77 09/26/2024    CALCIUM 8.6 09/26/2024    ANIONGAP 7 09/26/2024       Lab Results   Component Value Date    CHOL 168 03/29/2023     Lab Results   Component Value Date    HDL 53 03/29/2023     Lab Results   Component Value Date    LDLCALC 100 03/29/2023     Lab Results   Component Value Date    TRIG 75 03/29/2023     Lab Results   Component Value Date    CHOLHDL 3.2 03/29/2023       Lab Results   Component Value Date    WBC 6.27 09/26/2024    HGB 13.1 09/26/2024    HCT 39.0 09/26/2024    MCV 90.3 09/26/2024     09/26/2024           Current Outpatient Medications:     docusate sodium (COLACE) 100 MG capsule, Take 1 capsule (100 mg total) by mouth 2 (two) times daily., Disp: 28 capsule, Rfl: 0    levothyroxine (SYNTHROID) 25 MCG tablet, Take 1 tablet (25 mcg total) by mouth before breakfast., Disp: 30 tablet, Rfl: 11    losartan-hydrochlorothiazide 50-12.5 mg (HYZAAR) 50-12.5 mg per tablet, Take 1 tablet by mouth once daily., Disp: 90 tablet, Rfl: 3    naproxen (NAPROSYN) 500 MG tablet, Take 1 tablet (500 mg total) by mouth 2 (two) times daily with meals., Disp: 60 tablet, Rfl: 0    clonazePAM (KLONOPIN) 0.5 MG tablet, Take 1 tablet (0.5 mg total) by mouth 2 (two) times  "daily as needed for Anxiety., Disp: 30 tablet, Rfl: 2    DULoxetine (CYMBALTA) 30 MG capsule, Take 1 capsule (30 mg total) by mouth once daily., Disp: 30 capsule, Rfl: 1  No current facility-administered medications for this visit.    Facility-Administered Medications Ordered in Other Visits:     0.9%  NaCl infusion, , Intravenous, Continuous, Tacos Aburto MD, Last Rate: 75 mL/hr at 05/25/23 1709, New Bag at 05/25/23 1709    0.9%  NaCl infusion, , Intravenous, Continuous, Leny Lang MD, Last Rate: 500 mL/hr at 06/03/24 1439, New Bag at 06/03/24 1439    Review of Systems   Respiratory:  Negative for cough and shortness of breath.    Cardiovascular:  Positive for chest pain. Negative for palpitations, orthopnea, claudication, leg swelling and PND.         Objective:   /70   Pulse 69   Resp 18   Ht 5' 6" (1.676 m)   Wt 115.7 kg (255 lb)   BMI 41.16 kg/m²     Physical Exam  Constitutional:       Appearance: Normal appearance.   Cardiovascular:      Rate and Rhythm: Normal rate and regular rhythm.      Pulses: Normal pulses.      Heart sounds: Normal heart sounds. No murmur heard.  Pulmonary:      Effort: Pulmonary effort is normal.      Breath sounds: Normal breath sounds.   Musculoskeletal:         General: No swelling.   Neurological:      Mental Status: She is alert and oriented to person, place, and time.           Assessment:     1. Syncope and collapse  Echo      2. Chest pain  Ambulatory referral/consult to Cardiology    Exercise Stress - EKG      3. Chest wall pain        4. Syncope, unspecified syncope type              Plan:   Chest wall pain  Atypical chest pain  Multiple ED visit  Will get treadmill stress test - if abnormal will get CCTA    Syncope  Likely vasovagal  Check orthostatics  ECHO ordered          "

## 2024-10-07 NOTE — ASSESSMENT & PLAN NOTE
Atypical chest pain  Multiple ED visit  Will get treadmill stress test - if abnormal will get CCTA

## 2024-10-16 ENCOUNTER — HOSPITAL ENCOUNTER (EMERGENCY)
Facility: HOSPITAL | Age: 54
Discharge: HOME | End: 2024-10-16
Payer: COMMERCIAL

## 2024-10-16 VITALS
OXYGEN SATURATION: 100 % | DIASTOLIC BLOOD PRESSURE: 81 MMHG | TEMPERATURE: 98 F | HEART RATE: 80 BPM | RESPIRATION RATE: 18 BRPM | SYSTOLIC BLOOD PRESSURE: 131 MMHG | BODY MASS INDEX: 40.36 KG/M2 | WEIGHT: 251.13 LBS | HEIGHT: 66 IN

## 2024-10-16 DIAGNOSIS — R10.2 PELVIC PAIN: Primary | ICD-10-CM

## 2024-10-16 PROCEDURE — 63600175 PHARM REV CODE 636 W HCPCS: Performed by: NURSE PRACTITIONER

## 2024-10-16 PROCEDURE — 99284 EMERGENCY DEPT VISIT MOD MDM: CPT | Mod: 25

## 2024-10-16 PROCEDURE — 96372 THER/PROPH/DIAG INJ SC/IM: CPT | Performed by: NURSE PRACTITIONER

## 2024-10-16 RX ORDER — KETOROLAC TROMETHAMINE 30 MG/ML
60 INJECTION, SOLUTION INTRAMUSCULAR; INTRAVENOUS
Status: COMPLETED | OUTPATIENT
Start: 2024-10-16 | End: 2024-10-16

## 2024-10-16 RX ORDER — HYDROMORPHONE HYDROCHLORIDE 1 MG/ML
1 INJECTION, SOLUTION INTRAMUSCULAR; INTRAVENOUS; SUBCUTANEOUS
Status: COMPLETED | OUTPATIENT
Start: 2024-10-16 | End: 2024-10-16

## 2024-10-16 RX ADMIN — KETOROLAC TROMETHAMINE 60 MG: 30 INJECTION, SOLUTION INTRAMUSCULAR at 08:10

## 2024-10-16 RX ADMIN — HYDROMORPHONE HYDROCHLORIDE 1 MG: 1 INJECTION, SOLUTION INTRAMUSCULAR; INTRAVENOUS; SUBCUTANEOUS at 08:10

## 2024-10-16 NOTE — ED TRIAGE NOTES
Stuart Cloud, a 54 y.o. female presents to the ED via personal vehicle with complaint of pelvic pain    Triage note:  Chief Complaint   Patient presents with    Pelvic Pain     States that she has pain that hurts in vaginal area but this is something she has had numerous times and they tell her it is related to her hip     Review of patient's allergies indicates:   Allergen Reactions    Tylox [oxycodone-acetaminophen] Itching    Latex     Opioids - morphine analogues Hives    Sulfa (sulfonamide antibiotics) Rash    Zithromax z-casimiro [azithromycin] Rash     Past Medical History:   Diagnosis Date    Arthritis     Class 3 severe obesity due to excess calories without serious comorbidity with body mass index (BMI) of 40.0 to 44.9 in adult 03/20/2023    Essential hypertension 03/20/2023    History of gastric ulcer 05/01/2023    Hypothyroidism     Vestibular dizziness 10/23/2023

## 2024-10-16 NOTE — DISCHARGE INSTRUCTIONS
Follow up with your primary care provider in 2 days.  Follow-up with pain treatment.  Return to the emergency department for any increase in symptoms or for any other new or worrisome symptoms.

## 2024-10-16 NOTE — Clinical Note
"Stuart Recio" Pedro Luis was seen and treated in our emergency department on 10/16/2024.  She may return to work on 10/17/2024.       If you have any questions or concerns, please don't hesitate to call.      Maria L Palafox, FNP"

## 2024-10-16 NOTE — ED PROVIDER NOTES
Encounter Date: 10/16/2024       History     Chief Complaint   Patient presents with    Pelvic Pain     States that she has pain that hurts in vaginal area but this is something she has had numerous times and they tell her it is related to her hip     54-year-old female presents to the emergency department to be evaluated for pelvic pain.  She reports this is a recurrent problem for her and it is managed by pain treatment.  She is scheduled for a procedure in a few days.  Denies any dysuria, discharge, fever, chills, nausea, vomiting, diarrhea, constipation.    The history is provided by the patient.   Pelvic Pain  This is a recurrent problem. Pertinent negatives include no chest pain, no abdominal pain, no headaches and no shortness of breath.     Review of patient's allergies indicates:   Allergen Reactions    Tylox [oxycodone-acetaminophen] Itching    Latex     Opioids - morphine analogues Hives    Sulfa (sulfonamide antibiotics) Rash    Zithromax z-casimiro [azithromycin] Rash     Past Medical History:   Diagnosis Date    Arthritis     Class 3 severe obesity due to excess calories without serious comorbidity with body mass index (BMI) of 40.0 to 44.9 in adult 03/20/2023    Essential hypertension 03/20/2023    History of gastric ulcer 05/01/2023    Hypothyroidism     Vestibular dizziness 10/23/2023     Past Surgical History:   Procedure Laterality Date    ARTHROSCOPY OF KNEE Right 05/25/2023    Procedure: ARTHROSCOPY, KNEE;  Surgeon: Tacos Aburto MD;  Location: [facility];  Service: Orthopedics;  Laterality: Right;    CHOLECYSTECTOMY  05/09/2024    ENDOSCOPY  05/13/2024    HYSTERECTOMY      INJECTION OF ANESTHETIC AGENT INTO SACROILIAC JOINT Bilateral 05/07/2024    Procedure: BLOCK, SACROILIAC JOINT;  Surgeon: Leny Lang MD;  Location: [facility];  Service: Pain Management;  Laterality: Bilateral;    INJECTION OF JOINT Left 7/30/2024    Procedure: Injection, Joint, Hip, intra-articular hip  injection;  Surgeon: Leny Lang MD;  Location: [facility];  Service: Pain Management;  Laterality: Left;    JOINT REPLACEMENT Right     hip replacement    KNEE ARTHROPLASTY Left     KNEE ARTHROSCOPY W/ MENISCECTOMY Right 05/25/2023    Procedure: ARTHROSCOPY, KNEE, WITH MENISCECTOMY;  Surgeon: Tacos Aburto MD;  Location: [facility];  Service: Orthopedics;  Laterality: Right;    LUMPECTOMY, BREAST      ROBOT-ASSISTED CHOLECYSTECTOMY USING DA ANG XI N/A 05/09/2024    Procedure: XI ROBOTIC CHOLECYSTECTOMY;  Surgeon: Mao Dempsey DO;  Location: [facility] OR;  Service: General;  Laterality: N/A;     Family History   Problem Relation Name Age of Onset    Hypertension Maternal Grandmother      Stroke Maternal Grandmother      Stroke Maternal Grandfather      Hypertension Mother      Stroke Mother      Diabetes Mother       Social History     Tobacco Use    Smoking status: Never     Passive exposure: Never    Smokeless tobacco: Never   Substance Use Topics    Alcohol use: Never    Drug use: Never     Review of Systems   Respiratory:  Negative for shortness of breath.    Cardiovascular:  Negative for chest pain.   Gastrointestinal:  Negative for abdominal pain.   Genitourinary:  Positive for pelvic pain. Negative for dysuria.   Neurological:  Negative for headaches.   All other systems reviewed and are negative.      Physical Exam     Initial Vitals [10/16/24 0657]   BP Pulse Resp Temp SpO2   131/81 80 20 98 °F (36.7 °C) 100 %      MAP       --         Physical Exam    Vitals reviewed.  Constitutional: She appears well-developed and well-nourished.   Neck: Neck supple.   Cardiovascular:  Normal rate and regular rhythm.           Pulmonary/Chest: Breath sounds normal.   Abdominal: Abdomen is soft. Bowel sounds are normal. She exhibits no distension and no mass. There is no abdominal tenderness. There is no rebound and no guarding.   Musculoskeletal:         General: Normal range of motion.       Cervical back: Neck supple.     Neurological: She is alert and oriented to person, place, and time. She has normal strength. GCS score is 15. GCS eye subscore is 4. GCS verbal subscore is 5. GCS motor subscore is 6.   Skin: Skin is warm and dry. Capillary refill takes less than 2 seconds.   Psychiatric: She has a normal mood and affect.         Medical Screening Exam   See Full Note    ED Course   Procedures  Labs Reviewed - No data to display       Imaging Results    None          Medications   HYDROmorphone injection 1 mg (1 mg Intramuscular Given 10/16/24 0836)   ketorolac injection 60 mg (60 mg Intramuscular Given 10/16/24 0895)     Medical Decision Making  54-year-old female presents to the emergency department to be evaluated for pelvic pain.  She reports this is a recurrent problem for her and it is managed by pain treatment.  She is scheduled for a procedure in a few days.  Denies any dysuria, discharge, fever, chills, nausea, vomiting, diarrhea, constipation.  She requested Dilaudid and Toradol; she said that is what Dr. Rosario usually gives her that helps with the symptoms.  Diagnosis: Pelvic pain  Treated with Toradol and Dilaudid      Risk  Prescription drug management.                                      Clinical Impression:   Final diagnoses:  [R10.2] Pelvic pain (Primary)        ED Disposition Condition    Discharge Stable          ED Prescriptions    None       Follow-up Information    None          Maria L Palafox FNP  10/16/24 3035

## 2024-10-21 ENCOUNTER — TELEPHONE (OUTPATIENT)
Dept: PAIN MEDICINE | Facility: CLINIC | Age: 54
End: 2024-10-21
Payer: COMMERCIAL

## 2024-10-21 NOTE — TELEPHONE ENCOUNTER
Attempted to call patient. No answer, left a voicemail letting her know her procedure time for tomorrow is 10:55 am     ----- Message from Tammy sent at 10/21/2024 10:55 AM CDT -----  Regarding: PROCEDURE  Who Called: Stuart Cloud        Who Left Message for Patient:  Does the patient know what this is regarding?:YES      Preferred Method of Contact: Phone Call  Patient's Preferred Phone Number on File: [phone]   Best Call Back Number, if different:  Additional Information: PATIENT IS REQUESTING THE TIME OF HER PROCEDURE

## 2024-10-22 ENCOUNTER — HOSPITAL ENCOUNTER (OUTPATIENT)
Facility: HOSPITAL | Age: 54
Discharge: HOME | End: 2024-10-22
Attending: PAIN MEDICINE | Admitting: PAIN MEDICINE
Payer: COMMERCIAL

## 2024-10-22 ENCOUNTER — TELEPHONE (OUTPATIENT)
Dept: CARDIOLOGY | Facility: CLINIC | Age: 54
End: 2024-10-22
Payer: COMMERCIAL

## 2024-10-22 ENCOUNTER — ANESTHESIA EVENT (OUTPATIENT)
Dept: PAIN MEDICINE | Facility: HOSPITAL | Age: 54
End: 2024-10-22
Payer: COMMERCIAL

## 2024-10-22 ENCOUNTER — ANESTHESIA (OUTPATIENT)
Dept: PAIN MEDICINE | Facility: HOSPITAL | Age: 54
End: 2024-10-22
Payer: COMMERCIAL

## 2024-10-22 VITALS
OXYGEN SATURATION: 100 % | TEMPERATURE: 98 F | WEIGHT: 258 LBS | RESPIRATION RATE: 22 BRPM | HEIGHT: 66 IN | BODY MASS INDEX: 41.46 KG/M2 | HEART RATE: 72 BPM | SYSTOLIC BLOOD PRESSURE: 151 MMHG | DIASTOLIC BLOOD PRESSURE: 89 MMHG

## 2024-10-22 DIAGNOSIS — M25.559 HIP PAIN: ICD-10-CM

## 2024-10-22 PROCEDURE — 63600175 PHARM REV CODE 636 W HCPCS: Mod: JG | Performed by: PAIN MEDICINE

## 2024-10-22 PROCEDURE — 37000008 HC ANESTHESIA 1ST 15 MINUTES: Performed by: PAIN MEDICINE

## 2024-10-22 PROCEDURE — 20610 DRAIN/INJ JOINT/BURSA W/O US: CPT | Mod: LT | Performed by: PAIN MEDICINE

## 2024-10-22 PROCEDURE — 63600175 PHARM REV CODE 636 W HCPCS: Performed by: NURSE ANESTHETIST, CERTIFIED REGISTERED

## 2024-10-22 PROCEDURE — 77002 NEEDLE LOCALIZATION BY XRAY: CPT | Mod: 26,,, | Performed by: PAIN MEDICINE

## 2024-10-22 PROCEDURE — D9220A PRA ANESTHESIA: Mod: ,,, | Performed by: NURSE ANESTHETIST, CERTIFIED REGISTERED

## 2024-10-22 PROCEDURE — 20610 DRAIN/INJ JOINT/BURSA W/O US: CPT | Mod: LT,,, | Performed by: PAIN MEDICINE

## 2024-10-22 RX ORDER — BUPIVACAINE HYDROCHLORIDE 2.5 MG/ML
INJECTION, SOLUTION INFILTRATION; PERINEURAL CODE/TRAUMA/SEDATION MEDICATION
Status: DISCONTINUED | OUTPATIENT
Start: 2024-10-22 | End: 2024-10-22 | Stop reason: HOSPADM

## 2024-10-22 RX ORDER — PROPOFOL 10 MG/ML
INJECTION, EMULSION INTRAVENOUS
Status: DISCONTINUED | OUTPATIENT
Start: 2024-10-22 | End: 2024-10-22

## 2024-10-22 RX ORDER — CIPROFLOXACIN 500 MG/1
500 TABLET ORAL EVERY 12 HOURS
Qty: 14 TABLET | Refills: 0 | Status: SHIPPED | OUTPATIENT
Start: 2024-10-22 | End: 2024-10-29

## 2024-10-22 RX ORDER — LIDOCAINE HYDROCHLORIDE 20 MG/ML
INJECTION, SOLUTION EPIDURAL; INFILTRATION; INTRACAUDAL; PERINEURAL
Status: DISCONTINUED | OUTPATIENT
Start: 2024-10-22 | End: 2024-10-22

## 2024-10-22 RX ORDER — TRIAMCINOLONE ACETONIDE 40 MG/ML
INJECTION, SUSPENSION INTRA-ARTICULAR; INTRAMUSCULAR CODE/TRAUMA/SEDATION MEDICATION
Status: DISCONTINUED | OUTPATIENT
Start: 2024-10-22 | End: 2024-10-22 | Stop reason: HOSPADM

## 2024-10-22 RX ORDER — SODIUM CHLORIDE 9 MG/ML
INJECTION, SOLUTION INTRAVENOUS CONTINUOUS
Status: DISCONTINUED | OUTPATIENT
Start: 2024-10-22 | End: 2024-10-22 | Stop reason: HOSPADM

## 2024-10-22 RX ADMIN — PROPOFOL 60 MG: 10 INJECTION, EMULSION INTRAVENOUS at 12:10

## 2024-10-22 RX ADMIN — LIDOCAINE HYDROCHLORIDE 70 MG: 20 INJECTION, SOLUTION EPIDURAL; INFILTRATION; INTRACAUDAL; PERINEURAL at 12:10

## 2024-10-22 RX ADMIN — PROPOFOL 80 MG: 10 INJECTION, EMULSION INTRAVENOUS at 12:10

## 2024-10-22 NOTE — PLAN OF CARE
Plan:  D/c pt via wheelchair at 1320  Informed pt if does not void in 8 hours to go to ER. Notify if redness, drainage, from injection site or fever over next 3-4 days. Rest and drink plenty of fluids for the remainder of the day. No lifting over 5 lbs. For the remainder of the day. Continue regular medications as prescribed. May take pain medications as prescribed.     Pain improved 100%

## 2024-10-22 NOTE — OP NOTE
PROCEDURE DATE: 10/22/2024    PROCEDURE:  Left  Hip joint injection under fluoroscopy.      Diagnosis:  Left hip pain  Post Op diagnosis: Same       PHYSICIAN: SHARON Lang MD                                                                               Local anesthetic:  Lidocaine 1%, 3 ml total                                                                                                              MEDICATIONS INJECTED:  Kenalog 40mg, 1% lidocaine 5 cc                                                                             ESTIMATED BLOOD LOSS: None                                                                                                                            COMPLICATIONS: None                                                                                                                                   TECHNIQUE: A time-out taken to identify patient and procedure side prior to starting the procedure.  With the patient lying in the supine position, the left greater trochanter, femoral neck and femoral head were visualized under fluoroscopy. The area was prepped and draped in the usual sterile fashion.  Local anesthetic was used, given by raising a wheal and going down to the hub of the 25-gauge needle 1.5inch needle.  A 3.5inch 22-gauge needle was introduced under fluoroscopy, 1 cm lateral to the femoral artery , to the lateral portion of the femoral neck and then walked medially to enter the hip joint capsule .  When the tip of the needle was presumed to be in appropriate position and after negative aspiration for blood, the medication as noted above was then injected slowly.  The patient tolerated the procedure well.                                                                                                                                                                                                The patient was monitored after the procedure and was given post procedure and discharge  instructions to follow at home. The patient was discharged in a stable condition.

## 2024-10-22 NOTE — TELEPHONE ENCOUNTER
-Pt. Notified r/s echo, stress test 11-1-24@1:00. Pt. Voiced understanding.---- Message from Haily sent at 10/22/2024  3:11 PM CDT -----  Regarding: Reschedule appt.  Who Called: Stuart Cloud    Caller is requesting assistance/information from provider's office.      Preferred Method of Contact: Phone Call  Patient's Preferred Phone Number on File: [phone]     Additional Information: Pt. Is needing to reschedule stress test and Trans Echo Complete due to a procedure she has had today w/ pain treatment.

## 2024-10-22 NOTE — TRANSFER OF CARE
"Anesthesia Transfer of Care Note    Patient: Stuart Cloud    Procedure(s) Performed: Procedure(s) (LRB):  Injection, Joint, Hip, intra-articular hip injection (Left)    Patient location: PACU    Anesthesia Type: MAC    Transport from OR: Transported from OR on room air with adequate spontaneous ventilation    Post pain: adequate analgesia    Post assessment: no apparent anesthetic complications and tolerated procedure well    Post vital signs: stable    Level of consciousness: alert and responds to stimulation    Nausea/Vomiting: no nausea/vomiting    Complications: none    Transfer of care protocol was followed      Last vitals: Visit Vitals  /73   Pulse 80   Temp 36.9 °C (98.4 °F) (Oral)   Resp 19   Ht 5' 6" (1.676 m)   Wt 117 kg (258 lb)   SpO2 98%   BMI 41.64 kg/m²     "

## 2024-10-22 NOTE — ANESTHESIA PREPROCEDURE EVALUATION
10/22/2024  Stuart Cloud is a 54 y.o., female.      Pre-op Assessment    I have reviewed the Patient Summary Reports.     I have reviewed the Nursing Notes. I have reviewed the NPO Status.   I have reviewed the Medications.     Review of Systems  Cardiovascular:     Hypertension                                    Hypertension         Pulmonary:      Shortness of breath                  Endocrine:   Hypothyroidism        Morbid Obesity / BMI > 40      Physical Exam  General: Well nourished, Cooperative, Alert and Oriented    Airway:  Mallampati: II   Mouth Opening: Normal  TM Distance: Normal  Tongue: Normal  Neck ROM: Normal ROM    Dental:  Intact        Anesthesia Plan  Type of Anesthesia, risks & benefits discussed:    Anesthesia Type: MAC, Gen Natural Airway  Intra-op Monitoring Plan: Standard ASA Monitors  Post Op Pain Control Plan: multimodal analgesia  Induction:  IV  Informed Consent: Informed consent signed with the Patient and all parties understand the risks and agree with anesthesia plan.  All questions answered. Patient consented to blood products? Yes  ASA Score: 3  Day of Surgery Review of History & Physical: H&P Update referred to the surgeon/provider.    Ready For Surgery From Anesthesia Perspective.     .

## 2024-10-22 NOTE — ANESTHESIA POSTPROCEDURE EVALUATION
Anesthesia Post Evaluation    Patient: Stuart Cloud    Procedure(s) Performed: Procedure(s) (LRB):  Injection, Joint, Hip, intra-articular hip injection (Left)    Final Anesthesia Type: MAC      Patient location during evaluation: PACU  Patient participation: Yes- Able to Participate  Level of consciousness: awake and alert and oriented  Post-procedure vital signs: reviewed and stable  Pain management: adequate  Airway patency: patent    PONV status at discharge: No PONV  Anesthetic complications: no      Cardiovascular status: blood pressure returned to baseline and hemodynamically stable  Respiratory status: unassisted  Hydration status: euvolemic  Follow-up not needed.  Comments: Refer to nursing note for pain/fernando score upon discharge from recovery.              Vitals Value Taken Time   /74 10/22/24 1328   Temp  10/22/24 1516   Pulse 73 10/22/24 1329   Resp 16 10/22/24 1329   SpO2 99 % 10/22/24 1329   Vitals shown include unfiled device data.      Event Time   Out of Recovery 13:20:00         Pain/Fernando Score: Fernando Score: 10 (10/22/2024  1:05 PM)

## 2024-10-22 NOTE — DISCHARGE SUMMARY
[facility] - Pain Management  Discharge Note  Short Stay    Procedure(s) (LRB):  Injection, Joint, Hip, intra-articular hip injection (Left)      OUTCOME: Patient tolerated treatment/procedure well without complication and is now ready for discharge.    DISPOSITION: Home or Self Care    FINAL DIAGNOSIS:  Left hip pain    FOLLOWUP: In clinic    DISCHARGE INSTRUCTIONS:  See nurse's notes     TIME SPENT ON DISCHARGE: 5 minutes

## 2024-10-22 NOTE — BRIEF OP NOTE
The  Discharge Note  Short Stay    Admit Date: 10/22/2024    Discharge Date: 10/22/2024    Attending Physician: Leny Lang     Discharge Provider: Leny Lang    Diagnosis:  Chronic left hip pain      Procedure performed:  Left intra-articular hip injection under fluoroscopy    Findings: Procedure tolerated well and without complications. Consistent with diagnosis.    EBL: 0cc    Specimens: None    Discharged Condition: Good    Final Diagnoses: Hip pain, left [M25.552]    Disposition: Home or Self Care    Hospital Course: No complications, uneventful    Outcome of Hospitalization, Treatment, Procedure, or Surgery:  Patient was admitted for outpatient interventional pain management procedure. The patient tolerated the procedure well with no complications.    Follow up/Patient Instructions:  Follow up as scheduled in Pain Management office in 3-4 weeks.  Patient has received instructions and follow up date and time.    Medications:  Continue previous medications

## 2024-10-30 ENCOUNTER — OFFICE VISIT (OUTPATIENT)
Dept: GASTROENTEROLOGY | Facility: CLINIC | Age: 54
End: 2024-10-30
Payer: COMMERCIAL

## 2024-10-30 VITALS
SYSTOLIC BLOOD PRESSURE: 139 MMHG | OXYGEN SATURATION: 98 % | HEART RATE: 72 BPM | HEIGHT: 66 IN | BODY MASS INDEX: 41.11 KG/M2 | DIASTOLIC BLOOD PRESSURE: 78 MMHG | WEIGHT: 255.81 LBS

## 2024-10-30 DIAGNOSIS — Z12.11 COLON CANCER SCREENING: ICD-10-CM

## 2024-10-30 DIAGNOSIS — K59.04 CHRONIC IDIOPATHIC CONSTIPATION: Primary | ICD-10-CM

## 2024-10-30 DIAGNOSIS — K20.80 LOS ANGELES GRADE B ESOPHAGITIS: ICD-10-CM

## 2024-10-30 DIAGNOSIS — K21.00 GASTROESOPHAGEAL REFLUX DISEASE WITH ESOPHAGITIS WITHOUT HEMORRHAGE: ICD-10-CM

## 2024-10-30 PROCEDURE — 99999 PR PBB SHADOW E&M-EST. PATIENT-LVL IV: CPT | Mod: PBBFAC,,,

## 2024-10-30 PROCEDURE — 3008F BODY MASS INDEX DOCD: CPT | Mod: CPTII,,,

## 2024-10-30 PROCEDURE — 3075F SYST BP GE 130 - 139MM HG: CPT | Mod: CPTII,,,

## 2024-10-30 PROCEDURE — 99214 OFFICE O/P EST MOD 30 MIN: CPT | Mod: S$PBB,,,

## 2024-10-30 PROCEDURE — 1159F MED LIST DOCD IN RCRD: CPT | Mod: CPTII,,,

## 2024-10-30 PROCEDURE — 3078F DIAST BP <80 MM HG: CPT | Mod: CPTII,,,

## 2024-10-30 PROCEDURE — 99214 OFFICE O/P EST MOD 30 MIN: CPT | Mod: PBBFAC

## 2024-10-30 RX ORDER — POLYETHYLENE GLYCOL 3350, SODIUM SULFATE ANHYDROUS, SODIUM BICARBONATE, SODIUM CHLORIDE, POTASSIUM CHLORIDE 236; 22.74; 6.74; 5.86; 2.97 G/4L; G/4L; G/4L; G/4L; G/4L
4 POWDER, FOR SOLUTION ORAL ONCE
Qty: 4000 ML | Refills: 0 | Status: SHIPPED | OUTPATIENT
Start: 2024-10-30 | End: 2024-10-30

## 2024-10-30 RX ORDER — DEXLANSOPRAZOLE 60 MG/1
60 CAPSULE, DELAYED RELEASE ORAL DAILY
Qty: 90 CAPSULE | Refills: 3 | Status: SHIPPED | OUTPATIENT
Start: 2024-10-30 | End: 2025-10-30

## 2024-11-04 ENCOUNTER — TELEPHONE (OUTPATIENT)
Dept: CARDIOLOGY | Facility: CLINIC | Age: 54
End: 2024-11-04
Payer: COMMERCIAL

## 2024-11-04 NOTE — TELEPHONE ENCOUNTER
Call returned message left on pt. VM call was returned.----- Message from Evette sent at 11/4/2024  1:18 PM CST -----  Who Called: Stuart Cloud    Caller is requesting assistance/information from provider's office.    Pt missed appts for echo and stress test on 11/1 . Pt is wanting to reschedule     Preferred Method of Contact: Phone Call  Patient's Preferred Phone Number on File: [phone]   Best Call Back Number, if different:  Additional Information:

## 2024-11-05 ENCOUNTER — TELEPHONE (OUTPATIENT)
Dept: CARDIOLOGY | Facility: CLINIC | Age: 54
End: 2024-11-05
Payer: COMMERCIAL

## 2024-11-05 NOTE — TELEPHONE ENCOUNTER
-Pt. Notified r/s for 11-14-24 @ 8:00a.m. Pt. Voiced understanding.---- Message from Rosa sent at 11/5/2024  8:09 AM CST -----  Regarding: PT CALL BACK  Who Called: Stuart Cloud    Caller is requesting assistance/information from provider's office.   PT NEED TO RESCHEDULE THE APPT FOR  STRESS AND ECHO TEST ON 11/14. NOT ABLE  RE-SCHEDULE THE APPT .       Preferred Method of Contact: Phone Call  Patient's Preferred Phone Number on File: [phone]

## 2024-11-11 NOTE — Clinical Note
"Stuart Moonadonay" Pedro Luis was seen and treated in our emergency department on 3/11/2024.  She may return to work on 03/13/2024.       If you have any questions or concerns, please don't hesitate to call.      Tran Ortega, FNP"
"Stuart Recio" Pedro Luis was seen and treated in our emergency department on 3/11/2024.  She may return to work on 03/14/2024.       If you have any questions or concerns, please don't hesitate to call.       RN    "
Yes - the patient is able to be screened

## 2024-11-14 ENCOUNTER — HOSPITAL ENCOUNTER (OUTPATIENT)
Dept: CARDIOLOGY | Facility: HOSPITAL | Age: 54
Discharge: HOME | End: 2024-11-14
Attending: STUDENT IN AN ORGANIZED HEALTH CARE EDUCATION/TRAINING PROGRAM
Payer: COMMERCIAL

## 2024-11-14 VITALS — SYSTOLIC BLOOD PRESSURE: 152 MMHG | DIASTOLIC BLOOD PRESSURE: 96 MMHG | HEART RATE: 80 BPM

## 2024-11-14 DIAGNOSIS — R55 SYNCOPE AND COLLAPSE: ICD-10-CM

## 2024-11-14 DIAGNOSIS — R07.9 CHEST PAIN: ICD-10-CM

## 2024-11-14 LAB
CV STRESS BASE HR: 80 BPM
DIASTOLIC BLOOD PRESSURE: 96 MMHG
OHS CV CPX 1 MINUTE RECOVERY HEART RATE: 86 BPM
OHS CV CPX 85 PERCENT MAX PREDICTED HEART RATE MALE: 143
OHS CV CPX ESTIMATED METS: 6
OHS CV CPX MAX PREDICTED HEART RATE: 168
OHS CV CPX PATIENT IS FEMALE: 1
OHS CV CPX PATIENT IS MALE: 0
OHS CV CPX PEAK DIASTOLIC BLOOD PRESSURE: 78 MMHG
OHS CV CPX PEAK HEAR RATE: 140 BPM
OHS CV CPX PEAK RATE PRESSURE PRODUCT: NORMAL
OHS CV CPX PEAK SYSTOLIC BLOOD PRESSURE: 207 MMHG
OHS CV CPX PERCENT MAX PREDICTED HEART RATE ACHIEVED: 87
OHS CV CPX RATE PRESSURE PRODUCT PRESENTING: NORMAL
STRESS ECHO POST EXERCISE DUR MIN: 4 MINUTES
SYSTOLIC BLOOD PRESSURE: 152 MMHG

## 2024-11-14 PROCEDURE — 93018 CV STRESS TEST I&R ONLY: CPT | Mod: ,,, | Performed by: STUDENT IN AN ORGANIZED HEALTH CARE EDUCATION/TRAINING PROGRAM

## 2024-11-14 PROCEDURE — 93016 CV STRESS TEST SUPVJ ONLY: CPT | Mod: ,,, | Performed by: STUDENT IN AN ORGANIZED HEALTH CARE EDUCATION/TRAINING PROGRAM

## 2024-11-14 PROCEDURE — 93306 TTE W/DOPPLER COMPLETE: CPT

## 2024-11-14 PROCEDURE — 93017 CV STRESS TEST TRACING ONLY: CPT

## 2024-11-15 LAB
AORTIC ROOT ANNULUS: 2.56 CM
AV INDEX (PROSTH): 0.89
AV MEAN GRADIENT: 3.3 MMHG
AV PEAK GRADIENT: 6.8 MMHG
AV VALVE AREA BY VELOCITY RATIO: 2.4 CM²
AV VALVE AREA: 3.1 CM²
AV VELOCITY RATIO: 0.69
CV ECHO LV RWT: 0.24 CM
DOP CALC AO PEAK VEL: 1.3 M/S
DOP CALC AO VTI: 28.1 CM
DOP CALC LVOT AREA: 3.5 CM2
DOP CALC LVOT DIAMETER: 2.1 CM
DOP CALC LVOT PEAK VEL: 0.9 M/S
DOP CALC LVOT STROKE VOLUME: 86.5 CM3
DOP CALCLVOT PEAK VEL VTI: 25 CM
E WAVE DECELERATION TIME: 229.22 MSEC
E/A RATIO: 1.08
E/E' RATIO: 11.41 M/S
ECHO LV POSTERIOR WALL: 0.6 CM (ref 0.6–1.1)
FRACTIONAL SHORTENING: 34.7 % (ref 28–44)
INTERVENTRICULAR SEPTUM: 0.9 CM (ref 0.6–1.1)
IVC DIAMETER: 1.18 CM
LEFT ATRIUM AREA SYSTOLIC (APICAL 2 CHAMBER): 18.74 CM2
LEFT ATRIUM AREA SYSTOLIC (APICAL 4 CHAMBER): 18.89 CM2
LEFT ATRIUM VOLUME MOD: 50.19 ML
LEFT INTERNAL DIMENSION IN SYSTOLE: 3.2 CM (ref 2.1–4)
LEFT VENTRICLE DIASTOLIC VOLUME: 110.67 ML
LEFT VENTRICLE END SYSTOLIC VOLUME APICAL 2 CHAMBER: 49.99 ML
LEFT VENTRICLE END SYSTOLIC VOLUME APICAL 4 CHAMBER: 51.54 ML
LEFT VENTRICLE SYSTOLIC VOLUME: 40.82 ML
LEFT VENTRICULAR INTERNAL DIMENSION IN DIASTOLE: 4.9 CM (ref 3.5–6)
LEFT VENTRICULAR MASS: 120.8 G
LV LATERAL E/E' RATIO: 13.86 M/S
LV SEPTAL E/E' RATIO: 9.7 M/S
LVED V (TEICH): 110.67 ML
LVES V (TEICH): 40.82 ML
LVOT MG: 2.03 MMHG
LVOT MV: 0.69 CM/S
MV PEAK A VEL: 0.9 M/S
MV PEAK E VEL: 0.97 M/S
OHS CV RV/LV RATIO: 0.53 CM
PV PEAK GRADIENT: 8 MMHG
PV PEAK VELOCITY: 1.37 M/S
RA PRESSURE ESTIMATED: 3 MMHG
RA VOL SYS: 32.08 ML
RIGHT ATRIAL AREA: 13.4 CM2
RIGHT ATRIUM VOLUME AREA LENGTH APICAL 4 CHAMBER: 29.68 ML
RIGHT VENTRICLE DIASTOLIC BASEL DIMENSION: 2.6 CM
RIGHT VENTRICLE DIASTOLIC LENGTH: 6.8 CM
RIGHT VENTRICLE DIASTOLIC MID DIMENSION: 2.9 CM
RIGHT VENTRICULAR LENGTH IN DIASTOLE (APICAL 4-CHAMBER VIEW): 6.75 CM
RV MID DIAMA: 2.91 CM
TDI LATERAL: 0.07 M/S
TDI SEPTAL: 0.1 M/S
TDI: 0.09 M/S
TRICUSPID ANNULAR PLANE SYSTOLIC EXCURSION: 2.42 CM

## 2024-12-09 DIAGNOSIS — Z12.11 SCREENING FOR COLON CANCER: Primary | ICD-10-CM

## 2024-12-09 RX ORDER — POLYETHYLENE GLYCOL 3350, SODIUM SULFATE ANHYDROUS, SODIUM BICARBONATE, SODIUM CHLORIDE, POTASSIUM CHLORIDE 236; 22.74; 6.74; 5.86; 2.97 G/4L; G/4L; G/4L; G/4L; G/4L
4 POWDER, FOR SOLUTION ORAL ONCE
Qty: 4000 ML | Refills: 0 | Status: SHIPPED | OUTPATIENT
Start: 2024-12-09 | End: 2024-12-09

## 2024-12-11 ENCOUNTER — TELEPHONE (OUTPATIENT)
Dept: GASTROENTEROLOGY | Facility: CLINIC | Age: 54
End: 2024-12-11
Payer: COMMERCIAL

## 2024-12-19 ENCOUNTER — ANESTHESIA EVENT (OUTPATIENT)
Dept: GASTROENTEROLOGY | Facility: HOSPITAL | Age: 54
End: 2024-12-19
Payer: COMMERCIAL

## 2024-12-19 ENCOUNTER — ANESTHESIA (OUTPATIENT)
Dept: GASTROENTEROLOGY | Facility: HOSPITAL | Age: 54
End: 2024-12-19
Payer: COMMERCIAL

## 2024-12-19 ENCOUNTER — HOSPITAL ENCOUNTER (OUTPATIENT)
Dept: GASTROENTEROLOGY | Facility: HOSPITAL | Age: 54
Discharge: HOME | End: 2024-12-19
Attending: INTERNAL MEDICINE | Admitting: INTERNAL MEDICINE
Payer: COMMERCIAL

## 2024-12-19 VITALS
SYSTOLIC BLOOD PRESSURE: 125 MMHG | TEMPERATURE: 98 F | OXYGEN SATURATION: 100 % | BODY MASS INDEX: 38.57 KG/M2 | RESPIRATION RATE: 11 BRPM | HEART RATE: 59 BPM | WEIGHT: 240 LBS | HEIGHT: 66 IN | DIASTOLIC BLOOD PRESSURE: 66 MMHG

## 2024-12-19 DIAGNOSIS — Z12.11 SCREENING FOR COLON CANCER: ICD-10-CM

## 2024-12-19 PROCEDURE — 63600175 PHARM REV CODE 636 W HCPCS: Performed by: NURSE ANESTHETIST, CERTIFIED REGISTERED

## 2024-12-19 PROCEDURE — 37000008 HC ANESTHESIA 1ST 15 MINUTES

## 2024-12-19 PROCEDURE — 37000009 HC ANESTHESIA EA ADD 15 MINS

## 2024-12-19 PROCEDURE — 27201423 OPTIME MED/SURG SUP & DEVICES STERILE SUPPLY

## 2024-12-19 PROCEDURE — 88305 TISSUE EXAM BY PATHOLOGIST: CPT | Mod: TC,SUR | Performed by: INTERNAL MEDICINE

## 2024-12-19 RX ORDER — PHENYLEPHRINE HYDROCHLORIDE 10 MG/ML
INJECTION INTRAVENOUS
Status: DISCONTINUED | OUTPATIENT
Start: 2024-12-19 | End: 2024-12-19

## 2024-12-19 RX ORDER — LIDOCAINE HYDROCHLORIDE 20 MG/ML
INJECTION, SOLUTION EPIDURAL; INFILTRATION; INTRACAUDAL; PERINEURAL
Status: DISCONTINUED | OUTPATIENT
Start: 2024-12-19 | End: 2024-12-19

## 2024-12-19 RX ORDER — SODIUM CHLORIDE 0.9 % (FLUSH) 0.9 %
10 SYRINGE (ML) INJECTION
Status: DISCONTINUED | OUTPATIENT
Start: 2024-12-19 | End: 2024-12-20 | Stop reason: HOSPADM

## 2024-12-19 RX ORDER — PROPOFOL 10 MG/ML
INJECTION, EMULSION INTRAVENOUS
Status: DISCONTINUED | OUTPATIENT
Start: 2024-12-19 | End: 2024-12-19

## 2024-12-19 RX ADMIN — PHENYLEPHRINE HYDROCHLORIDE 100 MCG: 10 INJECTION INTRAVENOUS at 12:12

## 2024-12-19 RX ADMIN — PROPOFOL 30 MG: 10 INJECTION, EMULSION INTRAVENOUS at 01:12

## 2024-12-19 RX ADMIN — LIDOCAINE HYDROCHLORIDE 100 MG: 20 INJECTION, SOLUTION INTRAVENOUS at 12:12

## 2024-12-19 RX ADMIN — PROPOFOL 40 MG: 10 INJECTION, EMULSION INTRAVENOUS at 01:12

## 2024-12-19 RX ADMIN — PROPOFOL 100 MG: 10 INJECTION, EMULSION INTRAVENOUS at 12:12

## 2024-12-19 RX ADMIN — PROPOFOL 20 MG: 10 INJECTION, EMULSION INTRAVENOUS at 01:12

## 2024-12-19 NOTE — DISCHARGE INSTRUCTIONS
Procedure Date  12/19/24     Impression  Overall Impression:   Scattered diverticulosis of moderate severity  Erythematous mucosa with erosion in the rectum; performed cold forceps biopsy to rule out colitis  The ileocecal valve and cecum appeared normal.  (grade 1) hemorrhoids        Recommendation  Await pathology results   Repeat colonoscopy in 5 years due to diverticulosis; will need a 2-day bowel prep

## 2024-12-19 NOTE — H&P
Gastroenterology Pre-procedure H&P    History of Present Illness    Stuart Cloud is a 54 y.o. female that  has a past medical history of Arthritis, Class 3 severe obesity due to excess calories without serious comorbidity with body mass index (BMI) of 40.0 to 44.9 in adult (03/20/2023), Essential hypertension (03/20/2023), History of gastric ulcer (05/01/2023), Hypothyroidism, and Vestibular dizziness (10/23/2023).     Patient here for routine screening. Index exam with inadequate prep    Patient denies wt loss, abdominal pain, diarrhea, melena/hematochezia, change in stool caliber, no anticoagulants, FMHx of GI related malignancies.      Past Medical History:   Diagnosis Date    Arthritis     Class 3 severe obesity due to excess calories without serious comorbidity with body mass index (BMI) of 40.0 to 44.9 in adult 03/20/2023    Essential hypertension 03/20/2023    History of gastric ulcer 05/01/2023    Hypothyroidism     Vestibular dizziness 10/23/2023       Past Surgical History:   Procedure Laterality Date    ARTHROSCOPY OF KNEE Right 05/25/2023    Procedure: ARTHROSCOPY, KNEE;  Surgeon: Tacos Aburto MD;  Location: [facility];  Service: Orthopedics;  Laterality: Right;    CHOLECYSTECTOMY  05/09/2024    ENDOSCOPY  05/13/2024    HYSTERECTOMY      Partial    INJECTION OF ANESTHETIC AGENT INTO SACROILIAC JOINT Bilateral 05/07/2024    Procedure: BLOCK, SACROILIAC JOINT;  Surgeon: Leny Lang MD;  Location: [facility];  Service: Pain Management;  Laterality: Bilateral;    INJECTION OF JOINT Left 07/30/2024    Procedure: Injection, Joint, Hip, intra-articular hip injection;  Surgeon: Leny Lang MD;  Location: [facility];  Service: Pain Management;  Laterality: Left;    INJECTION OF JOINT Left 10/22/2024    Procedure: Injection, Joint, Hip, intra-articular hip injection;  Surgeon: Leny Lang MD;  Location: [facility];  Service: Pain Management;  Laterality: Left;     JOINT REPLACEMENT Right     hip replacement    KNEE ARTHROPLASTY Left     KNEE ARTHROSCOPY W/ MENISCECTOMY Right 05/25/2023    Procedure: ARTHROSCOPY, KNEE, WITH MENISCECTOMY;  Surgeon: Tacos Aburto MD;  Location: [facility] OR;  Service: Orthopedics;  Laterality: Right;    LUMPECTOMY, BREAST      ROBOT-ASSISTED CHOLECYSTECTOMY USING DA ANG XI N/A 05/09/2024    Procedure: XI ROBOTIC CHOLECYSTECTOMY;  Surgeon: Mao Dempsey DO;  Location: [facility];  Service: General;  Laterality: N/A;       Family History   Problem Relation Name Age of Onset    Hypertension Maternal Grandmother      Stroke Maternal Grandmother      Stroke Maternal Grandfather      Hypertension Mother      Stroke Mother      Diabetes Mother         Social History     Socioeconomic History    Marital status: Single   Tobacco Use    Smoking status: Never     Passive exposure: Never    Smokeless tobacco: Never   Substance and Sexual Activity    Alcohol use: Never    Drug use: Never    Sexual activity: Not Currently       Current Outpatient Medications   Medication Sig Dispense Refill    levothyroxine (SYNTHROID) 25 MCG tablet Take 1 tablet (25 mcg total) by mouth before breakfast. 30 tablet 11    losartan-hydrochlorothiazide 50-12.5 mg (HYZAAR) 50-12.5 mg per tablet Take 1 tablet by mouth once daily. 90 tablet 3    clonazePAM (KLONOPIN) 0.5 MG tablet Take 1 tablet (0.5 mg total) by mouth 2 (two) times daily as needed for Anxiety. 30 tablet 2    dexlansoprazole (DEXILANT) 60 mg capsule Take 1 capsule (60 mg total) by mouth once daily. 90 capsule 3    docusate sodium (COLACE) 100 MG capsule Take 1 capsule (100 mg total) by mouth 2 (two) times daily. (Patient not taking: Reported on 10/30/2024) 28 capsule 0    DULoxetine (CYMBALTA) 30 MG capsule Take 1 capsule (30 mg total) by mouth once daily. 30 capsule 1    linaCLOtide (LINZESS) 145 mcg Cap capsule Take 1 capsule (145 mcg total) by mouth before breakfast. 90 capsule 3    naproxen  "(NAPROSYN) 500 MG tablet Take 1 tablet (500 mg total) by mouth 2 (two) times daily with meals. (Patient not taking: Reported on 10/30/2024) 60 tablet 0     No current facility-administered medications for this encounter.     Facility-Administered Medications Ordered in Other Encounters   Medication Dose Route Frequency Provider Last Rate Last Admin    0.9%  NaCl infusion   Intravenous Continuous Tacos Aburto MD 75 mL/hr at 05/25/23 1709 New Bag at 05/25/23 1709    0.9%  NaCl infusion   Intravenous Continuous Leny Lang  mL/hr at 06/03/24 1439 New Bag at 06/03/24 1439       Review of patient's allergies indicates:   Allergen Reactions    Tylox [oxycodone-acetaminophen] Itching    Latex     Opioids - morphine analogues Hives    Sulfa (sulfonamide antibiotics) Rash    Zithromax z-casimiro [azithromycin] Rash       Objective:  Vitals:    12/19/24 1158   BP: 130/70   Pulse: 72   Resp: 12   Temp: 98 °F (36.7 °C)   TempSrc: Oral   SpO2: 97%   Weight: 108.9 kg (240 lb)   Height: 5' 6" (1.676 m)        GEN: normal appearing, NAD, AAO x3  HENT: NCAT, anicteric, OP benign  CV: normal rate, regular rhythm  RESP: NABS, symmetric rise, unlabored  ABD: soft, ND, no guarding or TTP  SKIN: warm and dry  NEURO: grossly afocal    Assessment and Plan:    Proceed with:    Colonoscopy for screening for colon cancer    Madi Winslow MD  Gastroenterology  "

## 2024-12-19 NOTE — ANESTHESIA PREPROCEDURE EVALUATION
12/19/2024  Stuart Cloud is a 54 y.o., female.      Pre-op Assessment    I have reviewed the Patient Summary Reports.     I have reviewed the Nursing Notes. I have reviewed the NPO Status.   I have reviewed the Medications.     Review of Systems  Anesthesia Hx:  No problems with previous Anesthesia                Cardiovascular:     Hypertension                                    Hypertension         Pulmonary:      Shortness of breath                  Endocrine:   Hypothyroidism        Obesity / BMI > 30      Physical Exam  General: Well nourished, Cooperative, Alert and Oriented    Airway:  Mallampati: II   Mouth Opening: Normal  TM Distance: Normal  Tongue: Normal  Neck ROM: Normal ROM    Dental:  Intact        Anesthesia Plan  Type of Anesthesia, risks & benefits discussed:    Anesthesia Type: MAC, Gen Natural Airway  Intra-op Monitoring Plan: Standard ASA Monitors  Post Op Pain Control Plan: multimodal analgesia  Induction:  IV  Informed Consent: Informed consent signed with the Patient and all parties understand the risks and agree with anesthesia plan.  All questions answered. Patient consented to blood products? Yes  ASA Score: 2  Day of Surgery Review of History & Physical: H&P Update referred to the surgeon/provider.    Ready For Surgery From Anesthesia Perspective.     .

## 2024-12-19 NOTE — ANESTHESIA POSTPROCEDURE EVALUATION
Anesthesia Post Evaluation    Patient: Stuart Cloud    Procedure(s) Performed: * No procedures listed *    Final Anesthesia Type: MAC      Patient location during evaluation: PACU  Patient participation: Yes- Able to Participate  Level of consciousness: awake and alert and oriented  Post-procedure vital signs: reviewed and stable  Pain management: adequate  Airway patency: patent    PONV status at discharge: No PONV  Anesthetic complications: no      Cardiovascular status: blood pressure returned to baseline and hemodynamically stable  Respiratory status: unassisted  Hydration status: euvolemic  Follow-up not needed.  Comments: Refer to nursing note for pain/fernando score upon discharge from recovery.              Vitals Value Taken Time   /66 12/19/24 1341   Temp  12/19/24 1346   Pulse 59 12/19/24 1341   Resp 11 12/19/24 1341   SpO2 100 % 12/19/24 1341         No case tracking events are documented in the log.      Pain/Fernando Score: Fernando Score: 8 (12/19/2024  1:16 PM)

## 2024-12-19 NOTE — TRANSFER OF CARE
"Anesthesia Transfer of Care Note    Patient: Stuart Cloud    Procedure(s) Performed: * No procedures listed *    Patient location: PACU    Anesthesia Type: MAC    Transport from OR: Transported from OR on room air with adequate spontaneous ventilation    Post pain: adequate analgesia    Post assessment: no apparent anesthetic complications and tolerated procedure well    Post vital signs: stable    Level of consciousness: alert and responds to stimulation    Nausea/Vomiting: no nausea/vomiting    Complications: none    Transfer of care protocol was followed      Last vitals: Visit Vitals  BP (!) 103/56   Pulse 73   Temp 36.7 °C (98 °F) (Oral)   Resp 19   Ht 5' 6" (1.676 m)   Wt 108.9 kg (240 lb)   SpO2 98%   Breastfeeding No   BMI 38.74 kg/m²     "

## 2024-12-20 LAB
DHEA SERPL-MCNC: NORMAL
ESTROGEN SERPL-MCNC: NORMAL PG/ML
INSULIN SERPL-ACNC: NORMAL U[IU]/ML
LAB AP GROSS DESCRIPTION: NORMAL
LAB AP LABORATORY NOTES: NORMAL
T3RU NFR SERPL: NORMAL %

## 2024-12-23 DIAGNOSIS — K59.04 CHRONIC IDIOPATHIC CONSTIPATION: ICD-10-CM

## 2024-12-30 DIAGNOSIS — I10 ESSENTIAL HYPERTENSION: ICD-10-CM

## 2024-12-30 RX ORDER — LOSARTAN POTASSIUM AND HYDROCHLOROTHIAZIDE 12.5; 5 MG/1; MG/1
1 TABLET ORAL DAILY
Qty: 90 TABLET | Refills: 3 | OUTPATIENT
Start: 2024-12-30 | End: 2025-12-30

## 2024-12-30 RX ORDER — LEVOTHYROXINE SODIUM 25 UG/1
25 TABLET ORAL
Qty: 30 TABLET | Refills: 11 | Status: CANCELLED | OUTPATIENT
Start: 2024-12-30 | End: 2025-12-30

## 2025-01-07 NOTE — H&P (VIEW-ONLY)
Subjective:         Patient ID: Stuart Cloud is a 54 y.o. female.    Chief Complaint: Hip Pain      Pain  This is a chronic problem. The current episode started more than 1 year ago. The problem occurs daily. The problem has been gradually improving. Associated symptoms include arthralgias. Pertinent negatives include no anorexia, change in bowel habit, chest pain, chills, coughing, diaphoresis, fever, neck pain, rash, sore throat, swollen glands, urinary symptoms, vertigo or vomiting.     Review of Systems   Constitutional:  Negative for activity change, appetite change, chills, diaphoresis, fever and unexpected weight change.   HENT:  Negative for drooling, ear discharge, ear pain, facial swelling, nosebleeds, sore throat, trouble swallowing, voice change and goiter.    Eyes:  Negative for photophobia, pain, discharge, redness and visual disturbance.   Respiratory:  Negative for apnea, cough, choking, chest tightness, shortness of breath, wheezing and stridor.    Cardiovascular:  Negative for chest pain, palpitations and leg swelling.   Gastrointestinal:  Negative for abdominal distention, anorexia, change in bowel habit, diarrhea, rectal pain, vomiting and fecal incontinence.   Endocrine: Negative for cold intolerance, heat intolerance, polydipsia, polyphagia and polyuria.   Genitourinary:  Negative for bladder incontinence, dysuria, flank pain, frequency and hot flashes.   Musculoskeletal:  Positive for arthralgias, back pain and leg pain. Negative for neck pain.   Integumentary:  Negative for color change, pallor and rash.   Allergic/Immunologic: Negative for immunocompromised state.   Neurological:  Negative for dizziness, vertigo, seizures, syncope, facial asymmetry, speech difficulty, light-headedness, memory loss and coordination difficulties.   Hematological:  Negative for adenopathy. Does not bruise/bleed easily.   Psychiatric/Behavioral:  Negative for agitation, behavioral problems, confusion,  decreased concentration, dysphoric mood, hallucinations, self-injury and suicidal ideas. The patient is not nervous/anxious and is not hyperactive.            Past Medical History:   Diagnosis Date    Arthritis     Class 3 severe obesity due to excess calories without serious comorbidity with body mass index (BMI) of 40.0 to 44.9 in adult 03/20/2023    Essential hypertension 03/20/2023    History of gastric ulcer 05/01/2023    Hypothyroidism     Vestibular dizziness 10/23/2023     Past Surgical History:   Procedure Laterality Date    ARTHROSCOPY OF KNEE Right 05/25/2023    Procedure: ARTHROSCOPY, KNEE;  Surgeon: Tacos Aburto MD;  Location: [facility] OR;  Service: Orthopedics;  Laterality: Right;    CHOLECYSTECTOMY  05/09/2024    ENDOSCOPY  05/13/2024    HYSTERECTOMY      Partial    INJECTION OF ANESTHETIC AGENT INTO SACROILIAC JOINT Bilateral 05/07/2024    Procedure: BLOCK, SACROILIAC JOINT;  Surgeon: Leny Lang MD;  Location: [facility] PAIN [facility];  Service: Pain Management;  Laterality: Bilateral;    INJECTION OF JOINT Left 07/30/2024    Procedure: Injection, Joint, Hip, intra-articular hip injection;  Surgeon: Leny Lang MD;  Location: [facility];  Service: Pain Management;  Laterality: Left;    INJECTION OF JOINT Left 10/22/2024    Procedure: Injection, Joint, Hip, intra-articular hip injection;  Surgeon: Leny Lang MD;  Location: [facility];  Service: Pain Management;  Laterality: Left;    JOINT REPLACEMENT Right     hip replacement    KNEE ARTHROPLASTY Left     KNEE ARTHROSCOPY W/ MENISCECTOMY Right 05/25/2023    Procedure: ARTHROSCOPY, KNEE, WITH MENISCECTOMY;  Surgeon: Tacos Aburto MD;  Location: [facility] OR;  Service: Orthopedics;  Laterality: Right;    LUMPECTOMY, BREAST      ROBOT-ASSISTED CHOLECYSTECTOMY USING DA ANG XI N/A 05/09/2024    Procedure: XI ROBOTIC CHOLECYSTECTOMY;  Surgeon: Mao Dempsey DO;  Location: [facility] OR;  Service: General;  " Laterality: N/A;     Social History     Socioeconomic History    Marital status: Single   Tobacco Use    Smoking status: Never     Passive exposure: Never    Smokeless tobacco: Never   Substance and Sexual Activity    Alcohol use: Never    Drug use: Never    Sexual activity: Not Currently     Family History   Problem Relation Name Age of Onset    Hypertension Maternal Grandmother      Stroke Maternal Grandmother      Stroke Maternal Grandfather      Hypertension Mother      Stroke Mother      Diabetes Mother       Review of patient's allergies indicates:   Allergen Reactions    Tylox [oxycodone-acetaminophen] Itching    Latex     Opioids - morphine analogues Hives    Sulfa (sulfonamide antibiotics) Rash    Zithromax z-casimiro [azithromycin] Rash        Objective:  Vitals:    01/14/25 1332   BP: (!) 160/81   Pulse: 74   Resp: 20   Weight: 114.3 kg (252 lb)   Height: 5' 6" (1.676 m)   PainSc:   6               Physical Exam  Vitals and nursing note reviewed. Exam conducted with a chaperone present.   Constitutional:       General: She is awake. She is not in acute distress.     Appearance: Normal appearance. She is not ill-appearing, toxic-appearing or diaphoretic.   HENT:      Head: Normocephalic and atraumatic.      Nose: Nose normal.      Mouth/Throat:      Mouth: Mucous membranes are moist.      Pharynx: Oropharynx is clear.   Eyes:      Conjunctiva/sclera: Conjunctivae normal.      Pupils: Pupils are equal, round, and reactive to light.   Cardiovascular:      Rate and Rhythm: Normal rate.   Pulmonary:      Effort: Pulmonary effort is normal. No respiratory distress.   Abdominal:      Palpations: Abdomen is soft.      Tenderness: There is no guarding.   Musculoskeletal:         General: Normal range of motion.      Cervical back: Normal range of motion and neck supple. No rigidity.   Skin:     General: Skin is warm and dry.      Coloration: Skin is not jaundiced or pale.   Neurological:      General: No focal deficit " present.      Mental Status: She is alert and oriented to person, place, and time. Mental status is at baseline.      Cranial Nerves: No cranial nerve deficit (II-XII).   Psychiatric:         Mood and Affect: Mood normal.         Behavior: Behavior normal. Behavior is cooperative.         Thought Content: Thought content normal.           Colonoscopy  Narrative: Table formatting from the original result was not included.  Procedure Date  12/19/24    Impression  Overall   Impression:    Scattered diverticulosis of moderate severity  Erythematous mucosa with erosion in the rectum; performed cold forceps   biopsy to rule out colitis  The ileocecal valve and cecum appeared normal.  (grade 1) hemorrhoids    Recommendation  Await pathology results   Repeat colonoscopy in 5 years due to diverticulosis; will need a 2-day   bowel prep      Outcome of procedure: successful Colonoscopy  Disposition: patient to recovery following procedure; discharge to home   when appropriate parameters met  Provisions for follow up: please call my office for any unexpected   symptoms like chest or abdominal pain or bleeding following your   procedure.  Final Diagnosis: CRC screening, avg risk     Indication  Screening for colon cancer    Providers  Tacos Peter Jr., CRNA CRNA   Elayne Parkinson RN Technician   Madi Winslow MD Proceduralist   Rose Adrian RN Registered Nurse     Medications  Moderate sedation administered by anesthesia staff - See anesthesia   record.    Preprocedure  A history and physical has been performed, and patient medication   allergies have been reviewed. The patient's tolerance of previous   anesthesia has been reviewed. The risks and benefits of the procedure and   the sedation options and risks were discussed with the patient. All   questions were answered and informed consent obtained.    Details of the Procedure  The patient underwent monitored anesthesia care, which was administered by   an  anesthesia professional. The patient's heart rate, blood pressure,   level of consciousness, respirations, oxygen, ECG and ETCO2 were monitored   throughout the procedure. A digital rectal exam was performed. A perianal   exam was performed. The scope was introduced through the anus and advanced   to the cecum. Retroflexion was not performed due to endocuff. The quality   of bowel preparation was evaluated using the Burke Bowel Preparation   Scale with scores of: right colon = 2, transverse colon = 2, left colon =   2. The total BBPS score was 6. Bowel prep was adequate. The patient's   estimated blood loss was minimal (<5 mL). The procedure was not difficult.   The patient tolerated the procedure well. There were no apparent adverse   events.     Scope: Colonoscope  Scope Serial: [id number]    Events    Procedure Events   Event Event Time     Procedure Events   Event Event Time   ENDO SCOPE IN TIME 12/19/2024 12:56 PM   ENDO CECUM REACHED 12/19/2024  1:02 PM   ENDO SCOPE OUT TIME 12/19/2024  1:12 PM     CECAL WITHDRAWAL TIME: 10m 43s    Findings  Multiple small, scattered pancolonic diverticula of moderate severity; no   bleeding was observed  Localized erythematous mucosa with erosion in the rectum; performed cold   forceps biopsy to rule out colitis  The ileocecal valve and cecum appeared normal. There was solid and   semi-solid stool throughout the colon which limited visualization of the   mucosa. There was no obstructing mass, but flat lesions and polyps could   have been missed due to inadequate prep.  Internal (grade 1) hemorrhoids; no bleeding was observed       Hospital Outpatient Visit on 12/19/2024   Component Date Value Ref Range Status    Case Report 12/19/2024    Final                    Value:Surgical Pathology                                Case: [id number]                                   Authorizing Provider:  Madi Winslow MD           Collected:           12/19/2024 01:12 PM          Ordering  Location:     [facility] -         Received:            12/19/2024 02:34 PM                                 Endoscopy                                                                    Pathologist:           Tito Ling MD                                                      Specimen:    Rectum, a. abnormal mucosa rectum bc                                                       Final Diagnosis 12/19/2024    Final                    Value:A. Rectum, abnormal mucosa, biopsy:  - Mildly active mild chronic colitis with superficial erosion  - CMV immunohistochemistry is negative      Comments 12/19/2024    Final                    Value:The histologic differential includes ischemia, infection, medication injury (particularly NSAIDs and antibiotics), diverticular disease, and idiopathic inflammatory bowel disease. Clinical, laboratory, and endoscopic correlation is suggested.      Gross Description 12/19/2024    Final                    Value:A. Rectum: a. abnormal mucosa rectum bc  The specimen is received in formalin designated abnormal mucosa rectum bc and consists of 3 white-tan tissue fragments (0.1-0.2 cm) entirely submitted in cassette A1.    Grossing was completed by Wu [place].      Microscopic Description 12/19/2024    Final                    Value:A microscopic examination was performed and the diagnosis reflects the findings.          Laboratory Notes 12/19/2024    Final                    Value:If this report includes immunohistochemical (IHC) test results, please note the following: IHC studies were interpreted in conjunction with appropriate positive and negative controls which demonstrate the expected positive and negative reactivity. This laboratory is regulated under CLIA as qualified to perform high-complexity testing. IHC tests are used for clinical purposes. They should not be regarded as investigational or research.       Hospital Outpatient Visit on 11/14/2024   Component  Date Value Ref Range Status    85% Max Predicted HR 11/14/2024 143   Final    Max Predicted HR 11/14/2024 168   Final    [facility] CV CPX PATIENT IS MALE 11/14/2024 0.0   Final    [facility] CV CPX PATIENT IS FEMALE 11/14/2024 1.0   Final    HR at rest 11/14/2024 80  bpm Final    Systolic blood pressure 11/14/2024 152  mmHg Final    Diastolic blood pressure 11/14/2024 96  mmHg Final    RPP 11/14/2024 12,160   Final    Exercise duration (min) 11/14/2024 4  minutes Final    Peak HR 11/14/2024 140  bpm Final    Peak Systolic BP 11/14/2024 207  mmHg Final    Peak Diatolic BP 11/14/2024 78  mmHg Final    Peak RPP 11/14/2024 28,980   Final    Estimated METs 11/14/2024 6   Final    % Max HR Achieved 11/14/2024 87   Final    1 Minute Recovery HR 11/14/2024 86  bpm Final   Hospital Outpatient Visit on 11/14/2024   Component Date Value Ref Range Status    LVOT stroke volume 11/14/2024 86.5  cm3 Final    LVIDd 11/14/2024 4.9  3.5 - 6.0 cm Final    LV Systolic Volume 11/14/2024 40.82  mL Final    LVIDs 11/14/2024 3.2  2.1 - 4.0 cm Final    LV ESV A2C 11/14/2024 49.99  mL Final    LV Diastolic Volume 11/14/2024 110.67  mL Final    LV ESV A4C 11/14/2024 51.54  mL Final    Left Ventricular End Systolic Volu* 11/14/2024 40.82  mL Final    Left Ventricular End Diastolic Vol* 11/14/2024 110.67  mL Final    IVS 11/14/2024 0.9  0.6 - 1.1 cm Final    LVOT diameter 11/14/2024 2.1  cm Final    LVOT area 11/14/2024 3.5  cm2 Final    FS 11/14/2024 34.7  28 - 44 % Final    Left Ventricle Relative Wall Thick* 11/14/2024 0.24  cm Final    PW 11/14/2024 0.6  0.6 - 1.1 cm Final    LV mass 11/14/2024 120.8  g Final    MV Peak E Iker 11/14/2024 0.97  m/s Final    TDI LATERAL 11/14/2024 0.07  m/s Final    TDI SEPTAL 11/14/2024 0.10  m/s Final    E/E' ratio 11/14/2024 11.41  m/s Final    MV Peak A Iker 11/14/2024 0.90  m/s Final    E/A ratio 11/14/2024 1.08   Final    E wave deceleration time 11/14/2024 229.22  msec Final    LV SEPTAL E/E' RATIO 11/14/2024 9.70   m/s Final    LV LATERAL E/E' RATIO 11/14/2024 13.86  m/s Final    LVOT peak cindy 11/14/2024 0.9  m/s Final    Left Ventricular Outflow Tract Mariela* 11/14/2024 0.69  cm/s Final    Left Ventricular Outflow Tract Mariela* 11/14/2024 2.03  mmHg Final    RV- pierre basal diam 11/14/2024 2.6  cm Final    RV-pierre mid d 11/14/2024 2.9  cm Final    RV Basal Diameter 11/14/2024 6.75  cm Final    RV-pierre length 11/14/2024 6.8  cm Final    RV mid diameter 11/14/2024 2.91  cm Final    TAPSE 11/14/2024 2.42  cm Final    RV/LV Ratio 11/14/2024 0.53  cm Final    LA Vol (MOD) 11/14/2024 50.19  mL Final    RA area length vol 11/14/2024 29.68  mL Final    RA Area 11/14/2024 13.4  cm2 Final    RA Vol 11/14/2024 32.08  mL Final    AV mean gradient 11/14/2024 3.3  mmHg Final    AV peak gradient 11/14/2024 6.8  mmHg Final    Ao peak cindy 11/14/2024 1.3  m/s Final    Ao VTI 11/14/2024 28.1  cm Final    LVOT peak VTI 11/14/2024 25.0  cm Final    AV valve area 11/14/2024 3.1  cm² Final    AV Velocity Ratio 11/14/2024 0.69   Final    AV index (prosthetic) 11/14/2024 0.89   Final    ESPERANZA by Velocity Ratio 11/14/2024 2.4  cm² Final    PV PEAK VELOCITY 11/14/2024 1.37  m/s Final    PV peak gradient 11/14/2024 8  mmHg Final    Ao root annulus 11/14/2024 2.56  cm Final    IVC diameter 11/14/2024 1.18  cm Final    Mean e' 11/14/2024 0.09  m/s Final    LA area A4C 11/14/2024 18.89  cm2 Final    LA area A2C 11/14/2024 18.74  cm2 Final    Est. RA pres 11/14/2024 3  mmHg Final   Admission on 09/26/2024, Discharged on 09/26/2024   Component Date Value Ref Range Status    Sodium 09/26/2024 142  136 - 145 mmol/L Final    Potassium 09/26/2024 3.6  3.5 - 5.1 mmol/L Final    Chloride 09/26/2024 111 (H)  98 - 107 mmol/L Final    CO2 09/26/2024 28  21 - 32 mmol/L Final    Anion Gap 09/26/2024 7  7 - 16 mmol/L Final    Glucose 09/26/2024 104  74 - 106 mg/dL Final    BUN 09/26/2024 14  7 - 18 mg/dL Final    Creatinine 09/26/2024 0.77  0.55 - 1.02 mg/dL Final     BUN/Creatinine Ratio 09/26/2024 18  6 - 20 Final    Calcium 09/26/2024 8.6  8.5 - 10.1 mg/dL Final    eGFR 09/26/2024 93  >=60 mL/min/1.73m2 Final    Troponin I High Sensitivity 09/26/2024 5.5  <=60.4 pg/mL Final    QRS Duration 09/26/2024 78  ms Final    [facility] QTC Calculation 09/26/2024 452  ms Final    WBC 09/26/2024 6.27  4.50 - 11.00 K/uL Final    RBC 09/26/2024 4.32  4.20 - 5.40 M/uL Final    Hemoglobin 09/26/2024 13.1  12.0 - 16.0 g/dL Final    Hematocrit 09/26/2024 39.0  38.0 - 47.0 % Final    MCV 09/26/2024 90.3  80.0 - 96.0 fL Final    MCH 09/26/2024 30.3  27.0 - 31.0 pg Final    MCHC 09/26/2024 33.6  32.0 - 36.0 g/dL Final    RDW 09/26/2024 12.9  11.5 - 14.5 % Final    Platelet Count 09/26/2024 267  150 - 400 K/uL Final    MPV 09/26/2024 11.2  9.4 - 12.4 fL Final    Neutrophils % 09/26/2024 63.1  53.0 - 65.0 % Final    Lymphocytes % 09/26/2024 25.5 (L)  27.0 - 41.0 % Final    Monocytes % 09/26/2024 8.1 (H)  2.0 - 6.0 % Final    Eosinophils % 09/26/2024 2.7  1.0 - 4.0 % Final    Basophils % 09/26/2024 0.6  0.0 - 1.0 % Final    Immature Granulocytes % 09/26/2024 0.0  0.0 - 0.4 % Final    nRBC, Auto 09/26/2024 0.0  <=0.0 % Final    Neutrophils, Abs 09/26/2024 3.95  1.80 - 7.70 K/uL Final    Lymphocytes, Absolute 09/26/2024 1.60  1.00 - 4.80 K/uL Final    Monocytes, Absolute 09/26/2024 0.51  0.00 - 0.80 K/uL Final    Eosinophils, Absolute 09/26/2024 0.17  0.00 - 0.50 K/uL Final    Basophils, Absolute 09/26/2024 0.04  0.00 - 0.20 K/uL Final    Immature Granulocytes, Absolute 09/26/2024 0.00  0.00 - 0.04 K/uL Final    nRBC, Absolute 09/26/2024 0.00  <=0.00 x10e3/uL Final    Diff Type 09/26/2024 Auto   Final    Troponin I High Sensitivity 09/26/2024 4.3  <=60.4 pg/mL Final   Orders Only on 09/12/2024   Component Date Value Ref Range Status    QRS Duration 09/12/2024 82  ms Final    [facility] QTC Calculation 09/12/2024 442  ms Final   Admission on 09/11/2024, Discharged on 09/12/2024   Component Date Value Ref Range  Status    ProBNP 09/11/2024 19  1 - 125 pg/mL Final    Sodium 09/11/2024 138  136 - 145 mmol/L Final    Potassium 09/11/2024 2.9 (L)  3.5 - 5.1 mmol/L Final    Chloride 09/11/2024 102  98 - 107 mmol/L Final    CO2 09/11/2024 31  21 - 32 mmol/L Final    Anion Gap 09/11/2024 8  7 - 16 mmol/L Final    Glucose 09/11/2024 130 (H)  74 - 106 mg/dL Final    BUN 09/11/2024 17  7 - 18 mg/dL Final    Creatinine 09/11/2024 1.18 (H)  0.55 - 1.02 mg/dL Final    BUN/Creatinine Ratio 09/11/2024 14  6 - 20 Final    Calcium 09/11/2024 8.5  8.5 - 10.1 mg/dL Final    Total Protein 09/11/2024 5.6 (L)  6.4 - 8.2 g/dL Final    Albumin 09/11/2024 2.9 (L)  3.5 - 5.0 g/dL Final    Globulin 09/11/2024 2.7  2.0 - 4.0 g/dL Final    A/G Ratio 09/11/2024 1.1   Final    Bilirubin, Total 09/11/2024 0.3  >0.0 - 1.2 mg/dL Final    Alk Phos 09/11/2024 55  41 - 108 U/L Final    ALT 09/11/2024 28  13 - 56 U/L Final    AST 09/11/2024 16  15 - 37 U/L Final    eGFR 09/11/2024 56 (L)  >=60 mL/min/1.73m2 Final    Barbiturates, Urine 09/11/2024 Negative  Negative Final    Benzodiazepine, Urine 09/11/2024 Negative  Negative Final    Opiates, Urine 09/11/2024 Negative  Negative Final    Phencyclidine, Urine 09/11/2024 Negative  Negative Final    Amphetamine, Urine 09/11/2024 Negative  Negative Final    Cannabinoid, Urine 09/11/2024 Negative  Negative Final    Cocaine, Urine 09/11/2024 Negative  Negative Final    Magnesium 09/11/2024 2.4 (H)  1.7 - 2.3 mg/dL Final    Troponin I High Sensitivity 09/11/2024 12.5  <=60.4 pg/mL Final    TSH 09/11/2024 3.690  0.358 - 3.740 uIU/mL Final    Color, UA 09/11/2024 Light-Yellow  Colorless, Straw, Yellow, Light Yellow, Dark Yellow Final    Clarity, UA 09/11/2024 Clear  Clear Final    pH, UA 09/11/2024 5.5  5.0 to 8.0 pH Units Final    Leukocytes, UA 09/11/2024 Small (A)  Negative Final    Nitrites, UA 09/11/2024 Negative  Negative Final    Protein, UA 09/11/2024 Negative  Negative Final    Glucose, UA 09/11/2024 Normal   Normal mg/dL Final    Ketones, UA 09/11/2024 Negative  Negative mg/dL Final    Urobilinogen, UA 09/11/2024 Normal  0.2, 1.0, Normal mg/dL Final    Bilirubin, UA 09/11/2024 Negative  Negative Final    Blood, UA 09/11/2024 Trace (A)  Negative Final    Specific Gravity, UA 09/11/2024 1.012  <=1.030 Final    PT 09/11/2024 13.6  11.7 - 14.7 seconds Final    INR 09/11/2024 1.05  <=3.30 Final    PTT 09/11/2024 26.9  25.2 - 37.3 seconds Final    D-Dimer 09/11/2024 <0.27  0.00 - 0.47 µg/mL Final    QRS Duration 09/11/2024 84  ms Final    [facility] QTC Calculation 09/11/2024 415  ms Final    WBC 09/11/2024 11.56 (H)  4.50 - 11.00 K/uL Final    RBC 09/11/2024 5.07  4.20 - 5.40 M/uL Final    Hemoglobin 09/11/2024 15.2  12.0 - 16.0 g/dL Final    Hematocrit 09/11/2024 45.1  38.0 - 47.0 % Final    MCV 09/11/2024 89.0  80.0 - 96.0 fL Final    MCH 09/11/2024 30.0  27.0 - 31.0 pg Final    MCHC 09/11/2024 33.7  32.0 - 36.0 g/dL Final    RDW 09/11/2024 12.6  11.5 - 14.5 % Final    Platelet Count 09/11/2024 257  150 - 400 K/uL Final    MPV 09/11/2024 11.0  9.4 - 12.4 fL Final    Neutrophils % 09/11/2024 72.9 (H)  53.0 - 65.0 % Final    Lymphocytes % 09/11/2024 19.8 (L)  27.0 - 41.0 % Final    Monocytes % 09/11/2024 5.8  2.0 - 6.0 % Final    Eosinophils % 09/11/2024 0.5 (L)  1.0 - 4.0 % Final    Basophils % 09/11/2024 0.5  0.0 - 1.0 % Final    Immature Granulocytes % 09/11/2024 0.5 (H)  0.0 - 0.4 % Final    nRBC, Auto 09/11/2024 0.0  <=0.0 % Final    Neutrophils, Abs 09/11/2024 8.42 (H)  1.80 - 7.70 K/uL Final    Lymphocytes, Absolute 09/11/2024 2.29  1.00 - 4.80 K/uL Final    Monocytes, Absolute 09/11/2024 0.67  0.00 - 0.80 K/uL Final    Eosinophils, Absolute 09/11/2024 0.06  0.00 - 0.50 K/uL Final    Basophils, Absolute 09/11/2024 0.06  0.00 - 0.20 K/uL Final    Immature Granulocytes, Absolute 09/11/2024 0.06 (H)  0.00 - 0.04 K/uL Final    nRBC, Absolute 09/11/2024 0.00  <=0.00 x10e3/uL Final    Diff Type 09/11/2024 Auto   Final    WBC, UA  09/11/2024 16 (H)  <=5 /hpf Final    RBC, UA 09/11/2024 3  <=3 /hpf Final    Bacteria, UA 09/11/2024 Occasional (A)  None Seen /hpf Final    Squamous Epithelial Cells, UA 09/11/2024 Occasional (A)  None Seen /HPF Final    Hyaline Casts, UA 09/11/2024 2-5 (A)  None Seen /lpf Final    Mucous 09/11/2024 Occasional (A)  None Seen /LPF Final    Culture, Urine 09/11/2024 Skin/Urogenital Coretta Isolated, no further workup.   Final   Admission on 09/03/2024, Discharged on 09/03/2024   Component Date Value Ref Range Status    SARS COV-2 Molecular 09/03/2024 Negative  Negative, Invalid Final    Sodium 09/03/2024 138  136 - 145 mmol/L Final    Potassium 09/03/2024 3.6  3.5 - 5.1 mmol/L Final    Chloride 09/03/2024 105  98 - 107 mmol/L Final    CO2 09/03/2024 28  21 - 32 mmol/L Final    Anion Gap 09/03/2024 9  7 - 16 mmol/L Final    Glucose 09/03/2024 100  74 - 106 mg/dL Final    BUN 09/03/2024 15  7 - 18 mg/dL Final    Creatinine 09/03/2024 0.85  0.55 - 1.02 mg/dL Final    BUN/Creatinine Ratio 09/03/2024 18  6 - 20 Final    Calcium 09/03/2024 8.8  8.5 - 10.1 mg/dL Final    Total Protein 09/03/2024 5.8 (L)  6.4 - 8.2 g/dL Final    Albumin 09/03/2024 3.2 (L)  3.5 - 5.0 g/dL Final    Globulin 09/03/2024 2.6  2.0 - 4.0 g/dL Final    A/G Ratio 09/03/2024 1.2   Final    Bilirubin, Total 09/03/2024 0.9  >0.0 - 1.2 mg/dL Final    Alk Phos 09/03/2024 64  41 - 108 U/L Final    ALT 09/03/2024 28  13 - 56 U/L Final    AST 09/03/2024 15  15 - 37 U/L Final    eGFR 09/03/2024 83  >=60 mL/min/1.73m2 Final    Color, UA 09/03/2024 Yellow  Colorless, Straw, Yellow, Light Yellow, Dark Yellow Final    Clarity, UA 09/03/2024 Clear  Clear Final    pH, UA 09/03/2024 5.5  5.0 to 8.0 pH Units Final    Leukocytes, UA 09/03/2024 Negative  Negative Final    Nitrites, UA 09/03/2024 Negative  Negative Final    Protein, UA 09/03/2024 20 (A)  Negative Final    Glucose, UA 09/03/2024 Normal  Normal mg/dL Final    Ketones, UA 09/03/2024 40 (A)  Negative mg/dL  Final    Urobilinogen, UA 09/03/2024 2 (A)  0.2, 1.0, Normal mg/dL Final    Bilirubin, UA 09/03/2024 Negative  Negative Final    Blood, UA 09/03/2024 Negative  Negative Final    Specific Gravity, UA 09/03/2024 1.029  <=1.030 Final    Troponin I High Sensitivity 09/03/2024 4.7  <=60.4 pg/mL Final    QRS Duration 09/03/2024 64  ms Final    [facility] QTC Calculation 09/03/2024 442  ms Final    Magnesium 09/03/2024 2.2  1.7 - 2.3 mg/dL Final    D-Dimer 09/03/2024 0.36  0.00 - 0.47 µg/mL Final    ProBNP 09/03/2024 15  1 - 125 pg/mL Final    WBC 09/03/2024 8.60  4.50 - 11.00 K/uL Final    RBC 09/03/2024 5.05  4.20 - 5.40 M/uL Final    Hemoglobin 09/03/2024 15.0  12.0 - 16.0 g/dL Final    Hematocrit 09/03/2024 45.3  38.0 - 47.0 % Final    MCV 09/03/2024 89.7  80.0 - 96.0 fL Final    MCH 09/03/2024 29.7  27.0 - 31.0 pg Final    MCHC 09/03/2024 33.1  32.0 - 36.0 g/dL Final    RDW 09/03/2024 12.7  11.5 - 14.5 % Final    Platelet Count 09/03/2024 292  150 - 400 K/uL Final    MPV 09/03/2024 10.5  9.4 - 12.4 fL Final    Neutrophils % 09/03/2024 69.0 (H)  53.0 - 65.0 % Final    Lymphocytes % 09/03/2024 23.3 (L)  27.0 - 41.0 % Final    Monocytes % 09/03/2024 6.3 (H)  2.0 - 6.0 % Final    Eosinophils % 09/03/2024 0.9 (L)  1.0 - 4.0 % Final    Basophils % 09/03/2024 0.3  0.0 - 1.0 % Final    Immature Granulocytes % 09/03/2024 0.2  0.0 - 0.4 % Final    nRBC, Auto 09/03/2024 0.0  <=0.0 % Final    Neutrophils, Abs 09/03/2024 5.93  1.80 - 7.70 K/uL Final    Lymphocytes, Absolute 09/03/2024 2.00  1.00 - 4.80 K/uL Final    Monocytes, Absolute 09/03/2024 0.54  0.00 - 0.80 K/uL Final    Eosinophils, Absolute 09/03/2024 0.08  0.00 - 0.50 K/uL Final    Basophils, Absolute 09/03/2024 0.03  0.00 - 0.20 K/uL Final    Immature Granulocytes, Absolute 09/03/2024 0.02  0.00 - 0.04 K/uL Final    nRBC, Absolute 09/03/2024 0.00  <=0.00 x10e3/uL Final    Diff Type 09/03/2024 Auto   Final   Admission on 08/08/2024, Discharged on 08/08/2024   Component Date  Value Ref Range Status    Color, UA 08/08/2024 Light-Yellow  Colorless, Straw, Yellow, Light Yellow, Dark Yellow Final    Clarity, UA 08/08/2024 Turbid  Clear Final    pH, UA 08/08/2024 5.5  5.0 to 8.0 pH Units Final    Leukocytes, UA 08/08/2024 Moderate (A)  Negative Final    Nitrites, UA 08/08/2024 Negative  Negative Final    Protein, UA 08/08/2024 10 (A)  Negative Final    Glucose, UA 08/08/2024 50 (A)  Normal mg/dL Final    Ketones, UA 08/08/2024 Trace  Negative mg/dL Final    Urobilinogen, UA 08/08/2024 Normal  0.2, 1.0, Normal mg/dL Final    Bilirubin, UA 08/08/2024 Negative  Negative Final    Blood, UA 08/08/2024 Trace (A)  Negative Final    Specific Gravity, UA 08/08/2024 1.014  <=1.030 Final    WBC, UA 08/08/2024 9 (H)  <=5 /hpf Final    RBC, UA 08/08/2024 2  <=3 /hpf Final    Bacteria, UA 08/08/2024 Occasional (A)  None Seen /hpf Final    Squamous Epithelial Cells, UA 08/08/2024 Occasional (A)  None Seen /HPF Final    Hyaline Casts, UA 08/08/2024 0-2 (A)  None Seen /lpf Final    Mucous 08/08/2024 Occasional (A)  None Seen /LPF Final   Admission on 07/22/2024, Discharged on 07/22/2024   Component Date Value Ref Range Status    Color, UA 07/22/2024 Light-Yellow  Colorless, Straw, Yellow, Light Yellow, Dark Yellow Final    Clarity, UA 07/22/2024 Clear  Clear Final    pH, UA 07/22/2024 5.0  5.0 to 8.0 pH Units Final    Leukocytes, UA 07/22/2024 Negative  Negative Final    Nitrites, UA 07/22/2024 Negative  Negative Final    Protein, UA 07/22/2024 Negative  Negative Final    Glucose, UA 07/22/2024 Normal  Normal mg/dL Final    Ketones, UA 07/22/2024 Negative  Negative mg/dL Final    Urobilinogen, UA 07/22/2024 Normal  0.2, 1.0, Normal mg/dL Final    Bilirubin, UA 07/22/2024 Negative  Negative Final    Blood, UA 07/22/2024 Trace (A)  Negative Final    Specific Gravity, UA 07/22/2024 1.020  <=1.030 Final    WBC, UA 07/22/2024 1  <=5 /hpf Final    RBC, UA 07/22/2024 1  <=3 /hpf Final    Squamous Epithelial Cells,  UA 07/22/2024 Occasional (A)  None Seen /HPF Final    Mucous 07/22/2024 Occasional (A)  None Seen /LPF Final   Admission on 07/19/2024, Discharged on 07/20/2024   Component Date Value Ref Range Status    QRS Duration 07/19/2024 90  ms Final    [facility] QTC Calculation 07/19/2024 426  ms Final    Sodium 07/19/2024 142  136 - 145 mmol/L Final    Potassium 07/19/2024 3.1 (L)  3.5 - 5.1 mmol/L Final    Chloride 07/19/2024 109 (H)  98 - 107 mmol/L Final    CO2 07/19/2024 29  21 - 32 mmol/L Final    Anion Gap 07/19/2024 7  7 - 16 mmol/L Final    Glucose 07/19/2024 103  74 - 106 mg/dL Final    BUN 07/19/2024 18  7 - 18 mg/dL Final    Creatinine 07/19/2024 0.70  0.55 - 1.02 mg/dL Final    BUN/Creatinine Ratio 07/19/2024 26 (H)  6 - 20 Final    Calcium 07/19/2024 8.6  8.5 - 10.1 mg/dL Final    Total Protein 07/19/2024 6.3 (L)  6.4 - 8.2 g/dL Final    Albumin 07/19/2024 3.2 (L)  3.5 - 5.0 g/dL Final    Globulin 07/19/2024 3.1  2.0 - 4.0 g/dL Final    A/G Ratio 07/19/2024 1.0   Final    Bilirubin, Total 07/19/2024 0.3  >0.0 - 1.2 mg/dL Final    Alk Phos 07/19/2024 83  41 - 108 U/L Final    ALT 07/19/2024 31  13 - 56 U/L Final    AST 07/19/2024 19  15 - 37 U/L Final    eGFR 07/19/2024 104  >=60 mL/min/1.73m2 Final    Troponin I High Sensitivity 07/19/2024 4.7  <=60.4 pg/mL Final    ProBNP 07/19/2024 50  1 - 125 pg/mL Final    WBC 07/19/2024 6.30  4.50 - 11.00 K/uL Final    RBC 07/19/2024 4.13 (L)  4.20 - 5.40 M/uL Final    Hemoglobin 07/19/2024 12.3  12.0 - 16.0 g/dL Final    Hematocrit 07/19/2024 36.9 (L)  38.0 - 47.0 % Final    MCV 07/19/2024 89.3  80.0 - 96.0 fL Final    MCH 07/19/2024 29.8  27.0 - 31.0 pg Final    MCHC 07/19/2024 33.3  32.0 - 36.0 g/dL Final    RDW 07/19/2024 12.5  11.5 - 14.5 % Final    Platelet Count 07/19/2024 277  150 - 400 K/uL Final    MPV 07/19/2024 11.1  9.4 - 12.4 fL Final    Neutrophils % 07/19/2024 53.1  53.0 - 65.0 % Final    Lymphocytes % 07/19/2024 32.4  27.0 - 41.0 % Final    Monocytes %  07/19/2024 10.0 (H)  2.0 - 6.0 % Final    Eosinophils % 07/19/2024 3.7  1.0 - 4.0 % Final    Basophils % 07/19/2024 0.6  0.0 - 1.0 % Final    Immature Granulocytes % 07/19/2024 0.2  0.0 - 0.4 % Final    nRBC, Auto 07/19/2024 0.0  <=0.0 % Final    Neutrophils, Abs 07/19/2024 3.35  1.80 - 7.70 K/uL Final    Lymphocytes, Absolute 07/19/2024 2.04  1.00 - 4.80 K/uL Final    Monocytes, Absolute 07/19/2024 0.63  0.00 - 0.80 K/uL Final    Eosinophils, Absolute 07/19/2024 0.23  0.00 - 0.50 K/uL Final    Basophils, Absolute 07/19/2024 0.04  0.00 - 0.20 K/uL Final    Immature Granulocytes, Absolute 07/19/2024 0.01  0.00 - 0.04 K/uL Final    nRBC, Absolute 07/19/2024 0.00  <=0.00 x10e3/uL Final    Diff Type 07/19/2024 Auto   Final    Troponin I High Sensitivity 07/19/2024 5.9  <=60.4 pg/mL Final    SARS COV-2 Molecular 07/19/2024 Negative  Negative, Invalid Final    INFLUENZA A MOLECULAR 07/19/2024 Negative  Negative Final    INFLUENZA B MOLECULAR  07/19/2024 Negative  Negative Final    D-Dimer 07/19/2024 0.37  0.00 - 0.47 µg/mL Final    Magnesium 07/19/2024 2.1  1.7 - 2.3 mg/dL Final   There may be more visits with results that are not included.         Orders Placed This Encounter   Procedures    Case Request Operating Room: BLOCK, NERVE, FEMORAL, BLOCK,NERVE,OBTURATOR     Order Specific Question:   Medical Necessity:     Answer:   Medically Non-Urgent [100]     Order Specific Question:   Clinical trial?     Answer:   No [0]     Order Specific Question:   Case classification     Answer:   E - Elective [90]     Order Specific Question:   Implant Required:     Answer:   No [1001]     Order Specific Question:   Is an on-site pathologist required for this procedure?     Answer:   N/A       Requested Prescriptions      No prescriptions requested or ordered in this encounter       Assessment:     1. Hip pain, right    2. Hip pain, left    3. Primary osteoarthritis involving multiple joints                     Plan:    Not using  narcotics/medication from our office    [employer] employee    Can not tolerate Cymbalta      Follows Neurology [facility] neuropathy    Follows spine surgery [facility]     Follows orthopedics [facility], right hip replacement left knee replacement    Robotic cholecystectomy May 9, 2024 [facility]      She had bilateral sacroiliac injection # 1 May 7, 2024  She states she had 50% relief after procedure, the procedure did help improve her level function    Follow-up after left intra-articular hip injection # 2, October 22, 2024  She states she had 100% relief after procedure  Procedure did help improve her level function/activities daily living      Today complaint right groin pain history right total hip replacement worse with standing or walking or leaning forward may better with short periods resting or changing position    She is requesting procedure for chronic right hip pain after right hip replacement    Requesting right femoral/obturator nerve block    Considering repeating sacroiliac procedure if sacroiliac discomfort persist    X-ray sacroiliac joint [facility] April 15, 2024  No sacroiliac joint abnormality demonstrated    X-ray hips [facility] March 18, 2024  Right hip arthroplasty  No fracture noted  Left knee arthroplasty  Mild degenerative changes left hip      Monitor anesthesia request is medically indicated for the scheduled nerve block procedure due to:  1- needle phobia and anxiety, placing  the patient at risk during the provided service.  2-patient has an ASA class greater than 3 and requires constant presence of an anesthesiologist during the procedure,   3-patient has severe problems hard to lie still  4-patient suffers from chronic pain and is unable to function due to  diminished ADLs    Schedule right femoral/obturator nerve block # 1, right hip pain after right hip replacement    Dr. Lang    Bring original prescription medication bottles/container/box with labels to  each visit

## 2025-01-07 NOTE — PROGRESS NOTES
Subjective:         Patient ID: Stuart Cloud is a 54 y.o. female.    Chief Complaint: Hip Pain      Pain  This is a chronic problem. The current episode started more than 1 year ago. The problem occurs daily. The problem has been gradually improving. Associated symptoms include arthralgias. Pertinent negatives include no anorexia, change in bowel habit, chest pain, chills, coughing, diaphoresis, fever, neck pain, rash, sore throat, swollen glands, urinary symptoms, vertigo or vomiting.     Review of Systems   Constitutional:  Negative for activity change, appetite change, chills, diaphoresis, fever and unexpected weight change.   HENT:  Negative for drooling, ear discharge, ear pain, facial swelling, nosebleeds, sore throat, trouble swallowing, voice change and goiter.    Eyes:  Negative for photophobia, pain, discharge, redness and visual disturbance.   Respiratory:  Negative for apnea, cough, choking, chest tightness, shortness of breath, wheezing and stridor.    Cardiovascular:  Negative for chest pain, palpitations and leg swelling.   Gastrointestinal:  Negative for abdominal distention, anorexia, change in bowel habit, diarrhea, rectal pain, vomiting and fecal incontinence.   Endocrine: Negative for cold intolerance, heat intolerance, polydipsia, polyphagia and polyuria.   Genitourinary:  Negative for bladder incontinence, dysuria, flank pain, frequency and hot flashes.   Musculoskeletal:  Positive for arthralgias, back pain and leg pain. Negative for neck pain.   Integumentary:  Negative for color change, pallor and rash.   Allergic/Immunologic: Negative for immunocompromised state.   Neurological:  Negative for dizziness, vertigo, seizures, syncope, facial asymmetry, speech difficulty, light-headedness, memory loss and coordination difficulties.   Hematological:  Negative for adenopathy. Does not bruise/bleed easily.   Psychiatric/Behavioral:  Negative for agitation, behavioral problems, confusion,  decreased concentration, dysphoric mood, hallucinations, self-injury and suicidal ideas. The patient is not nervous/anxious and is not hyperactive.            Past Medical History:   Diagnosis Date    Arthritis     Class 3 severe obesity due to excess calories without serious comorbidity with body mass index (BMI) of 40.0 to 44.9 in adult 03/20/2023    Essential hypertension 03/20/2023    History of gastric ulcer 05/01/2023    Hypothyroidism     Vestibular dizziness 10/23/2023     Past Surgical History:   Procedure Laterality Date    ARTHROSCOPY OF KNEE Right 05/25/2023    Procedure: ARTHROSCOPY, KNEE;  Surgeon: Tacos Aburto MD;  Location: [facility] OR;  Service: Orthopedics;  Laterality: Right;    CHOLECYSTECTOMY  05/09/2024    ENDOSCOPY  05/13/2024    HYSTERECTOMY      Partial    INJECTION OF ANESTHETIC AGENT INTO SACROILIAC JOINT Bilateral 05/07/2024    Procedure: BLOCK, SACROILIAC JOINT;  Surgeon: Leny Lang MD;  Location: [facility];  Service: Pain Management;  Laterality: Bilateral;    INJECTION OF JOINT Left 07/30/2024    Procedure: Injection, Joint, Hip, intra-articular hip injection;  Surgeon: Leny Lang MD;  Location: [facility];  Service: Pain Management;  Laterality: Left;    INJECTION OF JOINT Left 10/22/2024    Procedure: Injection, Joint, Hip, intra-articular hip injection;  Surgeon: Leny Lang MD;  Location: [facility];  Service: Pain Management;  Laterality: Left;    JOINT REPLACEMENT Right     hip replacement    KNEE ARTHROPLASTY Left     KNEE ARTHROSCOPY W/ MENISCECTOMY Right 05/25/2023    Procedure: ARTHROSCOPY, KNEE, WITH MENISCECTOMY;  Surgeon: Tacos Aburto MD;  Location: [facility] OR;  Service: Orthopedics;  Laterality: Right;    LUMPECTOMY, BREAST      ROBOT-ASSISTED CHOLECYSTECTOMY USING DA ANG XI N/A 05/09/2024    Procedure: XI ROBOTIC CHOLECYSTECTOMY;  Surgeon: Mao Dempsey DO;  Location: [facility] OR;  Service: General;  " Laterality: N/A;     Social History     Socioeconomic History    Marital status: Single   Tobacco Use    Smoking status: Never     Passive exposure: Never    Smokeless tobacco: Never   Substance and Sexual Activity    Alcohol use: Never    Drug use: Never    Sexual activity: Not Currently     Family History   Problem Relation Name Age of Onset    Hypertension Maternal Grandmother      Stroke Maternal Grandmother      Stroke Maternal Grandfather      Hypertension Mother      Stroke Mother      Diabetes Mother       Review of patient's allergies indicates:   Allergen Reactions    Tylox [oxycodone-acetaminophen] Itching    Latex     Opioids - morphine analogues Hives    Sulfa (sulfonamide antibiotics) Rash    Zithromax z-casimiro [azithromycin] Rash        Objective:  Vitals:    01/14/25 1332   BP: (!) 160/81   Pulse: 74   Resp: 20   Weight: 114.3 kg (252 lb)   Height: 5' 6" (1.676 m)   PainSc:   6               Physical Exam  Vitals and nursing note reviewed. Exam conducted with a chaperone present.   Constitutional:       General: She is awake. She is not in acute distress.     Appearance: Normal appearance. She is not ill-appearing, toxic-appearing or diaphoretic.   HENT:      Head: Normocephalic and atraumatic.      Nose: Nose normal.      Mouth/Throat:      Mouth: Mucous membranes are moist.      Pharynx: Oropharynx is clear.   Eyes:      Conjunctiva/sclera: Conjunctivae normal.      Pupils: Pupils are equal, round, and reactive to light.   Cardiovascular:      Rate and Rhythm: Normal rate.   Pulmonary:      Effort: Pulmonary effort is normal. No respiratory distress.   Abdominal:      Palpations: Abdomen is soft.      Tenderness: There is no guarding.   Musculoskeletal:         General: Normal range of motion.      Cervical back: Normal range of motion and neck supple. No rigidity.   Skin:     General: Skin is warm and dry.      Coloration: Skin is not jaundiced or pale.   Neurological:      General: No focal deficit " present.      Mental Status: She is alert and oriented to person, place, and time. Mental status is at baseline.      Cranial Nerves: No cranial nerve deficit (II-XII).   Psychiatric:         Mood and Affect: Mood normal.         Behavior: Behavior normal. Behavior is cooperative.         Thought Content: Thought content normal.           Colonoscopy  Narrative: Table formatting from the original result was not included.  Procedure Date  12/19/24    Impression  Overall   Impression:    Scattered diverticulosis of moderate severity  Erythematous mucosa with erosion in the rectum; performed cold forceps   biopsy to rule out colitis  The ileocecal valve and cecum appeared normal.  (grade 1) hemorrhoids    Recommendation  Await pathology results   Repeat colonoscopy in 5 years due to diverticulosis; will need a 2-day   bowel prep      Outcome of procedure: successful Colonoscopy  Disposition: patient to recovery following procedure; discharge to home   when appropriate parameters met  Provisions for follow up: please call my office for any unexpected   symptoms like chest or abdominal pain or bleeding following your   procedure.  Final Diagnosis: CRC screening, avg risk     Indication  Screening for colon cancer    Providers  Tacos Peter Jr., CRNA CRNA   Elayne Parkinson RN Technician   Madi Winslow MD Proceduralist   Rose Adrian RN Registered Nurse     Medications  Moderate sedation administered by anesthesia staff - See anesthesia   record.    Preprocedure  A history and physical has been performed, and patient medication   allergies have been reviewed. The patient's tolerance of previous   anesthesia has been reviewed. The risks and benefits of the procedure and   the sedation options and risks were discussed with the patient. All   questions were answered and informed consent obtained.    Details of the Procedure  The patient underwent monitored anesthesia care, which was administered by   an  anesthesia professional. The patient's heart rate, blood pressure,   level of consciousness, respirations, oxygen, ECG and ETCO2 were monitored   throughout the procedure. A digital rectal exam was performed. A perianal   exam was performed. The scope was introduced through the anus and advanced   to the cecum. Retroflexion was not performed due to endocuff. The quality   of bowel preparation was evaluated using the [place] Bowel Preparation   Scale with scores of: right colon = 2, transverse colon = 2, left colon =   2. The total BBPS score was 6. Bowel prep was adequate. The patient's   estimated blood loss was minimal (<5 mL). The procedure was not difficult.   The patient tolerated the procedure well. There were no apparent adverse   events.     Scope: Colonoscope  Scope Serial: [id number]    Events    Procedure Events   Event Event Time     Procedure Events   Event Event Time   ENDO SCOPE IN TIME 12/19/2024 12:56 PM   ENDO CECUM REACHED 12/19/2024  1:02 PM   ENDO SCOPE OUT TIME 12/19/2024  1:12 PM     CECAL WITHDRAWAL TIME: 10m 43s    Findings  Multiple small, scattered pancolonic diverticula of moderate severity; no   bleeding was observed  Localized erythematous mucosa with erosion in the rectum; performed cold   forceps biopsy to rule out colitis  The ileocecal valve and cecum appeared normal. There was solid and   semi-solid stool throughout the colon which limited visualization of the   mucosa. There was no obstructing mass, but flat lesions and polyps could   have been missed due to inadequate prep.  Internal (grade 1) hemorrhoids; no bleeding was observed       Hospital Outpatient Visit on 12/19/2024   Component Date Value Ref Range Status    Case Report 12/19/2024    Final                    Value:Surgical Pathology                                Case: [id number]                                   Authorizing Provider:  Madi Winslow MD           Collected:           12/19/2024 01:12 PM          Ordering  Location:     [facility] -         Received:            12/19/2024 02:34 PM                                 Endoscopy                                                                    Pathologist:           Tito Ling MD                                                      Specimen:    Rectum, a. abnormal mucosa rectum bc                                                       Final Diagnosis 12/19/2024    Final                    Value:A. Rectum, abnormal mucosa, biopsy:  - Mildly active mild chronic colitis with superficial erosion  - CMV immunohistochemistry is negative      Comments 12/19/2024    Final                    Value:The histologic differential includes ischemia, infection, medication injury (particularly NSAIDs and antibiotics), diverticular disease, and idiopathic inflammatory bowel disease. Clinical, laboratory, and endoscopic correlation is suggested.      Gross Description 12/19/2024    Final                    Value:A. Rectum: a. abnormal mucosa rectum bc  The specimen is received in formalin designated abnormal mucosa rectum bc and consists of 3 white-tan tissue fragments (0.1-0.2 cm) entirely submitted in cassette A1.    Grossing was completed by Wu Carolina.      Microscopic Description 12/19/2024    Final                    Value:A microscopic examination was performed and the diagnosis reflects the findings.          Laboratory Notes 12/19/2024    Final                    Value:If this report includes immunohistochemical (IHC) test results, please note the following: IHC studies were interpreted in conjunction with appropriate positive and negative controls which demonstrate the expected positive and negative reactivity. This laboratory is regulated under CLIA as qualified to perform high-complexity testing. IHC tests are used for clinical purposes. They should not be regarded as investigational or research.       Hospital Outpatient Visit on 11/14/2024   Component  Date Value Ref Range Status    85% Max Predicted HR 11/14/2024 143   Final    Max Predicted HR 11/14/2024 168   Final    [facility] CV CPX PATIENT IS MALE 11/14/2024 0.0   Final    [facility] CV CPX PATIENT IS FEMALE 11/14/2024 1.0   Final    HR at rest 11/14/2024 80  bpm Final    Systolic blood pressure 11/14/2024 152  mmHg Final    Diastolic blood pressure 11/14/2024 96  mmHg Final    RPP 11/14/2024 12,160   Final    Exercise duration (min) 11/14/2024 4  minutes Final    Peak HR 11/14/2024 140  bpm Final    Peak Systolic BP 11/14/2024 207  mmHg Final    Peak Diatolic BP 11/14/2024 78  mmHg Final    Peak RPP 11/14/2024 28,980   Final    Estimated METs 11/14/2024 6   Final    % Max HR Achieved 11/14/2024 87   Final    1 Minute Recovery HR 11/14/2024 86  bpm Final   Hospital Outpatient Visit on 11/14/2024   Component Date Value Ref Range Status    LVOT stroke volume 11/14/2024 86.5  cm3 Final    LVIDd 11/14/2024 4.9  3.5 - 6.0 cm Final    LV Systolic Volume 11/14/2024 40.82  mL Final    LVIDs 11/14/2024 3.2  2.1 - 4.0 cm Final    LV ESV A2C 11/14/2024 49.99  mL Final    LV Diastolic Volume 11/14/2024 110.67  mL Final    LV ESV A4C 11/14/2024 51.54  mL Final    Left Ventricular End Systolic Volu* 11/14/2024 40.82  mL Final    Left Ventricular End Diastolic Vol* 11/14/2024 110.67  mL Final    IVS 11/14/2024 0.9  0.6 - 1.1 cm Final    LVOT diameter 11/14/2024 2.1  cm Final    LVOT area 11/14/2024 3.5  cm2 Final    FS 11/14/2024 34.7  28 - 44 % Final    Left Ventricle Relative Wall Thick* 11/14/2024 0.24  cm Final    PW 11/14/2024 0.6  0.6 - 1.1 cm Final    LV mass 11/14/2024 120.8  g Final    MV Peak E Iker 11/14/2024 0.97  m/s Final    TDI LATERAL 11/14/2024 0.07  m/s Final    TDI SEPTAL 11/14/2024 0.10  m/s Final    E/E' ratio 11/14/2024 11.41  m/s Final    MV Peak A Iker 11/14/2024 0.90  m/s Final    E/A ratio 11/14/2024 1.08   Final    E wave deceleration time 11/14/2024 229.22  msec Final    LV SEPTAL E/E' RATIO 11/14/2024 9.70   m/s Final    LV LATERAL E/E' RATIO 11/14/2024 13.86  m/s Final    LVOT peak cindy 11/14/2024 0.9  m/s Final    Left Ventricular Outflow Tract Mariela* 11/14/2024 0.69  cm/s Final    Left Ventricular Outflow Tract Mariela* 11/14/2024 2.03  mmHg Final    RV- pierre basal diam 11/14/2024 2.6  cm Final    RV-pierre mid d 11/14/2024 2.9  cm Final    RV Basal Diameter 11/14/2024 6.75  cm Final    RV-pierre length 11/14/2024 6.8  cm Final    RV mid diameter 11/14/2024 2.91  cm Final    TAPSE 11/14/2024 2.42  cm Final    RV/LV Ratio 11/14/2024 0.53  cm Final    LA Vol (MOD) 11/14/2024 50.19  mL Final    RA area length vol 11/14/2024 29.68  mL Final    RA Area 11/14/2024 13.4  cm2 Final    RA Vol 11/14/2024 32.08  mL Final    AV mean gradient 11/14/2024 3.3  mmHg Final    AV peak gradient 11/14/2024 6.8  mmHg Final    Ao peak cindy 11/14/2024 1.3  m/s Final    Ao VTI 11/14/2024 28.1  cm Final    LVOT peak VTI 11/14/2024 25.0  cm Final    AV valve area 11/14/2024 3.1  cm² Final    AV Velocity Ratio 11/14/2024 0.69   Final    AV index (prosthetic) 11/14/2024 0.89   Final    ESPERANZA by Velocity Ratio 11/14/2024 2.4  cm² Final    PV PEAK VELOCITY 11/14/2024 1.37  m/s Final    PV peak gradient 11/14/2024 8  mmHg Final    Ao root annulus 11/14/2024 2.56  cm Final    IVC diameter 11/14/2024 1.18  cm Final    Mean e' 11/14/2024 0.09  m/s Final    LA area A4C 11/14/2024 18.89  cm2 Final    LA area A2C 11/14/2024 18.74  cm2 Final    Est. RA pres 11/14/2024 3  mmHg Final   Admission on 09/26/2024, Discharged on 09/26/2024   Component Date Value Ref Range Status    Sodium 09/26/2024 142  136 - 145 mmol/L Final    Potassium 09/26/2024 3.6  3.5 - 5.1 mmol/L Final    Chloride 09/26/2024 111 (H)  98 - 107 mmol/L Final    CO2 09/26/2024 28  21 - 32 mmol/L Final    Anion Gap 09/26/2024 7  7 - 16 mmol/L Final    Glucose 09/26/2024 104  74 - 106 mg/dL Final    BUN 09/26/2024 14  7 - 18 mg/dL Final    Creatinine 09/26/2024 0.77  0.55 - 1.02 mg/dL Final     BUN/Creatinine Ratio 09/26/2024 18  6 - 20 Final    Calcium 09/26/2024 8.6  8.5 - 10.1 mg/dL Final    eGFR 09/26/2024 93  >=60 mL/min/1.73m2 Final    Troponin I High Sensitivity 09/26/2024 5.5  <=60.4 pg/mL Final    QRS Duration 09/26/2024 78  ms Final    [facility] QTC Calculation 09/26/2024 452  ms Final    WBC 09/26/2024 6.27  4.50 - 11.00 K/uL Final    RBC 09/26/2024 4.32  4.20 - 5.40 M/uL Final    Hemoglobin 09/26/2024 13.1  12.0 - 16.0 g/dL Final    Hematocrit 09/26/2024 39.0  38.0 - 47.0 % Final    MCV 09/26/2024 90.3  80.0 - 96.0 fL Final    MCH 09/26/2024 30.3  27.0 - 31.0 pg Final    MCHC 09/26/2024 33.6  32.0 - 36.0 g/dL Final    RDW 09/26/2024 12.9  11.5 - 14.5 % Final    Platelet Count 09/26/2024 267  150 - 400 K/uL Final    MPV 09/26/2024 11.2  9.4 - 12.4 fL Final    Neutrophils % 09/26/2024 63.1  53.0 - 65.0 % Final    Lymphocytes % 09/26/2024 25.5 (L)  27.0 - 41.0 % Final    Monocytes % 09/26/2024 8.1 (H)  2.0 - 6.0 % Final    Eosinophils % 09/26/2024 2.7  1.0 - 4.0 % Final    Basophils % 09/26/2024 0.6  0.0 - 1.0 % Final    Immature Granulocytes % 09/26/2024 0.0  0.0 - 0.4 % Final    nRBC, Auto 09/26/2024 0.0  <=0.0 % Final    Neutrophils, Abs 09/26/2024 3.95  1.80 - 7.70 K/uL Final    Lymphocytes, Absolute 09/26/2024 1.60  1.00 - 4.80 K/uL Final    Monocytes, Absolute 09/26/2024 0.51  0.00 - 0.80 K/uL Final    Eosinophils, Absolute 09/26/2024 0.17  0.00 - 0.50 K/uL Final    Basophils, Absolute 09/26/2024 0.04  0.00 - 0.20 K/uL Final    Immature Granulocytes, Absolute 09/26/2024 0.00  0.00 - 0.04 K/uL Final    nRBC, Absolute 09/26/2024 0.00  <=0.00 x10e3/uL Final    Diff Type 09/26/2024 Auto   Final    Troponin I High Sensitivity 09/26/2024 4.3  <=60.4 pg/mL Final   Orders Only on 09/12/2024   Component Date Value Ref Range Status    QRS Duration 09/12/2024 82  ms Final    [facility] QTC Calculation 09/12/2024 442  ms Final   Admission on 09/11/2024, Discharged on 09/12/2024   Component Date Value Ref Range  Status    ProBNP 09/11/2024 19  1 - 125 pg/mL Final    Sodium 09/11/2024 138  136 - 145 mmol/L Final    Potassium 09/11/2024 2.9 (L)  3.5 - 5.1 mmol/L Final    Chloride 09/11/2024 102  98 - 107 mmol/L Final    CO2 09/11/2024 31  21 - 32 mmol/L Final    Anion Gap 09/11/2024 8  7 - 16 mmol/L Final    Glucose 09/11/2024 130 (H)  74 - 106 mg/dL Final    BUN 09/11/2024 17  7 - 18 mg/dL Final    Creatinine 09/11/2024 1.18 (H)  0.55 - 1.02 mg/dL Final    BUN/Creatinine Ratio 09/11/2024 14  6 - 20 Final    Calcium 09/11/2024 8.5  8.5 - 10.1 mg/dL Final    Total Protein 09/11/2024 5.6 (L)  6.4 - 8.2 g/dL Final    Albumin 09/11/2024 2.9 (L)  3.5 - 5.0 g/dL Final    Globulin 09/11/2024 2.7  2.0 - 4.0 g/dL Final    A/G Ratio 09/11/2024 1.1   Final    Bilirubin, Total 09/11/2024 0.3  >0.0 - 1.2 mg/dL Final    Alk Phos 09/11/2024 55  41 - 108 U/L Final    ALT 09/11/2024 28  13 - 56 U/L Final    AST 09/11/2024 16  15 - 37 U/L Final    eGFR 09/11/2024 56 (L)  >=60 mL/min/1.73m2 Final    Barbiturates, Urine 09/11/2024 Negative  Negative Final    Benzodiazepine, Urine 09/11/2024 Negative  Negative Final    Opiates, Urine 09/11/2024 Negative  Negative Final    Phencyclidine, Urine 09/11/2024 Negative  Negative Final    Amphetamine, Urine 09/11/2024 Negative  Negative Final    Cannabinoid, Urine 09/11/2024 Negative  Negative Final    Cocaine, Urine 09/11/2024 Negative  Negative Final    Magnesium 09/11/2024 2.4 (H)  1.7 - 2.3 mg/dL Final    Troponin I High Sensitivity 09/11/2024 12.5  <=60.4 pg/mL Final    TSH 09/11/2024 3.690  0.358 - 3.740 uIU/mL Final    Color, UA 09/11/2024 Light-Yellow  Colorless, Straw, Yellow, Light Yellow, Dark Yellow Final    Clarity, UA 09/11/2024 Clear  Clear Final    pH, UA 09/11/2024 5.5  5.0 to 8.0 pH Units Final    Leukocytes, UA 09/11/2024 Small (A)  Negative Final    Nitrites, UA 09/11/2024 Negative  Negative Final    Protein, UA 09/11/2024 Negative  Negative Final    Glucose, UA 09/11/2024 Normal   Normal mg/dL Final    Ketones, UA 09/11/2024 Negative  Negative mg/dL Final    Urobilinogen, UA 09/11/2024 Normal  0.2, 1.0, Normal mg/dL Final    Bilirubin, UA 09/11/2024 Negative  Negative Final    Blood, UA 09/11/2024 Trace (A)  Negative Final    Specific Gravity, UA 09/11/2024 1.012  <=1.030 Final    PT 09/11/2024 13.6  11.7 - 14.7 seconds Final    INR 09/11/2024 1.05  <=3.30 Final    PTT 09/11/2024 26.9  25.2 - 37.3 seconds Final    D-Dimer 09/11/2024 <0.27  0.00 - 0.47 µg/mL Final    QRS Duration 09/11/2024 84  ms Final    [facility] QTC Calculation 09/11/2024 415  ms Final    WBC 09/11/2024 11.56 (H)  4.50 - 11.00 K/uL Final    RBC 09/11/2024 5.07  4.20 - 5.40 M/uL Final    Hemoglobin 09/11/2024 15.2  12.0 - 16.0 g/dL Final    Hematocrit 09/11/2024 45.1  38.0 - 47.0 % Final    MCV 09/11/2024 89.0  80.0 - 96.0 fL Final    MCH 09/11/2024 30.0  27.0 - 31.0 pg Final    MCHC 09/11/2024 33.7  32.0 - 36.0 g/dL Final    RDW 09/11/2024 12.6  11.5 - 14.5 % Final    Platelet Count 09/11/2024 257  150 - 400 K/uL Final    MPV 09/11/2024 11.0  9.4 - 12.4 fL Final    Neutrophils % 09/11/2024 72.9 (H)  53.0 - 65.0 % Final    Lymphocytes % 09/11/2024 19.8 (L)  27.0 - 41.0 % Final    Monocytes % 09/11/2024 5.8  2.0 - 6.0 % Final    Eosinophils % 09/11/2024 0.5 (L)  1.0 - 4.0 % Final    Basophils % 09/11/2024 0.5  0.0 - 1.0 % Final    Immature Granulocytes % 09/11/2024 0.5 (H)  0.0 - 0.4 % Final    nRBC, Auto 09/11/2024 0.0  <=0.0 % Final    Neutrophils, Abs 09/11/2024 8.42 (H)  1.80 - 7.70 K/uL Final    Lymphocytes, Absolute 09/11/2024 2.29  1.00 - 4.80 K/uL Final    Monocytes, Absolute 09/11/2024 0.67  0.00 - 0.80 K/uL Final    Eosinophils, Absolute 09/11/2024 0.06  0.00 - 0.50 K/uL Final    Basophils, Absolute 09/11/2024 0.06  0.00 - 0.20 K/uL Final    Immature Granulocytes, Absolute 09/11/2024 0.06 (H)  0.00 - 0.04 K/uL Final    nRBC, Absolute 09/11/2024 0.00  <=0.00 x10e3/uL Final    Diff Type 09/11/2024 Auto   Final    WBC, UA  09/11/2024 16 (H)  <=5 /hpf Final    RBC, UA 09/11/2024 3  <=3 /hpf Final    Bacteria, UA 09/11/2024 Occasional (A)  None Seen /hpf Final    Squamous Epithelial Cells, UA 09/11/2024 Occasional (A)  None Seen /HPF Final    Hyaline Casts, UA 09/11/2024 2-5 (A)  None Seen /lpf Final    Mucous 09/11/2024 Occasional (A)  None Seen /LPF Final    Culture, Urine 09/11/2024 Skin/Urogenital Coretta Isolated, no further workup.   Final   Admission on 09/03/2024, Discharged on 09/03/2024   Component Date Value Ref Range Status    SARS COV-2 Molecular 09/03/2024 Negative  Negative, Invalid Final    Sodium 09/03/2024 138  136 - 145 mmol/L Final    Potassium 09/03/2024 3.6  3.5 - 5.1 mmol/L Final    Chloride 09/03/2024 105  98 - 107 mmol/L Final    CO2 09/03/2024 28  21 - 32 mmol/L Final    Anion Gap 09/03/2024 9  7 - 16 mmol/L Final    Glucose 09/03/2024 100  74 - 106 mg/dL Final    BUN 09/03/2024 15  7 - 18 mg/dL Final    Creatinine 09/03/2024 0.85  0.55 - 1.02 mg/dL Final    BUN/Creatinine Ratio 09/03/2024 18  6 - 20 Final    Calcium 09/03/2024 8.8  8.5 - 10.1 mg/dL Final    Total Protein 09/03/2024 5.8 (L)  6.4 - 8.2 g/dL Final    Albumin 09/03/2024 3.2 (L)  3.5 - 5.0 g/dL Final    Globulin 09/03/2024 2.6  2.0 - 4.0 g/dL Final    A/G Ratio 09/03/2024 1.2   Final    Bilirubin, Total 09/03/2024 0.9  >0.0 - 1.2 mg/dL Final    Alk Phos 09/03/2024 64  41 - 108 U/L Final    ALT 09/03/2024 28  13 - 56 U/L Final    AST 09/03/2024 15  15 - 37 U/L Final    eGFR 09/03/2024 83  >=60 mL/min/1.73m2 Final    Color, UA 09/03/2024 Yellow  Colorless, Straw, Yellow, Light Yellow, Dark Yellow Final    Clarity, UA 09/03/2024 Clear  Clear Final    pH, UA 09/03/2024 5.5  5.0 to 8.0 pH Units Final    Leukocytes, UA 09/03/2024 Negative  Negative Final    Nitrites, UA 09/03/2024 Negative  Negative Final    Protein, UA 09/03/2024 20 (A)  Negative Final    Glucose, UA 09/03/2024 Normal  Normal mg/dL Final    Ketones, UA 09/03/2024 40 (A)  Negative mg/dL  Final    Urobilinogen, UA 09/03/2024 2 (A)  0.2, 1.0, Normal mg/dL Final    Bilirubin, UA 09/03/2024 Negative  Negative Final    Blood, UA 09/03/2024 Negative  Negative Final    Specific Gravity, UA 09/03/2024 1.029  <=1.030 Final    Troponin I High Sensitivity 09/03/2024 4.7  <=60.4 pg/mL Final    QRS Duration 09/03/2024 64  ms Final    [facility] QTC Calculation 09/03/2024 442  ms Final    Magnesium 09/03/2024 2.2  1.7 - 2.3 mg/dL Final    D-Dimer 09/03/2024 0.36  0.00 - 0.47 µg/mL Final    ProBNP 09/03/2024 15  1 - 125 pg/mL Final    WBC 09/03/2024 8.60  4.50 - 11.00 K/uL Final    RBC 09/03/2024 5.05  4.20 - 5.40 M/uL Final    Hemoglobin 09/03/2024 15.0  12.0 - 16.0 g/dL Final    Hematocrit 09/03/2024 45.3  38.0 - 47.0 % Final    MCV 09/03/2024 89.7  80.0 - 96.0 fL Final    MCH 09/03/2024 29.7  27.0 - 31.0 pg Final    MCHC 09/03/2024 33.1  32.0 - 36.0 g/dL Final    RDW 09/03/2024 12.7  11.5 - 14.5 % Final    Platelet Count 09/03/2024 292  150 - 400 K/uL Final    MPV 09/03/2024 10.5  9.4 - 12.4 fL Final    Neutrophils % 09/03/2024 69.0 (H)  53.0 - 65.0 % Final    Lymphocytes % 09/03/2024 23.3 (L)  27.0 - 41.0 % Final    Monocytes % 09/03/2024 6.3 (H)  2.0 - 6.0 % Final    Eosinophils % 09/03/2024 0.9 (L)  1.0 - 4.0 % Final    Basophils % 09/03/2024 0.3  0.0 - 1.0 % Final    Immature Granulocytes % 09/03/2024 0.2  0.0 - 0.4 % Final    nRBC, Auto 09/03/2024 0.0  <=0.0 % Final    Neutrophils, Abs 09/03/2024 5.93  1.80 - 7.70 K/uL Final    Lymphocytes, Absolute 09/03/2024 2.00  1.00 - 4.80 K/uL Final    Monocytes, Absolute 09/03/2024 0.54  0.00 - 0.80 K/uL Final    Eosinophils, Absolute 09/03/2024 0.08  0.00 - 0.50 K/uL Final    Basophils, Absolute 09/03/2024 0.03  0.00 - 0.20 K/uL Final    Immature Granulocytes, Absolute 09/03/2024 0.02  0.00 - 0.04 K/uL Final    nRBC, Absolute 09/03/2024 0.00  <=0.00 x10e3/uL Final    Diff Type 09/03/2024 Auto   Final   Admission on 08/08/2024, Discharged on 08/08/2024   Component Date  Value Ref Range Status    Color, UA 08/08/2024 Light-Yellow  Colorless, Straw, Yellow, Light Yellow, Dark Yellow Final    Clarity, UA 08/08/2024 Turbid  Clear Final    pH, UA 08/08/2024 5.5  5.0 to 8.0 pH Units Final    Leukocytes, UA 08/08/2024 Moderate (A)  Negative Final    Nitrites, UA 08/08/2024 Negative  Negative Final    Protein, UA 08/08/2024 10 (A)  Negative Final    Glucose, UA 08/08/2024 50 (A)  Normal mg/dL Final    Ketones, UA 08/08/2024 Trace  Negative mg/dL Final    Urobilinogen, UA 08/08/2024 Normal  0.2, 1.0, Normal mg/dL Final    Bilirubin, UA 08/08/2024 Negative  Negative Final    Blood, UA 08/08/2024 Trace (A)  Negative Final    Specific Gravity, UA 08/08/2024 1.014  <=1.030 Final    WBC, UA 08/08/2024 9 (H)  <=5 /hpf Final    RBC, UA 08/08/2024 2  <=3 /hpf Final    Bacteria, UA 08/08/2024 Occasional (A)  None Seen /hpf Final    Squamous Epithelial Cells, UA 08/08/2024 Occasional (A)  None Seen /HPF Final    Hyaline Casts, UA 08/08/2024 0-2 (A)  None Seen /lpf Final    Mucous 08/08/2024 Occasional (A)  None Seen /LPF Final   Admission on 07/22/2024, Discharged on 07/22/2024   Component Date Value Ref Range Status    Color, UA 07/22/2024 Light-Yellow  Colorless, Straw, Yellow, Light Yellow, Dark Yellow Final    Clarity, UA 07/22/2024 Clear  Clear Final    pH, UA 07/22/2024 5.0  5.0 to 8.0 pH Units Final    Leukocytes, UA 07/22/2024 Negative  Negative Final    Nitrites, UA 07/22/2024 Negative  Negative Final    Protein, UA 07/22/2024 Negative  Negative Final    Glucose, UA 07/22/2024 Normal  Normal mg/dL Final    Ketones, UA 07/22/2024 Negative  Negative mg/dL Final    Urobilinogen, UA 07/22/2024 Normal  0.2, 1.0, Normal mg/dL Final    Bilirubin, UA 07/22/2024 Negative  Negative Final    Blood, UA 07/22/2024 Trace (A)  Negative Final    Specific Gravity, UA 07/22/2024 1.020  <=1.030 Final    WBC, UA 07/22/2024 1  <=5 /hpf Final    RBC, UA 07/22/2024 1  <=3 /hpf Final    Squamous Epithelial Cells,  UA 07/22/2024 Occasional (A)  None Seen /HPF Final    Mucous 07/22/2024 Occasional (A)  None Seen /LPF Final   Admission on 07/19/2024, Discharged on 07/20/2024   Component Date Value Ref Range Status    QRS Duration 07/19/2024 90  ms Final    [facility] QTC Calculation 07/19/2024 426  ms Final    Sodium 07/19/2024 142  136 - 145 mmol/L Final    Potassium 07/19/2024 3.1 (L)  3.5 - 5.1 mmol/L Final    Chloride 07/19/2024 109 (H)  98 - 107 mmol/L Final    CO2 07/19/2024 29  21 - 32 mmol/L Final    Anion Gap 07/19/2024 7  7 - 16 mmol/L Final    Glucose 07/19/2024 103  74 - 106 mg/dL Final    BUN 07/19/2024 18  7 - 18 mg/dL Final    Creatinine 07/19/2024 0.70  0.55 - 1.02 mg/dL Final    BUN/Creatinine Ratio 07/19/2024 26 (H)  6 - 20 Final    Calcium 07/19/2024 8.6  8.5 - 10.1 mg/dL Final    Total Protein 07/19/2024 6.3 (L)  6.4 - 8.2 g/dL Final    Albumin 07/19/2024 3.2 (L)  3.5 - 5.0 g/dL Final    Globulin 07/19/2024 3.1  2.0 - 4.0 g/dL Final    A/G Ratio 07/19/2024 1.0   Final    Bilirubin, Total 07/19/2024 0.3  >0.0 - 1.2 mg/dL Final    Alk Phos 07/19/2024 83  41 - 108 U/L Final    ALT 07/19/2024 31  13 - 56 U/L Final    AST 07/19/2024 19  15 - 37 U/L Final    eGFR 07/19/2024 104  >=60 mL/min/1.73m2 Final    Troponin I High Sensitivity 07/19/2024 4.7  <=60.4 pg/mL Final    ProBNP 07/19/2024 50  1 - 125 pg/mL Final    WBC 07/19/2024 6.30  4.50 - 11.00 K/uL Final    RBC 07/19/2024 4.13 (L)  4.20 - 5.40 M/uL Final    Hemoglobin 07/19/2024 12.3  12.0 - 16.0 g/dL Final    Hematocrit 07/19/2024 36.9 (L)  38.0 - 47.0 % Final    MCV 07/19/2024 89.3  80.0 - 96.0 fL Final    MCH 07/19/2024 29.8  27.0 - 31.0 pg Final    MCHC 07/19/2024 33.3  32.0 - 36.0 g/dL Final    RDW 07/19/2024 12.5  11.5 - 14.5 % Final    Platelet Count 07/19/2024 277  150 - 400 K/uL Final    MPV 07/19/2024 11.1  9.4 - 12.4 fL Final    Neutrophils % 07/19/2024 53.1  53.0 - 65.0 % Final    Lymphocytes % 07/19/2024 32.4  27.0 - 41.0 % Final    Monocytes %  07/19/2024 10.0 (H)  2.0 - 6.0 % Final    Eosinophils % 07/19/2024 3.7  1.0 - 4.0 % Final    Basophils % 07/19/2024 0.6  0.0 - 1.0 % Final    Immature Granulocytes % 07/19/2024 0.2  0.0 - 0.4 % Final    nRBC, Auto 07/19/2024 0.0  <=0.0 % Final    Neutrophils, Abs 07/19/2024 3.35  1.80 - 7.70 K/uL Final    Lymphocytes, Absolute 07/19/2024 2.04  1.00 - 4.80 K/uL Final    Monocytes, Absolute 07/19/2024 0.63  0.00 - 0.80 K/uL Final    Eosinophils, Absolute 07/19/2024 0.23  0.00 - 0.50 K/uL Final    Basophils, Absolute 07/19/2024 0.04  0.00 - 0.20 K/uL Final    Immature Granulocytes, Absolute 07/19/2024 0.01  0.00 - 0.04 K/uL Final    nRBC, Absolute 07/19/2024 0.00  <=0.00 x10e3/uL Final    Diff Type 07/19/2024 Auto   Final    Troponin I High Sensitivity 07/19/2024 5.9  <=60.4 pg/mL Final    SARS COV-2 Molecular 07/19/2024 Negative  Negative, Invalid Final    INFLUENZA A MOLECULAR 07/19/2024 Negative  Negative Final    INFLUENZA B MOLECULAR  07/19/2024 Negative  Negative Final    D-Dimer 07/19/2024 0.37  0.00 - 0.47 µg/mL Final    Magnesium 07/19/2024 2.1  1.7 - 2.3 mg/dL Final   There may be more visits with results that are not included.         Orders Placed This Encounter   Procedures    Case Request Operating Room: BLOCK, NERVE, FEMORAL, BLOCK,NERVE,OBTURATOR     Order Specific Question:   Medical Necessity:     Answer:   Medically Non-Urgent [100]     Order Specific Question:   Clinical trial?     Answer:   No [0]     Order Specific Question:   Case classification     Answer:   E - Elective [90]     Order Specific Question:   Implant Required:     Answer:   No [1001]     Order Specific Question:   Is an on-site pathologist required for this procedure?     Answer:   N/A       Requested Prescriptions      No prescriptions requested or ordered in this encounter       Assessment:     1. Hip pain, right    2. Hip pain, left    3. Primary osteoarthritis involving multiple joints                     Plan:    Not using  narcotics/medication from our office    [facility] employee    Can not tolerate Cymbalta      Follows Neurology [facility] neuropathy    Follows spine surgery [facility]     Follows orthopedics [facility], right hip replacement left knee replacement    Robotic cholecystectomy May 9, 2024 [facility]      She had bilateral sacroiliac injection # 1 May 7, 2024  She states she had 50% relief after procedure, the procedure did help improve her level function    Follow-up after left intra-articular hip injection # 2, October 22, 2024  She states she had 100% relief after procedure  Procedure did help improve her level function/activities daily living      Today complaint right groin pain history right total hip replacement worse with standing or walking or leaning forward may better with short periods resting or changing position    She is requesting procedure for chronic right hip pain after right hip replacement    Requesting right femoral/obturator nerve block    Considering repeating sacroiliac procedure if sacroiliac discomfort persist    X-ray sacroiliac joint [facility] April 15, 2024  No sacroiliac joint abnormality demonstrated    X-ray hips [facility] March 18, 2024  Right hip arthroplasty  No fracture noted  Left knee arthroplasty  Mild degenerative changes left hip      Monitor anesthesia request is medically indicated for the scheduled nerve block procedure due to:  1- needle phobia and anxiety, placing  the patient at risk during the provided service.  2-patient has an ASA class greater than 3 and requires constant presence of an anesthesiologist during the procedure,   3-patient has severe problems hard to lie still  4-patient suffers from chronic pain and is unable to function due to  diminished ADLs    Schedule right femoral/obturator nerve block # 1, right hip pain after right hip replacement    Dr. Lang    Bring original prescription medication bottles/container/box with labels to  each visit

## 2025-01-14 ENCOUNTER — OFFICE VISIT (OUTPATIENT)
Dept: PAIN MEDICINE | Facility: CLINIC | Age: 55
End: 2025-01-14
Payer: COMMERCIAL

## 2025-01-14 VITALS
SYSTOLIC BLOOD PRESSURE: 160 MMHG | DIASTOLIC BLOOD PRESSURE: 81 MMHG | RESPIRATION RATE: 20 BRPM | BODY MASS INDEX: 40.5 KG/M2 | WEIGHT: 252 LBS | HEIGHT: 66 IN | HEART RATE: 74 BPM

## 2025-01-14 DIAGNOSIS — M25.552 HIP PAIN, LEFT: Chronic | ICD-10-CM

## 2025-01-14 DIAGNOSIS — M15.0 PRIMARY OSTEOARTHRITIS INVOLVING MULTIPLE JOINTS: Chronic | ICD-10-CM

## 2025-01-14 DIAGNOSIS — M25.551 HIP PAIN, RIGHT: Primary | Chronic | ICD-10-CM

## 2025-01-14 PROCEDURE — 99999 PR PBB SHADOW E&M-EST. PATIENT-LVL V: CPT | Mod: PBBFAC,,, | Performed by: PHYSICIAN ASSISTANT

## 2025-01-14 PROCEDURE — 3008F BODY MASS INDEX DOCD: CPT | Mod: ,,, | Performed by: PHYSICIAN ASSISTANT

## 2025-01-14 PROCEDURE — 3079F DIAST BP 80-89 MM HG: CPT | Mod: ,,, | Performed by: PHYSICIAN ASSISTANT

## 2025-01-14 PROCEDURE — 99215 OFFICE O/P EST HI 40 MIN: CPT | Mod: PBBFAC | Performed by: PHYSICIAN ASSISTANT

## 2025-01-14 PROCEDURE — 99213 OFFICE O/P EST LOW 20 MIN: CPT | Mod: S$PBB,,, | Performed by: PHYSICIAN ASSISTANT

## 2025-01-14 PROCEDURE — 3077F SYST BP >= 140 MM HG: CPT | Mod: ,,, | Performed by: PHYSICIAN ASSISTANT

## 2025-01-14 PROCEDURE — 1159F MED LIST DOCD IN RCRD: CPT | Mod: ,,, | Performed by: PHYSICIAN ASSISTANT

## 2025-01-14 NOTE — PATIENT INSTRUCTIONS

## 2025-01-15 ENCOUNTER — HOSPITAL ENCOUNTER (EMERGENCY)
Facility: HOSPITAL | Age: 55
Discharge: HOME | End: 2025-01-15
Payer: COMMERCIAL

## 2025-01-15 VITALS
HEIGHT: 66 IN | HEART RATE: 70 BPM | TEMPERATURE: 98 F | BODY MASS INDEX: 39.53 KG/M2 | RESPIRATION RATE: 18 BRPM | SYSTOLIC BLOOD PRESSURE: 156 MMHG | DIASTOLIC BLOOD PRESSURE: 93 MMHG | OXYGEN SATURATION: 98 % | WEIGHT: 246 LBS

## 2025-01-15 DIAGNOSIS — B34.9 VIRAL SYNDROME: Primary | ICD-10-CM

## 2025-01-15 LAB
BILIRUB UR QL STRIP: NEGATIVE
CLARITY UR: CLEAR
COLOR UR: ABNORMAL
GLUCOSE UR STRIP-MCNC: NORMAL MG/DL
INFLUENZA A MOLECULAR (OHS): NEGATIVE
INFLUENZA B MOLECULAR (OHS): NEGATIVE
KETONES UR STRIP-SCNC: NEGATIVE MG/DL
LEUKOCYTE ESTERASE UR QL STRIP: NEGATIVE
MUCOUS, UA: ABNORMAL /LPF
NITRITE UR QL STRIP: NEGATIVE
PH UR STRIP: 5.5 PH UNITS
PROT UR QL STRIP: NEGATIVE
RBC # UR STRIP: ABNORMAL /UL
RBC #/AREA URNS HPF: 1 /HPF
SARS-COV-2 RDRP RESP QL NAA+PROBE: NEGATIVE
SP GR UR STRIP: 1.02
SQUAMOUS #/AREA URNS LPF: ABNORMAL /HPF
UROBILINOGEN UR STRIP-ACNC: NORMAL MG/DL
WBC #/AREA URNS HPF: <1 /HPF

## 2025-01-15 PROCEDURE — 87502 INFLUENZA DNA AMP PROBE: CPT | Performed by: NURSE PRACTITIONER

## 2025-01-15 PROCEDURE — 63600175 PHARM REV CODE 636 W HCPCS: Performed by: NURSE PRACTITIONER

## 2025-01-15 PROCEDURE — 87635 SARS-COV-2 COVID-19 AMP PRB: CPT | Performed by: NURSE PRACTITIONER

## 2025-01-15 PROCEDURE — 81003 URINALYSIS AUTO W/O SCOPE: CPT | Performed by: NURSE PRACTITIONER

## 2025-01-15 PROCEDURE — 96372 THER/PROPH/DIAG INJ SC/IM: CPT | Performed by: NURSE PRACTITIONER

## 2025-01-15 PROCEDURE — 99284 EMERGENCY DEPT VISIT MOD MDM: CPT | Mod: 25

## 2025-01-15 RX ORDER — PROMETHAZINE HYDROCHLORIDE 25 MG/ML
25 INJECTION, SOLUTION INTRAMUSCULAR; INTRAVENOUS
Status: COMPLETED | OUTPATIENT
Start: 2025-01-15 | End: 2025-01-15

## 2025-01-15 RX ORDER — KETOROLAC TROMETHAMINE 30 MG/ML
60 INJECTION, SOLUTION INTRAMUSCULAR; INTRAVENOUS
Status: COMPLETED | OUTPATIENT
Start: 2025-01-15 | End: 2025-01-15

## 2025-01-15 RX ADMIN — PROMETHAZINE HYDROCHLORIDE 25 MG: 25 INJECTION INTRAMUSCULAR; INTRAVENOUS at 09:01

## 2025-01-15 RX ADMIN — KETOROLAC TROMETHAMINE 60 MG: 30 INJECTION, SOLUTION INTRAMUSCULAR at 09:01

## 2025-01-15 NOTE — ED PROVIDER NOTES
Encounter Date: 1/15/2025       History     Chief Complaint   Patient presents with    Headache     55-year-old female presents to the emergency department to be evaluated for headache, nausea, vomiting meaning, diarrhea that began yesterday.  She has been around some coworkers who tested positive for flu and some of tested positive for COVID.  Denies any recent fall or head injury.  She has had a mild cough.    The history is provided by the patient.   Headache   This is a new problem. The current episode started yesterday. The quality of the pain is described as dull. Associated symptoms include coughing and vomiting. Pertinent negatives include no abdominal pain, abnormal behavior, anorexia, back pain, blurred vision, dizziness, drainage, ear pain, eye pain, eye redness, eye watering, facial sweating, fever, hearing loss, insomnia, loss of balance, muscle aches, nausea, neck pain, numbness, phonophobia, photophobia, rhinorrhea, scalp tenderness, seizures, sinus pressure, sore throat, swollen glands, tingling, tinnitus, visual change, weakness or weight loss.   Diarrhea   This is a new problem. The current episode started yesterday. The problem occurs 2 to 4 times per day. Associated symptoms include coughing, headaches and vomiting. Pertinent negatives include no abdominal pain, arthralgias, bloating, chills, fever, increased  flatus, myalgias, sweats, URI or weight loss.     Review of patient's allergies indicates:   Allergen Reactions    Imitrex [sumatriptan] Anaphylaxis    Tylox [oxycodone-acetaminophen] Itching    Latex     Opioids - morphine analogues Hives    Sulfa (sulfonamide antibiotics) Rash    Zithromax z-casimiro [azithromycin] Rash     Past Medical History:   Diagnosis Date    Arthritis     Class 3 severe obesity due to excess calories without serious comorbidity with body mass index (BMI) of 40.0 to 44.9 in adult 03/20/2023    Essential hypertension 03/20/2023    History of gastric ulcer 05/01/2023     Hypothyroidism     Vestibular dizziness 10/23/2023     Past Surgical History:   Procedure Laterality Date    ARTHROSCOPY OF KNEE Right 05/25/2023    Procedure: ARTHROSCOPY, KNEE;  Surgeon: Tacos Aburto MD;  Location: [facility] OR;  Service: Orthopedics;  Laterality: Right;    CHOLECYSTECTOMY  05/09/2024    ENDOSCOPY  05/13/2024    HYSTERECTOMY      Partial    INJECTION OF ANESTHETIC AGENT INTO SACROILIAC JOINT Bilateral 05/07/2024    Procedure: BLOCK, SACROILIAC JOINT;  Surgeon: Leny Lang MD;  Location: [facility];  Service: Pain Management;  Laterality: Bilateral;    INJECTION OF JOINT Left 07/30/2024    Procedure: Injection, Joint, Hip, intra-articular hip injection;  Surgeon: Leny Lang MD;  Location: [facility];  Service: Pain Management;  Laterality: Left;    INJECTION OF JOINT Left 10/22/2024    Procedure: Injection, Joint, Hip, intra-articular hip injection;  Surgeon: Leny Lang MD;  Location: [facility];  Service: Pain Management;  Laterality: Left;    JOINT REPLACEMENT Right     hip replacement    KNEE ARTHROPLASTY Left     KNEE ARTHROSCOPY W/ MENISCECTOMY Right 05/25/2023    Procedure: ARTHROSCOPY, KNEE, WITH MENISCECTOMY;  Surgeon: Tacos Aburto MD;  Location: [facility] OR;  Service: Orthopedics;  Laterality: Right;    LUMPECTOMY, BREAST      ROBOT-ASSISTED CHOLECYSTECTOMY USING DA ANG XI N/A 05/09/2024    Procedure: XI ROBOTIC CHOLECYSTECTOMY;  Surgeon: Mao Dempsey DO;  Location: [facility] OR;  Service: General;  Laterality: N/A;     Family History   Problem Relation Name Age of Onset    Hypertension Maternal Grandmother      Stroke Maternal Grandmother      Stroke Maternal Grandfather      Hypertension Mother      Stroke Mother      Diabetes Mother       Social History     Tobacco Use    Smoking status: Never     Passive exposure: Never    Smokeless tobacco: Never   Substance Use Topics    Alcohol use: Never    Drug use: Never      Review of Systems   Constitutional:  Negative for chills, fever and weight loss.   HENT:  Negative for ear pain, hearing loss, rhinorrhea, sinus pressure, sore throat and tinnitus.    Eyes:  Negative for blurred vision, photophobia, pain and redness.   Respiratory:  Positive for cough.    Gastrointestinal:  Positive for diarrhea and vomiting. Negative for abdominal pain, anorexia, bloating, flatus and nausea.   Musculoskeletal:  Negative for arthralgias, back pain, myalgias and neck pain.   Neurological:  Positive for headaches. Negative for dizziness, tingling, seizures, weakness, numbness and loss of balance.   Psychiatric/Behavioral:  The patient does not have insomnia.    All other systems reviewed and are negative.      Physical Exam     Initial Vitals [01/15/25 0932]   BP Pulse Resp Temp SpO2   (!) 156/93 70 18 98 °F (36.7 °C) 98 %      MAP       --         Physical Exam    Vitals reviewed.  Constitutional: She appears well-developed and well-nourished.   HENT:   Head: Normocephalic and atraumatic.   Right Ear: Tympanic membrane normal.   Left Ear: Tympanic membrane normal.   Eyes: EOM are normal. Pupils are equal, round, and reactive to light.   Neck: Neck supple.   Cardiovascular:  Normal rate and regular rhythm.           Pulmonary/Chest: Breath sounds normal.   Abdominal: Abdomen is soft. Bowel sounds are normal. She exhibits no distension and no mass. There is no abdominal tenderness. There is no rebound and no guarding.   Musculoskeletal:         General: Normal range of motion.      Cervical back: Neck supple.     Neurological: She is alert and oriented to person, place, and time. She has normal strength. GCS score is 15. GCS eye subscore is 4. GCS verbal subscore is 5. GCS motor subscore is 6.   Skin: Skin is warm and dry. Capillary refill takes less than 2 seconds.   Psychiatric: She has a normal mood and affect.         Medical Screening Exam   See Full Note    ED Course   Procedures  Labs  Reviewed   INFLUENZA A & B BY MOLECULAR   SARS-COV-2 RNA AMPLIFICATION, QUAL   URINALYSIS          Imaging Results    None          Medications   promethazine injection 25 mg (25 mg Intramuscular Given 1/15/25 0953)   ketorolac injection 60 mg (60 mg Intramuscular Given 1/15/25 0953)     Medical Decision Making  55-year-old female presents to the emergency department to be evaluated for headache, nausea, vomiting meaning, diarrhea that began yesterday.  She has been around some coworkers who tested positive for flu and some of tested positive for COVID.  Denies any recent fall or head injury.  She has had a mild cough.  Diagnosis:  Viral syndrome  Treated with Toradol and Phenergan  Flu and COVID are both pending, patient will check MyChart  Prescribed Phenergan  As patient was being discharged by the nurse, she asked for a urinalysis to also be collected.  Urinalysis obtained    Amount and/or Complexity of Data Reviewed  Labs: ordered.    Risk  Prescription drug management.                                      Clinical Impression:   Final diagnoses:  [B34.9] Viral syndrome (Primary)        ED Disposition Condition    Discharge Stable          ED Prescriptions    None       Follow-up Information    None          Maria L Palafox FNP  01/15/25 0948       Maria L Palafox FNP  01/15/25 1024

## 2025-01-15 NOTE — ED TRIAGE NOTES
Stuart Cloud, a 55 y.o. female presents to [facility] via pov with a chief complaint of headache, nausea.       Triage Note:  Chief Complaint   Patient presents with    Headache     Maria L Phillips FNP-C  Review of patient's allergies indicates:   Allergen Reactions    Tylox [oxycodone-acetaminophen] Itching    Latex     Opioids - morphine analogues Hives    Sulfa (sulfonamide antibiotics) Rash    Zithromax z-casimiro [azithromycin] Rash     Past Medical History:   Diagnosis Date    Arthritis     Class 3 severe obesity due to excess calories without serious comorbidity with body mass index (BMI) of 40.0 to 44.9 in adult 03/20/2023    Essential hypertension 03/20/2023    History of gastric ulcer 05/01/2023    Hypothyroidism     Vestibular dizziness 10/23/2023     Past Surgical History:   Procedure Laterality Date    ARTHROSCOPY OF KNEE Right 05/25/2023    Procedure: ARTHROSCOPY, KNEE;  Surgeon: Tacos Aburto MD;  Location: [facility];  Service: Orthopedics;  Laterality: Right;    CHOLECYSTECTOMY  05/09/2024    ENDOSCOPY  05/13/2024    HYSTERECTOMY      Partial    INJECTION OF ANESTHETIC AGENT INTO SACROILIAC JOINT Bilateral 05/07/2024    Procedure: BLOCK, SACROILIAC JOINT;  Surgeon: Leny Lang MD;  [facility]: [facility];  Service: Pain Management;  Laterality: Bilateral;    INJECTION OF JOINT Left 07/30/2024    Procedure: Injection, Joint, Hip, intra-articular hip injection;  Surgeon: Leny Lang MD;  [facility]: [facility];  Service: Pain Management;  Laterality: Left;    INJECTION OF JOINT Left 10/22/2024    Procedure: Injection, Joint, Hip, intra-articular hip injection;  Surgeon: Leny Lang MD;  [facility]: [facility];  Service: Pain Management;  Laterality: Left;    JOINT REPLACEMENT Right     hip replacement    KNEE ARTHROPLASTY Left     KNEE ARTHROSCOPY W/ MENISCECTOMY Right 05/25/2023    Procedure: ARTHROSCOPY, KNEE, WITH MENISCECTOMY;   Surgeon: Tacos Aburto MD;  Location: [facility] OR;  Service: Orthopedics;  Laterality: Right;    LUMPECTOMY, BREAST      ROBOT-ASSISTED CHOLECYSTECTOMY USING DA ANG XI N/A 05/09/2024    Procedure: XI ROBOTIC CHOLECYSTECTOMY;  Surgeon: Mao Dempsey DO;  Location: [facility] OR;  Service: General;  Laterality: N/A;     Social History     Tobacco Use    Smoking status: Never     Passive exposure: Never    Smokeless tobacco: Never   Substance Use Topics    Alcohol use: Never    Drug use: Never     Past Surgical History:   Procedure Laterality Date    ARTHROSCOPY OF KNEE Right 05/25/2023    Procedure: ARTHROSCOPY, KNEE;  Surgeon: Tacos Aburto MD;  Location: [facility] OR;  Service: Orthopedics;  Laterality: Right;    CHOLECYSTECTOMY  05/09/2024    ENDOSCOPY  05/13/2024    HYSTERECTOMY      Partial    INJECTION OF ANESTHETIC AGENT INTO SACROILIAC JOINT Bilateral 05/07/2024    Procedure: BLOCK, SACROILIAC JOINT;  Surgeon: Leny Lang MD;  Location: [facility];  Service: Pain Management;  Laterality: Bilateral;    INJECTION OF JOINT Left 07/30/2024    Procedure: Injection, Joint, Hip, intra-articular hip injection;  Surgeon: Leny Lang MD;  Location: [facility];  Service: Pain Management;  Laterality: Left;    INJECTION OF JOINT Left 10/22/2024    Procedure: Injection, Joint, Hip, intra-articular hip injection;  Surgeon: Leny Lang MD;  Location: [facility];  Service: Pain Management;  Laterality: Left;    JOINT REPLACEMENT Right     hip replacement    KNEE ARTHROPLASTY Left     KNEE ARTHROSCOPY W/ MENISCECTOMY Right 05/25/2023    Procedure: ARTHROSCOPY, KNEE, WITH MENISCECTOMY;  Surgeon: Tacos Aburto MD;  Location: [facility] OR;  Service: Orthopedics;  Laterality: Right;    LUMPECTOMY, BREAST      ROBOT-ASSISTED CHOLECYSTECTOMY USING DA ANG XI N/A 05/09/2024    Procedure: XI ROBOTIC CHOLECYSTECTOMY;  Surgeon: Mao Dempsey DO;  Location: [facility];  Service: General;  Laterality: N/A;     Vitals:    01/15/25 0932   BP: (!) 156/93   Pulse: 70   Resp: 18   Temp: 98 °F (36.7 °C)     No current facility-administered medications for this encounter.     Current Outpatient Medications   Medication Sig Dispense Refill    clonazePAM (KLONOPIN) 0.5 MG tablet Take 1 tablet (0.5 mg total) by mouth 2 (two) times daily as needed for Anxiety. 30 tablet 2    dexlansoprazole (DEXILANT) 60 mg capsule Take 1 capsule (60 mg total) by mouth once daily. 90 capsule 3    docusate sodium (COLACE) 100 MG capsule Take 1 capsule (100 mg total) by mouth 2 (two) times daily. (Patient not taking: Reported on 10/30/2024) 28 capsule 0    DULoxetine (CYMBALTA) 30 MG capsule Take 1 capsule (30 mg total) by mouth once daily. 30 capsule 1    fluconazole (DIFLUCAN) 150 MG Tab Take 1 tablet by mouth for 1 dose. 1 tablet 1    levothyroxine (SYNTHROID) 25 MCG tablet Take 1 tablet (25 mcg total) by mouth before breakfast. 30 tablet 11    linaCLOtide (LINZESS) 145 mcg Cap capsule Take 1 capsule (145 mcg total) by mouth before breakfast. 90 capsule 3    losartan-hydrochlorothiazide 50-12.5 mg (HYZAAR) 50-12.5 mg per tablet Take 1 tablet by mouth once daily. 90 tablet 3    naproxen (NAPROSYN) 500 MG tablet Take 1 tablet (500 mg total) by mouth 2 (two) times daily with meals. (Patient not taking: Reported on 10/30/2024) 60 tablet 0     Facility-Administered Medications Ordered in Other Encounters   Medication Dose Route Frequency Provider Last Rate Last Admin    0.9%  NaCl infusion   Intravenous Continuous Tacos Aburto MD 75 mL/hr at 05/25/23 1709 New Bag at 05/25/23 1709    0.9%  NaCl infusion   Intravenous Continuous Leny Lang  mL/hr at 06/03/24 1439 New Bag at 06/03/24 1439

## 2025-01-15 NOTE — DISCHARGE INSTRUCTIONS
Check your MyChart for results.  Clear liquid diet.  Advance diet as tolerated.  Take Zofran as needed as directed.Follow up with your primary care provider in 2 days. Return to the emergency department for any increase in symptoms or for any other new or worrisome symptoms.

## 2025-01-16 RX ORDER — NAPROXEN 500 MG/1
500 TABLET ORAL 2 TIMES DAILY WITH MEALS
Qty: 60 TABLET | Refills: 0 | Status: CANCELLED | OUTPATIENT
Start: 2025-01-16

## 2025-01-16 RX ORDER — LEVOTHYROXINE SODIUM 25 UG/1
25 TABLET ORAL
Qty: 30 TABLET | Refills: 11 | Status: CANCELLED | OUTPATIENT
Start: 2025-01-16 | End: 2026-01-16

## 2025-01-19 ENCOUNTER — HOSPITAL ENCOUNTER (EMERGENCY)
Facility: HOSPITAL | Age: 55
Discharge: HOME | End: 2025-01-19
Attending: EMERGENCY MEDICINE
Payer: COMMERCIAL

## 2025-01-19 VITALS
TEMPERATURE: 98 F | WEIGHT: 246 LBS | HEIGHT: 66 IN | RESPIRATION RATE: 18 BRPM | SYSTOLIC BLOOD PRESSURE: 152 MMHG | OXYGEN SATURATION: 98 % | BODY MASS INDEX: 39.53 KG/M2 | HEART RATE: 69 BPM | DIASTOLIC BLOOD PRESSURE: 87 MMHG

## 2025-01-19 DIAGNOSIS — E86.0 DEHYDRATION: ICD-10-CM

## 2025-01-19 DIAGNOSIS — R51.9 NONINTRACTABLE EPISODIC HEADACHE, UNSPECIFIED HEADACHE TYPE: ICD-10-CM

## 2025-01-19 DIAGNOSIS — K52.9 GASTROENTERITIS: Primary | ICD-10-CM

## 2025-01-19 LAB
ALBUMIN SERPL BCP-MCNC: 3.5 G/DL (ref 3.5–5)
ALBUMIN/GLOB SERPL: 1.5 {RATIO}
ALP SERPL-CCNC: 78 U/L (ref 40–150)
ALT SERPL W P-5'-P-CCNC: 20 U/L
ANION GAP SERPL CALCULATED.3IONS-SCNC: 13 MMOL/L (ref 7–16)
AST SERPL W P-5'-P-CCNC: 22 U/L (ref 5–34)
BASOPHILS # BLD AUTO: 0.03 K/UL (ref 0–0.2)
BASOPHILS NFR BLD AUTO: 0.6 % (ref 0–1)
BILIRUB SERPL-MCNC: 0.4 MG/DL
BILIRUB UR QL STRIP: NEGATIVE
BUN SERPL-MCNC: 17 MG/DL (ref 10–20)
BUN/CREAT SERPL: 22 (ref 6–20)
CALCIUM SERPL-MCNC: 8.5 MG/DL (ref 8.4–10.2)
CHLORIDE SERPL-SCNC: 110 MMOL/L (ref 98–107)
CLARITY UR: CLEAR
CO2 SERPL-SCNC: 23 MMOL/L (ref 22–29)
COLOR UR: YELLOW
CREAT SERPL-MCNC: 0.76 MG/DL (ref 0.55–1.02)
DIFFERENTIAL METHOD BLD: ABNORMAL
EGFR (NO RACE VARIABLE) (RUSH/TITUS): 94 ML/MIN/1.73M2
EOSINOPHIL # BLD AUTO: 0.09 K/UL (ref 0–0.5)
EOSINOPHIL NFR BLD AUTO: 1.8 % (ref 1–4)
ERYTHROCYTE [DISTWIDTH] IN BLOOD BY AUTOMATED COUNT: 12 % (ref 11.5–14.5)
GLOBULIN SER-MCNC: 2.3 G/DL (ref 2–4)
GLUCOSE SERPL-MCNC: 112 MG/DL (ref 74–100)
GLUCOSE UR STRIP-MCNC: NORMAL MG/DL
HCT VFR BLD AUTO: 39 % (ref 38–47)
HGB BLD-MCNC: 12.9 G/DL (ref 12–16)
IMM GRANULOCYTES # BLD AUTO: 0.01 K/UL (ref 0–0.04)
IMM GRANULOCYTES NFR BLD: 0.2 % (ref 0–0.4)
INFLUENZA A MOLECULAR (OHS): NEGATIVE
INFLUENZA B MOLECULAR (OHS): NEGATIVE
KETONES UR STRIP-SCNC: NEGATIVE MG/DL
LEUKOCYTE ESTERASE UR QL STRIP: NEGATIVE
LYMPHOCYTES # BLD AUTO: 1.34 K/UL (ref 1–4.8)
LYMPHOCYTES NFR BLD AUTO: 26.1 % (ref 27–41)
MCH RBC QN AUTO: 30.1 PG (ref 27–31)
MCHC RBC AUTO-ENTMCNC: 33.1 G/DL (ref 32–36)
MCV RBC AUTO: 91.1 FL (ref 80–96)
MONOCYTES # BLD AUTO: 0.37 K/UL (ref 0–0.8)
MONOCYTES NFR BLD AUTO: 7.2 % (ref 2–6)
MPC BLD CALC-MCNC: 11.3 FL (ref 9.4–12.4)
NEUTROPHILS # BLD AUTO: 3.3 K/UL (ref 1.8–7.7)
NEUTROPHILS NFR BLD AUTO: 64.1 % (ref 53–65)
NITRITE UR QL STRIP: NEGATIVE
NRBC # BLD AUTO: 0 X10E3/UL
NRBC, AUTO (.00): 0 %
PH UR STRIP: 7 PH UNITS
PLATELET # BLD AUTO: 242 K/UL (ref 150–400)
POTASSIUM SERPL-SCNC: 4.2 MMOL/L (ref 3.5–5.1)
PROT SERPL-MCNC: 5.8 G/DL (ref 6.4–8.3)
PROT UR QL STRIP: 20
RBC # BLD AUTO: 4.28 M/UL (ref 4.2–5.4)
RBC # UR STRIP: NEGATIVE /UL
SARS-COV-2 RDRP RESP QL NAA+PROBE: NEGATIVE
SODIUM SERPL-SCNC: 142 MMOL/L (ref 136–145)
SP GR UR STRIP: 1.03
TSH SERPL DL<=0.005 MIU/L-ACNC: 1.75 UIU/ML (ref 0.35–4.94)
UROBILINOGEN UR STRIP-ACNC: 8 MG/DL
WBC # BLD AUTO: 5.14 K/UL (ref 4.5–11)

## 2025-01-19 PROCEDURE — 96374 THER/PROPH/DIAG INJ IV PUSH: CPT

## 2025-01-19 PROCEDURE — 87502 INFLUENZA DNA AMP PROBE: CPT | Performed by: EMERGENCY MEDICINE

## 2025-01-19 PROCEDURE — 96361 HYDRATE IV INFUSION ADD-ON: CPT

## 2025-01-19 PROCEDURE — 85025 COMPLETE CBC W/AUTO DIFF WBC: CPT | Performed by: EMERGENCY MEDICINE

## 2025-01-19 PROCEDURE — 81003 URINALYSIS AUTO W/O SCOPE: CPT | Performed by: EMERGENCY MEDICINE

## 2025-01-19 PROCEDURE — 63600175 PHARM REV CODE 636 W HCPCS: Mod: JZ,TB | Performed by: EMERGENCY MEDICINE

## 2025-01-19 PROCEDURE — 25000003 PHARM REV CODE 250: Performed by: EMERGENCY MEDICINE

## 2025-01-19 PROCEDURE — 99284 EMERGENCY DEPT VISIT MOD MDM: CPT | Mod: 25

## 2025-01-19 PROCEDURE — 96372 THER/PROPH/DIAG INJ SC/IM: CPT | Performed by: EMERGENCY MEDICINE

## 2025-01-19 PROCEDURE — 80053 COMPREHEN METABOLIC PANEL: CPT | Performed by: EMERGENCY MEDICINE

## 2025-01-19 PROCEDURE — 87635 SARS-COV-2 COVID-19 AMP PRB: CPT | Performed by: EMERGENCY MEDICINE

## 2025-01-19 RX ORDER — PROMETHAZINE HYDROCHLORIDE 25 MG/ML
25 INJECTION, SOLUTION INTRAMUSCULAR; INTRAVENOUS
Status: COMPLETED | OUTPATIENT
Start: 2025-01-19 | End: 2025-01-19

## 2025-01-19 RX ORDER — PROMETHAZINE HYDROCHLORIDE 25 MG/1
25 TABLET ORAL EVERY 6 HOURS PRN
Qty: 15 TABLET | Refills: 0 | Status: SHIPPED | OUTPATIENT
Start: 2025-01-19

## 2025-01-19 RX ORDER — METHOCARBAMOL 500 MG/1
TABLET, FILM COATED ORAL 3 TIMES DAILY PRN
Qty: 30 TABLET | Refills: 0 | Status: SHIPPED | OUTPATIENT
Start: 2025-01-19

## 2025-01-19 RX ORDER — PROMETHAZINE HYDROCHLORIDE 25 MG/1
25 TABLET ORAL
Status: DISCONTINUED | OUTPATIENT
Start: 2025-01-19 | End: 2025-01-19 | Stop reason: HOSPADM

## 2025-01-19 RX ORDER — KETOROLAC TROMETHAMINE 15 MG/ML
15 INJECTION, SOLUTION INTRAMUSCULAR; INTRAVENOUS
Status: COMPLETED | OUTPATIENT
Start: 2025-01-19 | End: 2025-01-19

## 2025-01-19 RX ADMIN — PROMETHAZINE HYDROCHLORIDE 25 MG: 25 INJECTION INTRAMUSCULAR; INTRAVENOUS at 08:01

## 2025-01-19 RX ADMIN — KETOROLAC TROMETHAMINE 15 MG: 15 INJECTION, SOLUTION INTRAMUSCULAR; INTRAVENOUS at 08:01

## 2025-01-19 RX ADMIN — SODIUM CHLORIDE 1000 ML: 9 INJECTION, SOLUTION INTRAVENOUS at 08:01

## 2025-01-19 NOTE — DISCHARGE INSTRUCTIONS
Today you are TSH/thyroid stimulating hormone that has not support the need to continue with your thyroid medication.  Follow-up with your primary care doctor about this to see if truly you need to continue taking thyroid medication    Start with a bland diet and advance gradually as tolerated.  Keep hydrated with plenty of fluids.    Use prescription Phenergan as needed for headache or nausea    Will prescribe methocarbamol to replace the Flexeril what you have taking for chronic spasms    Get some rest over the next couple days    Follow up with primary care    Return the ER if symptoms worsen or new symptoms develop

## 2025-01-19 NOTE — ED PROVIDER NOTES
Encounter Date: 1/19/2025       History     Chief Complaint   Patient presents with    Vomiting     Pt c/o N/V/D and migraine, pt states no relief with excedrin and zofran. Seen in er on 1/15 for same s/s      Patient complains of vomiting diarrhea body aches and headache.  That has symptoms 1st began 4 days ago with some loose stools and vomiting.  She had plain of a generalized dull headache.  The patient came to the ER tested negative COVID and was able to go back to work the following 2 days.  Patient was feeling much better but yesterday evening and she had some more loose stools.  She denies fever.  She describes her headache is dull and generalized mainly on the right side.  She does have some throbbing component to her headache so she has a history of migraines.  Otherwise she concerned about being out of her Synthroid for the past several weeks.  Denies associated chest pain shortness breath.  Occasional cough no productive cough no shortness of breath.      Review of patient's allergies indicates:   Allergen Reactions    Imitrex [sumatriptan] Anaphylaxis    Tylox [oxycodone-acetaminophen] Itching    Latex     Opioids - morphine analogues Hives    Sulfa (sulfonamide antibiotics) Rash    Zithromax z-casimiro [azithromycin] Rash     Past Medical History:   Diagnosis Date    Arthritis     Class 3 severe obesity due to excess calories without serious comorbidity with body mass index (BMI) of 40.0 to 44.9 in adult 03/20/2023    Essential hypertension 03/20/2023    History of gastric ulcer 05/01/2023    Hypothyroidism     Vestibular dizziness 10/23/2023     Past Surgical History:   Procedure Laterality Date    ARTHROSCOPY OF KNEE Right 05/25/2023    Procedure: ARTHROSCOPY, KNEE;  Surgeon: Tacos Aburto MD;  Location: [facility];  Service: Orthopedics;  Laterality: Right;    CHOLECYSTECTOMY  05/09/2024    ENDOSCOPY  05/13/2024    HYSTERECTOMY      Partial    INJECTION OF ANESTHETIC AGENT INTO SACROILIAC JOINT  Bilateral 05/07/2024    Procedure: BLOCK, SACROILIAC JOINT;  Surgeon: Leny Lang MD;  Location: [facility];  Service: Pain Management;  Laterality: Bilateral;    INJECTION OF JOINT Left 07/30/2024    Procedure: Injection, Joint, Hip, intra-articular hip injection;  Surgeon: Leny Lang MD;  Location: [facility];  Service: Pain Management;  Laterality: Left;    INJECTION OF JOINT Left 10/22/2024    Procedure: Injection, Joint, Hip, intra-articular hip injection;  Surgeon: Leny Lang MD;  Location: [facility];  Service: Pain Management;  Laterality: Left;    JOINT REPLACEMENT Right     hip replacement    KNEE ARTHROPLASTY Left     KNEE ARTHROSCOPY W/ MENISCECTOMY Right 05/25/2023    Procedure: ARTHROSCOPY, KNEE, WITH MENISCECTOMY;  Surgeon: Tacos Aburto MD;  Location: [facility] OR;  Service: Orthopedics;  Laterality: Right;    LUMPECTOMY, BREAST      ROBOT-ASSISTED CHOLECYSTECTOMY USING DA ANG XI N/A 05/09/2024    Procedure: XI ROBOTIC CHOLECYSTECTOMY;  Surgeon: Mao Dempsey DO;  Location: [facility] OR;  Service: General;  Laterality: N/A;     Family History   Problem Relation Name Age of Onset    Hypertension Maternal Grandmother      Stroke Maternal Grandmother      Stroke Maternal Grandfather      Hypertension Mother      Stroke Mother      Diabetes Mother       Social History     Tobacco Use    Smoking status: Never     Passive exposure: Never    Smokeless tobacco: Never   Substance Use Topics    Alcohol use: Never    Drug use: Never     Review of Systems   Constitutional:  Positive for chills and fatigue. Negative for fever.   HENT:  Positive for congestion. Negative for sore throat.    Respiratory:  Negative for shortness of breath.    Cardiovascular:  Negative for chest pain.   Gastrointestinal:  Positive for diarrhea, nausea and vomiting. Negative for abdominal pain.   Genitourinary:  Negative for dysuria.   Musculoskeletal:  Negative for back pain  and neck stiffness.   Skin:  Negative for rash.   Neurological:  Positive for headaches. Negative for weakness.   Hematological:  Does not bruise/bleed easily.       Physical Exam     Initial Vitals [01/19/25 0610]   BP Pulse Resp Temp SpO2   (!) 152/87 69 18 97.7 °F (36.5 °C) 98 %      MAP       --         Physical Exam    Medical Screening Exam   See Full Note    ED Course   Procedures  Labs Reviewed   COMPREHENSIVE METABOLIC PANEL - Abnormal       Result Value    Sodium 142      Potassium 4.2      Chloride 110 (*)     CO2 23      Anion Gap 13      Glucose 112 (*)     BUN 17      Creatinine 0.76      BUN/Creatinine Ratio 22 (*)     Calcium 8.5      Total Protein 5.8 (*)     Albumin 3.5      Globulin 2.3      A/G Ratio 1.5      Bilirubin, Total 0.4      Alk Phos 78      ALT 20      AST 22      eGFR 94     URINALYSIS, REFLEX TO URINE CULTURE - Abnormal    Color, UA Yellow      Clarity, UA Clear      pH, UA 7.0      Leukocytes, UA Negative      Nitrites, UA Negative      Protein, UA 20 (*)     Glucose, UA Normal      Ketones, UA Negative      Urobilinogen, UA 8 (*)     Bilirubin, UA Negative      Blood, UA Negative      Specific Gravity, UA 1.034 (*)    CBC WITH DIFFERENTIAL - Abnormal    WBC 5.14      RBC 4.28      Hemoglobin 12.9      Hematocrit 39.0      MCV 91.1      MCH 30.1      MCHC 33.1      RDW 12.0      Platelet Count 242      MPV 11.3      Neutrophils % 64.1      Lymphocytes % 26.1 (*)     Monocytes % 7.2 (*)     Eosinophils % 1.8      Basophils % 0.6      Immature Granulocytes % 0.2      nRBC, Auto 0.0      Neutrophils, Abs 3.30      Lymphocytes, Absolute 1.34      Monocytes, Absolute 0.37      Eosinophils, Absolute 0.09      Basophils, Absolute 0.03      Immature Granulocytes, Absolute 0.01      nRBC, Absolute 0.00      Diff Type Auto     INFLUENZA A & B BY MOLECULAR - Normal    INFLUENZA A MOLECULAR Negative      INFLUENZA B MOLECULAR  Negative     SARS-COV-2 RNA AMPLIFICATION, QUAL - Normal    SARS  COV-2 Molecular Negative      Narrative:     Negative SARS-CoV results should not be used as the sole basis for treatment or patient management decisions; negative results should be considered in the context of a patient's recent exposures, history and the presene of clinical signs and symptoms consistent with COVID-19.  Negative results should be treated as presumptive and confirmed by molecular assay, if necessary for patient management.   TSH - Normal    TSH 1.745     CBC W/ AUTO DIFFERENTIAL    Narrative:     The following orders were created for panel order CBC auto differential.  Procedure                               Abnormality         Status                     ---------                               -----------         ------                     CBC with Differential[[id number]]       Abnormal            Final result                 Please view results for these tests on the individual orders.   EXTRA TUBES    Narrative:     The following orders were created for panel order EXTRA TUBES.  Procedure                               Abnormality         Status                     ---------                               -----------         ------                     Light Blue Top Hold[[id number]]                             In process                 Red Top Hold[[id number]]                                    In process                   Please view results for these tests on the individual orders.   LIGHT BLUE TOP HOLD   RED TOP HOLD          Imaging Results    None          Medications   promethazine tablet 25 mg (has no administration in time range)   sodium chloride 0.9% bolus 1,000 mL 1,000 mL (0 mLs Intravenous Stopped 1/19/25 0915)   promethazine injection 25 mg (25 mg Intramuscular Given 1/19/25 0801)   ketorolac injection 15 mg (15 mg Intravenous Given 1/19/25 0802)     Medical Decision Making  Amount and/or Complexity of Data Reviewed  Labs: ordered.    Risk  Prescription drug management.                ED Course as of 01/19/25 1009   [place] Jan 19, 2025   1009 As per after visit summary instructions    Today you are TSH/thyroid stimulating hormone that has not support the need to continue with your thyroid medication.  Follow-up with your primary care doctor about this to see if truly you need to continue taking thyroid medication    Start with a bland diet and advance gradually as tolerated.  Keep hydrated with plenty of fluids.    Use prescription Phenergan as needed for headache or nausea    Will prescribe methocarbamol to replace the Flexeril what you have taking for chronic spasms    Get some rest over the next couple days    Follow up with primary care    Return the ER if symptoms worsen or new symptoms develop [PK]      ED Course User Index  [PK] Contreras Rosario MD                           Clinical Impression:   Final diagnoses:  [K52.9] Gastroenteritis (Primary)  [E86.0] Dehydration  [R51.9] Nonintractable episodic headache, unspecified headache type        ED Disposition Condition    Discharge Stable          ED Prescriptions       Medication Sig Dispense Start Date End Date Auth. Provider    promethazine (PHENERGAN) 25 MG tablet Take 1 tablet (25 mg total) by mouth every 6 (six) hours as needed for Nausea. 15 tablet 1/19/2025 -- Contreras Rosario MD    methocarbamoL (ROBAXIN) 500 MG Tab Take 2-3 tablets (1,000-1,500 mg total) by mouth 3 (three) times daily as needed (pain). 30 tablet 1/19/2025 -- Contreras Rosario MD          Follow-up Information       Follow up With Specialties Details Why Contact Info    [facility] - Emergency Department Emergency Medicine Go to  As needed, If symptoms worsen [address] [place] [id number] [phone]             Contreras Rosario MD  01/19/25 1000

## 2025-01-23 ENCOUNTER — OFFICE VISIT (OUTPATIENT)
Dept: CARDIOLOGY | Facility: CLINIC | Age: 55
End: 2025-01-23
Payer: COMMERCIAL

## 2025-01-23 ENCOUNTER — TELEPHONE (OUTPATIENT)
Dept: PAIN MEDICINE | Facility: CLINIC | Age: 55
End: 2025-01-23
Payer: COMMERCIAL

## 2025-01-23 VITALS
HEIGHT: 66 IN | WEIGHT: 254 LBS | SYSTOLIC BLOOD PRESSURE: 170 MMHG | BODY MASS INDEX: 40.82 KG/M2 | HEART RATE: 74 BPM | RESPIRATION RATE: 18 BRPM | DIASTOLIC BLOOD PRESSURE: 90 MMHG

## 2025-01-23 DIAGNOSIS — I10 PRIMARY HYPERTENSION: Primary | ICD-10-CM

## 2025-01-23 DIAGNOSIS — I10 ESSENTIAL HYPERTENSION: ICD-10-CM

## 2025-01-23 PROCEDURE — 3008F BODY MASS INDEX DOCD: CPT | Mod: ,,, | Performed by: STUDENT IN AN ORGANIZED HEALTH CARE EDUCATION/TRAINING PROGRAM

## 2025-01-23 PROCEDURE — 3077F SYST BP >= 140 MM HG: CPT | Mod: ,,, | Performed by: STUDENT IN AN ORGANIZED HEALTH CARE EDUCATION/TRAINING PROGRAM

## 2025-01-23 PROCEDURE — 1159F MED LIST DOCD IN RCRD: CPT | Mod: ,,, | Performed by: STUDENT IN AN ORGANIZED HEALTH CARE EDUCATION/TRAINING PROGRAM

## 2025-01-23 PROCEDURE — 99999 PR PBB SHADOW E&M-EST. PATIENT-LVL IV: CPT | Mod: PBBFAC,,, | Performed by: STUDENT IN AN ORGANIZED HEALTH CARE EDUCATION/TRAINING PROGRAM

## 2025-01-23 PROCEDURE — 3080F DIAST BP >= 90 MM HG: CPT | Mod: ,,, | Performed by: STUDENT IN AN ORGANIZED HEALTH CARE EDUCATION/TRAINING PROGRAM

## 2025-01-23 PROCEDURE — 99213 OFFICE O/P EST LOW 20 MIN: CPT | Mod: S$PBB,,, | Performed by: STUDENT IN AN ORGANIZED HEALTH CARE EDUCATION/TRAINING PROGRAM

## 2025-01-23 PROCEDURE — 99214 OFFICE O/P EST MOD 30 MIN: CPT | Mod: PBBFAC | Performed by: STUDENT IN AN ORGANIZED HEALTH CARE EDUCATION/TRAINING PROGRAM

## 2025-01-23 RX ORDER — CYCLOBENZAPRINE HCL 10 MG
TABLET ORAL
COMMUNITY
Start: 2024-10-30 | End: 2025-01-23

## 2025-01-23 RX ORDER — LOSARTAN POTASSIUM AND HYDROCHLOROTHIAZIDE 12.5; 5 MG/1; MG/1
1 TABLET ORAL 2 TIMES DAILY
Qty: 180 TABLET | Refills: 3 | Status: SHIPPED | OUTPATIENT
Start: 2025-01-23 | End: 2026-01-23

## 2025-01-23 NOTE — TELEPHONE ENCOUNTER
I have reviewed preprocedure instructions with patient for procedure with  scheduled on 01/30/2025. Patient voices understanding. 01/23/2025@09:58 TC.  ----- Message from Veronika sent at 1/23/2025  9:36 AM CST -----  Who Called: Stuart Cloud  Preferred Method of Contact: Phone Call  Patient's Preferred Phone Number on File: [phone]   Best Call Back Number, if different:  Additional Information: pt wanted to know if she needs to stop taking medicine until her surgery in 2 weeks and what plan should she follow

## 2025-01-23 NOTE — PROGRESS NOTES
PCP: Maria L Phillips FNP-C    Referring Provider:     Subjective:   Stuart Cloud is a 55 y.o. female with hx of HTN, hypothyroidism, mood disorder who presents for evaluation of chest pain and syncope.     1/23/25 - Doing well. Was in the ER for nausea and diarrhea. S/P IV fluids. Had a normal ECHO and stress test for dizziness. Today however her BP is 160/90.     10/2024 - Patient works at [employer]. Reports having one episode of dizziness with pre-syncope. Reports feeling cold and clammy and BP was 90/60s at the time. She also report having left lower chest pain that is exarcerbated by walking up stairs. Approximately has 1 episode/week.     Fhx: +Ve for MI and CHF  Shx: Denies smoking, etoh or drug use    EKG - 9/26/24 - NSR, normal EKG  ECHO Results for orders placed during the hospital encounter of 11/14/24    Echo    Interpretation Summary    Left Ventricle: The left ventricle is normal in size. Normal wall thickness. There is normal systolic function with a visually estimated ejection fraction of 55 - 60%.    Right Ventricle: Normal right ventricular cavity size. Systolic function is normal.    IVC/SVC: Normal venous pressure at 3 mmHg.    Stress Results for orders placed during the hospital encounter of 11/14/24    Exercise Stress - EKG    Interpretation Summary    The ECG portion of the study is negative for ischemia.    The patient reported no chest pain during the stress test.    The blood pressure response to stress was normal.    During stress, rare PVCs are noted.    The patient exercised for 4 minutes  seconds on a Shayan protocol, corresponding to a functional capacity of 6METS, achieving a peak heart rate of 140 bpm, which is 87% of the age predicted maximum heart rate.        Lab Results   Component Value Date     01/19/2025    K 4.2 01/19/2025     (H) 01/19/2025    CO2 23 01/19/2025    BUN 17 01/19/2025    CREATININE 0.76 01/19/2025    CALCIUM 8.5 01/19/2025    ANIONGAP 13  01/19/2025       Lab Results   Component Value Date    CHOL 168 03/29/2023     Lab Results   Component Value Date    HDL 53 03/29/2023     Lab Results   Component Value Date    LDLCALC 100 03/29/2023     Lab Results   Component Value Date    TRIG 75 03/29/2023     Lab Results   Component Value Date    CHOLHDL 3.2 03/29/2023       Lab Results   Component Value Date    WBC 5.14 01/19/2025    HGB 12.9 01/19/2025    HCT 39.0 01/19/2025    MCV 91.1 01/19/2025     01/19/2025           Current Outpatient Medications:     docusate sodium (COLACE) 100 MG capsule, Take 1 capsule (100 mg total) by mouth 2 (two) times daily., Disp: 28 capsule, Rfl: 0    methocarbamoL (ROBAXIN) 500 MG Tab, Take 2-3 tablets (1,000-1,500 mg total) by mouth 3 (three) times daily as needed (pain)., Disp: 30 tablet, Rfl: 0    promethazine (PHENERGAN) 25 MG tablet, Take 1 tablet (25 mg total) by mouth every 6 (six) hours as needed for Nausea., Disp: 15 tablet, Rfl: 0    clonazePAM (KLONOPIN) 0.5 MG tablet, Take 1 tablet (0.5 mg total) by mouth 2 (two) times daily as needed for Anxiety., Disp: 30 tablet, Rfl: 2    dexlansoprazole (DEXILANT) 60 mg capsule, Take 1 capsule (60 mg total) by mouth once daily., Disp: 90 capsule, Rfl: 3    DULoxetine (CYMBALTA) 30 MG capsule, Take 1 capsule (30 mg total) by mouth once daily., Disp: 30 capsule, Rfl: 1    losartan-hydrochlorothiazide 50-12.5 mg (HYZAAR) 50-12.5 mg per tablet, Take 1 tablet by mouth 2 (two) times a day. for blood pressure, Disp: 180 tablet, Rfl: 3  No current facility-administered medications for this visit.    Facility-Administered Medications Ordered in Other Visits:     0.9%  NaCl infusion, , Intravenous, Continuous, Tacos Aburto MD, Last Rate: 75 mL/hr at 05/25/23 1709, New Bag at 05/25/23 1709    0.9%  NaCl infusion, , Intravenous, Continuous, Leny Lang MD, Last Rate: 500 mL/hr at 06/03/24 1439, New Bag at 06/03/24 1439    Review of Systems   Respiratory:  Negative for  "cough and shortness of breath.    Cardiovascular:  Negative for chest pain, palpitations, orthopnea, claudication, leg swelling and PND.         Objective:   BP (!) 170/90   Pulse 74   Resp 18   Ht 5' 6" (1.676 m)   Wt 115.2 kg (254 lb)   BMI 41.00 kg/m²     Physical Exam  Constitutional:       Appearance: Normal appearance.   Cardiovascular:      Rate and Rhythm: Normal rate and regular rhythm.      Pulses: Normal pulses.      Heart sounds: Normal heart sounds. No murmur heard.  Pulmonary:      Effort: Pulmonary effort is normal.      Breath sounds: Normal breath sounds.   Musculoskeletal:         General: No swelling.   Neurological:      Mental Status: She is alert and oriented to person, place, and time.           Assessment:     1. Primary hypertension  Hypertension Digital Medicine (HDMP) Enrollment Order      2. Essential hypertension  losartan-hydrochlorothiazide 50-12.5 mg (HYZAAR) 50-12.5 mg per tablet    Hypertension Digital Medicine (HDMP) Enrollment Order            Plan:   Primary hypertension  Uncontrolled now  Increase hyzaar to 1 tab twice a day  BMP in 1 week  Sign up for digital HTN              "

## 2025-01-27 ENCOUNTER — PATIENT MESSAGE (OUTPATIENT)
Dept: PAIN MEDICINE | Facility: CLINIC | Age: 55
End: 2025-01-27
Payer: COMMERCIAL

## 2025-02-04 ENCOUNTER — ANESTHESIA (OUTPATIENT)
Dept: PAIN MEDICINE | Facility: HOSPITAL | Age: 55
End: 2025-02-04
Payer: COMMERCIAL

## 2025-02-04 ENCOUNTER — ANESTHESIA EVENT (OUTPATIENT)
Dept: PAIN MEDICINE | Facility: HOSPITAL | Age: 55
End: 2025-02-04
Payer: COMMERCIAL

## 2025-02-04 ENCOUNTER — HOSPITAL ENCOUNTER (OUTPATIENT)
Facility: HOSPITAL | Age: 55
Discharge: HOME | End: 2025-02-04
Attending: PAIN MEDICINE | Admitting: PAIN MEDICINE
Payer: COMMERCIAL

## 2025-02-04 VITALS
RESPIRATION RATE: 16 BRPM | WEIGHT: 251 LBS | OXYGEN SATURATION: 98 % | TEMPERATURE: 99 F | HEART RATE: 57 BPM | SYSTOLIC BLOOD PRESSURE: 133 MMHG | BODY MASS INDEX: 39.39 KG/M2 | HEIGHT: 67 IN | DIASTOLIC BLOOD PRESSURE: 85 MMHG

## 2025-02-04 DIAGNOSIS — M25.551 HIP PAIN, RIGHT: Primary | Chronic | ICD-10-CM

## 2025-02-04 DIAGNOSIS — M25.551 PAIN IN RIGHT HIP: ICD-10-CM

## 2025-02-04 PROCEDURE — D9220A PRA ANESTHESIA: Mod: ,,, | Performed by: NURSE ANESTHETIST, CERTIFIED REGISTERED

## 2025-02-04 PROCEDURE — 64447 NJX AA&/STRD FEMORAL NRV IMG: CPT | Mod: RT | Performed by: PAIN MEDICINE

## 2025-02-04 PROCEDURE — 63600175 PHARM REV CODE 636 W HCPCS: Performed by: PAIN MEDICINE

## 2025-02-04 PROCEDURE — 37000008 HC ANESTHESIA 1ST 15 MINUTES: Performed by: PAIN MEDICINE

## 2025-02-04 PROCEDURE — 63600175 PHARM REV CODE 636 W HCPCS: Performed by: NURSE ANESTHETIST, CERTIFIED REGISTERED

## 2025-02-04 PROCEDURE — 64447 NJX AA&/STRD FEMORAL NRV IMG: CPT | Mod: RT,,, | Performed by: PAIN MEDICINE

## 2025-02-04 RX ORDER — BUPIVACAINE HYDROCHLORIDE 2.5 MG/ML
INJECTION, SOLUTION EPIDURAL; INFILTRATION; INTRACAUDAL CODE/TRAUMA/SEDATION MEDICATION
Status: DISCONTINUED | OUTPATIENT
Start: 2025-02-04 | End: 2025-02-04 | Stop reason: HOSPADM

## 2025-02-04 RX ORDER — ONDANSETRON HYDROCHLORIDE 2 MG/ML
INJECTION, SOLUTION INTRAVENOUS
Status: DISCONTINUED | OUTPATIENT
Start: 2025-02-04 | End: 2025-02-04

## 2025-02-04 RX ORDER — SODIUM CHLORIDE 9 MG/ML
INJECTION, SOLUTION INTRAVENOUS CONTINUOUS
Status: DISCONTINUED | OUTPATIENT
Start: 2025-02-04 | End: 2025-02-04 | Stop reason: HOSPADM

## 2025-02-04 RX ORDER — LIDOCAINE HYDROCHLORIDE 20 MG/ML
INJECTION INTRAVENOUS
Status: DISCONTINUED | OUTPATIENT
Start: 2025-02-04 | End: 2025-02-04

## 2025-02-04 RX ORDER — PROPOFOL 10 MG/ML
VIAL (ML) INTRAVENOUS
Status: DISCONTINUED | OUTPATIENT
Start: 2025-02-04 | End: 2025-02-04

## 2025-02-04 RX ADMIN — PROPOFOL 30 MG: 10 INJECTION, EMULSION INTRAVENOUS at 01:02

## 2025-02-04 RX ADMIN — LIDOCAINE HYDROCHLORIDE 100 MG: 20 INJECTION, SOLUTION INTRAVENOUS at 01:02

## 2025-02-04 RX ADMIN — PROPOFOL 20 MG: 10 INJECTION, EMULSION INTRAVENOUS at 01:02

## 2025-02-04 RX ADMIN — ONDANSETRON 4 MG: 2 INJECTION INTRAMUSCULAR; INTRAVENOUS at 02:02

## 2025-02-04 NOTE — ANESTHESIA RELEASE NOTE
"Anesthesia Release from PACU Note    Patient: Stuart Cloud    Procedure(s) Performed: Procedure(s) (LRB):  BLOCK, NERVE, FEMORAL (Right)  BLOCK,NERVE,OBTURATOR (Right)    Anesthesia type: general    Post pain: Adequate analgesia    Post assessment: no apparent anesthetic complications    Last Vitals: Visit Vitals  /78 (BP Location: Right arm, Patient Position: Lying)   Pulse 78   Temp 37 °C (98.6 °F) (Oral)   Resp 18   Ht 5' 7" (1.702 m)   Wt 113.9 kg (251 lb)   SpO2 98%   BMI 39.31 kg/m²       Post vital signs: stable    Level of consciousness: awake    Nausea/Vomiting: no nausea/no vomiting    Complications: none    Airway Patency: patent    Respiratory: unassisted    Cardiovascular: stable and blood pressure at baseline    Hydration: euvolemic  "

## 2025-02-04 NOTE — LETTER
"Stuart Recio" Pedro Luis was seen and treated in our department on 2/4/2025. Please excuse her from work on 2/5/2025.    If you have any questions or concerns, please don't hesitate to call.      Thanks,      [facility]      "

## 2025-02-04 NOTE — DISCHARGE SUMMARY
[facility] - Pain Management  Discharge Note  Short Stay    Procedure(s) (LRB):  BLOCK, NERVE, FEMORAL (Right)  BLOCK,NERVE,OBTURATOR (Right)      OUTCOME: Patient tolerated treatment/procedure well without complication and is now ready for discharge.    DISPOSITION: Home or Self Care    FINAL DIAGNOSIS:  Chronic right hip pain    FOLLOWUP: In clinic    DISCHARGE INSTRUCTIONS:  See nurse's notes     TIME SPENT ON DISCHARGE: 5 minutes

## 2025-02-04 NOTE — ADDENDUM NOTE
Addendum  created 02/04/25 1414 by Jarvis Nieves, CRNA    Intraprocedure Meds edited, Orders acknowledged in Narrator

## 2025-02-04 NOTE — ANESTHESIA POSTPROCEDURE EVALUATION
Anesthesia Post Evaluation    Patient: Stuart Cloud    Procedure(s) Performed: Procedure(s) (LRB):  BLOCK, NERVE, FEMORAL (Right)  BLOCK,NERVE,OBTURATOR (Right)    Final Anesthesia Type: general      Patient location: [facility].  Patient participation: Yes- Able to Participate  Level of consciousness: awake and alert  Post-procedure vital signs: reviewed and stable  Pain management: adequate  Airway patency: patent    PONV status at discharge: No PONV  Anesthetic complications: no      Cardiovascular status: blood pressure returned to baseline, hemodynamically stable and stable  Respiratory status: unassisted  Hydration status: euvolemic  Follow-up not needed.  Comments: Pt voices appreciation for care              Vitals Value Taken Time   /78 02/04/25 1411   Temp 37 °C (98.6 °F) 02/04/25 1411   Pulse 78 02/04/25 1411   Resp 18 02/04/25 1411   SpO2 98 % 02/04/25 1411         No case tracking events are documented in the log.      Pain/Fernando Score: No data recorded

## 2025-02-04 NOTE — OP NOTE
PROCEDURE DATE: 2/4/2025    PROCEDURE:  Right-side nerve block of sensory branches of obturator and femoral nerves     DIAGNOSIS:  Hip pain    Post Op diagnosis: Same    PHYSICIAN: SHARON Chaudhari    MEDICATIONS INJECTED: 0.25% bupivicaine, 3ml at each nerve    LOCAL ANESTHETIC USED: Lidocaine 1%, 4ml at each site    ANESTHESIA: Mac    ESTIMATED BLOOD LOSS:  None    COMPLICATIONS: None    TECHNIQUE: A time out was taken to identify the patient, procedure and side of the procedure. The patient was placed in a supine position, then prepped and draped in the usual sterile fashion using ChloraPrep and sterile towels.  The anatomic location of the sensory branches of the obturator and femoral nerves on the right side were determined under fluoroscopic guidance and then marked.  Local anesthetic was given by raising a wheal at the skin over each site and then infiltrated approximately 2cm deeper.  A 25-gauge 4 inch needle was introduced to the anatomic location of the right sensory branch of the obturator nerve located at a site below the inferior junction between the ischium and the pubis. For the sensory branch of the femoral nerve, the same size needle was placed to a site below the anterior inferior iliac spine near the anterior lateral margin of the hip joint. Aspiration was negative for vascularity. Appropriate location and medication spread confirmed by injecting 1ml of Omnipaque. The above medication was then injected. The patient tolerated the procedure well.     The patient was monitored after the procedure. Patient was given post procedure and discharge instructions to follow at home.  The patient was discharged in a stable condition. We will contact the patient in the next 24 hours to find out what type of relief he had and whether we should consider moving to radiofrequency ablation of these nerves for more permanent relief.

## 2025-02-04 NOTE — PLAN OF CARE
Plan:  D/c pt via wheelchair at 1445  Informed pt if does not void in 8 hours to go to ER. Notify if redness, drainage, from injection site or fever over next 3-4 days. Rest and drink plenty of fluids for the remainder of the day. No lifting over 5 lbs. For the remainder of the day. Continue regular medications as prescribed. May take pain medications as prescribed.     Pain improved 20%  Pre-procedure pain: 10  Post-procedure pain: 8

## 2025-02-04 NOTE — BRIEF OP NOTE
The  Discharge Note  Short Stay    Admit Date: 2/4/2025    Discharge Date: 2/4/2025    Attending Physician: Leny Lang     Discharge Provider: Leny Lang    Diagnosis:  Chronic right hip pain    Procedure performed:  Right femoral obturator nerve block under fluoroscopy    Findings: Procedure tolerated well and without complications. Consistent with diagnosis.    EBL: 0cc    Specimens: None    Discharged Condition: Good    Final Diagnoses: Hip pain, right [M25.551]    Disposition: Home or Self Care    Hospital Course: No complications, uneventful    Outcome of Hospitalization, Treatment, Procedure, or Surgery:  Patient was admitted for outpatient interventional pain management procedure. The patient tolerated the procedure well with no complications.    Follow up/Patient Instructions:  Follow up as scheduled in Pain Management office in 3-4 weeks.  Patient has received instructions and follow up date and time.    Medications:  Continue previous medications

## 2025-02-04 NOTE — ANESTHESIA PREPROCEDURE EVALUATION
02/04/2025  Stuart Cloud is a 55 y.o., female.      Pre-op Assessment    I have reviewed the Patient Summary Reports.     I have reviewed the Nursing Notes. I have reviewed the NPO Status.   I have reviewed the Medications.     Review of Systems  Cardiovascular:     Hypertension                                          Musculoskeletal:  Arthritis               Endocrine:   Hypothyroidism              Physical Exam  General: Well nourished, Cooperative, Alert and Oriented    Airway:  Mallampati: II   Mouth Opening: Normal  TM Distance: Normal  Tongue: Normal  Neck ROM: Normal ROM        Anesthesia Plan  Type of Anesthesia, risks & benefits discussed:    Anesthesia Type: Gen Natural Airway  Intra-op Monitoring Plan: Standard ASA Monitors  Post Op Pain Control Plan: multimodal analgesia  Induction:  IV  Airway Plan: , Awake  Informed Consent: Informed consent signed with the Patient and all parties understand the risks and agree with anesthesia plan.  All questions answered. Patient consented to blood products? Yes  ASA Score: 3  Day of Surgery Review of History & Physical: H&P Update referred to the surgeon/provider.    Ready For Surgery From Anesthesia Perspective.     .

## 2025-02-04 NOTE — TRANSFER OF CARE
"Anesthesia Transfer of Care Note    Patient: Stuart Cloud    Procedure(s) Performed: Procedure(s) (LRB):  BLOCK, NERVE, FEMORAL (Right)  BLOCK,NERVE,OBTURATOR (Right)    Patient location: Other: Pain [facility]x Center    Anesthesia Type: general    Transport from OR: Transported from OR on room air with adequate spontaneous ventilation    Post pain: adequate analgesia    Post assessment: no apparent anesthetic complications    Post vital signs: stable    Level of consciousness: sedated and responds to stimulation    Nausea/Vomiting: no nausea/vomiting    Complications: none    Transfer of care protocol was followedComments: Good SV continue, NAD noted, VSS, RTRN      Last vitals: Visit Vitals  /78 (BP Location: Right arm, Patient Position: Lying)   Pulse 78   Temp 37 °C (98.6 °F) (Oral)   Resp 18   Ht 5' 7" (1.702 m)   Wt 113.9 kg (251 lb)   SpO2 98%   BMI 39.31 kg/m²     "

## 2025-02-18 NOTE — PROGRESS NOTES
Subjective:         Patient ID: Stuart Cloud is a 55 y.o. female.    Chief Complaint: Hip Pain and Low-back Pain      Pain  This is a chronic problem. The current episode started more than 1 year ago. The problem occurs daily. The problem has been waxing and waning. Associated symptoms include arthralgias. Pertinent negatives include no anorexia, change in bowel habit, chest pain, chills, coughing, diaphoresis, fever, neck pain, rash, sore throat, swollen glands, urinary symptoms, vertigo or vomiting.     Review of Systems   Constitutional:  Negative for activity change, appetite change, chills, diaphoresis, fever and unexpected weight change.   HENT:  Negative for drooling, ear discharge, ear pain, facial swelling, nosebleeds, sore throat, trouble swallowing, voice change and goiter.    Eyes:  Negative for photophobia, pain, discharge, redness and visual disturbance.   Respiratory:  Negative for apnea, cough, choking, chest tightness, shortness of breath, wheezing and stridor.    Cardiovascular:  Negative for chest pain, palpitations and leg swelling.   Gastrointestinal:  Negative for abdominal distention, anorexia, change in bowel habit, diarrhea, rectal pain, vomiting and fecal incontinence.   Endocrine: Negative for cold intolerance, heat intolerance, polydipsia, polyphagia and polyuria.   Genitourinary:  Negative for bladder incontinence, dysuria, flank pain, frequency and hot flashes.   Musculoskeletal:  Positive for arthralgias, back pain and leg pain. Negative for neck pain.   Integumentary:  Negative for color change, pallor and rash.   Allergic/Immunologic: Negative for immunocompromised state.   Neurological:  Negative for dizziness, vertigo, seizures, syncope, facial asymmetry, speech difficulty, light-headedness, memory loss and coordination difficulties.   Hematological:  Negative for adenopathy. Does not bruise/bleed easily.   Psychiatric/Behavioral:  Negative for agitation, behavioral problems,  confusion, decreased concentration, dysphoric mood, hallucinations, self-injury and suicidal ideas. The patient is not nervous/anxious and is not hyperactive.            Past Medical History:   Diagnosis Date    Arthritis     Class 3 severe obesity due to excess calories without serious comorbidity with body mass index (BMI) of 40.0 to 44.9 in adult 03/20/2023    Essential hypertension 03/20/2023    History of gastric ulcer 05/01/2023    Hypothyroidism     Vestibular dizziness 10/23/2023     Past Surgical History:   Procedure Laterality Date    ARTHROSCOPY OF KNEE Right 05/25/2023    Procedure: ARTHROSCOPY, KNEE;  Surgeon: Tacos Aburto MD;  Location: [facility] ORTHO OR;  Service: Orthopedics;  Laterality: Right;    BLOCK, NERVE, FEMORAL Right 2/4/2025    Procedure: BLOCK, NERVE, FEMORAL;  Surgeon: Leny Lang MD;  Location: [facility] PAIN MGMT;  Service: Pain Management;  Laterality: Right;    BLOCK, NERVE, OBTURATOR Right 2/4/2025    Procedure: BLOCK,NERVE,OBTURATOR;  Surgeon: Leny Lang MD;  Location: [facility] PAIN MGMT;  Service: Pain Management;  Laterality: Right;    CHOLECYSTECTOMY  05/09/2024    ENDOSCOPY  05/13/2024    HYSTERECTOMY      Partial    INJECTION OF ANESTHETIC AGENT INTO SACROILIAC JOINT Bilateral 05/07/2024    Procedure: BLOCK, SACROILIAC JOINT;  Surgeon: Leny Lang MD;  Location: [facility] PAIN MGMT;  Service: Pain Management;  Laterality: Bilateral;    INJECTION OF JOINT Left 07/30/2024    Procedure: Injection, Joint, Hip, intra-articular hip injection;  Surgeon: Leny Lang MD;  Location: [facility] PAIN MGMT;  Service: Pain Management;  Laterality: Left;    INJECTION OF JOINT Left 10/22/2024    Procedure: Injection, Joint, Hip, intra-articular hip injection;  Surgeon: Leny Lang MD;  Location: [facility] PAIN MGMT;  Service: Pain Management;  Laterality: Left;    JOINT REPLACEMENT Right     hip replacement    KNEE ARTHROPLASTY Left     KNEE ARTHROSCOPY W/  "MENISCECTOMY Right 05/25/2023    Procedure: ARTHROSCOPY, KNEE, WITH MENISCECTOMY;  Surgeon: Tacos Aburto MD;  Location: [facility] OR;  Service: Orthopedics;  Laterality: Right;    LUMPECTOMY, BREAST      ROBOT-ASSISTED CHOLECYSTECTOMY USING DA ANG XI N/A 05/09/2024    Procedure: XI ROBOTIC CHOLECYSTECTOMY;  Surgeon: Mao Dempsey DO;  Location: [facility] OR;  Service: General;  Laterality: N/A;     Social History     Socioeconomic History    Marital status: Single   Tobacco Use    Smoking status: Never     Passive exposure: Never    Smokeless tobacco: Never   Substance and Sexual Activity    Alcohol use: Never    Drug use: Never    Sexual activity: Not Currently     Family History   Problem Relation Name Age of Onset    Hypertension Maternal Grandmother      Stroke Maternal Grandmother      Stroke Maternal Grandfather      Hypertension Mother      Stroke Mother      Diabetes Mother       Review of patient's allergies indicates:   Allergen Reactions    Imitrex [sumatriptan] Anaphylaxis    Tylox [oxycodone-acetaminophen] Itching    Latex     Opioids - morphine analogues Hives    Sulfa (sulfonamide antibiotics) Rash    Zithromax z-casimiro [azithromycin] Rash        Objective:  Vitals:    02/19/25 1359 02/19/25 1402   BP: 119/83    Pulse: 83    Resp: 20    Weight: 116.6 kg (257 lb)    Height: 5' 6" (1.676 m)    PainSc:   3   3                 Physical Exam  Vitals and nursing note reviewed. Exam conducted with a chaperone present.   Constitutional:       General: She is awake. She is not in acute distress.     Appearance: Normal appearance. She is not ill-appearing, toxic-appearing or diaphoretic.   HENT:      Head: Normocephalic and atraumatic.      Nose: Nose normal.      Mouth/Throat:      Mouth: Mucous membranes are moist.      Pharynx: Oropharynx is clear.   Eyes:      Conjunctiva/sclera: Conjunctivae normal.      Pupils: Pupils are equal, round, and reactive to light.   Cardiovascular:      Rate and " Rhythm: Normal rate.   Pulmonary:      Effort: Pulmonary effort is normal. No respiratory distress.   Abdominal:      Palpations: Abdomen is soft.      Tenderness: There is no guarding.   Musculoskeletal:         General: Normal range of motion.      Cervical back: Normal range of motion and neck supple. No rigidity.   Skin:     General: Skin is warm and dry.      Coloration: Skin is not jaundiced or pale.   Neurological:      General: No focal deficit present.      Mental Status: She is alert and oriented to person, place, and time. Mental status is at baseline.      Cranial Nerves: No cranial nerve deficit (II-XII).   Psychiatric:         Mood and Affect: Mood normal.         Behavior: Behavior normal. Behavior is cooperative.         Thought Content: Thought content normal.           [place] Fluoro for Pain Management  See OP Notes for results.     IMPRESSION: See OP Notes for results.     This procedure was auto-finalized by: Virtual Radiologist       Admission on 01/19/2025, Discharged on 01/19/2025   Component Date Value Ref Range Status    Sodium 01/19/2025 142  136 - 145 mmol/L Final    Potassium 01/19/2025 4.2  3.5 - 5.1 mmol/L Final    Chloride 01/19/2025 110 (H)  98 - 107 mmol/L Final    CO2 01/19/2025 23  22 - 29 mmol/L Final    Anion Gap 01/19/2025 13  7 - 16 mmol/L Final    Glucose 01/19/2025 112 (H)  74 - 100 mg/dL Final    BUN 01/19/2025 17  10 - 20 mg/dL Final    Creatinine 01/19/2025 0.76  0.55 - 1.02 mg/dL Final    BUN/Creatinine Ratio 01/19/2025 22 (H)  6 - 20 Final    Calcium 01/19/2025 8.5  8.4 - 10.2 mg/dL Final    Total Protein 01/19/2025 5.8 (L)  6.4 - 8.3 g/dL Final    Albumin 01/19/2025 3.5  3.5 - 5.0 g/dL Final    Globulin 01/19/2025 2.3  2.0 - 4.0 g/dL Final    A/G Ratio 01/19/2025 1.5   Final    Bilirubin, Total 01/19/2025 0.4  <=1.5 mg/dL Final    Alk Phos 01/19/2025 78  40 - 150 U/L Final    ALT 01/19/2025 20  <=55 U/L Final    AST 01/19/2025 22  5 - 34 U/L Final    eGFR 01/19/2025 94   >=60 mL/min/1.73m2 Final    Color, UA 01/19/2025 Yellow  Colorless, Straw, Yellow, Dark Yellow, Light Yellow Final    Clarity, UA 01/19/2025 Clear  Clear Final    pH, UA 01/19/2025 7.0  5.0 to 8.0 pH Units Final    Leukocytes, UA 01/19/2025 Negative  Negative Final    Nitrites, UA 01/19/2025 Negative  Negative Final    Protein, UA 01/19/2025 20 (A)  Negative Final    Glucose, UA 01/19/2025 Normal  Normal mg/dL Final    Ketones, UA 01/19/2025 Negative  Negative mg/dL Final    Urobilinogen, UA 01/19/2025 8 (A)  0.2, 1.0, Normal mg/dL Final    Bilirubin, UA 01/19/2025 Negative  Negative Final    Blood, UA 01/19/2025 Negative  Negative Final    Specific Gravity, UA 01/19/2025 1.034 (H)  <=1.030 Final    INFLUENZA A MOLECULAR 01/19/2025 Negative  Negative Final    INFLUENZA B MOLECULAR  01/19/2025 Negative  Negative Final    SARS COV-2 Molecular 01/19/2025 Negative  Negative, Invalid Final    TSH 01/19/2025 1.745  0.350 - 4.940 uIU/mL Final    WBC 01/19/2025 5.14  4.50 - 11.00 K/uL Final    RBC 01/19/2025 4.28  4.20 - 5.40 M/uL Final    Hemoglobin 01/19/2025 12.9  12.0 - 16.0 g/dL Final    Hematocrit 01/19/2025 39.0  38.0 - 47.0 % Final    MCV 01/19/2025 91.1  80.0 - 96.0 fL Final    MCH 01/19/2025 30.1  27.0 - 31.0 pg Final    MCHC 01/19/2025 33.1  32.0 - 36.0 g/dL Final    RDW 01/19/2025 12.0  11.5 - 14.5 % Final    Platelet Count 01/19/2025 242  150 - 400 K/uL Final    MPV 01/19/2025 11.3  9.4 - 12.4 fL Final    Neutrophils % 01/19/2025 64.1  53.0 - 65.0 % Final    Lymphocytes % 01/19/2025 26.1 (L)  27.0 - 41.0 % Final    Monocytes % 01/19/2025 7.2 (H)  2.0 - 6.0 % Final    Eosinophils % 01/19/2025 1.8  1.0 - 4.0 % Final    Basophils % 01/19/2025 0.6  0.0 - 1.0 % Final    Immature Granulocytes % 01/19/2025 0.2  0.0 - 0.4 % Final    nRBC, Auto 01/19/2025 0.0  <=0.0 % Final    Neutrophils, Abs 01/19/2025 3.30  1.80 - 7.70 K/uL Final    Lymphocytes, Absolute 01/19/2025 1.34  1.00 - 4.80 K/uL Final    Monocytes, Absolute  01/19/2025 0.37  0.00 - 0.80 K/uL Final    Eosinophils, Absolute 01/19/2025 0.09  0.00 - 0.50 K/uL Final    Basophils, Absolute 01/19/2025 0.03  0.00 - 0.20 K/uL Final    Immature Granulocytes, Absolute 01/19/2025 0.01  0.00 - 0.04 K/uL Final    nRBC, Absolute 01/19/2025 0.00  <=0.00 x10e3/uL Final    Diff Type 01/19/2025 Auto   Final   Admission on 01/15/2025, Discharged on 01/15/2025   Component Date Value Ref Range Status    SARS COV-2 Molecular 01/15/2025 Negative  Negative, Invalid Final    INFLUENZA A MOLECULAR 01/15/2025 Negative  Negative Final    INFLUENZA B MOLECULAR  01/15/2025 Negative  Negative Final    Color, UA 01/15/2025 Light Yellow  Colorless, Straw, Yellow, Dark Yellow, Light Yellow Final    Clarity, UA 01/15/2025 Clear  Clear Final    pH, UA 01/15/2025 5.5  5.0 to 8.0 pH Units Final    Leukocytes, UA 01/15/2025 Negative  Negative Final    Nitrites, UA 01/15/2025 Negative  Negative Final    Protein, UA 01/15/2025 Negative  Negative Final    Glucose, UA 01/15/2025 Normal  Normal mg/dL Final    Ketones, UA 01/15/2025 Negative  Negative mg/dL Final    Urobilinogen, UA 01/15/2025 Normal  0.2, 1.0, Normal mg/dL Final    Bilirubin, UA 01/15/2025 Negative  Negative Final    Blood, UA 01/15/2025 Trace (A)  Negative Final    Specific Gravity, UA 01/15/2025 1.022  <=1.030 Final    WBC, UA 01/15/2025 <1  <=5 /hpf Final    RBC, UA 01/15/2025 1  <=3 /hpf Final    Squamous Epithelial Cells, UA 01/15/2025 Occasional (A)  None Seen /HPF Final    Mucous 01/15/2025 Occasional (A)  None Seen /LPF Final   Hospital Outpatient Visit on 12/19/2024   Component Date Value Ref Range Status    Case Report 12/19/2024    Final                    Value:Surgical Pathology                                Case: [id number]                                   Authorizing Provider:  Madi Winslow MD           Collected:           12/19/2024 01:12 PM          Ordering Location:     [facility] -         Received:             12/19/2024 02:34 PM                                 Endoscopy                                                                    Pathologist:           Tito Ling MD                                                      Specimen:    Rectum, a. abnormal mucosa rectum bc                                                       Final Diagnosis 12/19/2024    Final                    Value:A. Rectum, abnormal mucosa, biopsy:  - Mildly active mild chronic colitis with superficial erosion  - CMV immunohistochemistry is negative      Comments 12/19/2024    Final                    Value:The histologic differential includes ischemia, infection, medication injury (particularly NSAIDs and antibiotics), diverticular disease, and idiopathic inflammatory bowel disease. Clinical, laboratory, and endoscopic correlation is suggested.      Gross Description 12/19/2024    Final                    Value:A. Rectum: a. abnormal mucosa rectum bc  The specimen is received in formalin designated abnormal mucosa rectum bc and consists of 3 white-tan tissue fragments (0.1-0.2 cm) entirely submitted in cassette A1.    Grossing was completed by Wu Carolina.      Microscopic Description 12/19/2024    Final                    Value:A microscopic examination was performed and the diagnosis reflects the findings.          Laboratory Notes 12/19/2024    Final                    Value:If this report includes immunohistochemical (IHC) test results, please note the following: IHC studies were interpreted in conjunction with appropriate positive and negative controls which demonstrate the expected positive and negative reactivity. This laboratory is regulated under CLIA as qualified to perform high-complexity testing. IHC tests are used for clinical purposes. They should not be regarded as investigational or research.       Hospital Outpatient Visit on 11/14/2024   Component Date Value Ref Range Status    85% Max Predicted HR 11/14/2024  143   Final    Max Predicted HR 11/14/2024 168   Final    [facility] CV CPX PATIENT IS MALE 11/14/2024 0.0   Final    [facility] CV CPX PATIENT IS FEMALE 11/14/2024 1.0   Final    HR at rest 11/14/2024 80  bpm Final    Systolic blood pressure 11/14/2024 152  mmHg Final    Diastolic blood pressure 11/14/2024 96  mmHg Final    RPP 11/14/2024 12,160   Final    Exercise duration (min) 11/14/2024 4  minutes Final    Peak HR 11/14/2024 140  bpm Final    Peak Systolic BP 11/14/2024 207  mmHg Final    Peak Diatolic BP 11/14/2024 78  mmHg Final    Peak RPP 11/14/2024 28,980   Final    Estimated METs 11/14/2024 6   Final    % Max HR Achieved 11/14/2024 87   Final    1 Minute Recovery HR 11/14/2024 86  bpm Final   Hospital Outpatient Visit on 11/14/2024   Component Date Value Ref Range Status    LVOT stroke volume 11/14/2024 86.5  cm3 Final    LVIDd 11/14/2024 4.9  3.5 - 6.0 cm Final    LV Systolic Volume 11/14/2024 40.82  mL Final    LVIDs 11/14/2024 3.2  2.1 - 4.0 cm Final    LV ESV A2C 11/14/2024 49.99  mL Final    LV Diastolic Volume 11/14/2024 110.67  mL Final    LV ESV A4C 11/14/2024 51.54  mL Final    Left Ventricular End Systolic Volu* 11/14/2024 40.82  mL Final    Left Ventricular End Diastolic Vol* 11/14/2024 110.67  mL Final    IVS 11/14/2024 0.9  0.6 - 1.1 cm Final    LVOT diameter 11/14/2024 2.1  cm Final    LVOT area 11/14/2024 3.5  cm2 Final    FS 11/14/2024 34.7  28 - 44 % Final    Left Ventricle Relative Wall Thick* 11/14/2024 0.24  cm Final    PW 11/14/2024 0.6  0.6 - 1.1 cm Final    LV mass 11/14/2024 120.8  g Final    MV Peak E Iker 11/14/2024 0.97  m/s Final    TDI LATERAL 11/14/2024 0.07  m/s Final    TDI SEPTAL 11/14/2024 0.10  m/s Final    E/E' ratio 11/14/2024 11.41  m/s Final    MV Peak A Iker 11/14/2024 0.90  m/s Final    E/A ratio 11/14/2024 1.08   Final    E wave deceleration time 11/14/2024 229.22  msec Final    LV SEPTAL E/E' RATIO 11/14/2024 9.70  m/s Final    LV LATERAL E/E' RATIO 11/14/2024 13.86  m/s Final     LVOT peak cindy 11/14/2024 0.9  m/s Final    Left Ventricular Outflow Tract Mariela* 11/14/2024 0.69  cm/s Final    Left Ventricular Outflow Tract Mariela* 11/14/2024 2.03  mmHg Final    RV- pierre basal diam 11/14/2024 2.6  cm Final    RV-pierre mid d 11/14/2024 2.9  cm Final    RV Basal Diameter 11/14/2024 6.75  cm Final    RV-pierre length 11/14/2024 6.8  cm Final    RV mid diameter 11/14/2024 2.91  cm Final    TAPSE 11/14/2024 2.42  cm Final    RV/LV Ratio 11/14/2024 0.53  cm Final    LA Vol (MOD) 11/14/2024 50.19  mL Final    RA area length vol 11/14/2024 29.68  mL Final    RA Area 11/14/2024 13.4  cm2 Final    RA Vol 11/14/2024 32.08  mL Final    AV mean gradient 11/14/2024 3.3  mmHg Final    AV peak gradient 11/14/2024 6.8  mmHg Final    Ao peak cindy 11/14/2024 1.3  m/s Final    Ao VTI 11/14/2024 28.1  cm Final    LVOT peak VTI 11/14/2024 25.0  cm Final    AV valve area 11/14/2024 3.1  cm² Final    AV Velocity Ratio 11/14/2024 0.69   Final    AV index (prosthetic) 11/14/2024 0.89   Final    ESPERANZA by Velocity Ratio 11/14/2024 2.4  cm² Final    PV PEAK VELOCITY 11/14/2024 1.37  m/s Final    PV peak gradient 11/14/2024 8  mmHg Final    Ao root annulus 11/14/2024 2.56  cm Final    IVC diameter 11/14/2024 1.18  cm Final    Mean e' 11/14/2024 0.09  m/s Final    LA area A4C 11/14/2024 18.89  cm2 Final    LA area A2C 11/14/2024 18.74  cm2 Final    Est. RA pres 11/14/2024 3  mmHg Final   Admission on 09/26/2024, Discharged on 09/26/2024   Component Date Value Ref Range Status    Sodium 09/26/2024 142  136 - 145 mmol/L Final    Potassium 09/26/2024 3.6  3.5 - 5.1 mmol/L Final    Chloride 09/26/2024 111 (H)  98 - 107 mmol/L Final    CO2 09/26/2024 28  21 - 32 mmol/L Final    Anion Gap 09/26/2024 7  7 - 16 mmol/L Final    Glucose 09/26/2024 104  74 - 106 mg/dL Final    BUN 09/26/2024 14  7 - 18 mg/dL Final    Creatinine 09/26/2024 0.77  0.55 - 1.02 mg/dL Final    BUN/Creatinine Ratio 09/26/2024 18  6 - 20 Final    Calcium 09/26/2024 8.6   8.5 - 10.1 mg/dL Final    eGFR 09/26/2024 93  >=60 mL/min/1.73m2 Final    Troponin I High Sensitivity 09/26/2024 5.5  <=60.4 pg/mL Final    QRS Duration 09/26/2024 78  ms Final    [facility] QTC Calculation 09/26/2024 452  ms Final    WBC 09/26/2024 6.27  4.50 - 11.00 K/uL Final    RBC 09/26/2024 4.32  4.20 - 5.40 M/uL Final    Hemoglobin 09/26/2024 13.1  12.0 - 16.0 g/dL Final    Hematocrit 09/26/2024 39.0  38.0 - 47.0 % Final    MCV 09/26/2024 90.3  80.0 - 96.0 fL Final    MCH 09/26/2024 30.3  27.0 - 31.0 pg Final    MCHC 09/26/2024 33.6  32.0 - 36.0 g/dL Final    RDW 09/26/2024 12.9  11.5 - 14.5 % Final    Platelet Count 09/26/2024 267  150 - 400 K/uL Final    MPV 09/26/2024 11.2  9.4 - 12.4 fL Final    Neutrophils % 09/26/2024 63.1  53.0 - 65.0 % Final    Lymphocytes % 09/26/2024 25.5 (L)  27.0 - 41.0 % Final    Monocytes % 09/26/2024 8.1 (H)  2.0 - 6.0 % Final    Eosinophils % 09/26/2024 2.7  1.0 - 4.0 % Final    Basophils % 09/26/2024 0.6  0.0 - 1.0 % Final    Immature Granulocytes % 09/26/2024 0.0  0.0 - 0.4 % Final    nRBC, Auto 09/26/2024 0.0  <=0.0 % Final    Neutrophils, Abs 09/26/2024 3.95  1.80 - 7.70 K/uL Final    Lymphocytes, Absolute 09/26/2024 1.60  1.00 - 4.80 K/uL Final    Monocytes, Absolute 09/26/2024 0.51  0.00 - 0.80 K/uL Final    Eosinophils, Absolute 09/26/2024 0.17  0.00 - 0.50 K/uL Final    Basophils, Absolute 09/26/2024 0.04  0.00 - 0.20 K/uL Final    Immature Granulocytes, Absolute 09/26/2024 0.00  0.00 - 0.04 K/uL Final    nRBC, Absolute 09/26/2024 0.00  <=0.00 x10e3/uL Final    Diff Type 09/26/2024 Auto   Final    Troponin I High Sensitivity 09/26/2024 4.3  <=60.4 pg/mL Final   Orders Only on 09/12/2024   Component Date Value Ref Range Status    QRS Duration 09/12/2024 82  ms Final    [facility] QTC Calculation 09/12/2024 442  ms Final   Admission on 09/11/2024, Discharged on 09/12/2024   Component Date Value Ref Range Status    ProBNP 09/11/2024 19  1 - 125 pg/mL Final    Sodium 09/11/2024 138   136 - 145 mmol/L Final    Potassium 09/11/2024 2.9 (L)  3.5 - 5.1 mmol/L Final    Chloride 09/11/2024 102  98 - 107 mmol/L Final    CO2 09/11/2024 31  21 - 32 mmol/L Final    Anion Gap 09/11/2024 8  7 - 16 mmol/L Final    Glucose 09/11/2024 130 (H)  74 - 106 mg/dL Final    BUN 09/11/2024 17  7 - 18 mg/dL Final    Creatinine 09/11/2024 1.18 (H)  0.55 - 1.02 mg/dL Final    BUN/Creatinine Ratio 09/11/2024 14  6 - 20 Final    Calcium 09/11/2024 8.5  8.5 - 10.1 mg/dL Final    Total Protein 09/11/2024 5.6 (L)  6.4 - 8.2 g/dL Final    Albumin 09/11/2024 2.9 (L)  3.5 - 5.0 g/dL Final    Globulin 09/11/2024 2.7  2.0 - 4.0 g/dL Final    A/G Ratio 09/11/2024 1.1   Final    Bilirubin, Total 09/11/2024 0.3  >0.0 - 1.2 mg/dL Final    Alk Phos 09/11/2024 55  41 - 108 U/L Final    ALT 09/11/2024 28  13 - 56 U/L Final    AST 09/11/2024 16  15 - 37 U/L Final    eGFR 09/11/2024 56 (L)  >=60 mL/min/1.73m2 Final    Barbiturates, Urine 09/11/2024 Negative  Negative Final    Benzodiazepine, Urine 09/11/2024 Negative  Negative Final    Opiates, Urine 09/11/2024 Negative  Negative Final    Phencyclidine, Urine 09/11/2024 Negative  Negative Final    Amphetamine, Urine 09/11/2024 Negative  Negative Final    Cannabinoid, Urine 09/11/2024 Negative  Negative Final    Cocaine, Urine 09/11/2024 Negative  Negative Final    Magnesium 09/11/2024 2.4 (H)  1.7 - 2.3 mg/dL Final    Troponin I High Sensitivity 09/11/2024 12.5  <=60.4 pg/mL Final    TSH 09/11/2024 3.690  0.358 - 3.740 uIU/mL Final    Color, UA 09/11/2024 Light-Yellow  Colorless, Straw, Yellow, Light Yellow, Dark Yellow Final    Clarity, UA 09/11/2024 Clear  Clear Final    pH, UA 09/11/2024 5.5  5.0 to 8.0 pH Units Final    Leukocytes, UA 09/11/2024 Small (A)  Negative Final    Nitrites, UA 09/11/2024 Negative  Negative Final    Protein, UA 09/11/2024 Negative  Negative Final    Glucose, UA 09/11/2024 Normal  Normal mg/dL Final    Ketones, UA 09/11/2024 Negative  Negative mg/dL Final     Urobilinogen, UA 09/11/2024 Normal  0.2, 1.0, Normal mg/dL Final    Bilirubin, UA 09/11/2024 Negative  Negative Final    Blood, UA 09/11/2024 Trace (A)  Negative Final    Specific Gravity, UA 09/11/2024 1.012  <=1.030 Final    PT 09/11/2024 13.6  11.7 - 14.7 seconds Final    INR 09/11/2024 1.05  <=3.30 Final    PTT 09/11/2024 26.9  25.2 - 37.3 seconds Final    D-Dimer 09/11/2024 <0.27  0.00 - 0.47 µg/mL Final    QRS Duration 09/11/2024 84  ms Final    [facility] QTC Calculation 09/11/2024 415  ms Final    WBC 09/11/2024 11.56 (H)  4.50 - 11.00 K/uL Final    RBC 09/11/2024 5.07  4.20 - 5.40 M/uL Final    Hemoglobin 09/11/2024 15.2  12.0 - 16.0 g/dL Final    Hematocrit 09/11/2024 45.1  38.0 - 47.0 % Final    MCV 09/11/2024 89.0  80.0 - 96.0 fL Final    MCH 09/11/2024 30.0  27.0 - 31.0 pg Final    MCHC 09/11/2024 33.7  32.0 - 36.0 g/dL Final    RDW 09/11/2024 12.6  11.5 - 14.5 % Final    Platelet Count 09/11/2024 257  150 - 400 K/uL Final    MPV 09/11/2024 11.0  9.4 - 12.4 fL Final    Neutrophils % 09/11/2024 72.9 (H)  53.0 - 65.0 % Final    Lymphocytes % 09/11/2024 19.8 (L)  27.0 - 41.0 % Final    Monocytes % 09/11/2024 5.8  2.0 - 6.0 % Final    Eosinophils % 09/11/2024 0.5 (L)  1.0 - 4.0 % Final    Basophils % 09/11/2024 0.5  0.0 - 1.0 % Final    Immature Granulocytes % 09/11/2024 0.5 (H)  0.0 - 0.4 % Final    nRBC, Auto 09/11/2024 0.0  <=0.0 % Final    Neutrophils, Abs 09/11/2024 8.42 (H)  1.80 - 7.70 K/uL Final    Lymphocytes, Absolute 09/11/2024 2.29  1.00 - 4.80 K/uL Final    Monocytes, Absolute 09/11/2024 0.67  0.00 - 0.80 K/uL Final    Eosinophils, Absolute 09/11/2024 0.06  0.00 - 0.50 K/uL Final    Basophils, Absolute 09/11/2024 0.06  0.00 - 0.20 K/uL Final    Immature Granulocytes, Absolute 09/11/2024 0.06 (H)  0.00 - 0.04 K/uL Final    nRBC, Absolute 09/11/2024 0.00  <=0.00 x10e3/uL Final    Diff Type 09/11/2024 Auto   Final    WBC, UA 09/11/2024 16 (H)  <=5 /hpf Final    RBC, UA 09/11/2024 3  <=3 /hpf Final     Bacteria, UA 09/11/2024 Occasional (A)  None Seen /hpf Final    Squamous Epithelial Cells, UA 09/11/2024 Occasional (A)  None Seen /HPF Final    Hyaline Casts, UA 09/11/2024 2-5 (A)  None Seen /lpf Final    Mucous 09/11/2024 Occasional (A)  None Seen /LPF Final    Culture, Urine 09/11/2024 Skin/Urogenital [place] Isolated, no further workup.   Final   Admission on 09/03/2024, Discharged on 09/03/2024   Component Date Value Ref Range Status    SARS COV-2 Molecular 09/03/2024 Negative  Negative, Invalid Final    Sodium 09/03/2024 138  136 - 145 mmol/L Final    Potassium 09/03/2024 3.6  3.5 - 5.1 mmol/L Final    Chloride 09/03/2024 105  98 - 107 mmol/L Final    CO2 09/03/2024 28  21 - 32 mmol/L Final    Anion Gap 09/03/2024 9  7 - 16 mmol/L Final    Glucose 09/03/2024 100  74 - 106 mg/dL Final    BUN 09/03/2024 15  7 - 18 mg/dL Final    Creatinine 09/03/2024 0.85  0.55 - 1.02 mg/dL Final    BUN/Creatinine Ratio 09/03/2024 18  6 - 20 Final    Calcium 09/03/2024 8.8  8.5 - 10.1 mg/dL Final    Total Protein 09/03/2024 5.8 (L)  6.4 - 8.2 g/dL Final    Albumin 09/03/2024 3.2 (L)  3.5 - 5.0 g/dL Final    Globulin 09/03/2024 2.6  2.0 - 4.0 g/dL Final    A/G Ratio 09/03/2024 1.2   Final    Bilirubin, Total 09/03/2024 0.9  >0.0 - 1.2 mg/dL Final    Alk Phos 09/03/2024 64  41 - 108 U/L Final    ALT 09/03/2024 28  13 - 56 U/L Final    AST 09/03/2024 15  15 - 37 U/L Final    eGFR 09/03/2024 83  >=60 mL/min/1.73m2 Final    Color, UA 09/03/2024 Yellow  Colorless, Straw, Yellow, Light Yellow, Dark Yellow Final    Clarity, UA 09/03/2024 Clear  Clear Final    pH, UA 09/03/2024 5.5  5.0 to 8.0 pH Units Final    Leukocytes, UA 09/03/2024 Negative  Negative Final    Nitrites, UA 09/03/2024 Negative  Negative Final    Protein, UA 09/03/2024 20 (A)  Negative Final    Glucose, UA 09/03/2024 Normal  Normal mg/dL Final    Ketones, UA 09/03/2024 40 (A)  Negative mg/dL Final    Urobilinogen, UA 09/03/2024 2 (A)  0.2, 1.0, Normal mg/dL Final     Bilirubin, UA 09/03/2024 Negative  Negative Final    Blood, UA 09/03/2024 Negative  Negative Final    Specific Gravity, UA 09/03/2024 1.029  <=1.030 Final    Troponin I High Sensitivity 09/03/2024 4.7  <=60.4 pg/mL Final    QRS Duration 09/03/2024 64  ms Final    [facility] QTC Calculation 09/03/2024 442  ms Final    Magnesium 09/03/2024 2.2  1.7 - 2.3 mg/dL Final    D-Dimer 09/03/2024 0.36  0.00 - 0.47 µg/mL Final    ProBNP 09/03/2024 15  1 - 125 pg/mL Final    WBC 09/03/2024 8.60  4.50 - 11.00 K/uL Final    RBC 09/03/2024 5.05  4.20 - 5.40 M/uL Final    Hemoglobin 09/03/2024 15.0  12.0 - 16.0 g/dL Final    Hematocrit 09/03/2024 45.3  38.0 - 47.0 % Final    MCV 09/03/2024 89.7  80.0 - 96.0 fL Final    MCH 09/03/2024 29.7  27.0 - 31.0 pg Final    MCHC 09/03/2024 33.1  32.0 - 36.0 g/dL Final    RDW 09/03/2024 12.7  11.5 - 14.5 % Final    Platelet Count 09/03/2024 292  150 - 400 K/uL Final    MPV 09/03/2024 10.5  9.4 - 12.4 fL Final    Neutrophils % 09/03/2024 69.0 (H)  53.0 - 65.0 % Final    Lymphocytes % 09/03/2024 23.3 (L)  27.0 - 41.0 % Final    Monocytes % 09/03/2024 6.3 (H)  2.0 - 6.0 % Final    Eosinophils % 09/03/2024 0.9 (L)  1.0 - 4.0 % Final    Basophils % 09/03/2024 0.3  0.0 - 1.0 % Final    Immature Granulocytes % 09/03/2024 0.2  0.0 - 0.4 % Final    nRBC, Auto 09/03/2024 0.0  <=0.0 % Final    Neutrophils, Abs 09/03/2024 5.93  1.80 - 7.70 K/uL Final    Lymphocytes, Absolute 09/03/2024 2.00  1.00 - 4.80 K/uL Final    Monocytes, Absolute 09/03/2024 0.54  0.00 - 0.80 K/uL Final    Eosinophils, Absolute 09/03/2024 0.08  0.00 - 0.50 K/uL Final    Basophils, Absolute 09/03/2024 0.03  0.00 - 0.20 K/uL Final    Immature Granulocytes, Absolute 09/03/2024 0.02  0.00 - 0.04 K/uL Final    nRBC, Absolute 09/03/2024 0.00  <=0.00 x10e3/uL Final    Diff Type 09/03/2024 Auto   Final         Orders Placed This Encounter   Procedures    Ambulatory referral/consult to Orthopedics     Referral Priority:   Routine     Referral Type:    Consultation     Referred to Provider:   Srikanth Conde MD     Requested Specialty:   Orthopedic Surgery     Number of Visits Requested:   1       Requested Prescriptions     Signed Prescriptions Disp Refills    ibuprofen (ADVIL,MOTRIN) 800 MG tablet 90 tablet 0     Sig: Take 1 tablet by mouth 3 times daily as needed for pain.       Assessment:     1. Hip pain, right    2. Primary osteoarthritis involving multiple joints                       Plan:    Not using narcotics/medication from our office    [employer] employee    Can not tolerate Cymbalta      Follows Neurology [facility] neuropathy    Follows spine surgery [facility]     Follows orthopedics [facility], right hip replacement left knee replacement    Robotic cholecystectomy May 9, 2024 [facility]      She had bilateral sacroiliac injection # 1 May 7, 2024  She states she had 50% relief after procedure, the procedure did help improve her level function    She had left intra-articular hip injection # 2, October 22, 2024  She states she had 100% relief after procedure  Procedure did help improve her level function/activities daily living    Follow-up after right femoral/obturator nerve block # 1 February 4, 2025  She states she had 90% relief after procedure  Procedure did help improve her level of function    Requesting referral orthopedics [facility] for chronic right hip pain after total hip replacement many years ago while living in [place]    Continues complaining sacroiliac discomfort    Considering repeating sacroiliac procedure if sacroiliac discomfort persist    X-ray sacroiliac joint [facility] April 15, 2024  No sacroiliac joint abnormality demonstrated    X-ray hips [facility] March 18, 2024  Right hip arthroplasty  No fracture noted  Left knee arthroplasty  Mild degenerative changes left hip    Continue NSAIDs as directed    Continue home exercise program as directed    Follow-up 3 months    Dr. Lang February  2026    Bring original prescription medication bottles/container/box with labels to each visit

## 2025-02-19 ENCOUNTER — OFFICE VISIT (OUTPATIENT)
Dept: PAIN MEDICINE | Facility: CLINIC | Age: 55
End: 2025-02-19
Payer: COMMERCIAL

## 2025-02-19 VITALS
BODY MASS INDEX: 41.3 KG/M2 | HEIGHT: 66 IN | WEIGHT: 257 LBS | HEART RATE: 83 BPM | SYSTOLIC BLOOD PRESSURE: 119 MMHG | DIASTOLIC BLOOD PRESSURE: 83 MMHG | RESPIRATION RATE: 20 BRPM

## 2025-02-19 DIAGNOSIS — M15.0 PRIMARY OSTEOARTHRITIS INVOLVING MULTIPLE JOINTS: Chronic | ICD-10-CM

## 2025-02-19 DIAGNOSIS — M25.551 HIP PAIN, RIGHT: Primary | Chronic | ICD-10-CM

## 2025-02-19 PROCEDURE — 99215 OFFICE O/P EST HI 40 MIN: CPT | Mod: PBBFAC | Performed by: PHYSICIAN ASSISTANT

## 2025-02-19 PROCEDURE — 99999 PR PBB SHADOW E&M-EST. PATIENT-LVL V: CPT | Mod: PBBFAC,,, | Performed by: PHYSICIAN ASSISTANT

## 2025-02-19 RX ORDER — IBUPROFEN 800 MG/1
800 TABLET ORAL 3 TIMES DAILY PRN
Qty: 90 TABLET | Refills: 0 | Status: SHIPPED | OUTPATIENT
Start: 2025-02-19

## 2025-02-26 ENCOUNTER — TELEPHONE (OUTPATIENT)
Dept: ORTHOPEDICS | Facility: CLINIC | Age: 55
End: 2025-02-26

## 2025-04-03 ENCOUNTER — HOSPITAL ENCOUNTER (EMERGENCY)
Facility: HOSPITAL | Age: 55
Discharge: HOME | End: 2025-04-03
Attending: EMERGENCY MEDICINE
Payer: COMMERCIAL

## 2025-04-03 VITALS
OXYGEN SATURATION: 98 % | TEMPERATURE: 98 F | DIASTOLIC BLOOD PRESSURE: 64 MMHG | HEART RATE: 57 BPM | WEIGHT: 256 LBS | HEIGHT: 66 IN | RESPIRATION RATE: 18 BRPM | SYSTOLIC BLOOD PRESSURE: 126 MMHG | BODY MASS INDEX: 41.14 KG/M2

## 2025-04-03 DIAGNOSIS — R10.9 ABDOMINAL PAIN, UNSPECIFIED ABDOMINAL LOCATION: Primary | ICD-10-CM

## 2025-04-03 DIAGNOSIS — K59.04 CHRONIC IDIOPATHIC CONSTIPATION: ICD-10-CM

## 2025-04-03 LAB
ALBUMIN SERPL BCP-MCNC: 3.6 G/DL (ref 3.5–5)
ALBUMIN/GLOB SERPL: 1.4 {RATIO}
ALP SERPL-CCNC: 86 U/L (ref 40–150)
ALT SERPL W P-5'-P-CCNC: 10 U/L
ANION GAP SERPL CALCULATED.3IONS-SCNC: 11 MMOL/L (ref 7–16)
AST SERPL W P-5'-P-CCNC: 19 U/L (ref 11–45)
BACTERIA #/AREA URNS HPF: ABNORMAL /HPF
BASOPHILS # BLD AUTO: 0.03 K/UL (ref 0–0.2)
BASOPHILS NFR BLD AUTO: 0.7 % (ref 0–1)
BILIRUB SERPL-MCNC: 0.3 MG/DL
BILIRUB UR QL STRIP: NEGATIVE
BUN SERPL-MCNC: 14 MG/DL (ref 10–20)
BUN/CREAT SERPL: 21 (ref 6–20)
CALCIUM SERPL-MCNC: 8.7 MG/DL (ref 8.4–10.2)
CHLORIDE SERPL-SCNC: 111 MMOL/L (ref 98–107)
CLARITY UR: CLEAR
CO2 SERPL-SCNC: 22 MMOL/L (ref 22–29)
COLOR UR: YELLOW
CREAT SERPL-MCNC: 0.68 MG/DL (ref 0.55–1.02)
DIFFERENTIAL METHOD BLD: ABNORMAL
EGFR (NO RACE VARIABLE) (RUSH/TITUS): 104 ML/MIN/1.73M2
EOSINOPHIL # BLD AUTO: 0.11 K/UL (ref 0–0.5)
EOSINOPHIL NFR BLD AUTO: 2.6 % (ref 1–4)
ERYTHROCYTE [DISTWIDTH] IN BLOOD BY AUTOMATED COUNT: 12.6 % (ref 11.5–14.5)
GLOBULIN SER-MCNC: 2.6 G/DL (ref 2–4)
GLUCOSE SERPL-MCNC: 111 MG/DL (ref 74–100)
GLUCOSE UR STRIP-MCNC: NORMAL MG/DL
HCT VFR BLD AUTO: 38.8 % (ref 38–47)
HGB BLD-MCNC: 12.8 G/DL (ref 12–16)
IMM GRANULOCYTES # BLD AUTO: 0.01 K/UL (ref 0–0.04)
IMM GRANULOCYTES NFR BLD: 0.2 % (ref 0–0.4)
KETONES UR STRIP-SCNC: NEGATIVE MG/DL
LACTATE SERPL-SCNC: 1.1 MMOL/L (ref 0.5–2.2)
LEUKOCYTE ESTERASE UR QL STRIP: NEGATIVE
LIPASE SERPL-CCNC: 13 U/L
LYMPHOCYTES # BLD AUTO: 1.73 K/UL (ref 1–4.8)
LYMPHOCYTES NFR BLD AUTO: 41 % (ref 27–41)
MCH RBC QN AUTO: 29.5 PG (ref 27–31)
MCHC RBC AUTO-ENTMCNC: 33 G/DL (ref 32–36)
MCV RBC AUTO: 89.4 FL (ref 80–96)
MONOCYTES # BLD AUTO: 0.62 K/UL (ref 0–0.8)
MONOCYTES NFR BLD AUTO: 14.7 % (ref 2–6)
MPC BLD CALC-MCNC: 10.9 FL (ref 9.4–12.4)
MUCOUS, UA: ABNORMAL /LPF
NEUTROPHILS # BLD AUTO: 1.72 K/UL (ref 1.8–7.7)
NEUTROPHILS NFR BLD AUTO: 40.8 % (ref 53–65)
NITRITE UR QL STRIP: NEGATIVE
NRBC # BLD AUTO: 0 X10E3/UL
NRBC, AUTO (.00): 0 %
PH UR STRIP: 5.5 PH UNITS
PLATELET # BLD AUTO: 225 K/UL (ref 150–400)
POTASSIUM SERPL-SCNC: 3.7 MMOL/L (ref 3.5–5.1)
PROT SERPL-MCNC: 6.2 G/DL (ref 6.4–8.3)
PROT UR QL STRIP: 10
RBC # BLD AUTO: 4.34 M/UL (ref 4.2–5.4)
RBC # UR STRIP: ABNORMAL /UL
RBC #/AREA URNS HPF: 9 /HPF
SODIUM SERPL-SCNC: 140 MMOL/L (ref 136–145)
SP GR UR STRIP: 1.03
SQUAMOUS #/AREA URNS LPF: ABNORMAL /HPF
TSH SERPL DL<=0.005 MIU/L-ACNC: 2.28 UIU/ML (ref 0.35–4.94)
UROBILINOGEN UR STRIP-ACNC: 2 MG/DL
WBC # BLD AUTO: 4.22 K/UL (ref 4.5–11)
WBC #/AREA URNS HPF: 1 /HPF

## 2025-04-03 PROCEDURE — 83690 ASSAY OF LIPASE: CPT | Performed by: EMERGENCY MEDICINE

## 2025-04-03 PROCEDURE — 85025 COMPLETE CBC W/AUTO DIFF WBC: CPT | Performed by: EMERGENCY MEDICINE

## 2025-04-03 PROCEDURE — 99284 EMERGENCY DEPT VISIT MOD MDM: CPT | Mod: 25

## 2025-04-03 PROCEDURE — 83605 ASSAY OF LACTIC ACID: CPT | Performed by: EMERGENCY MEDICINE

## 2025-04-03 PROCEDURE — 80053 COMPREHEN METABOLIC PANEL: CPT | Performed by: EMERGENCY MEDICINE

## 2025-04-03 PROCEDURE — 36415 COLL VENOUS BLD VENIPUNCTURE: CPT | Performed by: EMERGENCY MEDICINE

## 2025-04-03 PROCEDURE — 81003 URINALYSIS AUTO W/O SCOPE: CPT | Performed by: EMERGENCY MEDICINE

## 2025-04-03 NOTE — Clinical Note
"Stuart Cortesloni" Pedro Luis was seen and treated in our emergency department on 4/3/2025.  She may return to work on 04/04/2025.       If you have any questions or concerns, please don't hesitate to call.      Presley RN RN    "

## 2025-04-04 ENCOUNTER — TELEPHONE (OUTPATIENT)
Dept: EMERGENCY MEDICINE | Facility: HOSPITAL | Age: 55
End: 2025-04-04
Payer: COMMERCIAL

## 2025-04-09 ENCOUNTER — PATIENT OUTREACH (OUTPATIENT)
Facility: OTHER | Age: 55
End: 2025-04-09
Payer: COMMERCIAL

## 2025-04-10 NOTE — PROGRESS NOTES
ED navigator contacted patient to assist with scheduling a post ED 7 day follow up with PCP. Patient states that she is doing fine and she followed up with PCP. Patient declined ED navigation assessment and denied any needs at this time. ED navigator will close encounter at this time.    Bree Ferrari ED Navigator   [phone]

## 2025-04-30 ENCOUNTER — CLINICAL SUPPORT (OUTPATIENT)
Dept: PRIMARY CARE CLINIC | Facility: CLINIC | Age: 55
End: 2025-04-30
Payer: COMMERCIAL

## 2025-04-30 ENCOUNTER — HOSPITAL ENCOUNTER (OUTPATIENT)
Dept: RADIOLOGY | Facility: HOSPITAL | Age: 55
Discharge: HOME | End: 2025-04-30
Attending: NURSE PRACTITIONER
Payer: COMMERCIAL

## 2025-04-30 VITALS
HEIGHT: 66 IN | TEMPERATURE: 98 F | OXYGEN SATURATION: 96 % | BODY MASS INDEX: 41.13 KG/M2 | RESPIRATION RATE: 20 BRPM | HEART RATE: 80 BPM | DIASTOLIC BLOOD PRESSURE: 70 MMHG | SYSTOLIC BLOOD PRESSURE: 143 MMHG | WEIGHT: 255.94 LBS

## 2025-04-30 DIAGNOSIS — J06.9 UPPER RESPIRATORY TRACT INFECTION, UNSPECIFIED TYPE: ICD-10-CM

## 2025-04-30 DIAGNOSIS — R05.1 ACUTE COUGH: Primary | ICD-10-CM

## 2025-04-30 DIAGNOSIS — R05.1 ACUTE COUGH: ICD-10-CM

## 2025-04-30 PROCEDURE — 96372 THER/PROPH/DIAG INJ SC/IM: CPT | Mod: ,,, | Performed by: NURSE PRACTITIONER

## 2025-04-30 PROCEDURE — 99213 OFFICE O/P EST LOW 20 MIN: CPT | Mod: 25,,, | Performed by: NURSE PRACTITIONER

## 2025-04-30 PROCEDURE — 71046 X-RAY EXAM CHEST 2 VIEWS: CPT | Mod: TC

## 2025-04-30 PROCEDURE — 71046 X-RAY EXAM CHEST 2 VIEWS: CPT | Mod: 26,,, | Performed by: RADIOLOGY

## 2025-04-30 RX ORDER — DEXAMETHASONE SODIUM PHOSPHATE 4 MG/ML
4 INJECTION, SOLUTION INTRA-ARTICULAR; INTRALESIONAL; INTRAMUSCULAR; INTRAVENOUS; SOFT TISSUE
Status: COMPLETED | OUTPATIENT
Start: 2025-04-30 | End: 2025-04-30

## 2025-04-30 RX ORDER — METHYLPREDNISOLONE 4 MG/1
TABLET ORAL
Qty: 21 TABLET | Refills: 0 | Status: SHIPPED | OUTPATIENT
Start: 2025-04-30

## 2025-04-30 RX ORDER — AMOXICILLIN AND CLAVULANATE POTASSIUM 875; 125 MG/1; MG/1
1 TABLET, FILM COATED ORAL EVERY 12 HOURS
Qty: 14 TABLET | Refills: 0 | Status: SHIPPED | OUTPATIENT
Start: 2025-04-30

## 2025-04-30 RX ORDER — PROMETHAZINE HYDROCHLORIDE AND DEXTROMETHORPHAN HYDROBROMIDE 6.25; 15 MG/5ML; MG/5ML
5 SYRUP ORAL NIGHTLY PRN
Qty: 120 ML | Refills: 0 | Status: SHIPPED | OUTPATIENT
Start: 2025-04-30 | End: 2025-05-24

## 2025-04-30 RX ORDER — METHYLPREDNISOLONE ACETATE 80 MG/ML
80 INJECTION, SUSPENSION INTRA-ARTICULAR; INTRALESIONAL; INTRAMUSCULAR; SOFT TISSUE
Status: COMPLETED | OUTPATIENT
Start: 2025-04-30 | End: 2025-04-30

## 2025-04-30 RX ORDER — BENZONATATE 100 MG/1
100 CAPSULE ORAL 3 TIMES DAILY PRN
Qty: 30 CAPSULE | Refills: 0 | Status: SHIPPED | OUTPATIENT
Start: 2025-04-30 | End: 2025-05-10

## 2025-04-30 RX ADMIN — DEXAMETHASONE SODIUM PHOSPHATE 4 MG: 4 INJECTION, SOLUTION INTRA-ARTICULAR; INTRALESIONAL; INTRAMUSCULAR; INTRAVENOUS; SOFT TISSUE at 10:04

## 2025-04-30 RX ADMIN — METHYLPREDNISOLONE ACETATE 400 MG: 80 INJECTION, SUSPENSION INTRA-ARTICULAR; INTRALESIONAL; INTRAMUSCULAR; SOFT TISSUE at 10:04

## 2025-04-30 NOTE — PROGRESS NOTES
Subjective     Patient ID: Stuart Cloud is a 55 y.o. female.    Chief Complaint: cough, hoarsness (Presents with c/o non productive cough and hoarsness)    56 y/o bf presents with complaints of nasal stuffiness and non productive cough x 2 weeks. She denies sore throat, ear pain and fever.       Review of Systems   Constitutional:  Negative for fever.   HENT:  Positive for nasal congestion and postnasal drip. Negative for ear pain, sinus pressure/congestion and sore throat.    Respiratory:  Positive for cough.    All other systems reviewed and are negative.         Objective     Physical Exam  Vitals and nursing note reviewed.   Constitutional:       Appearance: Normal appearance.   HENT:      Head: Normocephalic.      Right Ear: Tympanic membrane is erythematous and bulging.      Nose: Mucosal edema and congestion present.      Right Sinus: Maxillary sinus tenderness present.      Mouth/Throat:      Lips: Pink.      Pharynx: Posterior oropharyngeal erythema present. No oropharyngeal exudate.   Eyes:      Pupils: Pupils are equal, round, and reactive to light.   Cardiovascular:      Rate and Rhythm: Normal rate and regular rhythm.      Heart sounds: Normal heart sounds.   Pulmonary:      Effort: Pulmonary effort is normal.      Breath sounds: Normal breath sounds. No wheezing, rhonchi or rales.   Musculoskeletal:         General: Normal range of motion.      Cervical back: Normal range of motion.   Skin:     General: Skin is warm and dry.   Neurological:      General: No focal deficit present.      Mental Status: She is alert and oriented to person, place, and time.   Psychiatric:         Attention and Perception: Attention and perception normal.         Mood and Affect: Mood normal.         Speech: Speech normal.         Behavior: Behavior normal. Behavior is cooperative.         Cognition and Memory: Cognition normal.         Judgment: Judgment normal.       Imaging:   XR CHEST PA AND LATERAL     CLINICAL  HISTORY:  Acute cough     TECHNIQUE:  PA and lateral views of the chest were performed.     COMPARISON:  Chest radiograph performed 09/26/2024.     FINDINGS:  Cardiac contours appear grossly within normal limits     Lungs essentially clear.  No definite pneumothorax or large volume pleural effusion.     No acute findings in the visualized abdomen.  Osseous and soft tissue structures appear without definite acute change.     Impression:     No convincing evidence of acute cardiopulmonary disease.        Electronically signed by:Noel Woods  Date:                                            04/30/2025  Time:                                           11:00       Assessment and Plan     1. Acute cough  -     X-Ray Chest PA And Lateral; Future; Expected date: 04/30/2025  -     dexAMETHasone injection 4 mg  -     methylPREDNISolone acetate injection 80 mg    2. Upper respiratory tract infection, unspecified type        Take medication as prescribed. RTN to [facility] if not improved in 1 week. Increase fluid intake.          No follow-ups on file.  Patient ID: Stuart Cloud is a 55 y.o. female.    Chief Complaint: cough, hoarsness (Presents with c/o non productive cough and hoarsness)    History of Present Illness              Physical Exam              Assessment & Plan               1. Acute cough  -     X-Ray Chest PA And Lateral; Future; Expected date: 04/30/2025        No follow-ups on file.    This note was generated with the assistance of ambient listening technology. Verbal consent was obtained by the patient and accompanying visitor(s) for the recording of patient appointment to facilitate this note. I attest to having reviewed and edited the generated note for accuracy, though some syntax or spelling errors may persist. Please contact the author of this note for any clarification.

## 2025-05-06 ENCOUNTER — TELEPHONE (OUTPATIENT)
Dept: PAIN MEDICINE | Facility: CLINIC | Age: 55
End: 2025-05-06
Payer: COMMERCIAL

## 2025-05-06 NOTE — TELEPHONE ENCOUNTER
----- Message from Nurse Lloyd sent at 5/6/2025 11:35 AM CDT -----  Regarding: FW: Procedure    ----- Message -----  From: Tiffany Isbell  Sent: 5/5/2025  10:40 AM CDT  To: [facility] Staff  Subject: Procedure                                        Who Called: Stuart JettsesarJudson is requesting assistance/information from provider's office.Preferred Method of Contact: Phone CallPatient's Preferred Phone Number on File: [phone] Best Call Back Number, if different:Additional Information: Stuart Cloud called in to schedule her procedure for her pain injection with . Stuart says that she is available any day after 12:30 or any time on any Tuesday. Please give Stuart a courtesy call back regarding this appt. Thank you.    EUGENIA MAYFIELD   05/06/2025   4:08 PM   Appointment made for 5/28/25 at 1:45

## 2025-05-21 ENCOUNTER — CLINICAL SUPPORT (OUTPATIENT)
Dept: OTHER | Facility: CLINIC | Age: 55
End: 2025-05-21

## 2025-05-21 DIAGNOSIS — Z00.8 ENCOUNTER FOR OTHER GENERAL EXAMINATION: ICD-10-CM

## 2025-05-22 ENCOUNTER — RESULTS FOLLOW-UP (OUTPATIENT)
Dept: OTHER | Facility: CLINIC | Age: 55
End: 2025-05-22

## 2025-05-22 VITALS
BODY MASS INDEX: 43.32 KG/M2 | DIASTOLIC BLOOD PRESSURE: 77 MMHG | SYSTOLIC BLOOD PRESSURE: 149 MMHG | WEIGHT: 260 LBS | HEIGHT: 65 IN

## 2025-05-22 LAB
GLUCOSE SERPL-MCNC: 92 MG/DL (ref 60–140)
HDLC SERPL-MCNC: 48 MG/DL
POC CHOLESTEROL, LDL (DOCK): 61 MG/DL
POC CHOLESTEROL, TOTAL: 136 MG/DL
TRIGL SERPL-MCNC: 162 MG/DL

## 2025-05-28 ENCOUNTER — HOSPITAL ENCOUNTER (EMERGENCY)
Facility: HOSPITAL | Age: 55
Discharge: HOME | End: 2025-05-28
Attending: EMERGENCY MEDICINE
Payer: COMMERCIAL

## 2025-05-28 VITALS
HEART RATE: 58 BPM | OXYGEN SATURATION: 99 % | BODY MASS INDEX: 42.49 KG/M2 | SYSTOLIC BLOOD PRESSURE: 140 MMHG | RESPIRATION RATE: 14 BRPM | TEMPERATURE: 98 F | DIASTOLIC BLOOD PRESSURE: 85 MMHG | HEIGHT: 65 IN | WEIGHT: 255 LBS

## 2025-05-28 DIAGNOSIS — R10.9 FLANK PAIN: ICD-10-CM

## 2025-05-28 DIAGNOSIS — M54.9 CHRONIC BACK PAIN, UNSPECIFIED BACK LOCATION, UNSPECIFIED BACK PAIN LATERALITY: ICD-10-CM

## 2025-05-28 DIAGNOSIS — R31.9 HEMATURIA, UNSPECIFIED TYPE: Primary | ICD-10-CM

## 2025-05-28 DIAGNOSIS — G89.29 CHRONIC BACK PAIN, UNSPECIFIED BACK LOCATION, UNSPECIFIED BACK PAIN LATERALITY: ICD-10-CM

## 2025-05-28 LAB
BILIRUB UR QL STRIP: NEGATIVE
CLARITY UR: CLEAR
COLOR UR: COLORLESS
GLUCOSE UR STRIP-MCNC: NORMAL MG/DL
INFLUENZA A MOLECULAR (OHS): NEGATIVE
INFLUENZA B MOLECULAR (OHS): NEGATIVE
KETONES UR STRIP-SCNC: NEGATIVE MG/DL
LEUKOCYTE ESTERASE UR QL STRIP: NEGATIVE
NITRITE UR QL STRIP: NEGATIVE
PH UR STRIP: 6.5 PH UNITS
PROT UR QL STRIP: NEGATIVE
RBC # UR STRIP: ABNORMAL /UL
RBC #/AREA URNS HPF: 7 /HPF
SARS-COV-2 RDRP RESP QL NAA+PROBE: NEGATIVE
SP GR UR STRIP: 1.01
SQUAMOUS #/AREA URNS LPF: ABNORMAL /HPF
UROBILINOGEN UR STRIP-ACNC: NORMAL MG/DL
WBC #/AREA URNS HPF: <1 /HPF

## 2025-05-28 PROCEDURE — 25000003 PHARM REV CODE 250: Performed by: EMERGENCY MEDICINE

## 2025-05-28 PROCEDURE — 96375 TX/PRO/DX INJ NEW DRUG ADDON: CPT

## 2025-05-28 PROCEDURE — 87502 INFLUENZA DNA AMP PROBE: CPT | Performed by: EMERGENCY MEDICINE

## 2025-05-28 PROCEDURE — 96365 THER/PROPH/DIAG IV INF INIT: CPT

## 2025-05-28 PROCEDURE — 63600175 PHARM REV CODE 636 W HCPCS: Mod: JZ,TB | Performed by: EMERGENCY MEDICINE

## 2025-05-28 PROCEDURE — 99285 EMERGENCY DEPT VISIT HI MDM: CPT | Mod: 25

## 2025-05-28 PROCEDURE — 87635 SARS-COV-2 COVID-19 AMP PRB: CPT | Performed by: EMERGENCY MEDICINE

## 2025-05-28 PROCEDURE — 81003 URINALYSIS AUTO W/O SCOPE: CPT | Performed by: EMERGENCY MEDICINE

## 2025-05-28 RX ORDER — METHOCARBAMOL 500 MG/1
TABLET, FILM COATED ORAL 3 TIMES DAILY PRN
Qty: 30 TABLET | Refills: 0 | Status: SHIPPED | OUTPATIENT
Start: 2025-05-28 | End: 2025-05-28

## 2025-05-28 RX ORDER — METHOCARBAMOL 500 MG/1
TABLET, FILM COATED ORAL 3 TIMES DAILY PRN
Qty: 30 TABLET | Refills: 0 | Status: SHIPPED | OUTPATIENT
Start: 2025-05-28

## 2025-05-28 RX ORDER — IBUPROFEN 800 MG/1
800 TABLET, FILM COATED ORAL
Status: COMPLETED | OUTPATIENT
Start: 2025-05-28 | End: 2025-05-28

## 2025-05-28 RX ORDER — KETOROLAC TROMETHAMINE 15 MG/ML
15 INJECTION, SOLUTION INTRAMUSCULAR; INTRAVENOUS
Status: COMPLETED | OUTPATIENT
Start: 2025-05-28 | End: 2025-05-28

## 2025-05-28 RX ADMIN — KETOROLAC TROMETHAMINE 15 MG: 15 INJECTION, SOLUTION INTRAMUSCULAR; INTRAVENOUS at 05:05

## 2025-05-28 RX ADMIN — IBUPROFEN 800 MG: 800 TABLET, FILM COATED ORAL at 08:05

## 2025-05-28 RX ADMIN — PROMETHAZINE HYDROCHLORIDE 25 MG: 25 INJECTION, SOLUTION INTRAMUSCULAR; INTRAVENOUS at 05:05

## 2025-05-29 ENCOUNTER — PATIENT OUTREACH (OUTPATIENT)
Facility: OTHER | Age: 55
End: 2025-05-29
Payer: COMMERCIAL

## 2025-05-29 RX ORDER — PROMETHAZINE HYDROCHLORIDE 25 MG/1
25 TABLET ORAL EVERY 6 HOURS PRN
Qty: 30 TABLET | Refills: 0 | Status: SHIPPED | OUTPATIENT
Start: 2025-05-29

## 2025-06-04 NOTE — H&P (VIEW-ONLY)
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 lumbar spine lumbar radiculopathy  MRI for consideration procedure/surgery    I have given the patient medically directed home exercise program    Patient has tried NSAIDs and neuromodulators (neurontin, lyrica, elavil, or cymbalta)    Patient is not able to perform activities daily living due to pain such as house chores grocery shopping cooking and cleaning personal hygiene patient can not stand, sit, walk ride for more than 15 minutes at a time due to pain    Patient will/shall continue home exercise program as directed ongoing x1 year, 3-4 days per week 30-45 minutes per session  Patient has been doing home exercises since last procedure Date --------  Stretching Exercises   Stretching exercises keep your muscles flexible. They also stop them from getting tight. Start by doing each of these stretches 2 to 3 times. In order for your body to make changes, you will need to hold these stretches for 20 to 30 seconds. Try to do the stretches 2 to 3 times each day.   Do all exercises slowly.   Do not bounce when doing stretches.    Single knee to chest stretches ? Lie on your back, bend your knees and have your feet flat on the floor. Pull one knee towards your chest until you feel a stretch in your lower back and buttock area.  Repeat with the other knee. If you have knee problems, pull your knee up by grabbing the back of your thigh instead of the front of your knee. You can also do this exercise by grabbing both knees at the same time.    Lower trunk rotations ? While lying on your back, bend your knees and have your feet flat on the floor. Keep your legs together and then drop them to one side. Be sure to keep both of your shoulders touching the floor until you feel a stretch in the muscles at the side of the back. Repeat on the other side.    Lower back stretches seated ? Sit in a chair with your feet spread about shoulder width apart. Then, lean forward until you feel a stretch in your lower back.  Strengthening Exercises   Strengthening exercises keep your muscles firm and strong. Start by repeating each exercise 2 to 3 times.     Work up to doing each exercise 10 times.     Hold each exercise for 3 to 5 seconds. Try to do the exercises 2 to 3 times each day.     Do all exercises slowly.    Shoulder blade squeezes ? Pinch your shoulder blades together on your upper back and hold 3 to 5 seconds. Be sure you are sitting with good posture and make sure your shoulders do not raise up when you do this exercise. Relax.    Pelvic tilts ? Lie on your back with your knees bent and feet flat on the floor. Tighten your stomach muscles and press your lower back down to the floor. Relax.    Hip lifts ? Lie on your back with your knees bent and feet flat on the floor. Tighten your stomach muscles and lift your buttocks off the floor. Relax.    Please see detailed exercises attached to note with diagram    Indications for this procedure for this specific patient include the following     - Injections being provided as part of a comprehensive pain management program.    - Pt has failed 6 weeks of conservative therapy including oral meds, PT and or home exercise program which has been discussed with the patient   - Injection being provided for  suspected radicular pain.    - Pain scale of greater than or equal to 3/10 with functional impairment  - No evidence of local or systemic infection, bleeding tendency or unstable medical condition.    - Pain is causing significant functional limitation resulting in diminished quality of life and impaired age appropriate ADL's.   - Repeat injections are done no sooner than 7 days after the previous injection  - Epidural done for suspected radicular pain along dermatome of nerve   - Epidural done to differentiate level of radicular nerve root pain   -procedure done prior to surgical consideration  - Repeat injections done only when pt reports 50% improvement in pain from previous injections    -increased level of function  - patient is aware if they take any NSAIDs/anticoagulant prior to procedure procedure will be postponed, this was listed on the preop instructions and highlighted, list of NSAIDs/anticoagulant reviewed with patient to include methotrexate  - Injection done at L5-S1 level(s) which is consistent with patient's dermatomal pain complaint  The planned medically necessary  surgical procedure is performed in a hospital outpatient department and not in an ambulatory surgical center due to:     -there is no geographically assessable ambulatory surgery center that has the  necessary equipment and fluoroscopy needed for the procedure     -there is no geographically assessable ambulatory surgical center available at which the physician has privileges     -an ASC's  specific  guideline regarding the individuals weight or health conditions that prevent the use of an ASC     -done under fluoro  Monitor anesthesia request is medically indicated for the scheduled nerve block procedure due to:  1- needle phobia and anxiety, placing  the patient at risk during the provided service.  2-patient has an ASA class greater than 3 and requires constant presence of an anesthesiologist during the procedure,   3-patient has  severe problems hard to lie still  4-patient suffers from chronic pain and is unable to function due to  diminished ADLs    Schedule lumbar L5-S1 MARCO A # 1, lumbosacral radiculopathy    Dr. Lang     Bring original prescription medication bottles/container/box with labels to each visit

## 2025-06-04 NOTE — PROGRESS NOTES
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 spine lumbar radiculopathy  MRI for consideration procedure/surgery    I have given the patient medically directed home exercise program    Patient has tried NSAIDs and neuromodulators (neurontin, lyrica, elavil, or cymbalta)    Patient is not able to perform activities daily living due to pain such as house chores grocery shopping cooking and cleaning personal hygiene patient can not stand, sit, walk ride for more than 15 minutes at a time due to pain    Patient will/shall continue home exercise program as directed ongoing x1 year, 3-4 days per week 30-45 minutes per session  Patient has been doing home exercises since last procedure Date --------  Stretching Exercises   Stretching exercises keep your muscles flexible. They also stop them from getting tight. Start by doing each of these stretches 2 to 3 times. In order for your body to make changes, you will need to hold these stretches for 20 to 30 seconds. Try to do the stretches 2 to 3 times each day.   Do all exercises slowly.   Do not bounce when doing stretches.    Single knee to chest stretches ? Lie on your back, bend your knees and have your feet flat on the floor. Pull one knee towards your chest until you feel a stretch in your lower back and buttock area.  Repeat with the other knee. If you have knee problems, pull your knee up by grabbing the back of your thigh instead of the front of your knee. You can also do this exercise by grabbing both knees at the same time.    Lower trunk rotations ? While lying on your back, bend your knees and have your feet flat on the floor. Keep your legs together and then drop them to one side. Be sure to keep both of your shoulders touching the floor until you feel a stretch in the muscles at the side of the back. Repeat on the other side.    Lower back stretches seated ? Sit in a chair with your feet spread about shoulder width apart. Then, lean forward until you feel a stretch in your lower back.  Strengthening Exercises   Strengthening exercises keep your muscles firm and strong. Start by repeating each exercise 2 to 3 times.     Work up to doing each exercise 10 times.     Hold each exercise for 3 to 5 seconds. Try to do the exercises 2 to 3 times each day.     Do all exercises slowly.    Shoulder blade squeezes ? Pinch your shoulder blades together on your upper back and hold 3 to 5 seconds. Be sure you are sitting with good posture and make sure your shoulders do not raise up when you do this exercise. Relax.    Pelvic tilts ? Lie on your back with your knees bent and feet flat on the floor. Tighten your stomach muscles and press your lower back down to the floor. Relax.    Hip lifts ? Lie on your back with your knees bent and feet flat on the floor. Tighten your stomach muscles and lift your buttocks off the floor. Relax.    Please see detailed exercises attached to note with diagram    Indications for this procedure for this specific patient include the following     - Injections being provided as part of a comprehensive pain management program.    - Pt has failed 6 weeks of conservative therapy including oral meds, PT and or home exercise program which has been discussed with the patient   - Injection being provided for  suspected radicular pain.    - Pain scale of greater than or equal to 3/10 with functional impairment  - No evidence of local or systemic infection, bleeding tendency or unstable medical condition.    - Pain is causing significant functional limitation resulting in diminished quality of life and impaired age appropriate ADL's.   - Repeat injections are done no sooner than 7 days after the previous injection  - Epidural done for suspected radicular pain along dermatome of nerve   - Epidural done to differentiate level of radicular nerve root pain   -procedure done prior to surgical consideration  - Repeat injections done only when pt reports 50% improvement in pain from previous injections    -increased level of function  - patient is aware if they take any NSAIDs/anticoagulant prior to procedure procedure will be postponed, this was listed on the preop instructions and highlighted, list of NSAIDs/anticoagulant reviewed with patient to include methotrexate  - Injection done at L5-S1 level(s) which is consistent with patient's dermatomal pain complaint  The planned medically necessary  surgical procedure is performed in a hospital outpatient department and not in an ambulatory surgical center due to:     -there is no geographically assessable ambulatory surgery center that has the  necessary equipment and fluoroscopy needed for the procedure     -there is no geographically assessable ambulatory surgical center available at which the physician has privileges     -an ASC's  specific  guideline regarding the individuals weight or health conditions that prevent the use of an ASC     -done under fluoro  Monitor anesthesia request is medically indicated for the scheduled nerve block procedure due to:  1- needle phobia and anxiety, placing  the patient at risk during the provided service.  2-patient has an ASA class greater than 3 and requires constant presence of an anesthesiologist during the procedure,   3-patient has  severe problems hard to lie still  4-patient suffers from chronic pain and is unable to function due to  diminished ADLs    Schedule lumbar L5-S1 MARCO A # 1, lumbosacral radiculopathy    Dr. Lang     Bring original prescription medication bottles/container/box with labels to each visit

## 2025-06-12 ENCOUNTER — OFFICE VISIT (OUTPATIENT)
Dept: PAIN MEDICINE | Facility: CLINIC | Age: 55
End: 2025-06-12
Payer: COMMERCIAL

## 2025-06-12 VITALS
HEIGHT: 66 IN | SYSTOLIC BLOOD PRESSURE: 135 MMHG | BODY MASS INDEX: 42.91 KG/M2 | HEART RATE: 78 BPM | WEIGHT: 267 LBS | DIASTOLIC BLOOD PRESSURE: 74 MMHG | RESPIRATION RATE: 18 BRPM

## 2025-06-12 DIAGNOSIS — M46.1 BILATERAL SACROILIITIS: Chronic | ICD-10-CM

## 2025-06-12 DIAGNOSIS — M15.0 PRIMARY OSTEOARTHRITIS INVOLVING MULTIPLE JOINTS: Chronic | ICD-10-CM

## 2025-06-12 DIAGNOSIS — M25.551 HIP PAIN, RIGHT: Chronic | ICD-10-CM

## 2025-06-12 DIAGNOSIS — M54.16 LUMBAR RADICULOPATHY, CHRONIC: Primary | Chronic | ICD-10-CM

## 2025-06-12 PROCEDURE — 99215 OFFICE O/P EST HI 40 MIN: CPT | Mod: PBBFAC | Performed by: PHYSICIAN ASSISTANT

## 2025-06-12 PROCEDURE — 99214 OFFICE O/P EST MOD 30 MIN: CPT | Mod: S$PBB,,, | Performed by: PHYSICIAN ASSISTANT

## 2025-06-12 PROCEDURE — 99999 PR PBB SHADOW E&M-EST. PATIENT-LVL V: CPT | Mod: PBBFAC,,, | Performed by: PHYSICIAN ASSISTANT

## 2025-06-12 PROCEDURE — 1159F MED LIST DOCD IN RCRD: CPT | Mod: ,,, | Performed by: PHYSICIAN ASSISTANT

## 2025-06-12 PROCEDURE — 3008F BODY MASS INDEX DOCD: CPT | Mod: ,,, | Performed by: PHYSICIAN ASSISTANT

## 2025-06-12 PROCEDURE — 3078F DIAST BP <80 MM HG: CPT | Mod: ,,, | Performed by: PHYSICIAN ASSISTANT

## 2025-06-12 PROCEDURE — 3075F SYST BP GE 130 - 139MM HG: CPT | Mod: ,,, | Performed by: PHYSICIAN ASSISTANT

## 2025-06-12 RX ORDER — IBUPROFEN 800 MG/1
800 TABLET, FILM COATED ORAL EVERY 8 HOURS PRN
Qty: 60 TABLET | Refills: 0 | Status: SHIPPED | OUTPATIENT
Start: 2025-06-12

## 2025-06-12 RX ORDER — CYCLOBENZAPRINE HCL 5 MG
5 TABLET ORAL EVERY 8 HOURS PRN
Qty: 90 TABLET | Refills: 0 | Status: SHIPPED | OUTPATIENT
Start: 2025-06-12

## 2025-06-12 RX ORDER — KETOROLAC TROMETHAMINE 30 MG/ML
60 INJECTION, SOLUTION INTRAMUSCULAR; INTRAVENOUS
Status: DISCONTINUED | OUTPATIENT
Start: 2025-06-12 | End: 2025-06-12

## 2025-06-12 NOTE — PATIENT INSTRUCTIONS

## 2025-07-03 ENCOUNTER — PATIENT MESSAGE (OUTPATIENT)
Dept: INTERNAL MEDICINE | Facility: CLINIC | Age: 55
End: 2025-07-03

## 2025-07-03 ENCOUNTER — OFFICE VISIT (OUTPATIENT)
Dept: INTERNAL MEDICINE | Facility: CLINIC | Age: 55
End: 2025-07-03
Payer: COMMERCIAL

## 2025-07-03 DIAGNOSIS — E66.01 OBESITY, CLASS III, BMI 40-49.9 (MORBID OBESITY): Primary | ICD-10-CM

## 2025-07-03 RX ORDER — SEMAGLUTIDE 1 MG/.5ML
1 INJECTION, SOLUTION SUBCUTANEOUS
Qty: 2 ML | Refills: 0 | Status: ACTIVE | OUTPATIENT
Start: 2025-07-03

## 2025-07-03 RX ORDER — SEMAGLUTIDE 0.25 MG/.5ML
0.25 INJECTION, SOLUTION SUBCUTANEOUS
Qty: 2 ML | Refills: 0 | Status: ACTIVE | OUTPATIENT
Start: 2025-07-03

## 2025-07-03 RX ORDER — SEMAGLUTIDE 0.5 MG/.5ML
0.5 INJECTION, SOLUTION SUBCUTANEOUS
Qty: 2 ML | Refills: 0 | Status: ACTIVE | OUTPATIENT
Start: 2025-07-03

## 2025-07-03 NOTE — PROGRESS NOTES
Patient ID: Stuart Cloud is a 55 y.o. Black or  female    Subjective  Chief Complaint: patient presents for medical weight loss management.    Contraindications to GLP-1 receptor agonist therapy:   Denies personal or family history of MTC and personal history of MEN2     Co-morbidities: HTN    History of weight loss therapy: Pt previously used Adipex but denies use of GLP-1 medications.    Weight loss history:  Starting weight:    7/2/2025   Recent Readings    Weight (lbs) 267 lb    BMI 43.09 BMI      Objective  Lab Results   Component Value Date     04/03/2025     01/19/2025     09/26/2024     Lab Results   Component Value Date    K 3.7 04/03/2025    K 4.2 01/19/2025    K 3.6 09/26/2024     Lab Results   Component Value Date     (H) 04/03/2025     (H) 01/19/2025     (H) 09/26/2024     Lab Results   Component Value Date    CO2 22 04/03/2025    CO2 23 01/19/2025    CO2 28 09/26/2024     Lab Results   Component Value Date    BUN 14 04/03/2025    BUN 17 01/19/2025    BUN 14 09/26/2024     Lab Results   Component Value Date     (H) 04/03/2025     (H) 01/19/2025     09/26/2024     Lab Results   Component Value Date    CALCIUM 8.7 04/03/2025    CALCIUM 8.5 01/19/2025    CALCIUM 8.6 09/26/2024     Lab Results   Component Value Date    PROT 6.2 (L) 04/03/2025    PROT 5.8 (L) 01/19/2025    PROT 5.6 (L) 09/11/2024     Lab Results   Component Value Date    ALBUMIN 3.6 04/03/2025    ALBUMIN 3.5 01/19/2025    ALBUMIN 2.9 (L) 09/11/2024     Lab Results   Component Value Date    BILITOT 0.3 04/03/2025    BILITOT 0.4 01/19/2025    BILITOT 0.3 09/11/2024     Lab Results   Component Value Date    AST 19 04/03/2025    AST 22 01/19/2025    AST 16 09/11/2024     Lab Results   Component Value Date    ALT 10 04/03/2025    ALT 20 01/19/2025    ALT 28 09/11/2024     Lab Results   Component Value Date    ANIONGAP 11 04/03/2025    ANIONGAP 13 01/19/2025    ANIONGAP 7  09/26/2024     Lab Results   Component Value Date    CREATININE 0.68 04/03/2025    CREATININE 0.76 01/19/2025    CREATININE 0.77 09/26/2024     Lab Results   Component Value Date    EGFRNORACEVR 104 04/03/2025    EGFRNORACEVR 94 01/19/2025    EGFRNORACEVR 93 09/26/2024     Assessment/Plan ** Hulin  -Pt qualifies for GLP-1 RA therapy based on BMI greater than or equal to 30 kg/m2  - Initiate Wegovy 0.25 mg once weekly for 1 month  - Then increase to Wegovy 0.5 mg once weekly for 1 month  - Then increase to Wegovy 1 mg once weekly  - RTC in 3 months for follow-up evaluation      Patient consented to pharmacist management via collaborative practice.

## 2025-07-10 ENCOUNTER — ANESTHESIA EVENT (OUTPATIENT)
Dept: PAIN MEDICINE | Facility: HOSPITAL | Age: 55
End: 2025-07-10
Payer: COMMERCIAL

## 2025-07-10 ENCOUNTER — HOSPITAL ENCOUNTER (OUTPATIENT)
Facility: HOSPITAL | Age: 55
Discharge: HOME | End: 2025-07-10
Attending: PAIN MEDICINE | Admitting: PAIN MEDICINE
Payer: COMMERCIAL

## 2025-07-10 ENCOUNTER — ANESTHESIA (OUTPATIENT)
Dept: PAIN MEDICINE | Facility: HOSPITAL | Age: 55
End: 2025-07-10
Payer: COMMERCIAL

## 2025-07-10 VITALS
RESPIRATION RATE: 14 BRPM | DIASTOLIC BLOOD PRESSURE: 75 MMHG | WEIGHT: 271 LBS | OXYGEN SATURATION: 99 % | TEMPERATURE: 98 F | BODY MASS INDEX: 43.55 KG/M2 | HEIGHT: 66 IN | SYSTOLIC BLOOD PRESSURE: 136 MMHG | HEART RATE: 50 BPM

## 2025-07-10 DIAGNOSIS — M25.551 HIP PAIN, RIGHT: ICD-10-CM

## 2025-07-10 DIAGNOSIS — M54.17 LUMBOSACRAL RADICULOPATHY: ICD-10-CM

## 2025-07-10 DIAGNOSIS — M54.17 LUMBOSACRAL RADICULOPATHY: Primary | ICD-10-CM

## 2025-07-10 PROCEDURE — 62323 NJX INTERLAMINAR LMBR/SAC: CPT | Performed by: PAIN MEDICINE

## 2025-07-10 PROCEDURE — 37000008 HC ANESTHESIA 1ST 15 MINUTES: Performed by: PAIN MEDICINE

## 2025-07-10 PROCEDURE — 63600175 PHARM REV CODE 636 W HCPCS: Performed by: NURSE ANESTHETIST, CERTIFIED REGISTERED

## 2025-07-10 PROCEDURE — 25500020 PHARM REV CODE 255: Performed by: PAIN MEDICINE

## 2025-07-10 PROCEDURE — 63600175 PHARM REV CODE 636 W HCPCS: Performed by: PAIN MEDICINE

## 2025-07-10 PROCEDURE — 62323 NJX INTERLAMINAR LMBR/SAC: CPT | Mod: ,,, | Performed by: PAIN MEDICINE

## 2025-07-10 RX ORDER — TRIAMCINOLONE ACETONIDE 40 MG/ML
INJECTION, SUSPENSION INTRA-ARTICULAR; INTRAMUSCULAR CODE/TRAUMA/SEDATION MEDICATION
Status: DISCONTINUED | OUTPATIENT
Start: 2025-07-10 | End: 2025-07-10 | Stop reason: HOSPADM

## 2025-07-10 RX ORDER — ONDANSETRON HYDROCHLORIDE 2 MG/ML
INJECTION, SOLUTION INTRAVENOUS
Status: DISCONTINUED | OUTPATIENT
Start: 2025-07-10 | End: 2025-07-10

## 2025-07-10 RX ORDER — LIDOCAINE HYDROCHLORIDE 20 MG/ML
INJECTION, SOLUTION EPIDURAL; INFILTRATION; INTRACAUDAL; PERINEURAL
Status: DISCONTINUED | OUTPATIENT
Start: 2025-07-10 | End: 2025-07-10

## 2025-07-10 RX ORDER — PROPOFOL 10 MG/ML
VIAL (ML) INTRAVENOUS
Status: DISCONTINUED | OUTPATIENT
Start: 2025-07-10 | End: 2025-07-10

## 2025-07-10 RX ORDER — SODIUM CHLORIDE 9 MG/ML
INJECTION, SOLUTION INTRAVENOUS CONTINUOUS
Status: DISCONTINUED | OUTPATIENT
Start: 2025-07-10 | End: 2025-07-10 | Stop reason: HOSPADM

## 2025-07-10 RX ORDER — IOPAMIDOL 612 MG/ML
INJECTION, SOLUTION INTRAVASCULAR CODE/TRAUMA/SEDATION MEDICATION
Status: DISCONTINUED | OUTPATIENT
Start: 2025-07-10 | End: 2025-07-10 | Stop reason: HOSPADM

## 2025-07-10 RX ADMIN — PROPOFOL 50 MG: 10 INJECTION, EMULSION INTRAVENOUS at 08:07

## 2025-07-10 RX ADMIN — PROPOFOL 40 MG: 10 INJECTION, EMULSION INTRAVENOUS at 08:07

## 2025-07-10 RX ADMIN — LIDOCAINE HYDROCHLORIDE 50 MG: 20 INJECTION, SOLUTION EPIDURAL; INFILTRATION; INTRACAUDAL; PERINEURAL at 08:07

## 2025-07-10 RX ADMIN — ONDANSETRON 4 MG: 2 INJECTION INTRAMUSCULAR; INTRAVENOUS at 08:07

## 2025-07-10 NOTE — ANESTHESIA POSTPROCEDURE EVALUATION
Anesthesia Post Evaluation    Patient: Stuart Cloud    Procedure(s) Performed: Procedure(s) (LRB):  Injection-steroid-epidural-lumbar L5-S1 (N/A)    Final Anesthesia Type: MAC      Patient location: pain pacu.  Patient participation: Yes- Able to Participate  Level of consciousness: awake and alert  Post-procedure vital signs: reviewed and stable  Pain management: adequate  Airway patency: patent    PONV status at discharge: No PONV  Anesthetic complications: no      Cardiovascular status: blood pressure returned to baseline and hemodynamically stable  Respiratory status: unassisted  Hydration status: euvolemic  Follow-up not needed.              Vitals Value Taken Time   /73 07/10/25 09:09   Temp 36.7 °C (98 °F) 07/10/25 08:51   Pulse 69 07/10/25 09:12   Resp 19 07/10/25 09:12   SpO2 99 % 07/10/25 09:10   Vitals shown include unfiled device data.      No case tracking events are documented in the log.      Pain/Fernando Score: Fernando Score: 9 (7/10/2025  9:01 AM)
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
## 2025-07-10 NOTE — DISCHARGE SUMMARY
[facility] - Pain Management  Discharge Note  Short Stay    Procedure(s) (LRB):  Injection-steroid-epidural-lumbar L5-S1 (N/A)      OUTCOME: Patient tolerated treatment/procedure well without complication and is now ready for discharge.    DISPOSITION: Home or Self Care    FINAL DIAGNOSIS:   lumbosacral radiculopathy    FOLLOWUP: In clinic    DISCHARGE INSTRUCTIONS:  See nurse's notes     TIME SPENT ON DISCHARGE: 5 minutes

## 2025-07-10 NOTE — PLAN OF CARE
Plan:  D/c pt via wheelchair at 0932  Informed pt if does not void in 8 hours to go to ER. Notify if redness, drainage, from injection site or fever over next 3-4 days. Rest and drink plenty of fluids for the remainder of the day. No lifting over 5 lbs. For the remainder of the day. Continue regular medications as prescribed. May take pain medications as prescribed.     Pain improved 50%  Pre-procedure pain: 8  Post-procedure pain: 4

## 2025-07-10 NOTE — TRANSFER OF CARE
"Anesthesia Transfer of Care Note    Patient: Stuart Cloud    Procedure(s) Performed: Procedure(s) (LRB):  Injection-steroid-epidural-lumbar L5-S1 (N/A)    Patient location: Other: pain    Anesthesia Type: MAC    Transport from OR: Transported from OR on 2-3 L/min O2 by NC with adequate spontaneous ventilation    Post pain: adequate analgesia    Post assessment: no apparent anesthetic complications    Post vital signs: stable    Level of consciousness: responds to stimulation    Nausea/Vomiting: no nausea/vomiting    Complications: none    Transfer of care protocol was followed      Last vitals: Visit Vitals  /87 (BP Location: Right arm, Patient Position: Sitting)   Pulse 70   Temp 36.9 °C (98.4 °F) (Oral)   Resp 18   Ht 5' 6" (1.676 m)   Wt 122.9 kg (271 lb)   SpO2 100%   BMI 43.74 kg/m²     "

## 2025-07-10 NOTE — ANESTHESIA RELEASE NOTE
"Anesthesia Release from PACU Note    Patient: Stuart Cloud    Procedure(s) Performed: Procedure(s) (LRB):  Injection-steroid-epidural-lumbar L5-S1 (N/A)    Anesthesia type: MAC    Post pain: Adequate analgesia    Post assessment: no apparent anesthetic complications    Last Vitals: Visit Vitals  /74   Pulse (!) 59   Temp 36.7 °C (98 °F)   Resp (!) 9   Ht 5' 6" (1.676 m)   Wt 122.9 kg (271 lb)   SpO2 99%   BMI 43.74 kg/m²       Post vital signs: stable    Level of consciousness: awake and alert     Nausea/Vomiting: no nausea/no vomiting    Complications: none    Airway Patency: patent    Respiratory: unassisted    Cardiovascular: stable and blood pressure at baseline    Hydration: euvolemic  "

## 2025-07-10 NOTE — OP NOTE
"Procedure Note    Procedure Date: 7/10/2025    Procedure Performed:  Lumbar interlaminar epidural steroid injection under fluoroscopy at L 5-S1    Indications: Patient failed conservative therapy.      Pre-op diagnosis: Lumbar Radiculopathy     Post-op diagnosis: same    Physician: Leny Lang MD    Anesthesia: MAC    Medications injected: Kenalog 40mg,  2 mL sterile preservative-free normal saline.    Local anesthetic used: 1% Lidocaine,  3 ml    Estimated Blood Loss:  none    Complications:   none    Technique:  The patient was interviewed in the holding area and Risks/Benefits were discussed, including, but not limited to, the possibility of new or different pain, bleeding or infection.   All questions were answered.  The patient understood and accepted risks.  Consent was verified and signed.   A time-out was taken to identify patient and procedure prior to starting the procedure. The patient was placed in the prone position on the fluoroscopy table. The area of the lumbar spine was prepped with Chloraprep and draped in a sterile manner. The L 5-S1  interspace was identified and marked under AP fluoroscopy. The skin and subcutaneous tissues overlying the targeted interspace were anesthetized with 3-5 mL of 1% lidocaine using a 25G 1.5" needle.  A 20G  3.5" Tuohy epidural needle was directed toward the interspace under fluoroscopic guidance until the ligamentum flavum was engaged. From this point, a loss of resistance technique with a pulsator syringe a was used to identify entrance of the needle into the epidural space. Once loss of resistance was observed 3mL of Isovue contrast solution was injected. An appropriate epidurogram was noted.  A 3mL mixture consisting of saline and 40 mg of kenalog was injected slowly and without resistance.  The needle was  removed and a sterile Band-Aid dressing was applied to the puncture site.  The patient tolerated the procedure well and [facility].  The patient was monitored after the procedure and was given post-procedure and discharge instructions to follow at home. The patient was discharged in a stable condition and accompanied by an adult .    Epidurogram:5 mL allotment of Isovue M 300 contrast revealed excellent delineation from L4- S1. There were no filling defects or obstruction to dye flow noted.  There was  no  intravascular or intrathecal spread noted with dye flow.

## 2025-08-22 ENCOUNTER — OFFICE VISIT (OUTPATIENT)
Dept: FAMILY MEDICINE | Facility: CLINIC | Age: 55
End: 2025-08-22
Payer: COMMERCIAL

## 2025-08-22 VITALS
DIASTOLIC BLOOD PRESSURE: 81 MMHG | BODY MASS INDEX: 43.55 KG/M2 | WEIGHT: 271 LBS | HEIGHT: 66 IN | SYSTOLIC BLOOD PRESSURE: 125 MMHG | HEART RATE: 84 BPM

## 2025-08-22 DIAGNOSIS — J02.0 STREP PHARYNGITIS: ICD-10-CM

## 2025-08-22 DIAGNOSIS — J02.9 SORE THROAT: Primary | ICD-10-CM

## 2025-08-22 LAB
CTP QC/QA: YES
MOLECULAR STREP A: POSITIVE

## 2025-08-22 RX ORDER — PREDNISONE 20 MG/1
20 TABLET ORAL DAILY
Qty: 5 TABLET | Refills: 0 | Status: SHIPPED | OUTPATIENT
Start: 2025-08-22

## 2025-08-22 RX ORDER — LIDOCAINE HYDROCHLORIDE 20 MG/ML
5 SOLUTION OROPHARYNGEAL
Qty: 100 ML | Refills: 0 | Status: SHIPPED | OUTPATIENT
Start: 2025-08-22

## 2025-08-22 RX ORDER — AMOXICILLIN AND CLAVULANATE POTASSIUM 875; 125 MG/1; MG/1
1 TABLET, FILM COATED ORAL EVERY 12 HOURS
Qty: 20 TABLET | Refills: 0 | Status: SHIPPED | OUTPATIENT
Start: 2025-08-22

## 2025-08-22 RX ORDER — FLUCONAZOLE 150 MG/1
150 TABLET ORAL
Qty: 2 TABLET | Refills: 0 | Status: SHIPPED | OUTPATIENT
Start: 2025-08-22 | End: 2025-09-05

## (undated) DEVICE — CATH IV INTROCAN 24G X 3/4

## (undated) DEVICE — KIT PROCEDURE STER INLET CLOSU

## (undated) DEVICE — GLOVE SENSICARE PI SURG 6.5

## (undated) DEVICE — GLOVE SENSICARE PI SURG 8

## (undated) DEVICE — KIT IV START

## (undated) DEVICE — NDL SPINAL CLR HUB 22GX4 3/4

## (undated) DEVICE — SET EXT STD BORE CATH 7.6IN

## (undated) DEVICE — GLOVE BIOGEL SKINSENSE PI 6.5

## (undated) DEVICE — FORCEP FENESTRATED BIPOLAR

## (undated) DEVICE — GLOVE SENSICARE PI SURG 6

## (undated) DEVICE — KIT IV START RUSH

## (undated) DEVICE — COVER PROXIMA MAYO STAND

## (undated) DEVICE — GLOVE 7.0 PROTEXIS PI BLUE

## (undated) DEVICE — SHAVER TOMCAT 4.0

## (undated) DEVICE — TRAY NERVE BLOCK UNIV 10/CA

## (undated) DEVICE — SET EXTENSION CLEARLINK 2INJ

## (undated) DEVICE — LINER SUCTION CANISTER (DISP)

## (undated) DEVICE — SOLIDIFIER BTL W/TREAT 1500CC

## (undated) DEVICE — STRIP MEDI WND CLSR 1/2X4IN

## (undated) DEVICE — DRAPE ARM DAVINCI XI

## (undated) DEVICE — TIP SUCTION YANKAUER

## (undated) DEVICE — CATH INTROCAN SAF 2 IV 22GX1IN

## (undated) DEVICE — APPLIER MEDIUM LARGE CLIP

## (undated) DEVICE — SYR 30CC LUER LOCK

## (undated) DEVICE — SUT 0 VICRYL / UR6 (J603)

## (undated) DEVICE — SOL NACL IRR 3000ML

## (undated) DEVICE — APPLICATOR CHLORAPREP ORN 26ML

## (undated) DEVICE — CATH IV 22G X 1 AUTOGUARD

## (undated) DEVICE — OBTURATOR BLDLESS 8MM LONG XI

## (undated) DEVICE — TUBING MEDI-VAC 20FT .25IN

## (undated) DEVICE — GLOVE BIOGEL SKINSENSE PI 8.5

## (undated) DEVICE — CLIP HEMO-LOK ML

## (undated) DEVICE — GLOVE SENSICARE PI GRN 7

## (undated) DEVICE — SLIPPER FALL PREV YEL BARIAT

## (undated) DEVICE — NDL TUOHY EPIDURAL 20GX3.5IN

## (undated) DEVICE — CATH IV INTROCAN 22G X 1

## (undated) DEVICE — WARMER BLUE HEAT SCOPE 3-12MM

## (undated) DEVICE — SLIPPER FALL PREV YELLOW XLG

## (undated) DEVICE — SYR EPILOR LUER-LOK LOR 7ML

## (undated) DEVICE — GOWN NONREINF SET-IN SLV 2XL

## (undated) DEVICE — HOOK PERM MONOPOLAR CAUTERY

## (undated) DEVICE — CANISTER SUCTION MEDI-VAC 12L

## (undated) DEVICE — TROCAR ENDO Z THREAD 12X100MM

## (undated) DEVICE — SUT MONOCYRL 4-0 PS2 UND

## (undated) DEVICE — SOL CONTINU-FLO SET 2 LAV

## (undated) DEVICE — GLOVE 8 PROTEXIS PI BLUE

## (undated) DEVICE — TUBING CROSSFLOW INFLOW CASS

## (undated) DEVICE — SEAL UNIVERSAL 5MM-8MM XI

## (undated) DEVICE — GLOVE PROTEXIS PI SYN SURG 6.5

## (undated) DEVICE — SET PNEUMOCLEAR HEAT HUM SE HF

## (undated) DEVICE — GLOVE 8.5 PROTEXIS PI BLUE

## (undated) DEVICE — GOWN POLY REINF BRTH SLV XL

## (undated) DEVICE — SPONGE GZ WOVEN 12-PLY 4X4IN

## (undated) DEVICE — ADHESIVE MASTISOL VIAL 48/BX

## (undated) DEVICE — GLOVE BIOGEL SKINSENSE PI 7.0

## (undated) DEVICE — SPONGE GAUZE 16PLY 4X4

## (undated) DEVICE — SOL NACL IRR 1000ML BTL

## (undated) DEVICE — BANDAGE ACE DOUBLE STER 6IN

## (undated) DEVICE — GLOVE 6.5 PROTEXIS PI BLUE

## (undated) DEVICE — TOWEL OR DISP STRL BLUE 4/PK

## (undated) DEVICE — PACK KNEE ARTHROSCOPY  RUSH

## (undated) DEVICE — NDL SPINAL SPINOCAN 22GX3.5

## (undated) DEVICE — Device

## (undated) DEVICE — BAG TISS RETRV MONARCH 10MM

## (undated) DEVICE — GLOVE SENSICARE PI GRN 6.5

## (undated) DEVICE — NDL INSUFFLATION VERRES 120MM

## (undated) DEVICE — TOURNIQUET SB QC SP 34X4IN